# Patient Record
Sex: MALE | Race: WHITE | NOT HISPANIC OR LATINO | URBAN - METROPOLITAN AREA
[De-identification: names, ages, dates, MRNs, and addresses within clinical notes are randomized per-mention and may not be internally consistent; named-entity substitution may affect disease eponyms.]

---

## 2020-01-01 ENCOUNTER — INPATIENT (INPATIENT)
Facility: HOSPITAL | Age: 65
LOS: 26 days | End: 2020-08-12
Attending: INTERNAL MEDICINE | Admitting: INTERNAL MEDICINE
Payer: MEDICARE

## 2020-01-01 VITALS
HEART RATE: 108 BPM | OXYGEN SATURATION: 95 % | DIASTOLIC BLOOD PRESSURE: 71 MMHG | SYSTOLIC BLOOD PRESSURE: 138 MMHG | RESPIRATION RATE: 16 BRPM | TEMPERATURE: 97 F | WEIGHT: 186.29 LBS

## 2020-01-01 VITALS
RESPIRATION RATE: 26 BRPM | TEMPERATURE: 95 F | HEART RATE: 60 BPM | SYSTOLIC BLOOD PRESSURE: 69 MMHG | DIASTOLIC BLOOD PRESSURE: 36 MMHG

## 2020-01-01 DIAGNOSIS — I60.8 OTHER NONTRAUMATIC SUBARACHNOID HEMORRHAGE: ICD-10-CM

## 2020-01-01 DIAGNOSIS — R40.2122 COMA SCALE, EYES OPEN, TO PAIN, AT ARRIVAL TO EMERGENCY DEPARTMENT: ICD-10-CM

## 2020-01-01 DIAGNOSIS — J96.01 ACUTE RESPIRATORY FAILURE WITH HYPOXIA: ICD-10-CM

## 2020-01-01 DIAGNOSIS — G91.1 OBSTRUCTIVE HYDROCEPHALUS: ICD-10-CM

## 2020-01-01 DIAGNOSIS — E11.22 TYPE 2 DIABETES MELLITUS WITH DIABETIC CHRONIC KIDNEY DISEASE: ICD-10-CM

## 2020-01-01 DIAGNOSIS — R40.2342 COMA SCALE, BEST MOTOR RESPONSE, FLEXION WITHDRAWAL, AT ARRIVAL TO EMERGENCY DEPARTMENT: ICD-10-CM

## 2020-01-01 DIAGNOSIS — R53.2 FUNCTIONAL QUADRIPLEGIA: ICD-10-CM

## 2020-01-01 DIAGNOSIS — I10 ESSENTIAL (PRIMARY) HYPERTENSION: ICD-10-CM

## 2020-01-01 DIAGNOSIS — N17.0 ACUTE KIDNEY FAILURE WITH TUBULAR NECROSIS: ICD-10-CM

## 2020-01-01 DIAGNOSIS — E78.5 HYPERLIPIDEMIA, UNSPECIFIED: ICD-10-CM

## 2020-01-01 DIAGNOSIS — E11.65 TYPE 2 DIABETES MELLITUS WITH HYPERGLYCEMIA: ICD-10-CM

## 2020-01-01 DIAGNOSIS — A04.72 ENTEROCOLITIS DUE TO CLOSTRIDIUM DIFFICILE, NOT SPECIFIED AS RECURRENT: ICD-10-CM

## 2020-01-01 DIAGNOSIS — R65.21 SEVERE SEPSIS WITH SEPTIC SHOCK: ICD-10-CM

## 2020-01-01 DIAGNOSIS — A41.51 SEPSIS DUE TO ESCHERICHIA COLI [E. COLI]: ICD-10-CM

## 2020-01-01 DIAGNOSIS — G93.6 CEREBRAL EDEMA: ICD-10-CM

## 2020-01-01 DIAGNOSIS — G50.0 TRIGEMINAL NEURALGIA: ICD-10-CM

## 2020-01-01 DIAGNOSIS — J95.851 VENTILATOR ASSOCIATED PNEUMONIA: ICD-10-CM

## 2020-01-01 DIAGNOSIS — G93.5 COMPRESSION OF BRAIN: ICD-10-CM

## 2020-01-01 DIAGNOSIS — R27.8 OTHER LACK OF COORDINATION: ICD-10-CM

## 2020-01-01 DIAGNOSIS — R47.01 APHASIA: ICD-10-CM

## 2020-01-01 DIAGNOSIS — I46.9 CARDIAC ARREST, CAUSE UNSPECIFIED: ICD-10-CM

## 2020-01-01 DIAGNOSIS — I63.02 CEREBRAL INFARCTION DUE TO THROMBOSIS OF BASILAR ARTERY: ICD-10-CM

## 2020-01-01 DIAGNOSIS — E87.2 ACIDOSIS: ICD-10-CM

## 2020-01-01 DIAGNOSIS — I95.9 HYPOTENSION, UNSPECIFIED: ICD-10-CM

## 2020-01-01 DIAGNOSIS — Z79.84 LONG TERM (CURRENT) USE OF ORAL HYPOGLYCEMIC DRUGS: ICD-10-CM

## 2020-01-01 DIAGNOSIS — R29.810 FACIAL WEAKNESS: ICD-10-CM

## 2020-01-01 DIAGNOSIS — I67.1 CEREBRAL ANEURYSM, NONRUPTURED: ICD-10-CM

## 2020-01-01 DIAGNOSIS — R40.2222 COMA SCALE, BEST VERBAL RESPONSE, INCOMPREHENSIBLE WORDS, AT ARRIVAL TO EMERGENCY DEPARTMENT: ICD-10-CM

## 2020-01-01 DIAGNOSIS — E23.2 DIABETES INSIPIDUS: ICD-10-CM

## 2020-01-01 DIAGNOSIS — R29.709 NIHSS SCORE 9: ICD-10-CM

## 2020-01-01 LAB
-  AMIKACIN: SIGNIFICANT CHANGE UP
-  AMOXICILLIN/CLAVULANIC ACID: SIGNIFICANT CHANGE UP
-  AMPICILLIN/SULBACTAM: SIGNIFICANT CHANGE UP
-  AMPICILLIN/SULBACTAM: SIGNIFICANT CHANGE UP
-  AMPICILLIN: SIGNIFICANT CHANGE UP
-  AMPICILLIN: SIGNIFICANT CHANGE UP
-  AZTREONAM: SIGNIFICANT CHANGE UP
-  CANDIDA ALBICANS: SIGNIFICANT CHANGE UP
-  CANDIDA GLABRATA: SIGNIFICANT CHANGE UP
-  CANDIDA KRUSEI: SIGNIFICANT CHANGE UP
-  CANDIDA PARAPSILOSIS: SIGNIFICANT CHANGE UP
-  CANDIDA TROPICALIS: SIGNIFICANT CHANGE UP
-  CEFAZOLIN: SIGNIFICANT CHANGE UP
-  CEFAZOLIN: SIGNIFICANT CHANGE UP
-  CEFEPIME: SIGNIFICANT CHANGE UP
-  CEFOXITIN: SIGNIFICANT CHANGE UP
-  CEFOXITIN: SIGNIFICANT CHANGE UP
-  CEFTAZIDIME: SIGNIFICANT CHANGE UP
-  CEFTRIAXONE: SIGNIFICANT CHANGE UP
-  CEFTRIAXONE: SIGNIFICANT CHANGE UP
-  CIPROFLOXACIN: SIGNIFICANT CHANGE UP
-  COAGULASE NEGATIVE STAPHYLOCOCCUS: SIGNIFICANT CHANGE UP
-  ERTAPENEM: SIGNIFICANT CHANGE UP
-  ERTAPENEM: SIGNIFICANT CHANGE UP
-  GENTAMICIN: SIGNIFICANT CHANGE UP
-  IMIPENEM: SIGNIFICANT CHANGE UP
-  IMIPENEM: SIGNIFICANT CHANGE UP
-  K. PNEUMONIAE GROUP: SIGNIFICANT CHANGE UP
-  KPC RESISTANCE GENE: SIGNIFICANT CHANGE UP
-  LEVOFLOXACIN: SIGNIFICANT CHANGE UP
-  MEROPENEM: SIGNIFICANT CHANGE UP
-  NITROFURANTOIN: SIGNIFICANT CHANGE UP
-  PIPERACILLIN/TAZOBACTAM: SIGNIFICANT CHANGE UP
-  STREPTOCOCCUS SP. (NOT GRP A, B OR S PNEUMONIAE): SIGNIFICANT CHANGE UP
-  TIGECYCLINE: SIGNIFICANT CHANGE UP
-  TOBRAMYCIN: SIGNIFICANT CHANGE UP
-  TRIMETHOPRIM/SULFAMETHOXAZOLE: SIGNIFICANT CHANGE UP
-  TRIMETHOPRIM/SULFAMETHOXAZOLE: SIGNIFICANT CHANGE UP
A BAUMANNII DNA SPEC QL NAA+PROBE: SIGNIFICANT CHANGE UP
A1C WITH ESTIMATED AVERAGE GLUCOSE RESULT: 7.5 % — HIGH (ref 4–5.6)
ALBUMIN SERPL ELPH-MCNC: 1 G/DL — LOW (ref 3.5–5.2)
ALBUMIN SERPL ELPH-MCNC: 1.1 G/DL — LOW (ref 3.5–5.2)
ALBUMIN SERPL ELPH-MCNC: 1.4 G/DL — LOW (ref 3.5–5.2)
ALBUMIN SERPL ELPH-MCNC: 1.5 G/DL — LOW (ref 3.5–5.2)
ALBUMIN SERPL ELPH-MCNC: 1.6 G/DL — LOW (ref 3.5–5.2)
ALBUMIN SERPL ELPH-MCNC: 1.8 G/DL — LOW (ref 3.5–5.2)
ALBUMIN SERPL ELPH-MCNC: 2 G/DL — LOW (ref 3.5–5.2)
ALBUMIN SERPL ELPH-MCNC: 2.1 G/DL — LOW (ref 3.5–5.2)
ALBUMIN SERPL ELPH-MCNC: 2.3 G/DL — LOW (ref 3.5–5.2)
ALBUMIN SERPL ELPH-MCNC: 2.3 G/DL — LOW (ref 3.5–5.2)
ALBUMIN SERPL ELPH-MCNC: 2.5 G/DL — LOW (ref 3.5–5.2)
ALBUMIN SERPL ELPH-MCNC: 2.5 G/DL — LOW (ref 3.5–5.2)
ALBUMIN SERPL ELPH-MCNC: 3 G/DL — LOW (ref 3.5–5.2)
ALBUMIN SERPL ELPH-MCNC: 3.1 G/DL — LOW (ref 3.5–5.2)
ALBUMIN SERPL ELPH-MCNC: 3.1 G/DL — LOW (ref 3.5–5.2)
ALBUMIN SERPL ELPH-MCNC: 3.2 G/DL — LOW (ref 3.5–5.2)
ALBUMIN SERPL ELPH-MCNC: 3.2 G/DL — LOW (ref 3.5–5.2)
ALBUMIN SERPL ELPH-MCNC: 3.3 G/DL — LOW (ref 3.5–5.2)
ALBUMIN SERPL ELPH-MCNC: 3.4 G/DL — LOW (ref 3.5–5.2)
ALBUMIN SERPL ELPH-MCNC: 3.5 G/DL — SIGNIFICANT CHANGE UP (ref 3.5–5.2)
ALBUMIN SERPL ELPH-MCNC: 3.5 G/DL — SIGNIFICANT CHANGE UP (ref 3.5–5.2)
ALBUMIN SERPL ELPH-MCNC: 4 G/DL — SIGNIFICANT CHANGE UP (ref 3.5–5.2)
ALBUMIN SERPL ELPH-MCNC: 4.5 G/DL — SIGNIFICANT CHANGE UP (ref 3.5–5.2)
ALBUMIN SERPL ELPH-MCNC: 4.7 G/DL — SIGNIFICANT CHANGE UP (ref 3.5–5.2)
ALP SERPL-CCNC: 104 U/L — SIGNIFICANT CHANGE UP (ref 30–115)
ALP SERPL-CCNC: 137 U/L — HIGH (ref 30–115)
ALP SERPL-CCNC: 149 U/L — HIGH (ref 30–115)
ALP SERPL-CCNC: 155 U/L — HIGH (ref 30–115)
ALP SERPL-CCNC: 168 U/L — HIGH (ref 30–115)
ALP SERPL-CCNC: 35 U/L — SIGNIFICANT CHANGE UP (ref 30–115)
ALP SERPL-CCNC: 36 U/L — SIGNIFICANT CHANGE UP (ref 30–115)
ALP SERPL-CCNC: 36 U/L — SIGNIFICANT CHANGE UP (ref 30–115)
ALP SERPL-CCNC: 37 U/L — SIGNIFICANT CHANGE UP (ref 30–115)
ALP SERPL-CCNC: 38 U/L — SIGNIFICANT CHANGE UP (ref 30–115)
ALP SERPL-CCNC: 41 U/L — SIGNIFICANT CHANGE UP (ref 30–115)
ALP SERPL-CCNC: 43 U/L — SIGNIFICANT CHANGE UP (ref 30–115)
ALP SERPL-CCNC: 43 U/L — SIGNIFICANT CHANGE UP (ref 30–115)
ALP SERPL-CCNC: 45 U/L — SIGNIFICANT CHANGE UP (ref 30–115)
ALP SERPL-CCNC: 52 U/L — SIGNIFICANT CHANGE UP (ref 30–115)
ALP SERPL-CCNC: 54 U/L — SIGNIFICANT CHANGE UP (ref 30–115)
ALP SERPL-CCNC: 56 U/L — SIGNIFICANT CHANGE UP (ref 30–115)
ALP SERPL-CCNC: 68 U/L — SIGNIFICANT CHANGE UP (ref 30–115)
ALP SERPL-CCNC: 70 U/L — SIGNIFICANT CHANGE UP (ref 30–115)
ALP SERPL-CCNC: 75 U/L — SIGNIFICANT CHANGE UP (ref 30–115)
ALP SERPL-CCNC: 78 U/L — SIGNIFICANT CHANGE UP (ref 30–115)
ALP SERPL-CCNC: 79 U/L — SIGNIFICANT CHANGE UP (ref 30–115)
ALP SERPL-CCNC: 81 U/L — SIGNIFICANT CHANGE UP (ref 30–115)
ALP SERPL-CCNC: 86 U/L — SIGNIFICANT CHANGE UP (ref 30–115)
ALP SERPL-CCNC: 95 U/L — SIGNIFICANT CHANGE UP (ref 30–115)
ALP SERPL-CCNC: 95 U/L — SIGNIFICANT CHANGE UP (ref 30–115)
ALP SERPL-CCNC: 96 U/L — SIGNIFICANT CHANGE UP (ref 30–115)
ALP SERPL-CCNC: 96 U/L — SIGNIFICANT CHANGE UP (ref 30–115)
ALP SERPL-CCNC: 99 U/L — SIGNIFICANT CHANGE UP (ref 30–115)
ALT FLD-CCNC: 11 U/L — SIGNIFICANT CHANGE UP (ref 0–41)
ALT FLD-CCNC: 11 U/L — SIGNIFICANT CHANGE UP (ref 0–41)
ALT FLD-CCNC: 12 U/L — SIGNIFICANT CHANGE UP (ref 0–41)
ALT FLD-CCNC: 15 U/L — SIGNIFICANT CHANGE UP (ref 0–41)
ALT FLD-CCNC: 15 U/L — SIGNIFICANT CHANGE UP (ref 0–41)
ALT FLD-CCNC: 18 U/L — SIGNIFICANT CHANGE UP (ref 0–41)
ALT FLD-CCNC: 19 U/L — SIGNIFICANT CHANGE UP (ref 0–41)
ALT FLD-CCNC: 20 U/L — SIGNIFICANT CHANGE UP (ref 0–41)
ALT FLD-CCNC: 21 U/L — SIGNIFICANT CHANGE UP (ref 0–41)
ALT FLD-CCNC: 21 U/L — SIGNIFICANT CHANGE UP (ref 0–41)
ALT FLD-CCNC: 22 U/L — SIGNIFICANT CHANGE UP (ref 0–41)
ALT FLD-CCNC: 23 U/L — SIGNIFICANT CHANGE UP (ref 0–41)
ALT FLD-CCNC: 23 U/L — SIGNIFICANT CHANGE UP (ref 0–41)
ALT FLD-CCNC: 24 U/L — SIGNIFICANT CHANGE UP (ref 0–41)
ALT FLD-CCNC: 25 U/L — SIGNIFICANT CHANGE UP (ref 0–41)
ALT FLD-CCNC: 25 U/L — SIGNIFICANT CHANGE UP (ref 0–41)
ALT FLD-CCNC: 26 U/L — SIGNIFICANT CHANGE UP (ref 0–41)
ALT FLD-CCNC: 27 U/L — SIGNIFICANT CHANGE UP (ref 0–41)
ALT FLD-CCNC: 35 U/L — SIGNIFICANT CHANGE UP (ref 0–41)
ALT FLD-CCNC: 36 U/L — SIGNIFICANT CHANGE UP (ref 0–41)
ALT FLD-CCNC: 38 U/L — SIGNIFICANT CHANGE UP (ref 0–41)
ALT FLD-CCNC: 6 U/L — SIGNIFICANT CHANGE UP (ref 0–41)
ALT FLD-CCNC: 7 U/L — SIGNIFICANT CHANGE UP (ref 0–41)
AMMONIA BLD-MCNC: 22 UMOL/L — SIGNIFICANT CHANGE UP (ref 11–55)
ANION GAP SERPL CALC-SCNC: 10 MMOL/L — SIGNIFICANT CHANGE UP (ref 7–14)
ANION GAP SERPL CALC-SCNC: 11 MMOL/L — SIGNIFICANT CHANGE UP (ref 7–14)
ANION GAP SERPL CALC-SCNC: 12 MMOL/L — SIGNIFICANT CHANGE UP (ref 7–14)
ANION GAP SERPL CALC-SCNC: 13 MMOL/L — SIGNIFICANT CHANGE UP (ref 7–14)
ANION GAP SERPL CALC-SCNC: 14 MMOL/L — SIGNIFICANT CHANGE UP (ref 7–14)
ANION GAP SERPL CALC-SCNC: 15 MMOL/L — HIGH (ref 7–14)
ANION GAP SERPL CALC-SCNC: 16 MMOL/L — HIGH (ref 7–14)
ANION GAP SERPL CALC-SCNC: 17 MMOL/L — HIGH (ref 7–14)
ANION GAP SERPL CALC-SCNC: 18 MMOL/L — HIGH (ref 7–14)
ANION GAP SERPL CALC-SCNC: 19 MMOL/L — HIGH (ref 7–14)
ANION GAP SERPL CALC-SCNC: 7 MMOL/L — SIGNIFICANT CHANGE UP (ref 7–14)
ANION GAP SERPL CALC-SCNC: 8 MMOL/L — SIGNIFICANT CHANGE UP (ref 7–14)
ANION GAP SERPL CALC-SCNC: 9 MMOL/L — SIGNIFICANT CHANGE UP (ref 7–14)
ANISOCYTOSIS BLD QL: SLIGHT — SIGNIFICANT CHANGE UP
APPEARANCE UR: ABNORMAL
APPEARANCE UR: CLEAR — SIGNIFICANT CHANGE UP
APTT BLD: 23.8 SEC — CRITICAL LOW (ref 27–39.2)
APTT BLD: 26.1 SEC — LOW (ref 27–39.2)
APTT BLD: 35.8 SEC — SIGNIFICANT CHANGE UP (ref 27–39.2)
APTT BLD: 36.3 SEC — SIGNIFICANT CHANGE UP (ref 27–39.2)
APTT BLD: 40.7 SEC — HIGH (ref 27–39.2)
AST SERPL-CCNC: 129 U/L — HIGH (ref 0–41)
AST SERPL-CCNC: 138 U/L — HIGH (ref 0–41)
AST SERPL-CCNC: 23 U/L — SIGNIFICANT CHANGE UP (ref 0–41)
AST SERPL-CCNC: 24 U/L — SIGNIFICANT CHANGE UP (ref 0–41)
AST SERPL-CCNC: 26 U/L — SIGNIFICANT CHANGE UP (ref 0–41)
AST SERPL-CCNC: 26 U/L — SIGNIFICANT CHANGE UP (ref 0–41)
AST SERPL-CCNC: 28 U/L — SIGNIFICANT CHANGE UP (ref 0–41)
AST SERPL-CCNC: 29 U/L — SIGNIFICANT CHANGE UP (ref 0–41)
AST SERPL-CCNC: 31 U/L — SIGNIFICANT CHANGE UP (ref 0–41)
AST SERPL-CCNC: 34 U/L — SIGNIFICANT CHANGE UP (ref 0–41)
AST SERPL-CCNC: 34 U/L — SIGNIFICANT CHANGE UP (ref 0–41)
AST SERPL-CCNC: 35 U/L — SIGNIFICANT CHANGE UP (ref 0–41)
AST SERPL-CCNC: 36 U/L — SIGNIFICANT CHANGE UP (ref 0–41)
AST SERPL-CCNC: 36 U/L — SIGNIFICANT CHANGE UP (ref 0–41)
AST SERPL-CCNC: 40 U/L — SIGNIFICANT CHANGE UP (ref 0–41)
AST SERPL-CCNC: 44 U/L — HIGH (ref 0–41)
AST SERPL-CCNC: 46 U/L — HIGH (ref 0–41)
AST SERPL-CCNC: 48 U/L — HIGH (ref 0–41)
AST SERPL-CCNC: 55 U/L — HIGH (ref 0–41)
AST SERPL-CCNC: 59 U/L — HIGH (ref 0–41)
AST SERPL-CCNC: 68 U/L — HIGH (ref 0–41)
AST SERPL-CCNC: 71 U/L — HIGH (ref 0–41)
AST SERPL-CCNC: 73 U/L — HIGH (ref 0–41)
AST SERPL-CCNC: 75 U/L — HIGH (ref 0–41)
AST SERPL-CCNC: 75 U/L — HIGH (ref 0–41)
AST SERPL-CCNC: 78 U/L — HIGH (ref 0–41)
AST SERPL-CCNC: 84 U/L — HIGH (ref 0–41)
AST SERPL-CCNC: 86 U/L — HIGH (ref 0–41)
AST SERPL-CCNC: 92 U/L — HIGH (ref 0–41)
BACTERIA # UR AUTO: NEGATIVE — SIGNIFICANT CHANGE UP
BACTERIA # UR AUTO: NEGATIVE — SIGNIFICANT CHANGE UP
BASE EXCESS BLDA CALC-SCNC: -0.9 MMOL/L — SIGNIFICANT CHANGE UP (ref -2–2)
BASE EXCESS BLDA CALC-SCNC: -1.1 MMOL/L — SIGNIFICANT CHANGE UP (ref -2–2)
BASE EXCESS BLDA CALC-SCNC: -1.8 MMOL/L — SIGNIFICANT CHANGE UP (ref -2–2)
BASE EXCESS BLDA CALC-SCNC: -2.2 MMOL/L — LOW (ref -2–2)
BASE EXCESS BLDA CALC-SCNC: -4.3 MMOL/L — LOW (ref -2–2)
BASE EXCESS BLDV CALC-SCNC: -2.7 MMOL/L — LOW (ref -2–2)
BASE EXCESS BLDV CALC-SCNC: 1.7 MMOL/L — SIGNIFICANT CHANGE UP (ref -2–2)
BASOPHILS # BLD AUTO: 0 K/UL — SIGNIFICANT CHANGE UP (ref 0–0.2)
BASOPHILS # BLD AUTO: 0.01 K/UL — SIGNIFICANT CHANGE UP (ref 0–0.2)
BASOPHILS # BLD AUTO: 0.02 K/UL — SIGNIFICANT CHANGE UP (ref 0–0.2)
BASOPHILS # BLD AUTO: 0.03 K/UL — SIGNIFICANT CHANGE UP (ref 0–0.2)
BASOPHILS # BLD AUTO: 0.03 K/UL — SIGNIFICANT CHANGE UP (ref 0–0.2)
BASOPHILS # BLD AUTO: 0.06 K/UL — SIGNIFICANT CHANGE UP (ref 0–0.2)
BASOPHILS NFR BLD AUTO: 0 % — SIGNIFICANT CHANGE UP (ref 0–1)
BASOPHILS NFR BLD AUTO: 0.1 % — SIGNIFICANT CHANGE UP (ref 0–1)
BASOPHILS NFR BLD AUTO: 0.2 % — SIGNIFICANT CHANGE UP (ref 0–1)
BASOPHILS NFR BLD AUTO: 0.4 % — SIGNIFICANT CHANGE UP (ref 0–1)
BILIRUB SERPL-MCNC: 0.2 MG/DL — SIGNIFICANT CHANGE UP (ref 0.2–1.2)
BILIRUB SERPL-MCNC: 0.3 MG/DL — SIGNIFICANT CHANGE UP (ref 0.2–1.2)
BILIRUB SERPL-MCNC: 0.4 MG/DL — SIGNIFICANT CHANGE UP (ref 0.2–1.2)
BILIRUB SERPL-MCNC: 0.5 MG/DL — SIGNIFICANT CHANGE UP (ref 0.2–1.2)
BILIRUB SERPL-MCNC: 0.5 MG/DL — SIGNIFICANT CHANGE UP (ref 0.2–1.2)
BILIRUB SERPL-MCNC: 0.6 MG/DL — SIGNIFICANT CHANGE UP (ref 0.2–1.2)
BILIRUB SERPL-MCNC: 0.6 MG/DL — SIGNIFICANT CHANGE UP (ref 0.2–1.2)
BILIRUB SERPL-MCNC: 0.8 MG/DL — SIGNIFICANT CHANGE UP (ref 0.2–1.2)
BILIRUB SERPL-MCNC: 0.9 MG/DL — SIGNIFICANT CHANGE UP (ref 0.2–1.2)
BILIRUB SERPL-MCNC: 1 MG/DL — SIGNIFICANT CHANGE UP (ref 0.2–1.2)
BILIRUB SERPL-MCNC: 1.1 MG/DL — SIGNIFICANT CHANGE UP (ref 0.2–1.2)
BILIRUB SERPL-MCNC: 1.2 MG/DL — SIGNIFICANT CHANGE UP (ref 0.2–1.2)
BILIRUB SERPL-MCNC: 1.3 MG/DL — HIGH (ref 0.2–1.2)
BILIRUB SERPL-MCNC: 1.4 MG/DL — HIGH (ref 0.2–1.2)
BILIRUB SERPL-MCNC: 1.6 MG/DL — HIGH (ref 0.2–1.2)
BILIRUB UR-MCNC: NEGATIVE — SIGNIFICANT CHANGE UP
BILIRUB UR-MCNC: NEGATIVE — SIGNIFICANT CHANGE UP
BLD GP AB SCN SERPL QL: SIGNIFICANT CHANGE UP
BLD GP AB SCN SERPL QL: SIGNIFICANT CHANGE UP
BUN SERPL-MCNC: 100 MG/DL — CRITICAL HIGH (ref 10–20)
BUN SERPL-MCNC: 101 MG/DL — CRITICAL HIGH (ref 10–20)
BUN SERPL-MCNC: 103 MG/DL — CRITICAL HIGH (ref 10–20)
BUN SERPL-MCNC: 103 MG/DL — CRITICAL HIGH (ref 10–20)
BUN SERPL-MCNC: 104 MG/DL — CRITICAL HIGH (ref 10–20)
BUN SERPL-MCNC: 108 MG/DL — CRITICAL HIGH (ref 10–20)
BUN SERPL-MCNC: 108 MG/DL — CRITICAL HIGH (ref 10–20)
BUN SERPL-MCNC: 110 MG/DL — CRITICAL HIGH (ref 10–20)
BUN SERPL-MCNC: 16 MG/DL — SIGNIFICANT CHANGE UP (ref 10–20)
BUN SERPL-MCNC: 17 MG/DL — SIGNIFICANT CHANGE UP (ref 10–20)
BUN SERPL-MCNC: 17 MG/DL — SIGNIFICANT CHANGE UP (ref 10–20)
BUN SERPL-MCNC: 18 MG/DL — SIGNIFICANT CHANGE UP (ref 10–20)
BUN SERPL-MCNC: 19 MG/DL — SIGNIFICANT CHANGE UP (ref 10–20)
BUN SERPL-MCNC: 19 MG/DL — SIGNIFICANT CHANGE UP (ref 10–20)
BUN SERPL-MCNC: 20 MG/DL — SIGNIFICANT CHANGE UP (ref 10–20)
BUN SERPL-MCNC: 21 MG/DL — HIGH (ref 10–20)
BUN SERPL-MCNC: 21 MG/DL — HIGH (ref 10–20)
BUN SERPL-MCNC: 22 MG/DL — HIGH (ref 10–20)
BUN SERPL-MCNC: 23 MG/DL — HIGH (ref 10–20)
BUN SERPL-MCNC: 23 MG/DL — HIGH (ref 10–20)
BUN SERPL-MCNC: 24 MG/DL — HIGH (ref 10–20)
BUN SERPL-MCNC: 24 MG/DL — HIGH (ref 10–20)
BUN SERPL-MCNC: 25 MG/DL — HIGH (ref 10–20)
BUN SERPL-MCNC: 25 MG/DL — HIGH (ref 10–20)
BUN SERPL-MCNC: 26 MG/DL — HIGH (ref 10–20)
BUN SERPL-MCNC: 27 MG/DL — HIGH (ref 10–20)
BUN SERPL-MCNC: 28 MG/DL — HIGH (ref 10–20)
BUN SERPL-MCNC: 29 MG/DL — HIGH (ref 10–20)
BUN SERPL-MCNC: 30 MG/DL — HIGH (ref 10–20)
BUN SERPL-MCNC: 34 MG/DL — HIGH (ref 10–20)
BUN SERPL-MCNC: 35 MG/DL — HIGH (ref 10–20)
BUN SERPL-MCNC: 42 MG/DL — HIGH (ref 10–20)
BUN SERPL-MCNC: 44 MG/DL — HIGH (ref 10–20)
BUN SERPL-MCNC: 46 MG/DL — HIGH (ref 10–20)
BUN SERPL-MCNC: 46 MG/DL — HIGH (ref 10–20)
BUN SERPL-MCNC: 52 MG/DL — HIGH (ref 10–20)
BUN SERPL-MCNC: 54 MG/DL — HIGH (ref 10–20)
BUN SERPL-MCNC: 55 MG/DL — HIGH (ref 10–20)
BUN SERPL-MCNC: 62 MG/DL — CRITICAL HIGH (ref 10–20)
BUN SERPL-MCNC: 68 MG/DL — CRITICAL HIGH (ref 10–20)
BUN SERPL-MCNC: 69 MG/DL — CRITICAL HIGH (ref 10–20)
BUN SERPL-MCNC: 7 MG/DL — LOW (ref 10–20)
BUN SERPL-MCNC: 75 MG/DL — CRITICAL HIGH (ref 10–20)
BUN SERPL-MCNC: 76 MG/DL — CRITICAL HIGH (ref 10–20)
BUN SERPL-MCNC: 76 MG/DL — CRITICAL HIGH (ref 10–20)
BUN SERPL-MCNC: 77 MG/DL — CRITICAL HIGH (ref 10–20)
BUN SERPL-MCNC: 78 MG/DL — CRITICAL HIGH (ref 10–20)
BUN SERPL-MCNC: 78 MG/DL — CRITICAL HIGH (ref 10–20)
BUN SERPL-MCNC: 80 MG/DL — CRITICAL HIGH (ref 10–20)
BUN SERPL-MCNC: 81 MG/DL — CRITICAL HIGH (ref 10–20)
BUN SERPL-MCNC: 83 MG/DL — CRITICAL HIGH (ref 10–20)
BUN SERPL-MCNC: 84 MG/DL — CRITICAL HIGH (ref 10–20)
BUN SERPL-MCNC: 87 MG/DL — CRITICAL HIGH (ref 10–20)
BUN SERPL-MCNC: 95 MG/DL — CRITICAL HIGH (ref 10–20)
BUN SERPL-MCNC: 98 MG/DL — CRITICAL HIGH (ref 10–20)
BUN SERPL-MCNC: 99 MG/DL — CRITICAL HIGH (ref 10–20)
BURR CELLS BLD QL SMEAR: PRESENT — SIGNIFICANT CHANGE UP
C DIFF BY PCR RESULT: POSITIVE
C DIFF TOX GENS STL QL NAA+PROBE: SIGNIFICANT CHANGE UP
CA-I SERPL-SCNC: 1.06 MMOL/L — LOW (ref 1.12–1.3)
CA-I SERPL-SCNC: 1.11 MMOL/L — LOW (ref 1.12–1.3)
CALCIUM SERPL-MCNC: 10 MG/DL — SIGNIFICANT CHANGE UP (ref 8.5–10.1)
CALCIUM SERPL-MCNC: 7 MG/DL — LOW (ref 8.5–10.1)
CALCIUM SERPL-MCNC: 7.4 MG/DL — LOW (ref 8.5–10.1)
CALCIUM SERPL-MCNC: 7.4 MG/DL — LOW (ref 8.5–10.1)
CALCIUM SERPL-MCNC: 7.5 MG/DL — LOW (ref 8.5–10.1)
CALCIUM SERPL-MCNC: 7.6 MG/DL — LOW (ref 8.5–10.1)
CALCIUM SERPL-MCNC: 7.7 MG/DL — LOW (ref 8.5–10.1)
CALCIUM SERPL-MCNC: 7.8 MG/DL — LOW (ref 8.5–10.1)
CALCIUM SERPL-MCNC: 7.9 MG/DL — LOW (ref 8.5–10.1)
CALCIUM SERPL-MCNC: 8 MG/DL — LOW (ref 8.5–10.1)
CALCIUM SERPL-MCNC: 8.1 MG/DL — LOW (ref 8.5–10.1)
CALCIUM SERPL-MCNC: 8.2 MG/DL — LOW (ref 8.5–10.1)
CALCIUM SERPL-MCNC: 8.3 MG/DL — LOW (ref 8.5–10.1)
CALCIUM SERPL-MCNC: 8.4 MG/DL — LOW (ref 8.5–10.1)
CALCIUM SERPL-MCNC: 8.5 MG/DL — SIGNIFICANT CHANGE UP (ref 8.5–10.1)
CALCIUM SERPL-MCNC: 8.5 MG/DL — SIGNIFICANT CHANGE UP (ref 8.5–10.1)
CALCIUM SERPL-MCNC: 8.6 MG/DL — SIGNIFICANT CHANGE UP (ref 8.5–10.1)
CALCIUM SERPL-MCNC: 8.7 MG/DL — SIGNIFICANT CHANGE UP (ref 8.5–10.1)
CALCIUM SERPL-MCNC: 8.7 MG/DL — SIGNIFICANT CHANGE UP (ref 8.5–10.1)
CALCIUM SERPL-MCNC: 9.1 MG/DL — SIGNIFICANT CHANGE UP (ref 8.5–10.1)
CHLORIDE SERPL-SCNC: 101 MMOL/L — SIGNIFICANT CHANGE UP (ref 98–110)
CHLORIDE SERPL-SCNC: 103 MMOL/L — SIGNIFICANT CHANGE UP (ref 98–110)
CHLORIDE SERPL-SCNC: 104 MMOL/L — SIGNIFICANT CHANGE UP (ref 98–110)
CHLORIDE SERPL-SCNC: 105 MMOL/L — SIGNIFICANT CHANGE UP (ref 98–110)
CHLORIDE SERPL-SCNC: 106 MMOL/L — SIGNIFICANT CHANGE UP (ref 98–110)
CHLORIDE SERPL-SCNC: 108 MMOL/L — SIGNIFICANT CHANGE UP (ref 98–110)
CHLORIDE SERPL-SCNC: 109 MMOL/L — SIGNIFICANT CHANGE UP (ref 98–110)
CHLORIDE SERPL-SCNC: 109 MMOL/L — SIGNIFICANT CHANGE UP (ref 98–110)
CHLORIDE SERPL-SCNC: 110 MMOL/L — SIGNIFICANT CHANGE UP (ref 98–110)
CHLORIDE SERPL-SCNC: 111 MMOL/L — HIGH (ref 98–110)
CHLORIDE SERPL-SCNC: 113 MMOL/L — HIGH (ref 98–110)
CHLORIDE SERPL-SCNC: 114 MMOL/L — HIGH (ref 98–110)
CHLORIDE SERPL-SCNC: 115 MMOL/L — HIGH (ref 98–110)
CHLORIDE SERPL-SCNC: 115 MMOL/L — HIGH (ref 98–110)
CHLORIDE SERPL-SCNC: 116 MMOL/L — HIGH (ref 98–110)
CHLORIDE SERPL-SCNC: 117 MMOL/L — HIGH (ref 98–110)
CHLORIDE SERPL-SCNC: 117 MMOL/L — HIGH (ref 98–110)
CHLORIDE SERPL-SCNC: 118 MMOL/L — HIGH (ref 98–110)
CHLORIDE SERPL-SCNC: 118 MMOL/L — HIGH (ref 98–110)
CHLORIDE SERPL-SCNC: 119 MMOL/L — HIGH (ref 98–110)
CHLORIDE SERPL-SCNC: 120 MMOL/L — HIGH (ref 98–110)
CHLORIDE SERPL-SCNC: 121 MMOL/L — HIGH (ref 98–110)
CHLORIDE SERPL-SCNC: 122 MMOL/L — HIGH (ref 98–110)
CHLORIDE SERPL-SCNC: 122 MMOL/L — HIGH (ref 98–110)
CHLORIDE SERPL-SCNC: 126 MMOL/L — HIGH (ref 98–110)
CHLORIDE SERPL-SCNC: 127 MMOL/L — HIGH (ref 98–110)
CHLORIDE SERPL-SCNC: 128 MMOL/L — HIGH (ref 98–110)
CHLORIDE SERPL-SCNC: 129 MMOL/L — HIGH (ref 98–110)
CHLORIDE SERPL-SCNC: 131 MMOL/L — HIGH (ref 98–110)
CHLORIDE SERPL-SCNC: 131 MMOL/L — HIGH (ref 98–110)
CHLORIDE SERPL-SCNC: 132 MMOL/L — HIGH (ref 98–110)
CHLORIDE SERPL-SCNC: 134 MMOL/L — HIGH (ref 98–110)
CHLORIDE SERPL-SCNC: 135 MMOL/L — HIGH (ref 98–110)
CHLORIDE SERPL-SCNC: 135 MMOL/L — HIGH (ref 98–110)
CHLORIDE SERPL-SCNC: 136 MMOL/L — HIGH (ref 98–110)
CHLORIDE SERPL-SCNC: 137 MMOL/L — HIGH (ref 98–110)
CHLORIDE SERPL-SCNC: 138 MMOL/L — HIGH (ref 98–110)
CHLORIDE SERPL-SCNC: 139 MMOL/L — HIGH (ref 98–110)
CHLORIDE SERPL-SCNC: 139 MMOL/L — HIGH (ref 98–110)
CHLORIDE SERPL-SCNC: 141 MMOL/L — HIGH (ref 98–110)
CHLORIDE SERPL-SCNC: 143 MMOL/L — HIGH (ref 98–110)
CHLORIDE SERPL-SCNC: 93 MMOL/L — LOW (ref 98–110)
CHLORIDE SERPL-SCNC: 97 MMOL/L — LOW (ref 98–110)
CHOLEST SERPL-MCNC: 146 MG/DL — SIGNIFICANT CHANGE UP (ref 100–200)
CO2 SERPL-SCNC: 14 MMOL/L — LOW (ref 17–32)
CO2 SERPL-SCNC: 15 MMOL/L — LOW (ref 17–32)
CO2 SERPL-SCNC: 16 MMOL/L — LOW (ref 17–32)
CO2 SERPL-SCNC: 17 MMOL/L — SIGNIFICANT CHANGE UP (ref 17–32)
CO2 SERPL-SCNC: 18 MMOL/L — SIGNIFICANT CHANGE UP (ref 17–32)
CO2 SERPL-SCNC: 19 MMOL/L — SIGNIFICANT CHANGE UP (ref 17–32)
CO2 SERPL-SCNC: 20 MMOL/L — SIGNIFICANT CHANGE UP (ref 17–32)
CO2 SERPL-SCNC: 21 MMOL/L — SIGNIFICANT CHANGE UP (ref 17–32)
CO2 SERPL-SCNC: 22 MMOL/L — SIGNIFICANT CHANGE UP (ref 17–32)
CO2 SERPL-SCNC: 23 MMOL/L — SIGNIFICANT CHANGE UP (ref 17–32)
CO2 SERPL-SCNC: 23 MMOL/L — SIGNIFICANT CHANGE UP (ref 17–32)
CO2 SERPL-SCNC: 24 MMOL/L — SIGNIFICANT CHANGE UP (ref 17–32)
CO2 SERPL-SCNC: 28 MMOL/L — SIGNIFICANT CHANGE UP (ref 17–32)
COLOR SPEC: COLORLESS — SIGNIFICANT CHANGE UP
COLOR SPEC: YELLOW — SIGNIFICANT CHANGE UP
CREAT ?TM UR-MCNC: 13 MG/DL — SIGNIFICANT CHANGE UP
CREAT SERPL-MCNC: 0.6 MG/DL — LOW (ref 0.7–1.5)
CREAT SERPL-MCNC: 1 MG/DL — SIGNIFICANT CHANGE UP (ref 0.7–1.5)
CREAT SERPL-MCNC: 1.1 MG/DL — SIGNIFICANT CHANGE UP (ref 0.7–1.5)
CREAT SERPL-MCNC: 1.1 MG/DL — SIGNIFICANT CHANGE UP (ref 0.7–1.5)
CREAT SERPL-MCNC: 1.2 MG/DL — SIGNIFICANT CHANGE UP (ref 0.7–1.5)
CREAT SERPL-MCNC: 1.3 MG/DL — SIGNIFICANT CHANGE UP (ref 0.7–1.5)
CREAT SERPL-MCNC: 1.4 MG/DL — SIGNIFICANT CHANGE UP (ref 0.7–1.5)
CREAT SERPL-MCNC: 1.5 MG/DL — SIGNIFICANT CHANGE UP (ref 0.7–1.5)
CREAT SERPL-MCNC: 1.6 MG/DL — HIGH (ref 0.7–1.5)
CREAT SERPL-MCNC: 1.7 MG/DL — HIGH (ref 0.7–1.5)
CREAT SERPL-MCNC: 1.9 MG/DL — HIGH (ref 0.7–1.5)
CREAT SERPL-MCNC: 1.9 MG/DL — HIGH (ref 0.7–1.5)
CREAT SERPL-MCNC: 2.1 MG/DL — HIGH (ref 0.7–1.5)
CREAT SERPL-MCNC: 2.2 MG/DL — HIGH (ref 0.7–1.5)
CREAT SERPL-MCNC: 2.3 MG/DL — HIGH (ref 0.7–1.5)
CREAT SERPL-MCNC: 2.3 MG/DL — HIGH (ref 0.7–1.5)
CREAT SERPL-MCNC: 2.4 MG/DL — HIGH (ref 0.7–1.5)
CREAT SERPL-MCNC: 2.5 MG/DL — HIGH (ref 0.7–1.5)
CREAT SERPL-MCNC: 2.5 MG/DL — HIGH (ref 0.7–1.5)
CREAT SERPL-MCNC: 2.7 MG/DL — HIGH (ref 0.7–1.5)
CREAT SERPL-MCNC: 2.8 MG/DL — HIGH (ref 0.7–1.5)
CREAT SERPL-MCNC: 2.8 MG/DL — HIGH (ref 0.7–1.5)
CREAT SERPL-MCNC: 3 MG/DL — HIGH (ref 0.7–1.5)
CREAT SERPL-MCNC: 3.2 MG/DL — HIGH (ref 0.7–1.5)
CREAT SERPL-MCNC: 3.6 MG/DL — HIGH (ref 0.7–1.5)
CREAT SERPL-MCNC: 3.7 MG/DL — HIGH (ref 0.7–1.5)
CREAT SERPL-MCNC: 3.8 MG/DL — HIGH (ref 0.7–1.5)
CREAT SERPL-MCNC: 3.9 MG/DL — HIGH (ref 0.7–1.5)
CREAT SERPL-MCNC: 3.9 MG/DL — HIGH (ref 0.7–1.5)
CREAT SERPL-MCNC: 4 MG/DL — HIGH (ref 0.7–1.5)
CREAT SERPL-MCNC: 4.1 MG/DL — CRITICAL HIGH (ref 0.7–1.5)
CREAT SERPL-MCNC: 4.2 MG/DL — CRITICAL HIGH (ref 0.7–1.5)
CREAT SERPL-MCNC: 4.3 MG/DL — CRITICAL HIGH (ref 0.7–1.5)
CREAT SERPL-MCNC: 4.4 MG/DL — CRITICAL HIGH (ref 0.7–1.5)
CREAT SERPL-MCNC: 4.4 MG/DL — CRITICAL HIGH (ref 0.7–1.5)
CREAT SERPL-MCNC: 4.5 MG/DL — CRITICAL HIGH (ref 0.7–1.5)
CREAT SERPL-MCNC: 4.6 MG/DL — CRITICAL HIGH (ref 0.7–1.5)
CREAT SERPL-MCNC: 4.7 MG/DL — CRITICAL HIGH (ref 0.7–1.5)
CREAT SERPL-MCNC: 5.2 MG/DL — CRITICAL HIGH (ref 0.7–1.5)
CREAT SERPL-MCNC: 5.5 MG/DL — CRITICAL HIGH (ref 0.7–1.5)
CREAT SERPL-MCNC: 6 MG/DL — CRITICAL HIGH (ref 0.7–1.5)
CREAT SERPL-MCNC: 6.5 MG/DL — CRITICAL HIGH (ref 0.7–1.5)
CREAT SERPL-MCNC: 7 MG/DL — CRITICAL HIGH (ref 0.7–1.5)
CREAT SERPL-MCNC: 7.3 MG/DL — CRITICAL HIGH (ref 0.7–1.5)
CREAT SERPL-MCNC: 7.4 MG/DL — CRITICAL HIGH (ref 0.7–1.5)
CREAT SERPL-MCNC: 7.4 MG/DL — CRITICAL HIGH (ref 0.7–1.5)
CREAT SERPL-MCNC: 7.8 MG/DL — CRITICAL HIGH (ref 0.7–1.5)
CREAT SERPL-MCNC: 7.9 MG/DL — CRITICAL HIGH (ref 0.7–1.5)
CREAT SERPL-MCNC: 7.9 MG/DL — CRITICAL HIGH (ref 0.7–1.5)
CREAT SERPL-MCNC: 8 MG/DL — CRITICAL HIGH (ref 0.7–1.5)
CREAT SERPL-MCNC: 8 MG/DL — CRITICAL HIGH (ref 0.7–1.5)
CREAT SERPL-MCNC: 8.1 MG/DL — CRITICAL HIGH (ref 0.7–1.5)
CREAT SERPL-MCNC: 8.3 MG/DL — CRITICAL HIGH (ref 0.7–1.5)
CREAT SERPL-MCNC: 8.3 MG/DL — CRITICAL HIGH (ref 0.7–1.5)
CREAT SERPL-MCNC: 8.6 MG/DL — CRITICAL HIGH (ref 0.7–1.5)
CRP SERPL-MCNC: 16.3 MG/DL — HIGH (ref 0–0.4)
CULTURE RESULTS: SIGNIFICANT CHANGE UP
DIFF PNL FLD: ABNORMAL
DIFF PNL FLD: ABNORMAL
E CLOAC COMP DNA BLD POS QL NAA+PROBE: SIGNIFICANT CHANGE UP
E COLI DNA BLD POS QL NAA+NON-PROBE: SIGNIFICANT CHANGE UP
E COLI DNA BLD POS QL NAA+NON-PROBE: SIGNIFICANT CHANGE UP
ENTEROCOC DNA BLD POS QL NAA+NON-PROBE: SIGNIFICANT CHANGE UP
ENTEROCOC DNA BLD POS QL NAA+NON-PROBE: SIGNIFICANT CHANGE UP
EOSINOPHIL # BLD AUTO: 0 K/UL — SIGNIFICANT CHANGE UP (ref 0–0.7)
EOSINOPHIL # BLD AUTO: 0.01 K/UL — SIGNIFICANT CHANGE UP (ref 0–0.7)
EOSINOPHIL # BLD AUTO: 0.01 K/UL — SIGNIFICANT CHANGE UP (ref 0–0.7)
EOSINOPHIL # BLD AUTO: 0.14 K/UL — SIGNIFICANT CHANGE UP (ref 0–0.7)
EOSINOPHIL # BLD AUTO: 0.19 K/UL — SIGNIFICANT CHANGE UP (ref 0–0.7)
EOSINOPHIL # BLD AUTO: 0.48 K/UL — SIGNIFICANT CHANGE UP (ref 0–0.7)
EOSINOPHIL NFR BLD AUTO: 0 % — SIGNIFICANT CHANGE UP (ref 0–8)
EOSINOPHIL NFR BLD AUTO: 0.1 % — SIGNIFICANT CHANGE UP (ref 0–8)
EOSINOPHIL NFR BLD AUTO: 0.1 % — SIGNIFICANT CHANGE UP (ref 0–8)
EOSINOPHIL NFR BLD AUTO: 0.9 % — SIGNIFICANT CHANGE UP (ref 0–8)
EOSINOPHIL NFR BLD AUTO: 1.3 % — SIGNIFICANT CHANGE UP (ref 0–8)
EOSINOPHIL NFR BLD AUTO: 3.3 % — SIGNIFICANT CHANGE UP (ref 0–8)
EPI CELLS # UR: 2 /HPF — SIGNIFICANT CHANGE UP (ref 0–5)
EPI CELLS # UR: 4 /HPF — SIGNIFICANT CHANGE UP (ref 0–5)
ESTIMATED AVERAGE GLUCOSE: 169 MG/DL — HIGH (ref 68–114)
ETHANOL SERPL-MCNC: <10 MG/DL — SIGNIFICANT CHANGE UP
GAS PNL BLDA: SIGNIFICANT CHANGE UP
GAS PNL BLDA: SIGNIFICANT CHANGE UP
GAS PNL BLDV: 140 MMOL/L — SIGNIFICANT CHANGE UP (ref 136–145)
GAS PNL BLDV: 150 MMOL/L — HIGH (ref 136–145)
GAS PNL BLDV: SIGNIFICANT CHANGE UP
GAS PNL BLDV: SIGNIFICANT CHANGE UP
GIANT PLATELETS BLD QL SMEAR: PRESENT — SIGNIFICANT CHANGE UP
GIANT PLATELETS BLD QL SMEAR: PRESENT — SIGNIFICANT CHANGE UP
GLUCOSE BLDC GLUCOMTR-MCNC: 106 MG/DL — HIGH (ref 70–99)
GLUCOSE BLDC GLUCOMTR-MCNC: 107 MG/DL — HIGH (ref 70–99)
GLUCOSE BLDC GLUCOMTR-MCNC: 107 MG/DL — HIGH (ref 70–99)
GLUCOSE BLDC GLUCOMTR-MCNC: 109 MG/DL — HIGH (ref 70–99)
GLUCOSE BLDC GLUCOMTR-MCNC: 114 MG/DL — HIGH (ref 70–99)
GLUCOSE BLDC GLUCOMTR-MCNC: 114 MG/DL — HIGH (ref 70–99)
GLUCOSE BLDC GLUCOMTR-MCNC: 116 MG/DL — HIGH (ref 70–99)
GLUCOSE BLDC GLUCOMTR-MCNC: 117 MG/DL — HIGH (ref 70–99)
GLUCOSE BLDC GLUCOMTR-MCNC: 118 MG/DL — HIGH (ref 70–99)
GLUCOSE BLDC GLUCOMTR-MCNC: 119 MG/DL — HIGH (ref 70–99)
GLUCOSE BLDC GLUCOMTR-MCNC: 120 MG/DL — HIGH (ref 70–99)
GLUCOSE BLDC GLUCOMTR-MCNC: 122 MG/DL — HIGH (ref 70–99)
GLUCOSE BLDC GLUCOMTR-MCNC: 123 MG/DL — HIGH (ref 70–99)
GLUCOSE BLDC GLUCOMTR-MCNC: 123 MG/DL — HIGH (ref 70–99)
GLUCOSE BLDC GLUCOMTR-MCNC: 124 MG/DL — HIGH (ref 70–99)
GLUCOSE BLDC GLUCOMTR-MCNC: 125 MG/DL — HIGH (ref 70–99)
GLUCOSE BLDC GLUCOMTR-MCNC: 126 MG/DL — HIGH (ref 70–99)
GLUCOSE BLDC GLUCOMTR-MCNC: 127 MG/DL — HIGH (ref 70–99)
GLUCOSE BLDC GLUCOMTR-MCNC: 128 MG/DL — HIGH (ref 70–99)
GLUCOSE BLDC GLUCOMTR-MCNC: 129 MG/DL — HIGH (ref 70–99)
GLUCOSE BLDC GLUCOMTR-MCNC: 130 MG/DL — HIGH (ref 70–99)
GLUCOSE BLDC GLUCOMTR-MCNC: 131 MG/DL — HIGH (ref 70–99)
GLUCOSE BLDC GLUCOMTR-MCNC: 131 MG/DL — HIGH (ref 70–99)
GLUCOSE BLDC GLUCOMTR-MCNC: 132 MG/DL — HIGH (ref 70–99)
GLUCOSE BLDC GLUCOMTR-MCNC: 132 MG/DL — HIGH (ref 70–99)
GLUCOSE BLDC GLUCOMTR-MCNC: 133 MG/DL — HIGH (ref 70–99)
GLUCOSE BLDC GLUCOMTR-MCNC: 134 MG/DL — HIGH (ref 70–99)
GLUCOSE BLDC GLUCOMTR-MCNC: 134 MG/DL — HIGH (ref 70–99)
GLUCOSE BLDC GLUCOMTR-MCNC: 135 MG/DL — HIGH (ref 70–99)
GLUCOSE BLDC GLUCOMTR-MCNC: 135 MG/DL — HIGH (ref 70–99)
GLUCOSE BLDC GLUCOMTR-MCNC: 136 MG/DL — HIGH (ref 70–99)
GLUCOSE BLDC GLUCOMTR-MCNC: 137 MG/DL — HIGH (ref 70–99)
GLUCOSE BLDC GLUCOMTR-MCNC: 138 MG/DL — HIGH (ref 70–99)
GLUCOSE BLDC GLUCOMTR-MCNC: 139 MG/DL — HIGH (ref 70–99)
GLUCOSE BLDC GLUCOMTR-MCNC: 140 MG/DL — HIGH (ref 70–99)
GLUCOSE BLDC GLUCOMTR-MCNC: 141 MG/DL — HIGH (ref 70–99)
GLUCOSE BLDC GLUCOMTR-MCNC: 142 MG/DL — HIGH (ref 70–99)
GLUCOSE BLDC GLUCOMTR-MCNC: 143 MG/DL — HIGH (ref 70–99)
GLUCOSE BLDC GLUCOMTR-MCNC: 144 MG/DL — HIGH (ref 70–99)
GLUCOSE BLDC GLUCOMTR-MCNC: 145 MG/DL — HIGH (ref 70–99)
GLUCOSE BLDC GLUCOMTR-MCNC: 145 MG/DL — HIGH (ref 70–99)
GLUCOSE BLDC GLUCOMTR-MCNC: 146 MG/DL — HIGH (ref 70–99)
GLUCOSE BLDC GLUCOMTR-MCNC: 147 MG/DL — HIGH (ref 70–99)
GLUCOSE BLDC GLUCOMTR-MCNC: 148 MG/DL — HIGH (ref 70–99)
GLUCOSE BLDC GLUCOMTR-MCNC: 149 MG/DL — HIGH (ref 70–99)
GLUCOSE BLDC GLUCOMTR-MCNC: 149 MG/DL — HIGH (ref 70–99)
GLUCOSE BLDC GLUCOMTR-MCNC: 150 MG/DL — HIGH (ref 70–99)
GLUCOSE BLDC GLUCOMTR-MCNC: 150 MG/DL — HIGH (ref 70–99)
GLUCOSE BLDC GLUCOMTR-MCNC: 151 MG/DL — HIGH (ref 70–99)
GLUCOSE BLDC GLUCOMTR-MCNC: 152 MG/DL — HIGH (ref 70–99)
GLUCOSE BLDC GLUCOMTR-MCNC: 153 MG/DL — HIGH (ref 70–99)
GLUCOSE BLDC GLUCOMTR-MCNC: 154 MG/DL — HIGH (ref 70–99)
GLUCOSE BLDC GLUCOMTR-MCNC: 155 MG/DL — HIGH (ref 70–99)
GLUCOSE BLDC GLUCOMTR-MCNC: 156 MG/DL — HIGH (ref 70–99)
GLUCOSE BLDC GLUCOMTR-MCNC: 156 MG/DL — HIGH (ref 70–99)
GLUCOSE BLDC GLUCOMTR-MCNC: 157 MG/DL — HIGH (ref 70–99)
GLUCOSE BLDC GLUCOMTR-MCNC: 158 MG/DL — HIGH (ref 70–99)
GLUCOSE BLDC GLUCOMTR-MCNC: 159 MG/DL — HIGH (ref 70–99)
GLUCOSE BLDC GLUCOMTR-MCNC: 160 MG/DL — HIGH (ref 70–99)
GLUCOSE BLDC GLUCOMTR-MCNC: 161 MG/DL — HIGH (ref 70–99)
GLUCOSE BLDC GLUCOMTR-MCNC: 162 MG/DL — HIGH (ref 70–99)
GLUCOSE BLDC GLUCOMTR-MCNC: 163 MG/DL — HIGH (ref 70–99)
GLUCOSE BLDC GLUCOMTR-MCNC: 164 MG/DL — HIGH (ref 70–99)
GLUCOSE BLDC GLUCOMTR-MCNC: 165 MG/DL — HIGH (ref 70–99)
GLUCOSE BLDC GLUCOMTR-MCNC: 166 MG/DL — HIGH (ref 70–99)
GLUCOSE BLDC GLUCOMTR-MCNC: 167 MG/DL — HIGH (ref 70–99)
GLUCOSE BLDC GLUCOMTR-MCNC: 168 MG/DL — HIGH (ref 70–99)
GLUCOSE BLDC GLUCOMTR-MCNC: 168 MG/DL — HIGH (ref 70–99)
GLUCOSE BLDC GLUCOMTR-MCNC: 169 MG/DL — HIGH (ref 70–99)
GLUCOSE BLDC GLUCOMTR-MCNC: 170 MG/DL — HIGH (ref 70–99)
GLUCOSE BLDC GLUCOMTR-MCNC: 171 MG/DL — HIGH (ref 70–99)
GLUCOSE BLDC GLUCOMTR-MCNC: 171 MG/DL — HIGH (ref 70–99)
GLUCOSE BLDC GLUCOMTR-MCNC: 172 MG/DL — HIGH (ref 70–99)
GLUCOSE BLDC GLUCOMTR-MCNC: 173 MG/DL — HIGH (ref 70–99)
GLUCOSE BLDC GLUCOMTR-MCNC: 175 MG/DL — HIGH (ref 70–99)
GLUCOSE BLDC GLUCOMTR-MCNC: 175 MG/DL — HIGH (ref 70–99)
GLUCOSE BLDC GLUCOMTR-MCNC: 176 MG/DL — HIGH (ref 70–99)
GLUCOSE BLDC GLUCOMTR-MCNC: 176 MG/DL — HIGH (ref 70–99)
GLUCOSE BLDC GLUCOMTR-MCNC: 177 MG/DL — HIGH (ref 70–99)
GLUCOSE BLDC GLUCOMTR-MCNC: 177 MG/DL — HIGH (ref 70–99)
GLUCOSE BLDC GLUCOMTR-MCNC: 178 MG/DL — HIGH (ref 70–99)
GLUCOSE BLDC GLUCOMTR-MCNC: 178 MG/DL — HIGH (ref 70–99)
GLUCOSE BLDC GLUCOMTR-MCNC: 179 MG/DL — HIGH (ref 70–99)
GLUCOSE BLDC GLUCOMTR-MCNC: 181 MG/DL — HIGH (ref 70–99)
GLUCOSE BLDC GLUCOMTR-MCNC: 181 MG/DL — HIGH (ref 70–99)
GLUCOSE BLDC GLUCOMTR-MCNC: 182 MG/DL — HIGH (ref 70–99)
GLUCOSE BLDC GLUCOMTR-MCNC: 184 MG/DL — HIGH (ref 70–99)
GLUCOSE BLDC GLUCOMTR-MCNC: 185 MG/DL — HIGH (ref 70–99)
GLUCOSE BLDC GLUCOMTR-MCNC: 185 MG/DL — HIGH (ref 70–99)
GLUCOSE BLDC GLUCOMTR-MCNC: 186 MG/DL — HIGH (ref 70–99)
GLUCOSE BLDC GLUCOMTR-MCNC: 186 MG/DL — HIGH (ref 70–99)
GLUCOSE BLDC GLUCOMTR-MCNC: 187 MG/DL — HIGH (ref 70–99)
GLUCOSE BLDC GLUCOMTR-MCNC: 188 MG/DL — HIGH (ref 70–99)
GLUCOSE BLDC GLUCOMTR-MCNC: 189 MG/DL — HIGH (ref 70–99)
GLUCOSE BLDC GLUCOMTR-MCNC: 190 MG/DL — HIGH (ref 70–99)
GLUCOSE BLDC GLUCOMTR-MCNC: 192 MG/DL — HIGH (ref 70–99)
GLUCOSE BLDC GLUCOMTR-MCNC: 194 MG/DL — HIGH (ref 70–99)
GLUCOSE BLDC GLUCOMTR-MCNC: 195 MG/DL — HIGH (ref 70–99)
GLUCOSE BLDC GLUCOMTR-MCNC: 196 MG/DL — HIGH (ref 70–99)
GLUCOSE BLDC GLUCOMTR-MCNC: 198 MG/DL — HIGH (ref 70–99)
GLUCOSE BLDC GLUCOMTR-MCNC: 199 MG/DL — HIGH (ref 70–99)
GLUCOSE BLDC GLUCOMTR-MCNC: 200 MG/DL — HIGH (ref 70–99)
GLUCOSE BLDC GLUCOMTR-MCNC: 200 MG/DL — HIGH (ref 70–99)
GLUCOSE BLDC GLUCOMTR-MCNC: 201 MG/DL — HIGH (ref 70–99)
GLUCOSE BLDC GLUCOMTR-MCNC: 204 MG/DL — HIGH (ref 70–99)
GLUCOSE BLDC GLUCOMTR-MCNC: 205 MG/DL — HIGH (ref 70–99)
GLUCOSE BLDC GLUCOMTR-MCNC: 207 MG/DL — HIGH (ref 70–99)
GLUCOSE BLDC GLUCOMTR-MCNC: 207 MG/DL — HIGH (ref 70–99)
GLUCOSE BLDC GLUCOMTR-MCNC: 212 MG/DL — HIGH (ref 70–99)
GLUCOSE BLDC GLUCOMTR-MCNC: 213 MG/DL — HIGH (ref 70–99)
GLUCOSE BLDC GLUCOMTR-MCNC: 214 MG/DL — HIGH (ref 70–99)
GLUCOSE BLDC GLUCOMTR-MCNC: 214 MG/DL — HIGH (ref 70–99)
GLUCOSE BLDC GLUCOMTR-MCNC: 218 MG/DL — HIGH (ref 70–99)
GLUCOSE BLDC GLUCOMTR-MCNC: 219 MG/DL — HIGH (ref 70–99)
GLUCOSE BLDC GLUCOMTR-MCNC: 219 MG/DL — HIGH (ref 70–99)
GLUCOSE BLDC GLUCOMTR-MCNC: 223 MG/DL — HIGH (ref 70–99)
GLUCOSE BLDC GLUCOMTR-MCNC: 223 MG/DL — HIGH (ref 70–99)
GLUCOSE BLDC GLUCOMTR-MCNC: 225 MG/DL — HIGH (ref 70–99)
GLUCOSE BLDC GLUCOMTR-MCNC: 229 MG/DL — HIGH (ref 70–99)
GLUCOSE BLDC GLUCOMTR-MCNC: 230 MG/DL — HIGH (ref 70–99)
GLUCOSE BLDC GLUCOMTR-MCNC: 233 MG/DL — HIGH (ref 70–99)
GLUCOSE BLDC GLUCOMTR-MCNC: 233 MG/DL — HIGH (ref 70–99)
GLUCOSE BLDC GLUCOMTR-MCNC: 236 MG/DL — HIGH (ref 70–99)
GLUCOSE BLDC GLUCOMTR-MCNC: 238 MG/DL — HIGH (ref 70–99)
GLUCOSE BLDC GLUCOMTR-MCNC: 238 MG/DL — HIGH (ref 70–99)
GLUCOSE BLDC GLUCOMTR-MCNC: 242 MG/DL — HIGH (ref 70–99)
GLUCOSE BLDC GLUCOMTR-MCNC: 246 MG/DL — HIGH (ref 70–99)
GLUCOSE BLDC GLUCOMTR-MCNC: 250 MG/DL — HIGH (ref 70–99)
GLUCOSE BLDC GLUCOMTR-MCNC: 250 MG/DL — HIGH (ref 70–99)
GLUCOSE BLDC GLUCOMTR-MCNC: 252 MG/DL — HIGH (ref 70–99)
GLUCOSE BLDC GLUCOMTR-MCNC: 252 MG/DL — HIGH (ref 70–99)
GLUCOSE BLDC GLUCOMTR-MCNC: 254 MG/DL — HIGH (ref 70–99)
GLUCOSE BLDC GLUCOMTR-MCNC: 257 MG/DL — HIGH (ref 70–99)
GLUCOSE BLDC GLUCOMTR-MCNC: 267 MG/DL — HIGH (ref 70–99)
GLUCOSE BLDC GLUCOMTR-MCNC: 271 MG/DL — HIGH (ref 70–99)
GLUCOSE BLDC GLUCOMTR-MCNC: 281 MG/DL — HIGH (ref 70–99)
GLUCOSE BLDC GLUCOMTR-MCNC: 288 MG/DL — HIGH (ref 70–99)
GLUCOSE BLDC GLUCOMTR-MCNC: 302 MG/DL — HIGH (ref 70–99)
GLUCOSE BLDC GLUCOMTR-MCNC: 306 MG/DL — HIGH (ref 70–99)
GLUCOSE BLDC GLUCOMTR-MCNC: 311 MG/DL — HIGH (ref 70–99)
GLUCOSE BLDC GLUCOMTR-MCNC: 317 MG/DL — HIGH (ref 70–99)
GLUCOSE BLDC GLUCOMTR-MCNC: 320 MG/DL — HIGH (ref 70–99)
GLUCOSE BLDC GLUCOMTR-MCNC: 323 MG/DL — HIGH (ref 70–99)
GLUCOSE BLDC GLUCOMTR-MCNC: 329 MG/DL — HIGH (ref 70–99)
GLUCOSE BLDC GLUCOMTR-MCNC: 334 MG/DL — HIGH (ref 70–99)
GLUCOSE BLDC GLUCOMTR-MCNC: 339 MG/DL — HIGH (ref 70–99)
GLUCOSE BLDC GLUCOMTR-MCNC: 342 MG/DL — HIGH (ref 70–99)
GLUCOSE BLDC GLUCOMTR-MCNC: 348 MG/DL — HIGH (ref 70–99)
GLUCOSE BLDC GLUCOMTR-MCNC: 349 MG/DL — HIGH (ref 70–99)
GLUCOSE BLDC GLUCOMTR-MCNC: 386 MG/DL — HIGH (ref 70–99)
GLUCOSE SERPL-MCNC: 115 MG/DL — HIGH (ref 70–99)
GLUCOSE SERPL-MCNC: 121 MG/DL — HIGH (ref 70–99)
GLUCOSE SERPL-MCNC: 125 MG/DL — HIGH (ref 70–99)
GLUCOSE SERPL-MCNC: 129 MG/DL — HIGH (ref 70–99)
GLUCOSE SERPL-MCNC: 129 MG/DL — HIGH (ref 70–99)
GLUCOSE SERPL-MCNC: 131 MG/DL — HIGH (ref 70–99)
GLUCOSE SERPL-MCNC: 132 MG/DL — HIGH (ref 70–99)
GLUCOSE SERPL-MCNC: 137 MG/DL — HIGH (ref 70–99)
GLUCOSE SERPL-MCNC: 138 MG/DL — HIGH (ref 70–99)
GLUCOSE SERPL-MCNC: 140 MG/DL — HIGH (ref 70–99)
GLUCOSE SERPL-MCNC: 141 MG/DL — HIGH (ref 70–99)
GLUCOSE SERPL-MCNC: 142 MG/DL — HIGH (ref 70–99)
GLUCOSE SERPL-MCNC: 143 MG/DL — HIGH (ref 70–99)
GLUCOSE SERPL-MCNC: 143 MG/DL — HIGH (ref 70–99)
GLUCOSE SERPL-MCNC: 151 MG/DL — HIGH (ref 70–99)
GLUCOSE SERPL-MCNC: 152 MG/DL — HIGH (ref 70–99)
GLUCOSE SERPL-MCNC: 154 MG/DL — HIGH (ref 70–99)
GLUCOSE SERPL-MCNC: 155 MG/DL — HIGH (ref 70–99)
GLUCOSE SERPL-MCNC: 157 MG/DL — HIGH (ref 70–99)
GLUCOSE SERPL-MCNC: 157 MG/DL — HIGH (ref 70–99)
GLUCOSE SERPL-MCNC: 159 MG/DL — HIGH (ref 70–99)
GLUCOSE SERPL-MCNC: 159 MG/DL — HIGH (ref 70–99)
GLUCOSE SERPL-MCNC: 160 MG/DL — HIGH (ref 70–99)
GLUCOSE SERPL-MCNC: 162 MG/DL — HIGH (ref 70–99)
GLUCOSE SERPL-MCNC: 163 MG/DL — HIGH (ref 70–99)
GLUCOSE SERPL-MCNC: 164 MG/DL — HIGH (ref 70–99)
GLUCOSE SERPL-MCNC: 165 MG/DL — HIGH (ref 70–99)
GLUCOSE SERPL-MCNC: 167 MG/DL — HIGH (ref 70–99)
GLUCOSE SERPL-MCNC: 169 MG/DL — HIGH (ref 70–99)
GLUCOSE SERPL-MCNC: 170 MG/DL — HIGH (ref 70–99)
GLUCOSE SERPL-MCNC: 170 MG/DL — HIGH (ref 70–99)
GLUCOSE SERPL-MCNC: 171 MG/DL — HIGH (ref 70–99)
GLUCOSE SERPL-MCNC: 172 MG/DL — HIGH (ref 70–99)
GLUCOSE SERPL-MCNC: 173 MG/DL — HIGH (ref 70–99)
GLUCOSE SERPL-MCNC: 173 MG/DL — HIGH (ref 70–99)
GLUCOSE SERPL-MCNC: 174 MG/DL — HIGH (ref 70–99)
GLUCOSE SERPL-MCNC: 174 MG/DL — HIGH (ref 70–99)
GLUCOSE SERPL-MCNC: 175 MG/DL — HIGH (ref 70–99)
GLUCOSE SERPL-MCNC: 176 MG/DL — HIGH (ref 70–99)
GLUCOSE SERPL-MCNC: 180 MG/DL — HIGH (ref 70–99)
GLUCOSE SERPL-MCNC: 181 MG/DL — HIGH (ref 70–99)
GLUCOSE SERPL-MCNC: 184 MG/DL — HIGH (ref 70–99)
GLUCOSE SERPL-MCNC: 186 MG/DL — HIGH (ref 70–99)
GLUCOSE SERPL-MCNC: 187 MG/DL — HIGH (ref 70–99)
GLUCOSE SERPL-MCNC: 189 MG/DL — HIGH (ref 70–99)
GLUCOSE SERPL-MCNC: 194 MG/DL — HIGH (ref 70–99)
GLUCOSE SERPL-MCNC: 195 MG/DL — HIGH (ref 70–99)
GLUCOSE SERPL-MCNC: 198 MG/DL — HIGH (ref 70–99)
GLUCOSE SERPL-MCNC: 203 MG/DL — HIGH (ref 70–99)
GLUCOSE SERPL-MCNC: 206 MG/DL — HIGH (ref 70–99)
GLUCOSE SERPL-MCNC: 206 MG/DL — HIGH (ref 70–99)
GLUCOSE SERPL-MCNC: 211 MG/DL — HIGH (ref 70–99)
GLUCOSE SERPL-MCNC: 211 MG/DL — HIGH (ref 70–99)
GLUCOSE SERPL-MCNC: 215 MG/DL — HIGH (ref 70–99)
GLUCOSE SERPL-MCNC: 220 MG/DL — HIGH (ref 70–99)
GLUCOSE SERPL-MCNC: 226 MG/DL — HIGH (ref 70–99)
GLUCOSE SERPL-MCNC: 228 MG/DL — HIGH (ref 70–99)
GLUCOSE SERPL-MCNC: 238 MG/DL — HIGH (ref 70–99)
GLUCOSE SERPL-MCNC: 252 MG/DL — HIGH (ref 70–99)
GLUCOSE SERPL-MCNC: 268 MG/DL — HIGH (ref 70–99)
GLUCOSE SERPL-MCNC: 273 MG/DL — HIGH (ref 70–99)
GLUCOSE SERPL-MCNC: 279 MG/DL — HIGH (ref 70–99)
GLUCOSE SERPL-MCNC: 284 MG/DL — HIGH (ref 70–99)
GLUCOSE SERPL-MCNC: 297 MG/DL — HIGH (ref 70–99)
GLUCOSE SERPL-MCNC: 303 MG/DL — HIGH (ref 70–99)
GLUCOSE SERPL-MCNC: 311 MG/DL — HIGH (ref 70–99)
GLUCOSE SERPL-MCNC: 332 MG/DL — HIGH (ref 70–99)
GLUCOSE SERPL-MCNC: 336 MG/DL — HIGH (ref 70–99)
GLUCOSE SERPL-MCNC: 336 MG/DL — HIGH (ref 70–99)
GLUCOSE SERPL-MCNC: 356 MG/DL — HIGH (ref 70–99)
GLUCOSE SERPL-MCNC: 360 MG/DL — HIGH (ref 70–99)
GLUCOSE SERPL-MCNC: 397 MG/DL — HIGH (ref 70–99)
GLUCOSE SERPL-MCNC: 402 MG/DL — HIGH (ref 70–99)
GLUCOSE SERPL-MCNC: 81 MG/DL — SIGNIFICANT CHANGE UP (ref 70–99)
GLUCOSE UR QL: NEGATIVE — SIGNIFICANT CHANGE UP
GLUCOSE UR QL: NEGATIVE — SIGNIFICANT CHANGE UP
GP B STREP DNA BLD POS QL NAA+NON-PROBE: SIGNIFICANT CHANGE UP
GRAM STN FLD: SIGNIFICANT CHANGE UP
HAEM INFLU DNA BLD POS QL NAA+NON-PROBE: SIGNIFICANT CHANGE UP
HCO3 BLDA-SCNC: 20 MMOL/L — LOW (ref 21–29)
HCO3 BLDA-SCNC: 21 MMOL/L — SIGNIFICANT CHANGE UP (ref 21–29)
HCO3 BLDA-SCNC: 23 MMOL/L — SIGNIFICANT CHANGE UP (ref 21–29)
HCO3 BLDA-SCNC: 23 MMOL/L — SIGNIFICANT CHANGE UP (ref 21–29)
HCO3 BLDA-SCNC: 24 MMOL/L — SIGNIFICANT CHANGE UP (ref 21–29)
HCO3 BLDV-SCNC: 22 MMOL/L — SIGNIFICANT CHANGE UP (ref 22–29)
HCO3 BLDV-SCNC: 25 MMOL/L — SIGNIFICANT CHANGE UP (ref 22–29)
HCT VFR BLD CALC: 21.9 % — LOW (ref 42–52)
HCT VFR BLD CALC: 22.4 % — LOW (ref 42–52)
HCT VFR BLD CALC: 22.7 % — LOW (ref 42–52)
HCT VFR BLD CALC: 22.7 % — LOW (ref 42–52)
HCT VFR BLD CALC: 23.6 % — LOW (ref 42–52)
HCT VFR BLD CALC: 24.1 % — LOW (ref 42–52)
HCT VFR BLD CALC: 24.1 % — LOW (ref 42–52)
HCT VFR BLD CALC: 24.4 % — LOW (ref 42–52)
HCT VFR BLD CALC: 24.5 % — LOW (ref 42–52)
HCT VFR BLD CALC: 25 % — LOW (ref 42–52)
HCT VFR BLD CALC: 25.2 % — LOW (ref 42–52)
HCT VFR BLD CALC: 25.8 % — LOW (ref 42–52)
HCT VFR BLD CALC: 25.8 % — LOW (ref 42–52)
HCT VFR BLD CALC: 26 % — LOW (ref 42–52)
HCT VFR BLD CALC: 26.4 % — LOW (ref 42–52)
HCT VFR BLD CALC: 26.5 % — LOW (ref 42–52)
HCT VFR BLD CALC: 26.7 % — LOW (ref 42–52)
HCT VFR BLD CALC: 27.7 % — LOW (ref 42–52)
HCT VFR BLD CALC: 28.1 % — LOW (ref 42–52)
HCT VFR BLD CALC: 28.2 % — LOW (ref 42–52)
HCT VFR BLD CALC: 28.3 % — LOW (ref 42–52)
HCT VFR BLD CALC: 28.5 % — LOW (ref 42–52)
HCT VFR BLD CALC: 28.6 % — LOW (ref 42–52)
HCT VFR BLD CALC: 29.4 % — LOW (ref 42–52)
HCT VFR BLD CALC: 30.9 % — LOW (ref 42–52)
HCT VFR BLD CALC: 30.9 % — LOW (ref 42–52)
HCT VFR BLD CALC: 31.7 % — LOW (ref 42–52)
HCT VFR BLD CALC: 33.1 % — LOW (ref 42–52)
HCT VFR BLD CALC: 33.2 % — LOW (ref 42–52)
HCT VFR BLD CALC: 33.4 % — LOW (ref 42–52)
HCT VFR BLD CALC: 33.6 % — LOW (ref 42–52)
HCT VFR BLD CALC: 33.7 % — LOW (ref 42–52)
HCT VFR BLD CALC: 34 % — LOW (ref 42–52)
HCT VFR BLD CALC: 34.1 % — LOW (ref 42–52)
HCT VFR BLD CALC: 34.3 % — LOW (ref 42–52)
HCT VFR BLD CALC: 34.7 % — LOW (ref 42–52)
HCT VFR BLD CALC: 35.1 % — LOW (ref 42–52)
HCT VFR BLD CALC: 35.8 % — LOW (ref 42–52)
HCT VFR BLD CALC: 44.7 % — SIGNIFICANT CHANGE UP (ref 42–52)
HCT VFR BLD CALC: 46.3 % — SIGNIFICANT CHANGE UP (ref 42–52)
HCT VFR BLDA CALC: 24 % — LOW (ref 34–44)
HCT VFR BLDA CALC: 37.1 % — SIGNIFICANT CHANGE UP (ref 34–44)
HCV AB S/CO SERPL IA: 0.04 COI — SIGNIFICANT CHANGE UP
HCV AB SERPL-IMP: SIGNIFICANT CHANGE UP
HDLC SERPL-MCNC: 50 MG/DL — SIGNIFICANT CHANGE UP
HGB BLD CALC-MCNC: 12.1 G/DL — LOW (ref 14–18)
HGB BLD CALC-MCNC: 7.8 G/DL — LOW (ref 14–18)
HGB BLD-MCNC: 10.1 G/DL — LOW (ref 14–18)
HGB BLD-MCNC: 10.5 G/DL — LOW (ref 14–18)
HGB BLD-MCNC: 10.6 G/DL — LOW (ref 14–18)
HGB BLD-MCNC: 10.7 G/DL — LOW (ref 14–18)
HGB BLD-MCNC: 10.7 G/DL — LOW (ref 14–18)
HGB BLD-MCNC: 10.9 G/DL — LOW (ref 14–18)
HGB BLD-MCNC: 11 G/DL — LOW (ref 14–18)
HGB BLD-MCNC: 11.2 G/DL — LOW (ref 14–18)
HGB BLD-MCNC: 12.1 G/DL — LOW (ref 14–18)
HGB BLD-MCNC: 15.2 G/DL — SIGNIFICANT CHANGE UP (ref 14–18)
HGB BLD-MCNC: 15.4 G/DL — SIGNIFICANT CHANGE UP (ref 14–18)
HGB BLD-MCNC: 7.1 G/DL — LOW (ref 14–18)
HGB BLD-MCNC: 7.1 G/DL — LOW (ref 14–18)
HGB BLD-MCNC: 7.2 G/DL — LOW (ref 14–18)
HGB BLD-MCNC: 7.3 G/DL — LOW (ref 14–18)
HGB BLD-MCNC: 7.5 G/DL — LOW (ref 14–18)
HGB BLD-MCNC: 7.5 G/DL — LOW (ref 14–18)
HGB BLD-MCNC: 7.7 G/DL — LOW (ref 14–18)
HGB BLD-MCNC: 7.7 G/DL — LOW (ref 14–18)
HGB BLD-MCNC: 7.9 G/DL — LOW (ref 14–18)
HGB BLD-MCNC: 7.9 G/DL — LOW (ref 14–18)
HGB BLD-MCNC: 8.1 G/DL — LOW (ref 14–18)
HGB BLD-MCNC: 8.1 G/DL — LOW (ref 14–18)
HGB BLD-MCNC: 8.2 G/DL — LOW (ref 14–18)
HGB BLD-MCNC: 8.2 G/DL — LOW (ref 14–18)
HGB BLD-MCNC: 8.3 G/DL — LOW (ref 14–18)
HGB BLD-MCNC: 8.8 G/DL — LOW (ref 14–18)
HGB BLD-MCNC: 9 G/DL — LOW (ref 14–18)
HGB BLD-MCNC: 9.2 G/DL — LOW (ref 14–18)
HGB BLD-MCNC: 9.4 G/DL — LOW (ref 14–18)
HGB BLD-MCNC: 9.7 G/DL — LOW (ref 14–18)
HGB BLD-MCNC: 9.8 G/DL — LOW (ref 14–18)
HGB BLD-MCNC: 9.9 G/DL — LOW (ref 14–18)
HOROWITZ INDEX BLDA+IHG-RTO: 40 — SIGNIFICANT CHANGE UP
HYALINE CASTS # UR AUTO: 1 /LPF — SIGNIFICANT CHANGE UP (ref 0–7)
HYALINE CASTS # UR AUTO: 5 /LPF — SIGNIFICANT CHANGE UP (ref 0–7)
IMM GRANULOCYTES NFR BLD AUTO: 0.5 % — HIGH (ref 0.1–0.3)
IMM GRANULOCYTES NFR BLD AUTO: 0.6 % — HIGH (ref 0.1–0.3)
IMM GRANULOCYTES NFR BLD AUTO: 0.6 % — HIGH (ref 0.1–0.3)
IMM GRANULOCYTES NFR BLD AUTO: 0.7 % — HIGH (ref 0.1–0.3)
IMM GRANULOCYTES NFR BLD AUTO: 9.3 % — HIGH (ref 0.1–0.3)
INR BLD: 1.15 RATIO — SIGNIFICANT CHANGE UP (ref 0.65–1.3)
INR BLD: 1.24 RATIO — SIGNIFICANT CHANGE UP (ref 0.65–1.3)
INR BLD: 1.28 RATIO — SIGNIFICANT CHANGE UP (ref 0.65–1.3)
INR BLD: 1.31 RATIO — HIGH (ref 0.65–1.3)
INR BLD: 1.37 RATIO — HIGH (ref 0.65–1.3)
K OXYTOCA DNA BLD POS QL NAA+NON-PROBE: SIGNIFICANT CHANGE UP
KETONES UR-MCNC: NEGATIVE — SIGNIFICANT CHANGE UP
KETONES UR-MCNC: NEGATIVE — SIGNIFICANT CHANGE UP
L MONOCYTOG DNA BLD POS QL NAA+NON-PROBE: SIGNIFICANT CHANGE UP
LACTATE BLDV-MCNC: 1 MMOL/L — SIGNIFICANT CHANGE UP (ref 0.5–1.6)
LACTATE BLDV-MCNC: 2 MMOL/L — HIGH (ref 0.5–1.6)
LACTATE SERPL-SCNC: 0.7 MMOL/L — SIGNIFICANT CHANGE UP (ref 0.7–2)
LACTATE SERPL-SCNC: 0.9 MMOL/L — SIGNIFICANT CHANGE UP (ref 0.7–2)
LACTATE SERPL-SCNC: 0.9 MMOL/L — SIGNIFICANT CHANGE UP (ref 0.7–2)
LACTATE SERPL-SCNC: 2 MMOL/L — SIGNIFICANT CHANGE UP (ref 0.7–2)
LACTATE SERPL-SCNC: 2.8 MMOL/L — HIGH (ref 0.7–2)
LACTATE SERPL-SCNC: 3.2 MMOL/L — HIGH (ref 0.7–2)
LEUKOCYTE ESTERASE UR-ACNC: NEGATIVE — SIGNIFICANT CHANGE UP
LEUKOCYTE ESTERASE UR-ACNC: NEGATIVE — SIGNIFICANT CHANGE UP
LIPID PNL WITH DIRECT LDL SERPL: 85 MG/DL — SIGNIFICANT CHANGE UP (ref 4–129)
LYMPHOCYTES # BLD AUTO: 1.1 K/UL — LOW (ref 1.2–3.4)
LYMPHOCYTES # BLD AUTO: 1.49 K/UL — SIGNIFICANT CHANGE UP (ref 1.2–3.4)
LYMPHOCYTES # BLD AUTO: 1.7 K/UL — SIGNIFICANT CHANGE UP (ref 1.2–3.4)
LYMPHOCYTES # BLD AUTO: 1.82 K/UL — SIGNIFICANT CHANGE UP (ref 1.2–3.4)
LYMPHOCYTES # BLD AUTO: 10.9 % — LOW (ref 20.5–51.1)
LYMPHOCYTES # BLD AUTO: 11.6 % — LOW (ref 20.5–51.1)
LYMPHOCYTES # BLD AUTO: 11.9 % — LOW (ref 20.5–51.1)
LYMPHOCYTES # BLD AUTO: 18.4 % — LOW (ref 20.5–51.1)
LYMPHOCYTES # BLD AUTO: 2.69 K/UL — SIGNIFICANT CHANGE UP (ref 1.2–3.4)
LYMPHOCYTES # BLD AUTO: 25.5 % — SIGNIFICANT CHANGE UP (ref 20.5–51.1)
LYMPHOCYTES # BLD AUTO: 3.66 K/UL — HIGH (ref 1.2–3.4)
LYMPHOCYTES # BLD AUTO: 8.9 % — LOW (ref 20.5–51.1)
MAGNESIUM SERPL-MCNC: 1.7 MG/DL — LOW (ref 1.8–2.4)
MAGNESIUM SERPL-MCNC: 1.8 MG/DL — SIGNIFICANT CHANGE UP (ref 1.8–2.4)
MAGNESIUM SERPL-MCNC: 1.9 MG/DL — SIGNIFICANT CHANGE UP (ref 1.8–2.4)
MAGNESIUM SERPL-MCNC: 2.1 MG/DL — SIGNIFICANT CHANGE UP (ref 1.8–2.4)
MAGNESIUM SERPL-MCNC: 2.1 MG/DL — SIGNIFICANT CHANGE UP (ref 1.8–2.4)
MAGNESIUM SERPL-MCNC: 2.2 MG/DL — SIGNIFICANT CHANGE UP (ref 1.8–2.4)
MAGNESIUM SERPL-MCNC: 2.3 MG/DL — SIGNIFICANT CHANGE UP (ref 1.8–2.4)
MAGNESIUM SERPL-MCNC: 2.4 MG/DL — SIGNIFICANT CHANGE UP (ref 1.8–2.4)
MAGNESIUM SERPL-MCNC: 2.5 MG/DL — HIGH (ref 1.8–2.4)
MAGNESIUM SERPL-MCNC: 2.6 MG/DL — HIGH (ref 1.8–2.4)
MAGNESIUM SERPL-MCNC: 2.7 MG/DL — HIGH (ref 1.8–2.4)
MAGNESIUM SERPL-MCNC: 2.7 MG/DL — HIGH (ref 1.8–2.4)
MANUAL SMEAR VERIFICATION: SIGNIFICANT CHANGE UP
MANUAL SMEAR VERIFICATION: SIGNIFICANT CHANGE UP
MCHC RBC-ENTMCNC: 28.1 PG — SIGNIFICANT CHANGE UP (ref 27–31)
MCHC RBC-ENTMCNC: 28.3 PG — SIGNIFICANT CHANGE UP (ref 27–31)
MCHC RBC-ENTMCNC: 28.5 PG — SIGNIFICANT CHANGE UP (ref 27–31)
MCHC RBC-ENTMCNC: 28.6 PG — SIGNIFICANT CHANGE UP (ref 27–31)
MCHC RBC-ENTMCNC: 28.8 PG — SIGNIFICANT CHANGE UP (ref 27–31)
MCHC RBC-ENTMCNC: 28.9 PG — SIGNIFICANT CHANGE UP (ref 27–31)
MCHC RBC-ENTMCNC: 29 PG — SIGNIFICANT CHANGE UP (ref 27–31)
MCHC RBC-ENTMCNC: 29.1 PG — SIGNIFICANT CHANGE UP (ref 27–31)
MCHC RBC-ENTMCNC: 29.2 PG — SIGNIFICANT CHANGE UP (ref 27–31)
MCHC RBC-ENTMCNC: 29.2 PG — SIGNIFICANT CHANGE UP (ref 27–31)
MCHC RBC-ENTMCNC: 29.3 PG — SIGNIFICANT CHANGE UP (ref 27–31)
MCHC RBC-ENTMCNC: 29.4 PG — SIGNIFICANT CHANGE UP (ref 27–31)
MCHC RBC-ENTMCNC: 29.4 PG — SIGNIFICANT CHANGE UP (ref 27–31)
MCHC RBC-ENTMCNC: 29.5 PG — SIGNIFICANT CHANGE UP (ref 27–31)
MCHC RBC-ENTMCNC: 29.6 PG — SIGNIFICANT CHANGE UP (ref 27–31)
MCHC RBC-ENTMCNC: 29.7 PG — SIGNIFICANT CHANGE UP (ref 27–31)
MCHC RBC-ENTMCNC: 29.8 PG — SIGNIFICANT CHANGE UP (ref 27–31)
MCHC RBC-ENTMCNC: 29.8 PG — SIGNIFICANT CHANGE UP (ref 27–31)
MCHC RBC-ENTMCNC: 30 PG — SIGNIFICANT CHANGE UP (ref 27–31)
MCHC RBC-ENTMCNC: 30.1 PG — SIGNIFICANT CHANGE UP (ref 27–31)
MCHC RBC-ENTMCNC: 30.2 PG — SIGNIFICANT CHANGE UP (ref 27–31)
MCHC RBC-ENTMCNC: 30.2 PG — SIGNIFICANT CHANGE UP (ref 27–31)
MCHC RBC-ENTMCNC: 30.4 G/DL — LOW (ref 32–37)
MCHC RBC-ENTMCNC: 30.7 G/DL — LOW (ref 32–37)
MCHC RBC-ENTMCNC: 30.8 G/DL — LOW (ref 32–37)
MCHC RBC-ENTMCNC: 30.9 G/DL — LOW (ref 32–37)
MCHC RBC-ENTMCNC: 31.3 G/DL — LOW (ref 32–37)
MCHC RBC-ENTMCNC: 31.3 G/DL — LOW (ref 32–37)
MCHC RBC-ENTMCNC: 31.6 G/DL — LOW (ref 32–37)
MCHC RBC-ENTMCNC: 31.7 G/DL — LOW (ref 32–37)
MCHC RBC-ENTMCNC: 31.8 G/DL — LOW (ref 32–37)
MCHC RBC-ENTMCNC: 31.8 G/DL — LOW (ref 32–37)
MCHC RBC-ENTMCNC: 31.9 G/DL — LOW (ref 32–37)
MCHC RBC-ENTMCNC: 31.9 G/DL — LOW (ref 32–37)
MCHC RBC-ENTMCNC: 32 G/DL — SIGNIFICANT CHANGE UP (ref 32–37)
MCHC RBC-ENTMCNC: 32.1 G/DL — SIGNIFICANT CHANGE UP (ref 32–37)
MCHC RBC-ENTMCNC: 32.2 G/DL — SIGNIFICANT CHANGE UP (ref 32–37)
MCHC RBC-ENTMCNC: 32.3 G/DL — SIGNIFICANT CHANGE UP (ref 32–37)
MCHC RBC-ENTMCNC: 32.3 PG — HIGH (ref 27–31)
MCHC RBC-ENTMCNC: 32.4 G/DL — SIGNIFICANT CHANGE UP (ref 32–37)
MCHC RBC-ENTMCNC: 32.5 G/DL — SIGNIFICANT CHANGE UP (ref 32–37)
MCHC RBC-ENTMCNC: 32.5 G/DL — SIGNIFICANT CHANGE UP (ref 32–37)
MCHC RBC-ENTMCNC: 33 G/DL — SIGNIFICANT CHANGE UP (ref 32–37)
MCHC RBC-ENTMCNC: 33.3 G/DL — SIGNIFICANT CHANGE UP (ref 32–37)
MCHC RBC-ENTMCNC: 33.6 G/DL — SIGNIFICANT CHANGE UP (ref 32–37)
MCHC RBC-ENTMCNC: 33.8 G/DL — SIGNIFICANT CHANGE UP (ref 32–37)
MCHC RBC-ENTMCNC: 34 G/DL — SIGNIFICANT CHANGE UP (ref 32–37)
MCHC RBC-ENTMCNC: 34.5 G/DL — SIGNIFICANT CHANGE UP (ref 32–37)
MCV RBC AUTO: 86.1 FL — SIGNIFICANT CHANGE UP (ref 80–94)
MCV RBC AUTO: 86.9 FL — SIGNIFICANT CHANGE UP (ref 80–94)
MCV RBC AUTO: 87 FL — SIGNIFICANT CHANGE UP (ref 80–94)
MCV RBC AUTO: 88 FL — SIGNIFICANT CHANGE UP (ref 80–94)
MCV RBC AUTO: 88 FL — SIGNIFICANT CHANGE UP (ref 80–94)
MCV RBC AUTO: 88.4 FL — SIGNIFICANT CHANGE UP (ref 80–94)
MCV RBC AUTO: 88.6 FL — SIGNIFICANT CHANGE UP (ref 80–94)
MCV RBC AUTO: 88.7 FL — SIGNIFICANT CHANGE UP (ref 80–94)
MCV RBC AUTO: 89.4 FL — SIGNIFICANT CHANGE UP (ref 80–94)
MCV RBC AUTO: 89.6 FL — SIGNIFICANT CHANGE UP (ref 80–94)
MCV RBC AUTO: 89.7 FL — SIGNIFICANT CHANGE UP (ref 80–94)
MCV RBC AUTO: 89.9 FL — SIGNIFICANT CHANGE UP (ref 80–94)
MCV RBC AUTO: 90.2 FL — SIGNIFICANT CHANGE UP (ref 80–94)
MCV RBC AUTO: 90.3 FL — SIGNIFICANT CHANGE UP (ref 80–94)
MCV RBC AUTO: 90.4 FL — SIGNIFICANT CHANGE UP (ref 80–94)
MCV RBC AUTO: 90.5 FL — SIGNIFICANT CHANGE UP (ref 80–94)
MCV RBC AUTO: 91.3 FL — SIGNIFICANT CHANGE UP (ref 80–94)
MCV RBC AUTO: 91.6 FL — SIGNIFICANT CHANGE UP (ref 80–94)
MCV RBC AUTO: 91.6 FL — SIGNIFICANT CHANGE UP (ref 80–94)
MCV RBC AUTO: 92.2 FL — SIGNIFICANT CHANGE UP (ref 80–94)
MCV RBC AUTO: 92.3 FL — SIGNIFICANT CHANGE UP (ref 80–94)
MCV RBC AUTO: 92.5 FL — SIGNIFICANT CHANGE UP (ref 80–94)
MCV RBC AUTO: 92.7 FL — SIGNIFICANT CHANGE UP (ref 80–94)
MCV RBC AUTO: 92.8 FL — SIGNIFICANT CHANGE UP (ref 80–94)
MCV RBC AUTO: 92.9 FL — SIGNIFICANT CHANGE UP (ref 80–94)
MCV RBC AUTO: 93.1 FL — SIGNIFICANT CHANGE UP (ref 80–94)
MCV RBC AUTO: 93.4 FL — SIGNIFICANT CHANGE UP (ref 80–94)
MCV RBC AUTO: 93.4 FL — SIGNIFICANT CHANGE UP (ref 80–94)
MCV RBC AUTO: 93.6 FL — SIGNIFICANT CHANGE UP (ref 80–94)
MCV RBC AUTO: 93.7 FL — SIGNIFICANT CHANGE UP (ref 80–94)
MCV RBC AUTO: 93.7 FL — SIGNIFICANT CHANGE UP (ref 80–94)
MCV RBC AUTO: 94 FL — SIGNIFICANT CHANGE UP (ref 80–94)
MCV RBC AUTO: 94.3 FL — HIGH (ref 80–94)
MCV RBC AUTO: 94.3 FL — HIGH (ref 80–94)
MCV RBC AUTO: 95.1 FL — HIGH (ref 80–94)
MCV RBC AUTO: 95.9 FL — HIGH (ref 80–94)
METHOD TYPE: SIGNIFICANT CHANGE UP
MONOCYTES # BLD AUTO: 0.8 K/UL — HIGH (ref 0.1–0.6)
MONOCYTES # BLD AUTO: 1.01 K/UL — HIGH (ref 0.1–0.6)
MONOCYTES # BLD AUTO: 1.14 K/UL — HIGH (ref 0.1–0.6)
MONOCYTES # BLD AUTO: 1.25 K/UL — HIGH (ref 0.1–0.6)
MONOCYTES # BLD AUTO: 1.32 K/UL — HIGH (ref 0.1–0.6)
MONOCYTES # BLD AUTO: 1.54 K/UL — HIGH (ref 0.1–0.6)
MONOCYTES NFR BLD AUTO: 10.5 % — HIGH (ref 1.7–9.3)
MONOCYTES NFR BLD AUTO: 6.5 % — SIGNIFICANT CHANGE UP (ref 1.7–9.3)
MONOCYTES NFR BLD AUTO: 7 % — SIGNIFICANT CHANGE UP (ref 1.7–9.3)
MONOCYTES NFR BLD AUTO: 8 % — SIGNIFICANT CHANGE UP (ref 1.7–9.3)
MONOCYTES NFR BLD AUTO: 8 % — SIGNIFICANT CHANGE UP (ref 1.7–9.3)
MONOCYTES NFR BLD AUTO: 9.7 % — HIGH (ref 1.7–9.3)
MRSA PCR RESULT.: NEGATIVE — SIGNIFICANT CHANGE UP
MRSA SPEC QL CULT: SIGNIFICANT CHANGE UP
MSSA DNA SPEC QL NAA+PROBE: SIGNIFICANT CHANGE UP
MYELOCYTES NFR BLD: 0.9 % — HIGH (ref 0–0)
N MEN ISLT CULT: SIGNIFICANT CHANGE UP
NEUTROPHILS # BLD AUTO: 10.35 K/UL — HIGH (ref 1.4–6.5)
NEUTROPHILS # BLD AUTO: 10.72 K/UL — HIGH (ref 1.4–6.5)
NEUTROPHILS # BLD AUTO: 11.25 K/UL — HIGH (ref 1.4–6.5)
NEUTROPHILS # BLD AUTO: 12.32 K/UL — HIGH (ref 1.4–6.5)
NEUTROPHILS # BLD AUTO: 8.51 K/UL — HIGH (ref 1.4–6.5)
NEUTROPHILS # BLD AUTO: 9.36 K/UL — HIGH (ref 1.4–6.5)
NEUTROPHILS NFR BLD AUTO: 58.1 % — SIGNIFICANT CHANGE UP (ref 42.2–75.2)
NEUTROPHILS NFR BLD AUTO: 65.4 % — SIGNIFICANT CHANGE UP (ref 42.2–75.2)
NEUTROPHILS NFR BLD AUTO: 78.6 % — HIGH (ref 42.2–75.2)
NEUTROPHILS NFR BLD AUTO: 78.7 % — HIGH (ref 42.2–75.2)
NEUTROPHILS NFR BLD AUTO: 79.1 % — HIGH (ref 42.2–75.2)
NEUTROPHILS NFR BLD AUTO: 83.8 % — HIGH (ref 42.2–75.2)
NEUTS BAND # BLD: 2.7 % — SIGNIFICANT CHANGE UP (ref 0–6)
NITRITE UR-MCNC: NEGATIVE — SIGNIFICANT CHANGE UP
NITRITE UR-MCNC: POSITIVE
NRBC # BLD: 0 /100 WBCS — SIGNIFICANT CHANGE UP (ref 0–0)
NRBC # BLD: 1 /100 WBCS — HIGH (ref 0–0)
NRBC # BLD: 2 /100 WBCS — HIGH (ref 0–0)
NRBC # BLD: 3 /100 WBCS — HIGH (ref 0–0)
NRBC # BLD: 3 /100 WBCS — HIGH (ref 0–0)
NRBC # BLD: 4 /100 WBCS — HIGH (ref 0–0)
NRBC # BLD: 4 /100 WBCS — HIGH (ref 0–0)
NRBC # BLD: 5 /100 — HIGH (ref 0–0)
NRBC # BLD: 6 /100 WBCS — HIGH (ref 0–0)
NRBC # BLD: SIGNIFICANT CHANGE UP /100 WBCS (ref 0–0)
ORGANISM # SPEC MICROSCOPIC CNT: SIGNIFICANT CHANGE UP
OSMOLALITY UR: 96 MOS/KG — SIGNIFICANT CHANGE UP (ref 50–1400)
P AERUGINOSA DNA BLD POS NAA+NON-PROBE: SIGNIFICANT CHANGE UP
PCO2 BLDA: 31 MMHG — LOW (ref 38–42)
PCO2 BLDA: 34 MMHG — LOW (ref 38–42)
PCO2 BLDA: 37 MMHG — LOW (ref 38–42)
PCO2 BLDA: 37 MMHG — LOW (ref 38–42)
PCO2 BLDA: 38 MMHG — SIGNIFICANT CHANGE UP (ref 38–42)
PCO2 BLDV: 33 MMHG — LOW (ref 41–51)
PCO2 BLDV: 35 MMHG — LOW (ref 41–51)
PH BLDA: 7.38 — SIGNIFICANT CHANGE UP (ref 7.38–7.42)
PH BLDA: 7.4 — SIGNIFICANT CHANGE UP (ref 7.38–7.42)
PH BLDA: 7.4 — SIGNIFICANT CHANGE UP (ref 7.38–7.42)
PH BLDA: 7.41 — SIGNIFICANT CHANGE UP (ref 7.38–7.42)
PH BLDA: 7.44 — HIGH (ref 7.38–7.42)
PH BLDV: 7.4 — SIGNIFICANT CHANGE UP (ref 7.26–7.43)
PH BLDV: 7.49 — HIGH (ref 7.26–7.43)
PH UR: 6 — SIGNIFICANT CHANGE UP (ref 5–8)
PH UR: 6.5 — SIGNIFICANT CHANGE UP (ref 5–8)
PHOSPHATE SERPL-MCNC: 1.4 MG/DL — LOW (ref 2.1–4.9)
PHOSPHATE SERPL-MCNC: 10.7 MG/DL — HIGH (ref 2.1–4.9)
PHOSPHATE SERPL-MCNC: 10.8 MG/DL — HIGH (ref 2.1–4.9)
PHOSPHATE SERPL-MCNC: 2.1 MG/DL — SIGNIFICANT CHANGE UP (ref 2.1–4.9)
PHOSPHATE SERPL-MCNC: 2.2 MG/DL — SIGNIFICANT CHANGE UP (ref 2.1–4.9)
PHOSPHATE SERPL-MCNC: 3.1 MG/DL — SIGNIFICANT CHANGE UP (ref 2.1–4.9)
PHOSPHATE SERPL-MCNC: 3.4 MG/DL — SIGNIFICANT CHANGE UP (ref 2.1–4.9)
PHOSPHATE SERPL-MCNC: 3.9 MG/DL — SIGNIFICANT CHANGE UP (ref 2.1–4.9)
PHOSPHATE SERPL-MCNC: 4.5 MG/DL — SIGNIFICANT CHANGE UP (ref 2.1–4.9)
PHOSPHATE SERPL-MCNC: 8.7 MG/DL — HIGH (ref 2.1–4.9)
PHOSPHATE SERPL-MCNC: 9.4 MG/DL — HIGH (ref 2.1–4.9)
PHOSPHATE SERPL-MCNC: 9.9 MG/DL — HIGH (ref 2.1–4.9)
PLAT MORPH BLD: NORMAL — SIGNIFICANT CHANGE UP
PLAT MORPH BLD: NORMAL — SIGNIFICANT CHANGE UP
PLATELET # BLD AUTO: 111 K/UL — LOW (ref 130–400)
PLATELET # BLD AUTO: 118 K/UL — LOW (ref 130–400)
PLATELET # BLD AUTO: 121 K/UL — LOW (ref 130–400)
PLATELET # BLD AUTO: 132 K/UL — SIGNIFICANT CHANGE UP (ref 130–400)
PLATELET # BLD AUTO: 138 K/UL — SIGNIFICANT CHANGE UP (ref 130–400)
PLATELET # BLD AUTO: 138 K/UL — SIGNIFICANT CHANGE UP (ref 130–400)
PLATELET # BLD AUTO: 139 K/UL — SIGNIFICANT CHANGE UP (ref 130–400)
PLATELET # BLD AUTO: 142 K/UL — SIGNIFICANT CHANGE UP (ref 130–400)
PLATELET # BLD AUTO: 142 K/UL — SIGNIFICANT CHANGE UP (ref 130–400)
PLATELET # BLD AUTO: 145 K/UL — SIGNIFICANT CHANGE UP (ref 130–400)
PLATELET # BLD AUTO: 152 K/UL — SIGNIFICANT CHANGE UP (ref 130–400)
PLATELET # BLD AUTO: 153 K/UL — SIGNIFICANT CHANGE UP (ref 130–400)
PLATELET # BLD AUTO: 153 K/UL — SIGNIFICANT CHANGE UP (ref 130–400)
PLATELET # BLD AUTO: 159 K/UL — SIGNIFICANT CHANGE UP (ref 130–400)
PLATELET # BLD AUTO: 161 K/UL — SIGNIFICANT CHANGE UP (ref 130–400)
PLATELET # BLD AUTO: 178 K/UL — SIGNIFICANT CHANGE UP (ref 130–400)
PLATELET # BLD AUTO: 204 K/UL — SIGNIFICANT CHANGE UP (ref 130–400)
PLATELET # BLD AUTO: 205 K/UL — SIGNIFICANT CHANGE UP (ref 130–400)
PLATELET # BLD AUTO: 218 K/UL — SIGNIFICANT CHANGE UP (ref 130–400)
PLATELET # BLD AUTO: 230 K/UL — SIGNIFICANT CHANGE UP (ref 130–400)
PLATELET # BLD AUTO: 259 K/UL — SIGNIFICANT CHANGE UP (ref 130–400)
PLATELET # BLD AUTO: 268 K/UL — SIGNIFICANT CHANGE UP (ref 130–400)
PLATELET # BLD AUTO: 279 K/UL — SIGNIFICANT CHANGE UP (ref 130–400)
PLATELET # BLD AUTO: 286 K/UL — SIGNIFICANT CHANGE UP (ref 130–400)
PLATELET # BLD AUTO: 302 K/UL — SIGNIFICANT CHANGE UP (ref 130–400)
PLATELET # BLD AUTO: 333 K/UL — SIGNIFICANT CHANGE UP (ref 130–400)
PLATELET # BLD AUTO: 349 K/UL — SIGNIFICANT CHANGE UP (ref 130–400)
PLATELET # BLD AUTO: 373 K/UL — SIGNIFICANT CHANGE UP (ref 130–400)
PLATELET # BLD AUTO: 380 K/UL — SIGNIFICANT CHANGE UP (ref 130–400)
PLATELET # BLD AUTO: 419 K/UL — HIGH (ref 130–400)
PLATELET # BLD AUTO: 445 K/UL — HIGH (ref 130–400)
PLATELET # BLD AUTO: 487 K/UL — HIGH (ref 130–400)
PLATELET # BLD AUTO: 493 K/UL — HIGH (ref 130–400)
PLATELET # BLD AUTO: 494 K/UL — HIGH (ref 130–400)
PLATELET # BLD AUTO: 500 K/UL — HIGH (ref 130–400)
PLATELET # BLD AUTO: 545 K/UL — HIGH (ref 130–400)
PLATELET # BLD AUTO: 569 K/UL — HIGH (ref 130–400)
PLATELET # BLD AUTO: 575 K/UL — HIGH (ref 130–400)
PLATELET # BLD AUTO: 627 K/UL — HIGH (ref 130–400)
PLATELET # BLD AUTO: 634 K/UL — HIGH (ref 130–400)
PO2 BLDA: 146 MMHG — HIGH (ref 78–95)
PO2 BLDA: 64 MMHG — LOW (ref 78–95)
PO2 BLDA: 70 MMHG — LOW (ref 78–95)
PO2 BLDA: 74 MMHG — LOW (ref 78–95)
PO2 BLDA: 95 MMHG — SIGNIFICANT CHANGE UP (ref 78–95)
PO2 BLDV: 43 MMHG — HIGH (ref 20–40)
PO2 BLDV: 93 MMHG — HIGH (ref 20–40)
POIKILOCYTOSIS BLD QL AUTO: SIGNIFICANT CHANGE UP
POLYCHROMASIA BLD QL SMEAR: SIGNIFICANT CHANGE UP
POTASSIUM BLDV-SCNC: 3.1 MMOL/L — LOW (ref 3.3–5.6)
POTASSIUM BLDV-SCNC: 4 MMOL/L — SIGNIFICANT CHANGE UP (ref 3.3–5.6)
POTASSIUM SERPL-MCNC: 3.1 MMOL/L — LOW (ref 3.5–5)
POTASSIUM SERPL-MCNC: 3.2 MMOL/L — LOW (ref 3.5–5)
POTASSIUM SERPL-MCNC: 3.2 MMOL/L — LOW (ref 3.5–5)
POTASSIUM SERPL-MCNC: 3.3 MMOL/L — LOW (ref 3.5–5)
POTASSIUM SERPL-MCNC: 3.4 MMOL/L — LOW (ref 3.5–5)
POTASSIUM SERPL-MCNC: 3.5 MMOL/L — SIGNIFICANT CHANGE UP (ref 3.5–5)
POTASSIUM SERPL-MCNC: 3.5 MMOL/L — SIGNIFICANT CHANGE UP (ref 3.5–5)
POTASSIUM SERPL-MCNC: 3.6 MMOL/L — SIGNIFICANT CHANGE UP (ref 3.5–5)
POTASSIUM SERPL-MCNC: 3.7 MMOL/L — SIGNIFICANT CHANGE UP (ref 3.5–5)
POTASSIUM SERPL-MCNC: 3.8 MMOL/L — SIGNIFICANT CHANGE UP (ref 3.5–5)
POTASSIUM SERPL-MCNC: 3.9 MMOL/L — SIGNIFICANT CHANGE UP (ref 3.5–5)
POTASSIUM SERPL-MCNC: 4 MMOL/L — SIGNIFICANT CHANGE UP (ref 3.5–5)
POTASSIUM SERPL-MCNC: 4.1 MMOL/L — SIGNIFICANT CHANGE UP (ref 3.5–5)
POTASSIUM SERPL-MCNC: 4.2 MMOL/L — SIGNIFICANT CHANGE UP (ref 3.5–5)
POTASSIUM SERPL-MCNC: 4.3 MMOL/L — SIGNIFICANT CHANGE UP (ref 3.5–5)
POTASSIUM SERPL-MCNC: 4.4 MMOL/L — SIGNIFICANT CHANGE UP (ref 3.5–5)
POTASSIUM SERPL-MCNC: 4.4 MMOL/L — SIGNIFICANT CHANGE UP (ref 3.5–5)
POTASSIUM SERPL-MCNC: 4.5 MMOL/L — SIGNIFICANT CHANGE UP (ref 3.5–5)
POTASSIUM SERPL-MCNC: 4.7 MMOL/L — SIGNIFICANT CHANGE UP (ref 3.5–5)
POTASSIUM SERPL-MCNC: 4.8 MMOL/L — SIGNIFICANT CHANGE UP (ref 3.5–5)
POTASSIUM SERPL-MCNC: 4.8 MMOL/L — SIGNIFICANT CHANGE UP (ref 3.5–5)
POTASSIUM SERPL-MCNC: 4.9 MMOL/L — SIGNIFICANT CHANGE UP (ref 3.5–5)
POTASSIUM SERPL-MCNC: 4.9 MMOL/L — SIGNIFICANT CHANGE UP (ref 3.5–5)
POTASSIUM SERPL-MCNC: 5 MMOL/L — SIGNIFICANT CHANGE UP (ref 3.5–5)
POTASSIUM SERPL-MCNC: 5.2 MMOL/L — HIGH (ref 3.5–5)
POTASSIUM SERPL-MCNC: 5.5 MMOL/L — HIGH (ref 3.5–5)
POTASSIUM SERPL-SCNC: 3.1 MMOL/L — LOW (ref 3.5–5)
POTASSIUM SERPL-SCNC: 3.2 MMOL/L — LOW (ref 3.5–5)
POTASSIUM SERPL-SCNC: 3.2 MMOL/L — LOW (ref 3.5–5)
POTASSIUM SERPL-SCNC: 3.3 MMOL/L — LOW (ref 3.5–5)
POTASSIUM SERPL-SCNC: 3.4 MMOL/L — LOW (ref 3.5–5)
POTASSIUM SERPL-SCNC: 3.5 MMOL/L — SIGNIFICANT CHANGE UP (ref 3.5–5)
POTASSIUM SERPL-SCNC: 3.5 MMOL/L — SIGNIFICANT CHANGE UP (ref 3.5–5)
POTASSIUM SERPL-SCNC: 3.6 MMOL/L — SIGNIFICANT CHANGE UP (ref 3.5–5)
POTASSIUM SERPL-SCNC: 3.7 MMOL/L — SIGNIFICANT CHANGE UP (ref 3.5–5)
POTASSIUM SERPL-SCNC: 3.8 MMOL/L — SIGNIFICANT CHANGE UP (ref 3.5–5)
POTASSIUM SERPL-SCNC: 3.9 MMOL/L — SIGNIFICANT CHANGE UP (ref 3.5–5)
POTASSIUM SERPL-SCNC: 4 MMOL/L — SIGNIFICANT CHANGE UP (ref 3.5–5)
POTASSIUM SERPL-SCNC: 4.1 MMOL/L — SIGNIFICANT CHANGE UP (ref 3.5–5)
POTASSIUM SERPL-SCNC: 4.2 MMOL/L — SIGNIFICANT CHANGE UP (ref 3.5–5)
POTASSIUM SERPL-SCNC: 4.3 MMOL/L — SIGNIFICANT CHANGE UP (ref 3.5–5)
POTASSIUM SERPL-SCNC: 4.4 MMOL/L — SIGNIFICANT CHANGE UP (ref 3.5–5)
POTASSIUM SERPL-SCNC: 4.4 MMOL/L — SIGNIFICANT CHANGE UP (ref 3.5–5)
POTASSIUM SERPL-SCNC: 4.5 MMOL/L — SIGNIFICANT CHANGE UP (ref 3.5–5)
POTASSIUM SERPL-SCNC: 4.7 MMOL/L — SIGNIFICANT CHANGE UP (ref 3.5–5)
POTASSIUM SERPL-SCNC: 4.8 MMOL/L — SIGNIFICANT CHANGE UP (ref 3.5–5)
POTASSIUM SERPL-SCNC: 4.8 MMOL/L — SIGNIFICANT CHANGE UP (ref 3.5–5)
POTASSIUM SERPL-SCNC: 4.9 MMOL/L — SIGNIFICANT CHANGE UP (ref 3.5–5)
POTASSIUM SERPL-SCNC: 4.9 MMOL/L — SIGNIFICANT CHANGE UP (ref 3.5–5)
POTASSIUM SERPL-SCNC: 5 MMOL/L — SIGNIFICANT CHANGE UP (ref 3.5–5)
POTASSIUM SERPL-SCNC: 5.2 MMOL/L — HIGH (ref 3.5–5)
POTASSIUM SERPL-SCNC: 5.5 MMOL/L — HIGH (ref 3.5–5)
PREALB SERPL-MCNC: 3 MG/DL — LOW (ref 20–40)
PROT SERPL-MCNC: 4.8 G/DL — LOW (ref 6–8)
PROT SERPL-MCNC: 4.8 G/DL — LOW (ref 6–8)
PROT SERPL-MCNC: 5 G/DL — LOW (ref 6–8)
PROT SERPL-MCNC: 5 G/DL — LOW (ref 6–8)
PROT SERPL-MCNC: 5.1 G/DL — LOW (ref 6–8)
PROT SERPL-MCNC: 5.2 G/DL — LOW (ref 6–8)
PROT SERPL-MCNC: 5.3 G/DL — LOW (ref 6–8)
PROT SERPL-MCNC: 5.4 G/DL — LOW (ref 6–8)
PROT SERPL-MCNC: 5.5 G/DL — LOW (ref 6–8)
PROT SERPL-MCNC: 5.9 G/DL — LOW (ref 6–8)
PROT SERPL-MCNC: 6 G/DL — SIGNIFICANT CHANGE UP (ref 6–8)
PROT SERPL-MCNC: 6.1 G/DL — SIGNIFICANT CHANGE UP (ref 6–8)
PROT SERPL-MCNC: 6.2 G/DL — SIGNIFICANT CHANGE UP (ref 6–8)
PROT SERPL-MCNC: 6.2 G/DL — SIGNIFICANT CHANGE UP (ref 6–8)
PROT SERPL-MCNC: 6.5 G/DL — SIGNIFICANT CHANGE UP (ref 6–8)
PROT SERPL-MCNC: 6.6 G/DL — SIGNIFICANT CHANGE UP (ref 6–8)
PROT SERPL-MCNC: 8.4 G/DL — HIGH (ref 6–8)
PROT SERPL-MCNC: 8.7 G/DL — HIGH (ref 6–8)
PROT UR-MCNC: ABNORMAL
PROT UR-MCNC: SIGNIFICANT CHANGE UP
PROTHROM AB SERPL-ACNC: 13.2 SEC — HIGH (ref 9.95–12.87)
PROTHROM AB SERPL-ACNC: 14.3 SEC — HIGH (ref 9.95–12.87)
PROTHROM AB SERPL-ACNC: 14.7 SEC — HIGH (ref 9.95–12.87)
PROTHROM AB SERPL-ACNC: 15.1 SEC — HIGH (ref 9.95–12.87)
PROTHROM AB SERPL-ACNC: 15.8 SEC — HIGH (ref 9.95–12.87)
RBC # BLD: 2.48 M/UL — LOW (ref 4.7–6.1)
RBC # BLD: 2.49 M/UL — LOW (ref 4.7–6.1)
RBC # BLD: 2.51 M/UL — LOW (ref 4.7–6.1)
RBC # BLD: 2.53 M/UL — LOW (ref 4.7–6.1)
RBC # BLD: 2.56 M/UL — LOW (ref 4.7–6.1)
RBC # BLD: 2.63 M/UL — LOW (ref 4.7–6.1)
RBC # BLD: 2.69 M/UL — LOW (ref 4.7–6.1)
RBC # BLD: 2.72 M/UL — LOW (ref 4.7–6.1)
RBC # BLD: 2.74 M/UL — LOW (ref 4.7–6.1)
RBC # BLD: 2.74 M/UL — LOW (ref 4.7–6.1)
RBC # BLD: 2.8 M/UL — LOW (ref 4.7–6.1)
RBC # BLD: 2.81 M/UL — LOW (ref 4.7–6.1)
RBC # BLD: 2.82 M/UL — LOW (ref 4.7–6.1)
RBC # BLD: 2.84 M/UL — LOW (ref 4.7–6.1)
RBC # BLD: 2.85 M/UL — LOW (ref 4.7–6.1)
RBC # BLD: 2.85 M/UL — LOW (ref 4.7–6.1)
RBC # BLD: 2.88 M/UL — LOW (ref 4.7–6.1)
RBC # BLD: 3 M/UL — LOW (ref 4.7–6.1)
RBC # BLD: 3.08 M/UL — LOW (ref 4.7–6.1)
RBC # BLD: 3.09 M/UL — LOW (ref 4.7–6.1)
RBC # BLD: 3.09 M/UL — LOW (ref 4.7–6.1)
RBC # BLD: 3.15 M/UL — LOW (ref 4.7–6.1)
RBC # BLD: 3.17 M/UL — LOW (ref 4.7–6.1)
RBC # BLD: 3.25 M/UL — LOW (ref 4.7–6.1)
RBC # BLD: 3.25 M/UL — LOW (ref 4.7–6.1)
RBC # BLD: 3.32 M/UL — LOW (ref 4.7–6.1)
RBC # BLD: 3.36 M/UL — LOW (ref 4.7–6.1)
RBC # BLD: 3.57 M/UL — LOW (ref 4.7–6.1)
RBC # BLD: 3.58 M/UL — LOW (ref 4.7–6.1)
RBC # BLD: 3.59 M/UL — LOW (ref 4.7–6.1)
RBC # BLD: 3.64 M/UL — LOW (ref 4.7–6.1)
RBC # BLD: 3.65 M/UL — LOW (ref 4.7–6.1)
RBC # BLD: 3.65 M/UL — LOW (ref 4.7–6.1)
RBC # BLD: 3.66 M/UL — LOW (ref 4.7–6.1)
RBC # BLD: 3.66 M/UL — LOW (ref 4.7–6.1)
RBC # BLD: 3.67 M/UL — LOW (ref 4.7–6.1)
RBC # BLD: 3.84 M/UL — LOW (ref 4.7–6.1)
RBC # BLD: 4.12 M/UL — LOW (ref 4.7–6.1)
RBC # BLD: 5.19 M/UL — SIGNIFICANT CHANGE UP (ref 4.7–6.1)
RBC # BLD: 5.32 M/UL — SIGNIFICANT CHANGE UP (ref 4.7–6.1)
RBC # FLD: 13.4 % — SIGNIFICANT CHANGE UP (ref 11.5–14.5)
RBC # FLD: 13.8 % — SIGNIFICANT CHANGE UP (ref 11.5–14.5)
RBC # FLD: 13.9 % — SIGNIFICANT CHANGE UP (ref 11.5–14.5)
RBC # FLD: 14.6 % — HIGH (ref 11.5–14.5)
RBC # FLD: 15 % — HIGH (ref 11.5–14.5)
RBC # FLD: 15.1 % — HIGH (ref 11.5–14.5)
RBC # FLD: 15.3 % — HIGH (ref 11.5–14.5)
RBC # FLD: 15.6 % — HIGH (ref 11.5–14.5)
RBC # FLD: 15.7 % — HIGH (ref 11.5–14.5)
RBC # FLD: 15.7 % — HIGH (ref 11.5–14.5)
RBC # FLD: 15.9 % — HIGH (ref 11.5–14.5)
RBC # FLD: 16 % — HIGH (ref 11.5–14.5)
RBC # FLD: 16 % — HIGH (ref 11.5–14.5)
RBC # FLD: 16.1 % — HIGH (ref 11.5–14.5)
RBC # FLD: 16.2 % — HIGH (ref 11.5–14.5)
RBC # FLD: 16.3 % — HIGH (ref 11.5–14.5)
RBC # FLD: 16.4 % — HIGH (ref 11.5–14.5)
RBC # FLD: 16.4 % — HIGH (ref 11.5–14.5)
RBC # FLD: 16.5 % — HIGH (ref 11.5–14.5)
RBC # FLD: 16.6 % — HIGH (ref 11.5–14.5)
RBC # FLD: 16.7 % — HIGH (ref 11.5–14.5)
RBC # FLD: 16.8 % — HIGH (ref 11.5–14.5)
RBC # FLD: 16.8 % — HIGH (ref 11.5–14.5)
RBC # FLD: 16.9 % — HIGH (ref 11.5–14.5)
RBC # FLD: 17 % — HIGH (ref 11.5–14.5)
RBC # FLD: 17.1 % — HIGH (ref 11.5–14.5)
RBC BLD AUTO: ABNORMAL
RBC BLD AUTO: NORMAL — SIGNIFICANT CHANGE UP
RBC CASTS # UR COMP ASSIST: 3 /HPF — SIGNIFICANT CHANGE UP (ref 0–4)
RBC CASTS # UR COMP ASSIST: 6 /HPF — HIGH (ref 0–4)
S MARCESCENS DNA BLD POS NAA+NON-PROBE: SIGNIFICANT CHANGE UP
S PNEUM DNA BLD POS QL NAA+NON-PROBE: SIGNIFICANT CHANGE UP
S PYO DNA BLD POS QL NAA+NON-PROBE: SIGNIFICANT CHANGE UP
SAO2 % BLDA: 92 % — SIGNIFICANT CHANGE UP (ref 92–96)
SAO2 % BLDA: 93 % — SIGNIFICANT CHANGE UP (ref 92–96)
SAO2 % BLDA: 95 % — SIGNIFICANT CHANGE UP (ref 92–96)
SAO2 % BLDA: 96 % — SIGNIFICANT CHANGE UP (ref 92–96)
SAO2 % BLDA: 97 % — HIGH (ref 92–96)
SAO2 % BLDV: 78 % — SIGNIFICANT CHANGE UP
SAO2 % BLDV: 96 % — SIGNIFICANT CHANGE UP
SARS-COV-2 RNA SPEC QL NAA+PROBE: SIGNIFICANT CHANGE UP
SMUDGE CELLS # BLD: PRESENT — SIGNIFICANT CHANGE UP
SODIUM SERPL-SCNC: 135 MMOL/L — SIGNIFICANT CHANGE UP (ref 135–146)
SODIUM SERPL-SCNC: 136 MMOL/L — SIGNIFICANT CHANGE UP (ref 135–146)
SODIUM SERPL-SCNC: 137 MMOL/L — SIGNIFICANT CHANGE UP (ref 135–146)
SODIUM SERPL-SCNC: 140 MMOL/L — SIGNIFICANT CHANGE UP (ref 135–146)
SODIUM SERPL-SCNC: 141 MMOL/L — SIGNIFICANT CHANGE UP (ref 135–146)
SODIUM SERPL-SCNC: 142 MMOL/L — SIGNIFICANT CHANGE UP (ref 135–146)
SODIUM SERPL-SCNC: 143 MMOL/L — SIGNIFICANT CHANGE UP (ref 135–146)
SODIUM SERPL-SCNC: 144 MMOL/L — SIGNIFICANT CHANGE UP (ref 135–146)
SODIUM SERPL-SCNC: 145 MMOL/L — SIGNIFICANT CHANGE UP (ref 135–146)
SODIUM SERPL-SCNC: 146 MMOL/L — SIGNIFICANT CHANGE UP (ref 135–146)
SODIUM SERPL-SCNC: 147 MMOL/L — HIGH (ref 135–146)
SODIUM SERPL-SCNC: 148 MMOL/L — HIGH (ref 135–146)
SODIUM SERPL-SCNC: 149 MMOL/L — HIGH (ref 135–146)
SODIUM SERPL-SCNC: 150 MMOL/L — HIGH (ref 135–146)
SODIUM SERPL-SCNC: 151 MMOL/L — HIGH (ref 135–146)
SODIUM SERPL-SCNC: 152 MMOL/L — HIGH (ref 135–146)
SODIUM SERPL-SCNC: 153 MMOL/L — HIGH (ref 135–146)
SODIUM SERPL-SCNC: 154 MMOL/L — HIGH (ref 135–146)
SODIUM SERPL-SCNC: 156 MMOL/L — HIGH (ref 135–146)
SODIUM SERPL-SCNC: 157 MMOL/L — HIGH (ref 135–146)
SODIUM SERPL-SCNC: 158 MMOL/L — HIGH (ref 135–146)
SODIUM SERPL-SCNC: 158 MMOL/L — HIGH (ref 135–146)
SODIUM SERPL-SCNC: 161 MMOL/L — CRITICAL HIGH (ref 135–146)
SODIUM SERPL-SCNC: 161 MMOL/L — CRITICAL HIGH (ref 135–146)
SODIUM SERPL-SCNC: 162 MMOL/L — CRITICAL HIGH (ref 135–146)
SODIUM SERPL-SCNC: 162 MMOL/L — CRITICAL HIGH (ref 135–146)
SODIUM SERPL-SCNC: 163 MMOL/L — CRITICAL HIGH (ref 135–146)
SODIUM SERPL-SCNC: 164 MMOL/L — CRITICAL HIGH (ref 135–146)
SODIUM SERPL-SCNC: 165 MMOL/L — CRITICAL HIGH (ref 135–146)
SODIUM SERPL-SCNC: 166 MMOL/L — CRITICAL HIGH (ref 135–146)
SODIUM SERPL-SCNC: 167 MMOL/L — CRITICAL HIGH (ref 135–146)
SODIUM SERPL-SCNC: 169 MMOL/L — CRITICAL HIGH (ref 135–146)
SODIUM SERPL-SCNC: 170 MMOL/L — CRITICAL HIGH (ref 135–146)
SP GR SPEC: 1 — LOW (ref 1.01–1.02)
SP GR SPEC: 1.01 — SIGNIFICANT CHANGE UP (ref 1.01–1.02)
SPECIMEN SOURCE: SIGNIFICANT CHANGE UP
TOTAL CHOLESTEROL/HDL RATIO MEASUREMENT: 2.9 RATIO — LOW (ref 4–5.5)
TOXIC GRANULES BLD QL SMEAR: PRESENT — SIGNIFICANT CHANGE UP
TRIGL SERPL-MCNC: 132 MG/DL — SIGNIFICANT CHANGE UP (ref 10–149)
TROPONIN T SERPL-MCNC: 0.1 NG/ML — CRITICAL HIGH
TROPONIN T SERPL-MCNC: <0.01 NG/ML — SIGNIFICANT CHANGE UP
TROPONIN T SERPL-MCNC: <0.01 NG/ML — SIGNIFICANT CHANGE UP
UROBILINOGEN FLD QL: SIGNIFICANT CHANGE UP
UROBILINOGEN FLD QL: SIGNIFICANT CHANGE UP
VANCOMYCIN TROUGH SERPL-MCNC: 10 UG/ML — SIGNIFICANT CHANGE UP (ref 5–10)
VANCOMYCIN TROUGH SERPL-MCNC: 9 UG/ML — SIGNIFICANT CHANGE UP (ref 5–10)
VARIANT LYMPHS # BLD: 0.9 % — SIGNIFICANT CHANGE UP (ref 0–5)
WBC # BLD: 10.97 K/UL — HIGH (ref 4.8–10.8)
WBC # BLD: 12.11 K/UL — HIGH (ref 4.8–10.8)
WBC # BLD: 12.2 K/UL — HIGH (ref 4.8–10.8)
WBC # BLD: 12.27 K/UL — HIGH (ref 4.8–10.8)
WBC # BLD: 12.34 K/UL — HIGH (ref 4.8–10.8)
WBC # BLD: 12.37 K/UL — HIGH (ref 4.8–10.8)
WBC # BLD: 12.47 K/UL — HIGH (ref 4.8–10.8)
WBC # BLD: 13.14 K/UL — HIGH (ref 4.8–10.8)
WBC # BLD: 13.4 K/UL — HIGH (ref 4.8–10.8)
WBC # BLD: 13.58 K/UL — HIGH (ref 4.8–10.8)
WBC # BLD: 13.62 K/UL — HIGH (ref 4.8–10.8)
WBC # BLD: 13.65 K/UL — HIGH (ref 4.8–10.8)
WBC # BLD: 13.88 K/UL — HIGH (ref 4.8–10.8)
WBC # BLD: 14.08 K/UL — HIGH (ref 4.8–10.8)
WBC # BLD: 14.23 K/UL — HIGH (ref 4.8–10.8)
WBC # BLD: 14.33 K/UL — HIGH (ref 4.8–10.8)
WBC # BLD: 14.42 K/UL — HIGH (ref 4.8–10.8)
WBC # BLD: 14.42 K/UL — HIGH (ref 4.8–10.8)
WBC # BLD: 14.5 K/UL — HIGH (ref 4.8–10.8)
WBC # BLD: 14.64 K/UL — HIGH (ref 4.8–10.8)
WBC # BLD: 15.29 K/UL — HIGH (ref 4.8–10.8)
WBC # BLD: 15.56 K/UL — HIGH (ref 4.8–10.8)
WBC # BLD: 15.68 K/UL — HIGH (ref 4.8–10.8)
WBC # BLD: 15.88 K/UL — HIGH (ref 4.8–10.8)
WBC # BLD: 15.9 K/UL — HIGH (ref 4.8–10.8)
WBC # BLD: 16.16 K/UL — HIGH (ref 4.8–10.8)
WBC # BLD: 16.21 K/UL — HIGH (ref 4.8–10.8)
WBC # BLD: 16.44 K/UL — HIGH (ref 4.8–10.8)
WBC # BLD: 16.51 K/UL — HIGH (ref 4.8–10.8)
WBC # BLD: 16.65 K/UL — HIGH (ref 4.8–10.8)
WBC # BLD: 16.68 K/UL — HIGH (ref 4.8–10.8)
WBC # BLD: 16.7 K/UL — HIGH (ref 4.8–10.8)
WBC # BLD: 16.73 K/UL — HIGH (ref 4.8–10.8)
WBC # BLD: 16.78 K/UL — HIGH (ref 4.8–10.8)
WBC # BLD: 17.57 K/UL — HIGH (ref 4.8–10.8)
WBC # BLD: 18.89 K/UL — HIGH (ref 4.8–10.8)
WBC # BLD: 21.86 K/UL — HIGH (ref 4.8–10.8)
WBC # BLD: 22.03 K/UL — HIGH (ref 4.8–10.8)
WBC # BLD: 23.32 K/UL — HIGH (ref 4.8–10.8)
WBC # BLD: 9.01 K/UL — SIGNIFICANT CHANGE UP (ref 4.8–10.8)
WBC # FLD AUTO: 10.97 K/UL — HIGH (ref 4.8–10.8)
WBC # FLD AUTO: 12.11 K/UL — HIGH (ref 4.8–10.8)
WBC # FLD AUTO: 12.2 K/UL — HIGH (ref 4.8–10.8)
WBC # FLD AUTO: 12.27 K/UL — HIGH (ref 4.8–10.8)
WBC # FLD AUTO: 12.34 K/UL — HIGH (ref 4.8–10.8)
WBC # FLD AUTO: 12.37 K/UL — HIGH (ref 4.8–10.8)
WBC # FLD AUTO: 12.47 K/UL — HIGH (ref 4.8–10.8)
WBC # FLD AUTO: 13.14 K/UL — HIGH (ref 4.8–10.8)
WBC # FLD AUTO: 13.4 K/UL — HIGH (ref 4.8–10.8)
WBC # FLD AUTO: 13.58 K/UL — HIGH (ref 4.8–10.8)
WBC # FLD AUTO: 13.62 K/UL — HIGH (ref 4.8–10.8)
WBC # FLD AUTO: 13.65 K/UL — HIGH (ref 4.8–10.8)
WBC # FLD AUTO: 13.88 K/UL — HIGH (ref 4.8–10.8)
WBC # FLD AUTO: 14.08 K/UL — HIGH (ref 4.8–10.8)
WBC # FLD AUTO: 14.23 K/UL — HIGH (ref 4.8–10.8)
WBC # FLD AUTO: 14.33 K/UL — HIGH (ref 4.8–10.8)
WBC # FLD AUTO: 14.42 K/UL — HIGH (ref 4.8–10.8)
WBC # FLD AUTO: 14.42 K/UL — HIGH (ref 4.8–10.8)
WBC # FLD AUTO: 14.5 K/UL — HIGH (ref 4.8–10.8)
WBC # FLD AUTO: 14.64 K/UL — HIGH (ref 4.8–10.8)
WBC # FLD AUTO: 15.29 K/UL — HIGH (ref 4.8–10.8)
WBC # FLD AUTO: 15.56 K/UL — HIGH (ref 4.8–10.8)
WBC # FLD AUTO: 15.68 K/UL — HIGH (ref 4.8–10.8)
WBC # FLD AUTO: 15.88 K/UL — HIGH (ref 4.8–10.8)
WBC # FLD AUTO: 15.9 K/UL — HIGH (ref 4.8–10.8)
WBC # FLD AUTO: 16.16 K/UL — HIGH (ref 4.8–10.8)
WBC # FLD AUTO: 16.21 K/UL — HIGH (ref 4.8–10.8)
WBC # FLD AUTO: 16.44 K/UL — HIGH (ref 4.8–10.8)
WBC # FLD AUTO: 16.51 K/UL — HIGH (ref 4.8–10.8)
WBC # FLD AUTO: 16.65 K/UL — HIGH (ref 4.8–10.8)
WBC # FLD AUTO: 16.68 K/UL — HIGH (ref 4.8–10.8)
WBC # FLD AUTO: 16.7 K/UL — HIGH (ref 4.8–10.8)
WBC # FLD AUTO: 16.73 K/UL — HIGH (ref 4.8–10.8)
WBC # FLD AUTO: 16.78 K/UL — HIGH (ref 4.8–10.8)
WBC # FLD AUTO: 17.57 K/UL — HIGH (ref 4.8–10.8)
WBC # FLD AUTO: 18.89 K/UL — HIGH (ref 4.8–10.8)
WBC # FLD AUTO: 21.86 K/UL — HIGH (ref 4.8–10.8)
WBC # FLD AUTO: 22.03 K/UL — HIGH (ref 4.8–10.8)
WBC # FLD AUTO: 23.32 K/UL — HIGH (ref 4.8–10.8)
WBC # FLD AUTO: 9.01 K/UL — SIGNIFICANT CHANGE UP (ref 4.8–10.8)
WBC UR QL: 14 /HPF — HIGH (ref 0–5)
WBC UR QL: 2 /HPF — SIGNIFICANT CHANGE UP (ref 0–5)

## 2020-01-01 PROCEDURE — 95819 EEG AWAKE AND ASLEEP: CPT | Mod: 26

## 2020-01-01 PROCEDURE — 71045 X-RAY EXAM CHEST 1 VIEW: CPT | Mod: 26

## 2020-01-01 PROCEDURE — 74176 CT ABD & PELVIS W/O CONTRAST: CPT | Mod: 26

## 2020-01-01 PROCEDURE — 99233 SBSQ HOSP IP/OBS HIGH 50: CPT

## 2020-01-01 PROCEDURE — 93010 ELECTROCARDIOGRAM REPORT: CPT

## 2020-01-01 PROCEDURE — 70450 CT HEAD/BRAIN W/O DYE: CPT | Mod: 26,59

## 2020-01-01 PROCEDURE — 99232 SBSQ HOSP IP/OBS MODERATE 35: CPT

## 2020-01-01 PROCEDURE — 43246 EGD PLACE GASTROSTOMY TUBE: CPT

## 2020-01-01 PROCEDURE — 99223 1ST HOSP IP/OBS HIGH 75: CPT

## 2020-01-01 PROCEDURE — 99221 1ST HOSP IP/OBS SF/LOW 40: CPT

## 2020-01-01 PROCEDURE — 99497 ADVNCD CARE PLAN 30 MIN: CPT | Mod: 25

## 2020-01-01 PROCEDURE — 71045 X-RAY EXAM CHEST 1 VIEW: CPT | Mod: 26,77

## 2020-01-01 PROCEDURE — 0042T: CPT

## 2020-01-01 PROCEDURE — 99222 1ST HOSP IP/OBS MODERATE 55: CPT

## 2020-01-01 PROCEDURE — 99223 1ST HOSP IP/OBS HIGH 75: CPT | Mod: 24

## 2020-01-01 PROCEDURE — 93970 EXTREMITY STUDY: CPT | Mod: 26

## 2020-01-01 PROCEDURE — 70498 CT ANGIOGRAPHY NECK: CPT | Mod: 26

## 2020-01-01 PROCEDURE — 99291 CRITICAL CARE FIRST HOUR: CPT | Mod: 25

## 2020-01-01 PROCEDURE — 74018 RADEX ABDOMEN 1 VIEW: CPT | Mod: 26

## 2020-01-01 PROCEDURE — 99231 SBSQ HOSP IP/OBS SF/LOW 25: CPT | Mod: GC

## 2020-01-01 PROCEDURE — 99291 CRITICAL CARE FIRST HOUR: CPT

## 2020-01-01 PROCEDURE — 93010 ELECTROCARDIOGRAM REPORT: CPT | Mod: 76

## 2020-01-01 PROCEDURE — 71045 X-RAY EXAM CHEST 1 VIEW: CPT | Mod: 26,76

## 2020-01-01 PROCEDURE — 99233 SBSQ HOSP IP/OBS HIGH 50: CPT | Mod: GC

## 2020-01-01 PROCEDURE — 70450 CT HEAD/BRAIN W/O DYE: CPT | Mod: 26

## 2020-01-01 PROCEDURE — 93306 TTE W/DOPPLER COMPLETE: CPT | Mod: 26

## 2020-01-01 PROCEDURE — 71045 X-RAY EXAM CHEST 1 VIEW: CPT | Mod: 26,59,76

## 2020-01-01 PROCEDURE — 99232 SBSQ HOSP IP/OBS MODERATE 35: CPT | Mod: GC

## 2020-01-01 PROCEDURE — 31500 INSERT EMERGENCY AIRWAY: CPT

## 2020-01-01 PROCEDURE — 70496 CT ANGIOGRAPHY HEAD: CPT | Mod: 26

## 2020-01-01 PROCEDURE — 31730 INTRO WINDPIPE WIRE/TUBE: CPT

## 2020-01-01 PROCEDURE — 76770 US EXAM ABDO BACK WALL COMP: CPT | Mod: 26

## 2020-01-01 RX ORDER — INSULIN GLARGINE 100 [IU]/ML
12 INJECTION, SOLUTION SUBCUTANEOUS AT BEDTIME
Refills: 0 | Status: DISCONTINUED | OUTPATIENT
Start: 2020-01-01 | End: 2020-01-01

## 2020-01-01 RX ORDER — CLOPIDOGREL BISULFATE 75 MG/1
1 TABLET, FILM COATED ORAL
Qty: 0 | Refills: 0 | DISCHARGE

## 2020-01-01 RX ORDER — DIGOXIN 250 MCG
0.5 TABLET ORAL ONCE
Refills: 0 | Status: COMPLETED | OUTPATIENT
Start: 2020-01-01 | End: 2020-01-01

## 2020-01-01 RX ORDER — LABETALOL HCL 100 MG
10 TABLET ORAL ONCE
Refills: 0 | Status: COMPLETED | OUTPATIENT
Start: 2020-01-01 | End: 2020-01-01

## 2020-01-01 RX ORDER — AMIODARONE HYDROCHLORIDE 400 MG/1
0.5 TABLET ORAL
Qty: 900 | Refills: 0 | Status: DISCONTINUED | OUTPATIENT
Start: 2020-01-01 | End: 2020-01-01

## 2020-01-01 RX ORDER — SODIUM CHLORIDE 9 MG/ML
1000 INJECTION, SOLUTION INTRAVENOUS
Refills: 0 | Status: DISCONTINUED | OUTPATIENT
Start: 2020-01-01 | End: 2020-01-01

## 2020-01-01 RX ORDER — DESMOPRESSIN ACETATE 0.1 MG/1
0.4 TABLET ORAL EVERY 12 HOURS
Refills: 0 | Status: DISCONTINUED | OUTPATIENT
Start: 2020-01-01 | End: 2020-01-01

## 2020-01-01 RX ORDER — HEPARIN SODIUM 5000 [USP'U]/ML
5000 INJECTION INTRAVENOUS; SUBCUTANEOUS EVERY 8 HOURS
Refills: 0 | Status: DISCONTINUED | OUTPATIENT
Start: 2020-01-01 | End: 2020-01-01

## 2020-01-01 RX ORDER — VANCOMYCIN HCL 1 G
1250 VIAL (EA) INTRAVENOUS
Refills: 0 | Status: DISCONTINUED | OUTPATIENT
Start: 2020-01-01 | End: 2020-01-01

## 2020-01-01 RX ORDER — SODIUM CHLORIDE 5 G/100ML
500 INJECTION, SOLUTION INTRAVENOUS
Refills: 0 | Status: DISCONTINUED | OUTPATIENT
Start: 2020-01-01 | End: 2020-01-01

## 2020-01-01 RX ORDER — ALTEPLASE 100 MG
7.6 KIT INTRAVENOUS ONCE
Refills: 0 | Status: COMPLETED | OUTPATIENT
Start: 2020-01-01 | End: 2020-01-01

## 2020-01-01 RX ORDER — VANCOMYCIN HCL 1 G
125 VIAL (EA) INTRAVENOUS EVERY 6 HOURS
Refills: 0 | Status: DISCONTINUED | OUTPATIENT
Start: 2020-01-01 | End: 2020-01-01

## 2020-01-01 RX ORDER — ETOMIDATE 2 MG/ML
20 INJECTION INTRAVENOUS ONCE
Refills: 0 | Status: COMPLETED | OUTPATIENT
Start: 2020-01-01 | End: 2020-01-01

## 2020-01-01 RX ORDER — VANCOMYCIN HCL 1 G
1250 VIAL (EA) INTRAVENOUS ONCE
Refills: 0 | Status: COMPLETED | OUTPATIENT
Start: 2020-01-01 | End: 2020-01-01

## 2020-01-01 RX ORDER — NOREPINEPHRINE BITARTRATE/D5W 8 MG/250ML
0.01 PLASTIC BAG, INJECTION (ML) INTRAVENOUS
Qty: 8 | Refills: 0 | Status: DISCONTINUED | OUTPATIENT
Start: 2020-01-01 | End: 2020-01-01

## 2020-01-01 RX ORDER — NOREPINEPHRINE BITARTRATE/D5W 8 MG/250ML
0.05 PLASTIC BAG, INJECTION (ML) INTRAVENOUS
Qty: 16 | Refills: 0 | Status: DISCONTINUED | OUTPATIENT
Start: 2020-01-01 | End: 2020-01-01

## 2020-01-01 RX ORDER — VANCOMYCIN HCL 1 G
VIAL (EA) INTRAVENOUS
Refills: 0 | Status: DISCONTINUED | OUTPATIENT
Start: 2020-01-01 | End: 2020-01-01

## 2020-01-01 RX ORDER — DESMOPRESSIN ACETATE 0.1 MG/1
0.2 TABLET ORAL EVERY 12 HOURS
Refills: 0 | Status: DISCONTINUED | OUTPATIENT
Start: 2020-01-01 | End: 2020-01-01

## 2020-01-01 RX ORDER — IOHEXOL 300 MG/ML
30 INJECTION, SOLUTION INTRAVENOUS ONCE
Refills: 0 | Status: COMPLETED | OUTPATIENT
Start: 2020-01-01 | End: 2020-01-01

## 2020-01-01 RX ORDER — NICARDIPINE HYDROCHLORIDE 30 MG/1
5 CAPSULE, EXTENDED RELEASE ORAL
Qty: 40 | Refills: 0 | Status: DISCONTINUED | OUTPATIENT
Start: 2020-01-01 | End: 2020-01-01

## 2020-01-01 RX ORDER — POTASSIUM CHLORIDE 20 MEQ
20 PACKET (EA) ORAL
Refills: 0 | Status: COMPLETED | OUTPATIENT
Start: 2020-01-01 | End: 2020-01-01

## 2020-01-01 RX ORDER — LOSARTAN POTASSIUM 100 MG/1
1 TABLET, FILM COATED ORAL
Qty: 0 | Refills: 0 | DISCHARGE

## 2020-01-01 RX ORDER — MEROPENEM 1 G/30ML
INJECTION INTRAVENOUS
Refills: 0 | Status: DISCONTINUED | OUTPATIENT
Start: 2020-01-01 | End: 2020-01-01

## 2020-01-01 RX ORDER — METRONIDAZOLE 500 MG
500 TABLET ORAL EVERY 8 HOURS
Refills: 0 | Status: DISCONTINUED | OUTPATIENT
Start: 2020-01-01 | End: 2020-01-01

## 2020-01-01 RX ORDER — ACETAMINOPHEN 500 MG
650 TABLET ORAL EVERY 6 HOURS
Refills: 0 | Status: DISCONTINUED | OUTPATIENT
Start: 2020-01-01 | End: 2020-01-01

## 2020-01-01 RX ORDER — ROCURONIUM BROMIDE 10 MG/ML
150 VIAL (ML) INTRAVENOUS ONCE
Refills: 0 | Status: COMPLETED | OUTPATIENT
Start: 2020-01-01 | End: 2020-01-01

## 2020-01-01 RX ORDER — CISATRACURIUM BESYLATE 2 MG/ML
20 INJECTION INTRAVENOUS ONCE
Refills: 0 | Status: COMPLETED | OUTPATIENT
Start: 2020-01-01 | End: 2020-01-01

## 2020-01-01 RX ORDER — SODIUM CHLORIDE 9 MG/ML
500 INJECTION INTRAMUSCULAR; INTRAVENOUS; SUBCUTANEOUS ONCE
Refills: 0 | Status: COMPLETED | OUTPATIENT
Start: 2020-01-01 | End: 2020-01-01

## 2020-01-01 RX ORDER — INSULIN LISPRO 100/ML
6 VIAL (ML) SUBCUTANEOUS EVERY 8 HOURS
Refills: 0 | Status: DISCONTINUED | OUTPATIENT
Start: 2020-01-01 | End: 2020-01-01

## 2020-01-01 RX ORDER — EPTIFIBATIDE 2 MG/ML
0.5 INJECTION, SOLUTION INTRAVENOUS
Qty: 75 | Refills: 0 | Status: DISCONTINUED | OUTPATIENT
Start: 2020-01-01 | End: 2020-01-01

## 2020-01-01 RX ORDER — AMIODARONE HYDROCHLORIDE 400 MG/1
1 TABLET ORAL
Qty: 900 | Refills: 0 | Status: DISCONTINUED | OUTPATIENT
Start: 2020-01-01 | End: 2020-01-01

## 2020-01-01 RX ORDER — DESMOPRESSIN ACETATE 0.1 MG/1
2 TABLET ORAL ONCE
Refills: 0 | Status: COMPLETED | OUTPATIENT
Start: 2020-01-01 | End: 2020-01-01

## 2020-01-01 RX ORDER — CHLORHEXIDINE GLUCONATE 213 G/1000ML
1 SOLUTION TOPICAL
Refills: 0 | Status: DISCONTINUED | OUTPATIENT
Start: 2020-01-01 | End: 2020-01-01

## 2020-01-01 RX ORDER — DESMOPRESSIN ACETATE 0.1 MG/1
2 TABLET ORAL EVERY 12 HOURS
Refills: 0 | Status: DISCONTINUED | OUTPATIENT
Start: 2020-01-01 | End: 2020-01-01

## 2020-01-01 RX ORDER — INSULIN HUMAN 100 [IU]/ML
1 INJECTION, SOLUTION SUBCUTANEOUS
Qty: 100 | Refills: 0 | Status: DISCONTINUED | OUTPATIENT
Start: 2020-01-01 | End: 2020-01-01

## 2020-01-01 RX ORDER — GLUCAGON INJECTION, SOLUTION 0.5 MG/.1ML
1 INJECTION, SOLUTION SUBCUTANEOUS ONCE
Refills: 0 | Status: DISCONTINUED | OUTPATIENT
Start: 2020-01-01 | End: 2020-01-01

## 2020-01-01 RX ORDER — MEROPENEM 1 G/30ML
500 INJECTION INTRAVENOUS ONCE
Refills: 0 | Status: COMPLETED | OUTPATIENT
Start: 2020-01-01 | End: 2020-01-01

## 2020-01-01 RX ORDER — OXCARBAZEPINE 300 MG/1
1 TABLET, FILM COATED ORAL
Qty: 0 | Refills: 0 | DISCHARGE

## 2020-01-01 RX ORDER — DOPAMINE HYDROCHLORIDE 40 MG/ML
1 INJECTION, SOLUTION, CONCENTRATE INTRAVENOUS
Qty: 400 | Refills: 0 | Status: DISCONTINUED | OUTPATIENT
Start: 2020-01-01 | End: 2020-01-01

## 2020-01-01 RX ORDER — PANTOPRAZOLE SODIUM 20 MG/1
40 TABLET, DELAYED RELEASE ORAL DAILY
Refills: 0 | Status: DISCONTINUED | OUTPATIENT
Start: 2020-01-01 | End: 2020-01-01

## 2020-01-01 RX ORDER — AMLODIPINE BESYLATE 2.5 MG/1
1 TABLET ORAL
Qty: 0 | Refills: 0 | DISCHARGE

## 2020-01-01 RX ORDER — SODIUM ZIRCONIUM CYCLOSILICATE 10 G/10G
10 POWDER, FOR SUSPENSION ORAL
Refills: 0 | Status: DISCONTINUED | OUTPATIENT
Start: 2020-01-01 | End: 2020-01-01

## 2020-01-01 RX ORDER — SUCCINYLCHOLINE CHLORIDE 100 MG/5ML
150 SYRINGE (ML) INTRAVENOUS ONCE
Refills: 0 | Status: DISCONTINUED | OUTPATIENT
Start: 2020-01-01 | End: 2020-01-01

## 2020-01-01 RX ORDER — DESMOPRESSIN ACETATE 0.1 MG/1
2 TABLET ORAL
Refills: 0 | Status: DISCONTINUED | OUTPATIENT
Start: 2020-01-01 | End: 2020-01-01

## 2020-01-01 RX ORDER — MIDAZOLAM HYDROCHLORIDE 1 MG/ML
2 INJECTION, SOLUTION INTRAMUSCULAR; INTRAVENOUS ONCE
Refills: 0 | Status: DISCONTINUED | OUTPATIENT
Start: 2020-01-01 | End: 2020-01-01

## 2020-01-01 RX ORDER — SEVELAMER CARBONATE 2400 MG/1
1600 POWDER, FOR SUSPENSION ORAL
Refills: 0 | Status: DISCONTINUED | OUTPATIENT
Start: 2020-01-01 | End: 2020-01-01

## 2020-01-01 RX ORDER — ALPRAZOLAM 0.25 MG
1 TABLET ORAL
Qty: 0 | Refills: 0 | DISCHARGE

## 2020-01-01 RX ORDER — INSULIN LISPRO 100/ML
3 VIAL (ML) SUBCUTANEOUS EVERY 8 HOURS
Refills: 0 | Status: DISCONTINUED | OUTPATIENT
Start: 2020-01-01 | End: 2020-01-01

## 2020-01-01 RX ORDER — DEXTROSE 50 % IN WATER 50 %
25 SYRINGE (ML) INTRAVENOUS ONCE
Refills: 0 | Status: DISCONTINUED | OUTPATIENT
Start: 2020-01-01 | End: 2020-01-01

## 2020-01-01 RX ORDER — VANCOMYCIN HCL 1 G
125 VIAL (EA) INTRAVENOUS EVERY 24 HOURS
Refills: 0 | Status: DISCONTINUED | OUTPATIENT
Start: 2020-01-01 | End: 2020-01-01

## 2020-01-01 RX ORDER — CEFEPIME 1 G/1
1000 INJECTION, POWDER, FOR SOLUTION INTRAMUSCULAR; INTRAVENOUS EVERY 24 HOURS
Refills: 0 | Status: DISCONTINUED | OUTPATIENT
Start: 2020-01-01 | End: 2020-01-01

## 2020-01-01 RX ORDER — OMEPRAZOLE 10 MG/1
1 CAPSULE, DELAYED RELEASE ORAL
Qty: 0 | Refills: 0 | DISCHARGE

## 2020-01-01 RX ORDER — VANCOMYCIN HCL 1 G
1250 VIAL (EA) INTRAVENOUS EVERY 24 HOURS
Refills: 0 | Status: DISCONTINUED | OUTPATIENT
Start: 2020-01-01 | End: 2020-01-01

## 2020-01-01 RX ORDER — LABETALOL HCL 100 MG
20 TABLET ORAL ONCE
Refills: 0 | Status: COMPLETED | OUTPATIENT
Start: 2020-01-01 | End: 2020-01-01

## 2020-01-01 RX ORDER — DIGOXIN 250 MCG
0.5 TABLET ORAL ONCE
Refills: 0 | Status: DISCONTINUED | OUTPATIENT
Start: 2020-01-01 | End: 2020-01-01

## 2020-01-01 RX ORDER — MIDODRINE HYDROCHLORIDE 2.5 MG/1
10 TABLET ORAL EVERY 8 HOURS
Refills: 0 | Status: DISCONTINUED | OUTPATIENT
Start: 2020-01-01 | End: 2020-01-01

## 2020-01-01 RX ORDER — SODIUM CHLORIDE 9 MG/ML
1000 INJECTION, SOLUTION INTRAVENOUS ONCE
Refills: 0 | Status: COMPLETED | OUTPATIENT
Start: 2020-01-01 | End: 2020-01-01

## 2020-01-01 RX ORDER — METFORMIN HYDROCHLORIDE 850 MG/1
1 TABLET ORAL
Qty: 0 | Refills: 0 | DISCHARGE

## 2020-01-01 RX ORDER — ATORVASTATIN CALCIUM 80 MG/1
80 TABLET, FILM COATED ORAL AT BEDTIME
Refills: 0 | Status: DISCONTINUED | OUTPATIENT
Start: 2020-01-01 | End: 2020-01-01

## 2020-01-01 RX ORDER — CHLORHEXIDINE GLUCONATE 213 G/1000ML
15 SOLUTION TOPICAL EVERY 12 HOURS
Refills: 0 | Status: DISCONTINUED | OUTPATIENT
Start: 2020-01-01 | End: 2020-01-01

## 2020-01-01 RX ORDER — INSULIN LISPRO 100/ML
5 VIAL (ML) SUBCUTANEOUS
Refills: 0 | Status: DISCONTINUED | OUTPATIENT
Start: 2020-01-01 | End: 2020-01-01

## 2020-01-01 RX ORDER — VANCOMYCIN HCL 1 G
250 VIAL (EA) INTRAVENOUS EVERY 6 HOURS
Refills: 0 | Status: DISCONTINUED | OUTPATIENT
Start: 2020-01-01 | End: 2020-01-01

## 2020-01-01 RX ORDER — INSULIN GLARGINE 100 [IU]/ML
24 INJECTION, SOLUTION SUBCUTANEOUS AT BEDTIME
Refills: 0 | Status: DISCONTINUED | OUTPATIENT
Start: 2020-01-01 | End: 2020-01-01

## 2020-01-01 RX ORDER — SODIUM BICARBONATE 1 MEQ/ML
650 SYRINGE (ML) INTRAVENOUS EVERY 8 HOURS
Refills: 0 | Status: DISCONTINUED | OUTPATIENT
Start: 2020-01-01 | End: 2020-01-01

## 2020-01-01 RX ORDER — AMIODARONE HYDROCHLORIDE 400 MG/1
TABLET ORAL
Refills: 0 | Status: DISCONTINUED | OUTPATIENT
Start: 2020-01-01 | End: 2020-01-01

## 2020-01-01 RX ORDER — DESMOPRESSIN ACETATE 0.1 MG/1
4 TABLET ORAL
Refills: 0 | Status: DISCONTINUED | OUTPATIENT
Start: 2020-01-01 | End: 2020-01-01

## 2020-01-01 RX ORDER — DEXTROSE 50 % IN WATER 50 %
15 SYRINGE (ML) INTRAVENOUS ONCE
Refills: 0 | Status: DISCONTINUED | OUTPATIENT
Start: 2020-01-01 | End: 2020-01-01

## 2020-01-01 RX ORDER — VASOPRESSIN 20 [USP'U]/ML
0.04 INJECTION INTRAVENOUS
Qty: 50 | Refills: 0 | Status: DISCONTINUED | OUTPATIENT
Start: 2020-01-01 | End: 2020-01-01

## 2020-01-01 RX ORDER — NOREPINEPHRINE BITARTRATE/D5W 8 MG/250ML
0.05 PLASTIC BAG, INJECTION (ML) INTRAVENOUS
Qty: 8 | Refills: 0 | Status: DISCONTINUED | OUTPATIENT
Start: 2020-01-01 | End: 2020-01-01

## 2020-01-01 RX ORDER — MAGNESIUM SULFATE 500 MG/ML
2 VIAL (ML) INJECTION ONCE
Refills: 0 | Status: COMPLETED | OUTPATIENT
Start: 2020-01-01 | End: 2020-01-01

## 2020-01-01 RX ORDER — MEROPENEM 1 G/30ML
500 INJECTION INTRAVENOUS EVERY 12 HOURS
Refills: 0 | Status: DISCONTINUED | OUTPATIENT
Start: 2020-01-01 | End: 2020-01-01

## 2020-01-01 RX ORDER — SODIUM BICARBONATE 1 MEQ/ML
1300 SYRINGE (ML) INTRAVENOUS THREE TIMES A DAY
Refills: 0 | Status: DISCONTINUED | OUTPATIENT
Start: 2020-01-01 | End: 2020-01-01

## 2020-01-01 RX ORDER — AMIODARONE HYDROCHLORIDE 400 MG/1
100 TABLET ORAL
Refills: 0 | Status: DISCONTINUED | OUTPATIENT
Start: 2020-01-01 | End: 2020-01-01

## 2020-01-01 RX ORDER — BACITRACIN ZINC 500 UNIT/G
1 OINTMENT IN PACKET (EA) TOPICAL ONCE
Refills: 0 | Status: COMPLETED | OUTPATIENT
Start: 2020-01-01 | End: 2020-01-01

## 2020-01-01 RX ORDER — SODIUM CHLORIDE 9 MG/ML
30 INJECTION INTRAMUSCULAR; INTRAVENOUS; SUBCUTANEOUS ONCE
Refills: 0 | Status: COMPLETED | OUTPATIENT
Start: 2020-01-01 | End: 2020-01-01

## 2020-01-01 RX ORDER — AMIODARONE HYDROCHLORIDE 400 MG/1
150 TABLET ORAL ONCE
Refills: 0 | Status: COMPLETED | OUTPATIENT
Start: 2020-01-01 | End: 2020-01-01

## 2020-01-01 RX ORDER — INSULIN GLARGINE 100 [IU]/ML
15 INJECTION, SOLUTION SUBCUTANEOUS AT BEDTIME
Refills: 0 | Status: DISCONTINUED | OUTPATIENT
Start: 2020-01-01 | End: 2020-01-01

## 2020-01-01 RX ORDER — FENTANYL CITRATE 50 UG/ML
25 INJECTION INTRAVENOUS ONCE
Refills: 0 | Status: DISCONTINUED | OUTPATIENT
Start: 2020-01-01 | End: 2020-01-01

## 2020-01-01 RX ORDER — TRIHEXYPHENIDYL HCL 2 MG
1 TABLET ORAL
Qty: 0 | Refills: 0 | DISCHARGE

## 2020-01-01 RX ORDER — INSULIN LISPRO 100/ML
VIAL (ML) SUBCUTANEOUS
Refills: 0 | Status: DISCONTINUED | OUTPATIENT
Start: 2020-01-01 | End: 2020-01-01

## 2020-01-01 RX ORDER — AMLODIPINE BESYLATE 2.5 MG/1
5 TABLET ORAL DAILY
Refills: 0 | Status: DISCONTINUED | OUTPATIENT
Start: 2020-01-01 | End: 2020-01-01

## 2020-01-01 RX ORDER — SODIUM BICARBONATE 1 MEQ/ML
650 SYRINGE (ML) INTRAVENOUS EVERY 6 HOURS
Refills: 0 | Status: DISCONTINUED | OUTPATIENT
Start: 2020-01-01 | End: 2020-01-01

## 2020-01-01 RX ORDER — VASOPRESSIN 20 [USP'U]/ML
0.05 INJECTION INTRAVENOUS
Qty: 50 | Refills: 0 | Status: DISCONTINUED | OUTPATIENT
Start: 2020-01-01 | End: 2020-01-01

## 2020-01-01 RX ORDER — AMIODARONE HYDROCHLORIDE 400 MG/1
400 TABLET ORAL EVERY 12 HOURS
Refills: 0 | Status: DISCONTINUED | OUTPATIENT
Start: 2020-01-01 | End: 2020-01-01

## 2020-01-01 RX ORDER — CEFTRIAXONE 500 MG/1
2000 INJECTION, POWDER, FOR SOLUTION INTRAMUSCULAR; INTRAVENOUS EVERY 24 HOURS
Refills: 0 | Status: DISCONTINUED | OUTPATIENT
Start: 2020-01-01 | End: 2020-01-01

## 2020-01-01 RX ORDER — ALTEPLASE 100 MG
68.4 KIT INTRAVENOUS ONCE
Refills: 0 | Status: COMPLETED | OUTPATIENT
Start: 2020-01-01 | End: 2020-01-01

## 2020-01-01 RX ORDER — DESMOPRESSIN ACETATE 0.1 MG/1
0.4 TABLET ORAL
Refills: 0 | Status: DISCONTINUED | OUTPATIENT
Start: 2020-01-01 | End: 2020-01-01

## 2020-01-01 RX ORDER — SODIUM CHLORIDE 9 MG/ML
1000 INJECTION INTRAMUSCULAR; INTRAVENOUS; SUBCUTANEOUS
Refills: 0 | Status: DISCONTINUED | OUTPATIENT
Start: 2020-01-01 | End: 2020-01-01

## 2020-01-01 RX ORDER — DEXMEDETOMIDINE HYDROCHLORIDE IN 0.9% SODIUM CHLORIDE 4 UG/ML
1 INJECTION INTRAVENOUS
Qty: 400 | Refills: 0 | Status: DISCONTINUED | OUTPATIENT
Start: 2020-01-01 | End: 2020-01-01

## 2020-01-01 RX ORDER — DEXTROSE 50 % IN WATER 50 %
12.5 SYRINGE (ML) INTRAVENOUS ONCE
Refills: 0 | Status: DISCONTINUED | OUTPATIENT
Start: 2020-01-01 | End: 2020-01-01

## 2020-01-01 RX ORDER — MANNITOL
50 POWDER (GRAM) MISCELLANEOUS ONCE
Refills: 0 | Status: COMPLETED | OUTPATIENT
Start: 2020-01-01 | End: 2020-01-01

## 2020-01-01 RX ORDER — SODIUM POLYSTYRENE SULFONATE 4.1 MEQ/G
15 POWDER, FOR SUSPENSION ORAL ONCE
Refills: 0 | Status: DISCONTINUED | OUTPATIENT
Start: 2020-01-01 | End: 2020-01-01

## 2020-01-01 RX ORDER — PHENYLEPHRINE HYDROCHLORIDE 10 MG/ML
0.1 INJECTION INTRAVENOUS
Qty: 160 | Refills: 0 | Status: DISCONTINUED | OUTPATIENT
Start: 2020-01-01 | End: 2020-01-01

## 2020-01-01 RX ORDER — LOSARTAN POTASSIUM 100 MG/1
50 TABLET, FILM COATED ORAL DAILY
Refills: 0 | Status: DISCONTINUED | OUTPATIENT
Start: 2020-01-01 | End: 2020-01-01

## 2020-01-01 RX ORDER — DEXTROSE MONOHYDRATE, SODIUM CHLORIDE, AND POTASSIUM CHLORIDE 50; .745; 4.5 G/1000ML; G/1000ML; G/1000ML
1000 INJECTION, SOLUTION INTRAVENOUS
Refills: 0 | Status: DISCONTINUED | OUTPATIENT
Start: 2020-01-01 | End: 2020-01-01

## 2020-01-01 RX ADMIN — Medication 650 MILLIGRAM(S): at 12:26

## 2020-01-01 RX ADMIN — PANTOPRAZOLE SODIUM 40 MILLIGRAM(S): 20 TABLET, DELAYED RELEASE ORAL at 12:01

## 2020-01-01 RX ADMIN — MEROPENEM 100 MILLIGRAM(S): 1 INJECTION INTRAVENOUS at 05:07

## 2020-01-01 RX ADMIN — HEPARIN SODIUM 5000 UNIT(S): 5000 INJECTION INTRAVENOUS; SUBCUTANEOUS at 21:03

## 2020-01-01 RX ADMIN — CEFEPIME 100 MILLIGRAM(S): 1 INJECTION, POWDER, FOR SOLUTION INTRAMUSCULAR; INTRAVENOUS at 17:14

## 2020-01-01 RX ADMIN — Medication 125 MILLIGRAM(S): at 11:23

## 2020-01-01 RX ADMIN — SODIUM CHLORIDE 400 MILLILITER(S): 9 INJECTION, SOLUTION INTRAVENOUS at 12:57

## 2020-01-01 RX ADMIN — CHLORHEXIDINE GLUCONATE 1 APPLICATION(S): 213 SOLUTION TOPICAL at 05:06

## 2020-01-01 RX ADMIN — HEPARIN SODIUM 5000 UNIT(S): 5000 INJECTION INTRAVENOUS; SUBCUTANEOUS at 22:03

## 2020-01-01 RX ADMIN — Medication 50 MILLIEQUIVALENT(S): at 10:38

## 2020-01-01 RX ADMIN — PANTOPRAZOLE SODIUM 40 MILLIGRAM(S): 20 TABLET, DELAYED RELEASE ORAL at 13:22

## 2020-01-01 RX ADMIN — Medication 100 MILLIGRAM(S): at 05:33

## 2020-01-01 RX ADMIN — AMIODARONE HYDROCHLORIDE 400 MILLIGRAM(S): 400 TABLET ORAL at 13:01

## 2020-01-01 RX ADMIN — Medication 166.67 MILLIGRAM(S): at 15:12

## 2020-01-01 RX ADMIN — AMIODARONE HYDROCHLORIDE 100 MILLIGRAM(S): 400 TABLET ORAL at 17:13

## 2020-01-01 RX ADMIN — Medication 125 MILLIGRAM(S): at 12:01

## 2020-01-01 RX ADMIN — SEVELAMER CARBONATE 1600 MILLIGRAM(S): 2400 POWDER, FOR SUSPENSION ORAL at 17:07

## 2020-01-01 RX ADMIN — CHLORHEXIDINE GLUCONATE 15 MILLILITER(S): 213 SOLUTION TOPICAL at 17:31

## 2020-01-01 RX ADMIN — AMIODARONE HYDROCHLORIDE 400 MILLIGRAM(S): 400 TABLET ORAL at 23:00

## 2020-01-01 RX ADMIN — HEPARIN SODIUM 5000 UNIT(S): 5000 INJECTION INTRAVENOUS; SUBCUTANEOUS at 21:17

## 2020-01-01 RX ADMIN — CEFTRIAXONE 100 MILLIGRAM(S): 500 INJECTION, POWDER, FOR SOLUTION INTRAMUSCULAR; INTRAVENOUS at 18:48

## 2020-01-01 RX ADMIN — PANTOPRAZOLE SODIUM 40 MILLIGRAM(S): 20 TABLET, DELAYED RELEASE ORAL at 11:24

## 2020-01-01 RX ADMIN — PANTOPRAZOLE SODIUM 40 MILLIGRAM(S): 20 TABLET, DELAYED RELEASE ORAL at 12:31

## 2020-01-01 RX ADMIN — Medication 100 MILLIGRAM(S): at 13:11

## 2020-01-01 RX ADMIN — Medication 650 MILLIGRAM(S): at 11:49

## 2020-01-01 RX ADMIN — ATORVASTATIN CALCIUM 80 MILLIGRAM(S): 80 TABLET, FILM COATED ORAL at 21:50

## 2020-01-01 RX ADMIN — Medication 1300 MILLIGRAM(S): at 14:21

## 2020-01-01 RX ADMIN — Medication 650 MILLIGRAM(S): at 00:09

## 2020-01-01 RX ADMIN — INSULIN HUMAN 1 UNIT(S)/HR: 100 INJECTION, SOLUTION SUBCUTANEOUS at 21:07

## 2020-01-01 RX ADMIN — Medication 125 MILLIGRAM(S): at 05:07

## 2020-01-01 RX ADMIN — INSULIN GLARGINE 12 UNIT(S): 100 INJECTION, SOLUTION SUBCUTANEOUS at 21:49

## 2020-01-01 RX ADMIN — AMIODARONE HYDROCHLORIDE 100 MILLIGRAM(S): 400 TABLET ORAL at 05:03

## 2020-01-01 RX ADMIN — Medication 125 MILLIGRAM(S): at 13:16

## 2020-01-01 RX ADMIN — Medication 3 UNIT(S): at 21:46

## 2020-01-01 RX ADMIN — Medication 1.58 MICROGRAM(S)/KG/MIN: at 05:27

## 2020-01-01 RX ADMIN — AMIODARONE HYDROCHLORIDE 16.7 MG/MIN: 400 TABLET ORAL at 17:48

## 2020-01-01 RX ADMIN — CHLORHEXIDINE GLUCONATE 1 APPLICATION(S): 213 SOLUTION TOPICAL at 05:47

## 2020-01-01 RX ADMIN — CHLORHEXIDINE GLUCONATE 15 MILLILITER(S): 213 SOLUTION TOPICAL at 17:16

## 2020-01-01 RX ADMIN — Medication 125 MILLIGRAM(S): at 17:25

## 2020-01-01 RX ADMIN — PANTOPRAZOLE SODIUM 40 MILLIGRAM(S): 20 TABLET, DELAYED RELEASE ORAL at 11:47

## 2020-01-01 RX ADMIN — Medication 5 UNIT(S): at 21:30

## 2020-01-01 RX ADMIN — AMIODARONE HYDROCHLORIDE 100 MILLIGRAM(S): 400 TABLET ORAL at 05:27

## 2020-01-01 RX ADMIN — SEVELAMER CARBONATE 1600 MILLIGRAM(S): 2400 POWDER, FOR SUSPENSION ORAL at 17:14

## 2020-01-01 RX ADMIN — Medication 250 MILLIGRAM(S): at 11:39

## 2020-01-01 RX ADMIN — Medication 650 MILLIGRAM(S): at 06:38

## 2020-01-01 RX ADMIN — Medication 650 MILLIGRAM(S): at 13:43

## 2020-01-01 RX ADMIN — HEPARIN SODIUM 5000 UNIT(S): 5000 INJECTION INTRAVENOUS; SUBCUTANEOUS at 21:35

## 2020-01-01 RX ADMIN — PANTOPRAZOLE SODIUM 40 MILLIGRAM(S): 20 TABLET, DELAYED RELEASE ORAL at 11:52

## 2020-01-01 RX ADMIN — INSULIN GLARGINE 12 UNIT(S): 100 INJECTION, SOLUTION SUBCUTANEOUS at 23:57

## 2020-01-01 RX ADMIN — SEVELAMER CARBONATE 1600 MILLIGRAM(S): 2400 POWDER, FOR SUSPENSION ORAL at 13:02

## 2020-01-01 RX ADMIN — HEPARIN SODIUM 5000 UNIT(S): 5000 INJECTION INTRAVENOUS; SUBCUTANEOUS at 21:16

## 2020-01-01 RX ADMIN — CHLORHEXIDINE GLUCONATE 1 APPLICATION(S): 213 SOLUTION TOPICAL at 05:03

## 2020-01-01 RX ADMIN — Medication 650 MILLIGRAM(S): at 09:45

## 2020-01-01 RX ADMIN — AMIODARONE HYDROCHLORIDE 100 MILLIGRAM(S): 400 TABLET ORAL at 05:58

## 2020-01-01 RX ADMIN — HEPARIN SODIUM 5000 UNIT(S): 5000 INJECTION INTRAVENOUS; SUBCUTANEOUS at 23:55

## 2020-01-01 RX ADMIN — SODIUM CHLORIDE 500 MILLILITER(S): 9 INJECTION INTRAMUSCULAR; INTRAVENOUS; SUBCUTANEOUS at 10:06

## 2020-01-01 RX ADMIN — Medication 3 UNIT(S): at 22:37

## 2020-01-01 RX ADMIN — Medication 1 APPLICATION(S): at 12:01

## 2020-01-01 RX ADMIN — HEPARIN SODIUM 5000 UNIT(S): 5000 INJECTION INTRAVENOUS; SUBCUTANEOUS at 15:02

## 2020-01-01 RX ADMIN — Medication 125 MILLIGRAM(S): at 00:02

## 2020-01-01 RX ADMIN — Medication 1300 MILLIGRAM(S): at 05:53

## 2020-01-01 RX ADMIN — DOPAMINE HYDROCHLORIDE 3.17 MICROGRAM(S)/KG/MIN: 40 INJECTION, SOLUTION, CONCENTRATE INTRAVENOUS at 11:42

## 2020-01-01 RX ADMIN — Medication 1300 MILLIGRAM(S): at 05:02

## 2020-01-01 RX ADMIN — CHLORHEXIDINE GLUCONATE 15 MILLILITER(S): 213 SOLUTION TOPICAL at 06:38

## 2020-01-01 RX ADMIN — CHLORHEXIDINE GLUCONATE 1 APPLICATION(S): 213 SOLUTION TOPICAL at 05:01

## 2020-01-01 RX ADMIN — Medication 2: at 16:55

## 2020-01-01 RX ADMIN — HEPARIN SODIUM 5000 UNIT(S): 5000 INJECTION INTRAVENOUS; SUBCUTANEOUS at 13:18

## 2020-01-01 RX ADMIN — ATORVASTATIN CALCIUM 80 MILLIGRAM(S): 80 TABLET, FILM COATED ORAL at 21:16

## 2020-01-01 RX ADMIN — Medication 650 MILLIGRAM(S): at 14:02

## 2020-01-01 RX ADMIN — Medication 1300 MILLIGRAM(S): at 13:03

## 2020-01-01 RX ADMIN — PANTOPRAZOLE SODIUM 40 MILLIGRAM(S): 20 TABLET, DELAYED RELEASE ORAL at 11:59

## 2020-01-01 RX ADMIN — Medication 3 UNIT(S): at 05:26

## 2020-01-01 RX ADMIN — MIDODRINE HYDROCHLORIDE 10 MILLIGRAM(S): 2.5 TABLET ORAL at 05:53

## 2020-01-01 RX ADMIN — Medication 1300 MILLIGRAM(S): at 22:09

## 2020-01-01 RX ADMIN — VASOPRESSIN 2.4 UNIT(S)/MIN: 20 INJECTION INTRAVENOUS at 12:30

## 2020-01-01 RX ADMIN — HEPARIN SODIUM 5000 UNIT(S): 5000 INJECTION INTRAVENOUS; SUBCUTANEOUS at 05:31

## 2020-01-01 RX ADMIN — PANTOPRAZOLE SODIUM 40 MILLIGRAM(S): 20 TABLET, DELAYED RELEASE ORAL at 11:58

## 2020-01-01 RX ADMIN — Medication 250 MILLIGRAM(S): at 11:34

## 2020-01-01 RX ADMIN — Medication 1300 MILLIGRAM(S): at 05:31

## 2020-01-01 RX ADMIN — Medication 1.58 MICROGRAM(S)/KG/MIN: at 03:04

## 2020-01-01 RX ADMIN — AMIODARONE HYDROCHLORIDE 100 MILLIGRAM(S): 400 TABLET ORAL at 06:03

## 2020-01-01 RX ADMIN — CHLORHEXIDINE GLUCONATE 15 MILLILITER(S): 213 SOLUTION TOPICAL at 05:01

## 2020-01-01 RX ADMIN — Medication 650 MILLIGRAM(S): at 17:14

## 2020-01-01 RX ADMIN — Medication 125 MILLIGRAM(S): at 05:57

## 2020-01-01 RX ADMIN — Medication 166.67 MILLIGRAM(S): at 23:05

## 2020-01-01 RX ADMIN — VASOPRESSIN 2.4 UNIT(S)/MIN: 20 INJECTION INTRAVENOUS at 12:58

## 2020-01-01 RX ADMIN — CHLORHEXIDINE GLUCONATE 15 MILLILITER(S): 213 SOLUTION TOPICAL at 18:11

## 2020-01-01 RX ADMIN — DESMOPRESSIN ACETATE 4 MICROGRAM(S): 0.1 TABLET ORAL at 17:49

## 2020-01-01 RX ADMIN — Medication 1300 MILLIGRAM(S): at 08:23

## 2020-01-01 RX ADMIN — AMIODARONE HYDROCHLORIDE 400 MILLIGRAM(S): 400 TABLET ORAL at 13:17

## 2020-01-01 RX ADMIN — Medication 125 MILLIGRAM(S): at 05:01

## 2020-01-01 RX ADMIN — CHLORHEXIDINE GLUCONATE 15 MILLILITER(S): 213 SOLUTION TOPICAL at 05:09

## 2020-01-01 RX ADMIN — CHLORHEXIDINE GLUCONATE 15 MILLILITER(S): 213 SOLUTION TOPICAL at 17:27

## 2020-01-01 RX ADMIN — PHENYLEPHRINE HYDROCHLORIDE 1.58 MICROGRAM(S)/KG/MIN: 10 INJECTION INTRAVENOUS at 12:57

## 2020-01-01 RX ADMIN — DESMOPRESSIN ACETATE 2 MICROGRAM(S): 0.1 TABLET ORAL at 05:03

## 2020-01-01 RX ADMIN — HEPARIN SODIUM 5000 UNIT(S): 5000 INJECTION INTRAVENOUS; SUBCUTANEOUS at 14:27

## 2020-01-01 RX ADMIN — DESMOPRESSIN ACETATE 0.2 MILLIGRAM(S): 0.1 TABLET ORAL at 21:26

## 2020-01-01 RX ADMIN — Medication 125 MILLIGRAM(S): at 13:03

## 2020-01-01 RX ADMIN — Medication 250 MILLIGRAM(S): at 05:29

## 2020-01-01 RX ADMIN — SEVELAMER CARBONATE 1600 MILLIGRAM(S): 2400 POWDER, FOR SUSPENSION ORAL at 12:01

## 2020-01-01 RX ADMIN — DESMOPRESSIN ACETATE 2 MICROGRAM(S): 0.1 TABLET ORAL at 19:21

## 2020-01-01 RX ADMIN — HEPARIN SODIUM 5000 UNIT(S): 5000 INJECTION INTRAVENOUS; SUBCUTANEOUS at 05:04

## 2020-01-01 RX ADMIN — Medication 650 MILLIGRAM(S): at 00:00

## 2020-01-01 RX ADMIN — CHLORHEXIDINE GLUCONATE 1 APPLICATION(S): 213 SOLUTION TOPICAL at 05:31

## 2020-01-01 RX ADMIN — HEPARIN SODIUM 5000 UNIT(S): 5000 INJECTION INTRAVENOUS; SUBCUTANEOUS at 15:27

## 2020-01-01 RX ADMIN — CHLORHEXIDINE GLUCONATE 15 MILLILITER(S): 213 SOLUTION TOPICAL at 05:58

## 2020-01-01 RX ADMIN — CHLORHEXIDINE GLUCONATE 1 APPLICATION(S): 213 SOLUTION TOPICAL at 05:29

## 2020-01-01 RX ADMIN — Medication 650 MILLIGRAM(S): at 20:45

## 2020-01-01 RX ADMIN — PANTOPRAZOLE SODIUM 40 MILLIGRAM(S): 20 TABLET, DELAYED RELEASE ORAL at 11:39

## 2020-01-01 RX ADMIN — HEPARIN SODIUM 5000 UNIT(S): 5000 INJECTION INTRAVENOUS; SUBCUTANEOUS at 21:07

## 2020-01-01 RX ADMIN — HEPARIN SODIUM 5000 UNIT(S): 5000 INJECTION INTRAVENOUS; SUBCUTANEOUS at 21:13

## 2020-01-01 RX ADMIN — AMIODARONE HYDROCHLORIDE 600 MILLIGRAM(S): 400 TABLET ORAL at 07:00

## 2020-01-01 RX ADMIN — SODIUM CHLORIDE 30 MILLILITER(S): 9 INJECTION INTRAMUSCULAR; INTRAVENOUS; SUBCUTANEOUS at 10:40

## 2020-01-01 RX ADMIN — Medication 250 MILLIGRAM(S): at 11:29

## 2020-01-01 RX ADMIN — MIDODRINE HYDROCHLORIDE 10 MILLIGRAM(S): 2.5 TABLET ORAL at 05:57

## 2020-01-01 RX ADMIN — CHLORHEXIDINE GLUCONATE 1 APPLICATION(S): 213 SOLUTION TOPICAL at 05:33

## 2020-01-01 RX ADMIN — HEPARIN SODIUM 5000 UNIT(S): 5000 INJECTION INTRAVENOUS; SUBCUTANEOUS at 05:53

## 2020-01-01 RX ADMIN — Medication 650 MILLIGRAM(S): at 21:24

## 2020-01-01 RX ADMIN — CHLORHEXIDINE GLUCONATE 1 APPLICATION(S): 213 SOLUTION TOPICAL at 05:27

## 2020-01-01 RX ADMIN — PANTOPRAZOLE SODIUM 40 MILLIGRAM(S): 20 TABLET, DELAYED RELEASE ORAL at 11:51

## 2020-01-01 RX ADMIN — Medication 250 MILLIGRAM(S): at 17:01

## 2020-01-01 RX ADMIN — Medication 250 MILLIGRAM(S): at 17:15

## 2020-01-01 RX ADMIN — AMIODARONE HYDROCHLORIDE 400 MILLIGRAM(S): 400 TABLET ORAL at 23:01

## 2020-01-01 RX ADMIN — Medication 1300 MILLIGRAM(S): at 05:29

## 2020-01-01 RX ADMIN — HEPARIN SODIUM 5000 UNIT(S): 5000 INJECTION INTRAVENOUS; SUBCUTANEOUS at 13:14

## 2020-01-01 RX ADMIN — SEVELAMER CARBONATE 1600 MILLIGRAM(S): 2400 POWDER, FOR SUSPENSION ORAL at 11:43

## 2020-01-01 RX ADMIN — DESMOPRESSIN ACETATE 4 MICROGRAM(S): 0.1 TABLET ORAL at 06:15

## 2020-01-01 RX ADMIN — CHLORHEXIDINE GLUCONATE 15 MILLILITER(S): 213 SOLUTION TOPICAL at 17:13

## 2020-01-01 RX ADMIN — INSULIN GLARGINE 12 UNIT(S): 100 INJECTION, SOLUTION SUBCUTANEOUS at 21:46

## 2020-01-01 RX ADMIN — Medication 50 GRAM(S): at 11:04

## 2020-01-01 RX ADMIN — CHLORHEXIDINE GLUCONATE 15 MILLILITER(S): 213 SOLUTION TOPICAL at 18:16

## 2020-01-01 RX ADMIN — MIDAZOLAM HYDROCHLORIDE 2 MILLIGRAM(S): 1 INJECTION, SOLUTION INTRAMUSCULAR; INTRAVENOUS at 11:36

## 2020-01-01 RX ADMIN — Medication 250 MILLIGRAM(S): at 00:36

## 2020-01-01 RX ADMIN — Medication 650 MILLIGRAM(S): at 07:15

## 2020-01-01 RX ADMIN — AMIODARONE HYDROCHLORIDE 400 MILLIGRAM(S): 400 TABLET ORAL at 03:41

## 2020-01-01 RX ADMIN — Medication 100 MILLIGRAM(S): at 05:10

## 2020-01-01 RX ADMIN — HEPARIN SODIUM 5000 UNIT(S): 5000 INJECTION INTRAVENOUS; SUBCUTANEOUS at 05:03

## 2020-01-01 RX ADMIN — HEPARIN SODIUM 5000 UNIT(S): 5000 INJECTION INTRAVENOUS; SUBCUTANEOUS at 13:05

## 2020-01-01 RX ADMIN — Medication 1300 MILLIGRAM(S): at 21:22

## 2020-01-01 RX ADMIN — Medication 3 UNIT(S): at 22:07

## 2020-01-01 RX ADMIN — INSULIN HUMAN 1 UNIT(S)/HR: 100 INJECTION, SOLUTION SUBCUTANEOUS at 03:05

## 2020-01-01 RX ADMIN — CHLORHEXIDINE GLUCONATE 15 MILLILITER(S): 213 SOLUTION TOPICAL at 17:28

## 2020-01-01 RX ADMIN — MIDODRINE HYDROCHLORIDE 10 MILLIGRAM(S): 2.5 TABLET ORAL at 21:46

## 2020-01-01 RX ADMIN — MIDODRINE HYDROCHLORIDE 10 MILLIGRAM(S): 2.5 TABLET ORAL at 22:09

## 2020-01-01 RX ADMIN — MIDODRINE HYDROCHLORIDE 10 MILLIGRAM(S): 2.5 TABLET ORAL at 13:03

## 2020-01-01 RX ADMIN — Medication 1300 MILLIGRAM(S): at 13:05

## 2020-01-01 RX ADMIN — CHLORHEXIDINE GLUCONATE 15 MILLILITER(S): 213 SOLUTION TOPICAL at 05:26

## 2020-01-01 RX ADMIN — HEPARIN SODIUM 5000 UNIT(S): 5000 INJECTION INTRAVENOUS; SUBCUTANEOUS at 21:05

## 2020-01-01 RX ADMIN — AMIODARONE HYDROCHLORIDE 400 MILLIGRAM(S): 400 TABLET ORAL at 12:12

## 2020-01-01 RX ADMIN — ATORVASTATIN CALCIUM 80 MILLIGRAM(S): 80 TABLET, FILM COATED ORAL at 21:06

## 2020-01-01 RX ADMIN — PHENYLEPHRINE HYDROCHLORIDE 1.58 MICROGRAM(S)/KG/MIN: 10 INJECTION INTRAVENOUS at 12:30

## 2020-01-01 RX ADMIN — PHENYLEPHRINE HYDROCHLORIDE 1.58 MICROGRAM(S)/KG/MIN: 10 INJECTION INTRAVENOUS at 05:09

## 2020-01-01 RX ADMIN — Medication 100 MILLIGRAM(S): at 21:25

## 2020-01-01 RX ADMIN — CHLORHEXIDINE GLUCONATE 15 MILLILITER(S): 213 SOLUTION TOPICAL at 05:06

## 2020-01-01 RX ADMIN — Medication 650 MILLIGRAM(S): at 12:04

## 2020-01-01 RX ADMIN — HEPARIN SODIUM 5000 UNIT(S): 5000 INJECTION INTRAVENOUS; SUBCUTANEOUS at 23:05

## 2020-01-01 RX ADMIN — Medication 250 MILLIGRAM(S): at 05:25

## 2020-01-01 RX ADMIN — DOPAMINE HYDROCHLORIDE 3.17 MICROGRAM(S)/KG/MIN: 40 INJECTION, SOLUTION, CONCENTRATE INTRAVENOUS at 11:40

## 2020-01-01 RX ADMIN — Medication 1300 MILLIGRAM(S): at 13:02

## 2020-01-01 RX ADMIN — MIDODRINE HYDROCHLORIDE 10 MILLIGRAM(S): 2.5 TABLET ORAL at 14:27

## 2020-01-01 RX ADMIN — CHLORHEXIDINE GLUCONATE 1 APPLICATION(S): 213 SOLUTION TOPICAL at 05:09

## 2020-01-01 RX ADMIN — CHLORHEXIDINE GLUCONATE 15 MILLILITER(S): 213 SOLUTION TOPICAL at 19:03

## 2020-01-01 RX ADMIN — INSULIN GLARGINE 12 UNIT(S): 100 INJECTION, SOLUTION SUBCUTANEOUS at 21:20

## 2020-01-01 RX ADMIN — Medication 250 MILLIGRAM(S): at 05:56

## 2020-01-01 RX ADMIN — CHLORHEXIDINE GLUCONATE 15 MILLILITER(S): 213 SOLUTION TOPICAL at 05:53

## 2020-01-01 RX ADMIN — SODIUM ZIRCONIUM CYCLOSILICATE 10 GRAM(S): 10 POWDER, FOR SUSPENSION ORAL at 18:42

## 2020-01-01 RX ADMIN — HEPARIN SODIUM 5000 UNIT(S): 5000 INJECTION INTRAVENOUS; SUBCUTANEOUS at 05:02

## 2020-01-01 RX ADMIN — Medication 100 MILLIGRAM(S): at 17:05

## 2020-01-01 RX ADMIN — Medication 50 MILLIEQUIVALENT(S): at 12:40

## 2020-01-01 RX ADMIN — Medication 3 UNIT(S): at 15:02

## 2020-01-01 RX ADMIN — Medication 650 MILLIGRAM(S): at 00:39

## 2020-01-01 RX ADMIN — HEPARIN SODIUM 5000 UNIT(S): 5000 INJECTION INTRAVENOUS; SUBCUTANEOUS at 21:22

## 2020-01-01 RX ADMIN — Medication 1.58 MICROGRAM(S)/KG/MIN: at 22:20

## 2020-01-01 RX ADMIN — Medication 100 MILLIGRAM(S): at 13:06

## 2020-01-01 RX ADMIN — Medication 25 GRAM(S): at 06:29

## 2020-01-01 RX ADMIN — AMIODARONE HYDROCHLORIDE 100 MILLIGRAM(S): 400 TABLET ORAL at 17:11

## 2020-01-01 RX ADMIN — DOPAMINE HYDROCHLORIDE 3.17 MICROGRAM(S)/KG/MIN: 40 INJECTION, SOLUTION, CONCENTRATE INTRAVENOUS at 10:17

## 2020-01-01 RX ADMIN — Medication 100 MILLIGRAM(S): at 05:28

## 2020-01-01 RX ADMIN — Medication 250 MILLIGRAM(S): at 11:38

## 2020-01-01 RX ADMIN — Medication 650 MILLIGRAM(S): at 05:31

## 2020-01-01 RX ADMIN — Medication 1 APPLICATION(S): at 12:25

## 2020-01-01 RX ADMIN — AMIODARONE HYDROCHLORIDE 100 MILLIGRAM(S): 400 TABLET ORAL at 18:42

## 2020-01-01 RX ADMIN — HEPARIN SODIUM 5000 UNIT(S): 5000 INJECTION INTRAVENOUS; SUBCUTANEOUS at 05:07

## 2020-01-01 RX ADMIN — Medication 650 MILLIGRAM(S): at 05:00

## 2020-01-01 RX ADMIN — CISATRACURIUM BESYLATE 20 MILLIGRAM(S): 2 INJECTION INTRAVENOUS at 11:37

## 2020-01-01 RX ADMIN — Medication 650 MILLIGRAM(S): at 05:55

## 2020-01-01 RX ADMIN — HEPARIN SODIUM 5000 UNIT(S): 5000 INJECTION INTRAVENOUS; SUBCUTANEOUS at 22:44

## 2020-01-01 RX ADMIN — Medication 3 UNIT(S): at 05:22

## 2020-01-01 RX ADMIN — Medication 650 MILLIGRAM(S): at 05:07

## 2020-01-01 RX ADMIN — Medication 250 MILLIGRAM(S): at 01:26

## 2020-01-01 RX ADMIN — SODIUM ZIRCONIUM CYCLOSILICATE 10 GRAM(S): 10 POWDER, FOR SUSPENSION ORAL at 05:30

## 2020-01-01 RX ADMIN — SEVELAMER CARBONATE 1600 MILLIGRAM(S): 2400 POWDER, FOR SUSPENSION ORAL at 11:47

## 2020-01-01 RX ADMIN — Medication 1300 MILLIGRAM(S): at 13:34

## 2020-01-01 RX ADMIN — Medication 125 MILLIGRAM(S): at 11:46

## 2020-01-01 RX ADMIN — Medication 1300 MILLIGRAM(S): at 05:57

## 2020-01-01 RX ADMIN — AMIODARONE HYDROCHLORIDE 400 MILLIGRAM(S): 400 TABLET ORAL at 23:52

## 2020-01-01 RX ADMIN — Medication 1300 MILLIGRAM(S): at 21:14

## 2020-01-01 RX ADMIN — SODIUM ZIRCONIUM CYCLOSILICATE 10 GRAM(S): 10 POWDER, FOR SUSPENSION ORAL at 05:02

## 2020-01-01 RX ADMIN — Medication 650 MILLIGRAM(S): at 17:25

## 2020-01-01 RX ADMIN — SODIUM ZIRCONIUM CYCLOSILICATE 10 GRAM(S): 10 POWDER, FOR SUSPENSION ORAL at 05:05

## 2020-01-01 RX ADMIN — SEVELAMER CARBONATE 1600 MILLIGRAM(S): 2400 POWDER, FOR SUSPENSION ORAL at 17:10

## 2020-01-01 RX ADMIN — Medication 1 APPLICATION(S): at 05:29

## 2020-01-01 RX ADMIN — INSULIN HUMAN 1 UNIT(S)/HR: 100 INJECTION, SOLUTION SUBCUTANEOUS at 00:15

## 2020-01-01 RX ADMIN — Medication 250 MILLIGRAM(S): at 11:51

## 2020-01-01 RX ADMIN — ATORVASTATIN CALCIUM 80 MILLIGRAM(S): 80 TABLET, FILM COATED ORAL at 21:51

## 2020-01-01 RX ADMIN — AMIODARONE HYDROCHLORIDE 100 MILLIGRAM(S): 400 TABLET ORAL at 17:24

## 2020-01-01 RX ADMIN — CHLORHEXIDINE GLUCONATE 1 APPLICATION(S): 213 SOLUTION TOPICAL at 06:00

## 2020-01-01 RX ADMIN — CHLORHEXIDINE GLUCONATE 1 APPLICATION(S): 213 SOLUTION TOPICAL at 05:58

## 2020-01-01 RX ADMIN — AMIODARONE HYDROCHLORIDE 33.3 MG/MIN: 400 TABLET ORAL at 08:51

## 2020-01-01 RX ADMIN — Medication 250 MILLIGRAM(S): at 01:25

## 2020-01-01 RX ADMIN — PANTOPRAZOLE SODIUM 40 MILLIGRAM(S): 20 TABLET, DELAYED RELEASE ORAL at 11:46

## 2020-01-01 RX ADMIN — AMIODARONE HYDROCHLORIDE 100 MILLIGRAM(S): 400 TABLET ORAL at 17:02

## 2020-01-01 RX ADMIN — PANTOPRAZOLE SODIUM 40 MILLIGRAM(S): 20 TABLET, DELAYED RELEASE ORAL at 11:34

## 2020-01-01 RX ADMIN — MIDODRINE HYDROCHLORIDE 10 MILLIGRAM(S): 2.5 TABLET ORAL at 13:11

## 2020-01-01 RX ADMIN — Medication 250 MILLIGRAM(S): at 05:32

## 2020-01-01 RX ADMIN — Medication 650 MILLIGRAM(S): at 07:58

## 2020-01-01 RX ADMIN — SEVELAMER CARBONATE 1600 MILLIGRAM(S): 2400 POWDER, FOR SUSPENSION ORAL at 08:35

## 2020-01-01 RX ADMIN — Medication 125 MILLIGRAM(S): at 23:10

## 2020-01-01 RX ADMIN — Medication 125 MILLIGRAM(S): at 19:02

## 2020-01-01 RX ADMIN — SODIUM CHLORIDE 25 MILLILITER(S): 9 INJECTION, SOLUTION INTRAVENOUS at 12:29

## 2020-01-01 RX ADMIN — Medication 650 MILLIGRAM(S): at 10:38

## 2020-01-01 RX ADMIN — MEROPENEM 100 MILLIGRAM(S): 1 INJECTION INTRAVENOUS at 17:16

## 2020-01-01 RX ADMIN — CHLORHEXIDINE GLUCONATE 15 MILLILITER(S): 213 SOLUTION TOPICAL at 17:57

## 2020-01-01 RX ADMIN — AMIODARONE HYDROCHLORIDE 400 MILLIGRAM(S): 400 TABLET ORAL at 00:02

## 2020-01-01 RX ADMIN — INSULIN GLARGINE 15 UNIT(S): 100 INJECTION, SOLUTION SUBCUTANEOUS at 21:29

## 2020-01-01 RX ADMIN — CHLORHEXIDINE GLUCONATE 1 APPLICATION(S): 213 SOLUTION TOPICAL at 05:23

## 2020-01-01 RX ADMIN — AMIODARONE HYDROCHLORIDE 100 MILLIGRAM(S): 400 TABLET ORAL at 05:25

## 2020-01-01 RX ADMIN — Medication 250 MILLIGRAM(S): at 18:33

## 2020-01-01 RX ADMIN — Medication 3.96 MICROGRAM(S)/KG/MIN: at 12:44

## 2020-01-01 RX ADMIN — ATORVASTATIN CALCIUM 80 MILLIGRAM(S): 80 TABLET, FILM COATED ORAL at 21:29

## 2020-01-01 RX ADMIN — Medication 250 MILLIGRAM(S): at 12:29

## 2020-01-01 RX ADMIN — HEPARIN SODIUM 5000 UNIT(S): 5000 INJECTION INTRAVENOUS; SUBCUTANEOUS at 13:03

## 2020-01-01 RX ADMIN — Medication 650 MILLIGRAM(S): at 17:29

## 2020-01-01 RX ADMIN — HEPARIN SODIUM 5000 UNIT(S): 5000 INJECTION INTRAVENOUS; SUBCUTANEOUS at 13:01

## 2020-01-01 RX ADMIN — Medication 650 MILLIGRAM(S): at 04:12

## 2020-01-01 RX ADMIN — HEPARIN SODIUM 5000 UNIT(S): 5000 INJECTION INTRAVENOUS; SUBCUTANEOUS at 16:00

## 2020-01-01 RX ADMIN — PANTOPRAZOLE SODIUM 40 MILLIGRAM(S): 20 TABLET, DELAYED RELEASE ORAL at 13:18

## 2020-01-01 RX ADMIN — MIDODRINE HYDROCHLORIDE 10 MILLIGRAM(S): 2.5 TABLET ORAL at 05:31

## 2020-01-01 RX ADMIN — HEPARIN SODIUM 5000 UNIT(S): 5000 INJECTION INTRAVENOUS; SUBCUTANEOUS at 13:20

## 2020-01-01 RX ADMIN — CHLORHEXIDINE GLUCONATE 15 MILLILITER(S): 213 SOLUTION TOPICAL at 05:19

## 2020-01-01 RX ADMIN — Medication 250 MILLIGRAM(S): at 18:49

## 2020-01-01 RX ADMIN — Medication 1300 MILLIGRAM(S): at 21:47

## 2020-01-01 RX ADMIN — Medication 1300 MILLIGRAM(S): at 23:55

## 2020-01-01 RX ADMIN — INSULIN HUMAN 1 UNIT(S)/HR: 100 INJECTION, SOLUTION SUBCUTANEOUS at 12:30

## 2020-01-01 RX ADMIN — HEPARIN SODIUM 5000 UNIT(S): 5000 INJECTION INTRAVENOUS; SUBCUTANEOUS at 05:37

## 2020-01-01 RX ADMIN — CHLORHEXIDINE GLUCONATE 15 MILLILITER(S): 213 SOLUTION TOPICAL at 17:47

## 2020-01-01 RX ADMIN — SODIUM ZIRCONIUM CYCLOSILICATE 10 GRAM(S): 10 POWDER, FOR SUSPENSION ORAL at 17:00

## 2020-01-01 RX ADMIN — INSULIN GLARGINE 12 UNIT(S): 100 INJECTION, SOLUTION SUBCUTANEOUS at 22:07

## 2020-01-01 RX ADMIN — HEPARIN SODIUM 5000 UNIT(S): 5000 INJECTION INTRAVENOUS; SUBCUTANEOUS at 14:04

## 2020-01-01 RX ADMIN — MIDODRINE HYDROCHLORIDE 10 MILLIGRAM(S): 2.5 TABLET ORAL at 05:05

## 2020-01-01 RX ADMIN — Medication 650 MILLIGRAM(S): at 00:30

## 2020-01-01 RX ADMIN — SEVELAMER CARBONATE 1600 MILLIGRAM(S): 2400 POWDER, FOR SUSPENSION ORAL at 11:59

## 2020-01-01 RX ADMIN — PANTOPRAZOLE SODIUM 40 MILLIGRAM(S): 20 TABLET, DELAYED RELEASE ORAL at 13:02

## 2020-01-01 RX ADMIN — DESMOPRESSIN ACETATE 0.2 MILLIGRAM(S): 0.1 TABLET ORAL at 05:03

## 2020-01-01 RX ADMIN — CHLORHEXIDINE GLUCONATE 15 MILLILITER(S): 213 SOLUTION TOPICAL at 16:56

## 2020-01-01 RX ADMIN — Medication 125 MILLIGRAM(S): at 18:42

## 2020-01-01 RX ADMIN — MEROPENEM 100 MILLIGRAM(S): 1 INJECTION INTRAVENOUS at 06:03

## 2020-01-01 RX ADMIN — CHLORHEXIDINE GLUCONATE 1 APPLICATION(S): 213 SOLUTION TOPICAL at 05:02

## 2020-01-01 RX ADMIN — Medication 4: at 06:22

## 2020-01-01 RX ADMIN — Medication 10: at 06:04

## 2020-01-01 RX ADMIN — INSULIN HUMAN 1 UNIT(S)/HR: 100 INJECTION, SOLUTION SUBCUTANEOUS at 11:45

## 2020-01-01 RX ADMIN — CHLORHEXIDINE GLUCONATE 1 APPLICATION(S): 213 SOLUTION TOPICAL at 05:54

## 2020-01-01 RX ADMIN — CHLORHEXIDINE GLUCONATE 15 MILLILITER(S): 213 SOLUTION TOPICAL at 17:14

## 2020-01-01 RX ADMIN — DOPAMINE HYDROCHLORIDE 3.17 MICROGRAM(S)/KG/MIN: 40 INJECTION, SOLUTION, CONCENTRATE INTRAVENOUS at 19:46

## 2020-01-01 RX ADMIN — Medication 250 MILLIGRAM(S): at 23:05

## 2020-01-01 RX ADMIN — Medication 3 UNIT(S): at 14:20

## 2020-01-01 RX ADMIN — Medication 125 MILLIGRAM(S): at 05:47

## 2020-01-01 RX ADMIN — Medication 4: at 06:25

## 2020-01-01 RX ADMIN — MIDODRINE HYDROCHLORIDE 10 MILLIGRAM(S): 2.5 TABLET ORAL at 05:20

## 2020-01-01 RX ADMIN — DESMOPRESSIN ACETATE 2 MICROGRAM(S): 0.1 TABLET ORAL at 13:39

## 2020-01-01 RX ADMIN — Medication 650 MILLIGRAM(S): at 13:19

## 2020-01-01 RX ADMIN — SODIUM CHLORIDE 1000 MILLILITER(S): 9 INJECTION INTRAMUSCULAR; INTRAVENOUS; SUBCUTANEOUS at 16:55

## 2020-01-01 RX ADMIN — Medication 3 UNIT(S): at 05:02

## 2020-01-01 RX ADMIN — AMIODARONE HYDROCHLORIDE 100 MILLIGRAM(S): 400 TABLET ORAL at 18:11

## 2020-01-01 RX ADMIN — SEVELAMER CARBONATE 1600 MILLIGRAM(S): 2400 POWDER, FOR SUSPENSION ORAL at 08:18

## 2020-01-01 RX ADMIN — MIDODRINE HYDROCHLORIDE 10 MILLIGRAM(S): 2.5 TABLET ORAL at 13:34

## 2020-01-01 RX ADMIN — SODIUM CHLORIDE 400 MILLILITER(S): 9 INJECTION, SOLUTION INTRAVENOUS at 08:54

## 2020-01-01 RX ADMIN — DOPAMINE HYDROCHLORIDE 3.17 MICROGRAM(S)/KG/MIN: 40 INJECTION, SOLUTION, CONCENTRATE INTRAVENOUS at 14:43

## 2020-01-01 RX ADMIN — AMIODARONE HYDROCHLORIDE 100 MILLIGRAM(S): 400 TABLET ORAL at 17:15

## 2020-01-01 RX ADMIN — MIDODRINE HYDROCHLORIDE 10 MILLIGRAM(S): 2.5 TABLET ORAL at 21:05

## 2020-01-01 RX ADMIN — Medication 125 MILLIGRAM(S): at 00:11

## 2020-01-01 RX ADMIN — SEVELAMER CARBONATE 1600 MILLIGRAM(S): 2400 POWDER, FOR SUSPENSION ORAL at 16:58

## 2020-01-01 RX ADMIN — AMIODARONE HYDROCHLORIDE 100 MILLIGRAM(S): 400 TABLET ORAL at 05:46

## 2020-01-01 RX ADMIN — Medication 650 MILLIGRAM(S): at 11:19

## 2020-01-01 RX ADMIN — Medication 650 MILLIGRAM(S): at 12:30

## 2020-01-01 RX ADMIN — AMIODARONE HYDROCHLORIDE 100 MILLIGRAM(S): 400 TABLET ORAL at 05:22

## 2020-01-01 RX ADMIN — CEFTRIAXONE 100 MILLIGRAM(S): 500 INJECTION, POWDER, FOR SOLUTION INTRAMUSCULAR; INTRAVENOUS at 18:27

## 2020-01-01 RX ADMIN — Medication 250 MILLIGRAM(S): at 17:27

## 2020-01-01 RX ADMIN — Medication 2: at 15:38

## 2020-01-01 RX ADMIN — HEPARIN SODIUM 5000 UNIT(S): 5000 INJECTION INTRAVENOUS; SUBCUTANEOUS at 14:20

## 2020-01-01 RX ADMIN — DESMOPRESSIN ACETATE 0.2 MILLIGRAM(S): 0.1 TABLET ORAL at 05:23

## 2020-01-01 RX ADMIN — HEPARIN SODIUM 5000 UNIT(S): 5000 INJECTION INTRAVENOUS; SUBCUTANEOUS at 05:09

## 2020-01-01 RX ADMIN — AMIODARONE HYDROCHLORIDE 400 MILLIGRAM(S): 400 TABLET ORAL at 12:11

## 2020-01-01 RX ADMIN — Medication 3 UNIT(S): at 06:10

## 2020-01-01 RX ADMIN — SODIUM CHLORIDE 1000 MILLILITER(S): 9 INJECTION, SOLUTION INTRAVENOUS at 07:00

## 2020-01-01 RX ADMIN — Medication 6: at 11:01

## 2020-01-01 RX ADMIN — CHLORHEXIDINE GLUCONATE 15 MILLILITER(S): 213 SOLUTION TOPICAL at 06:04

## 2020-01-01 RX ADMIN — Medication 50 MILLIEQUIVALENT(S): at 11:38

## 2020-01-01 RX ADMIN — Medication 650 MILLIGRAM(S): at 18:11

## 2020-01-01 RX ADMIN — Medication 250 MILLIGRAM(S): at 05:02

## 2020-01-01 RX ADMIN — Medication 650 MILLIGRAM(S): at 11:29

## 2020-01-01 RX ADMIN — HEPARIN SODIUM 5000 UNIT(S): 5000 INJECTION INTRAVENOUS; SUBCUTANEOUS at 21:20

## 2020-01-01 RX ADMIN — CHLORHEXIDINE GLUCONATE 15 MILLILITER(S): 213 SOLUTION TOPICAL at 05:56

## 2020-01-01 RX ADMIN — Medication 1.58 MICROGRAM(S)/KG/MIN: at 11:42

## 2020-01-01 RX ADMIN — Medication 650 MILLIGRAM(S): at 23:06

## 2020-01-01 RX ADMIN — MEROPENEM 100 MILLIGRAM(S): 1 INJECTION INTRAVENOUS at 19:02

## 2020-01-01 RX ADMIN — Medication 3 UNIT(S): at 13:30

## 2020-01-01 RX ADMIN — MEROPENEM 100 MILLIGRAM(S): 1 INJECTION INTRAVENOUS at 05:26

## 2020-01-01 RX ADMIN — Medication 125 MILLIGRAM(S): at 05:05

## 2020-01-01 RX ADMIN — SEVELAMER CARBONATE 1600 MILLIGRAM(S): 2400 POWDER, FOR SUSPENSION ORAL at 05:24

## 2020-01-01 RX ADMIN — CHLORHEXIDINE GLUCONATE 15 MILLILITER(S): 213 SOLUTION TOPICAL at 06:00

## 2020-01-01 RX ADMIN — PANTOPRAZOLE SODIUM 40 MILLIGRAM(S): 20 TABLET, DELAYED RELEASE ORAL at 11:43

## 2020-01-01 RX ADMIN — CHLORHEXIDINE GLUCONATE 15 MILLILITER(S): 213 SOLUTION TOPICAL at 17:00

## 2020-01-01 RX ADMIN — Medication 100 MILLIGRAM(S): at 21:06

## 2020-01-01 RX ADMIN — Medication 650 MILLIGRAM(S): at 12:09

## 2020-01-01 RX ADMIN — AMIODARONE HYDROCHLORIDE 100 MILLIGRAM(S): 400 TABLET ORAL at 17:07

## 2020-01-01 RX ADMIN — MEROPENEM 100 MILLIGRAM(S): 1 INJECTION INTRAVENOUS at 05:02

## 2020-01-01 RX ADMIN — DOPAMINE HYDROCHLORIDE 3.17 MICROGRAM(S)/KG/MIN: 40 INJECTION, SOLUTION, CONCENTRATE INTRAVENOUS at 05:33

## 2020-01-01 RX ADMIN — AMIODARONE HYDROCHLORIDE 100 MILLIGRAM(S): 400 TABLET ORAL at 05:08

## 2020-01-01 RX ADMIN — INSULIN GLARGINE 12 UNIT(S): 100 INJECTION, SOLUTION SUBCUTANEOUS at 21:54

## 2020-01-01 RX ADMIN — MIDODRINE HYDROCHLORIDE 10 MILLIGRAM(S): 2.5 TABLET ORAL at 13:05

## 2020-01-01 RX ADMIN — CHLORHEXIDINE GLUCONATE 15 MILLILITER(S): 213 SOLUTION TOPICAL at 05:29

## 2020-01-01 RX ADMIN — MEROPENEM 100 MILLIGRAM(S): 1 INJECTION INTRAVENOUS at 14:29

## 2020-01-01 RX ADMIN — Medication 3 UNIT(S): at 05:03

## 2020-01-01 RX ADMIN — Medication 650 MILLIGRAM(S): at 23:09

## 2020-01-01 RX ADMIN — CHLORHEXIDINE GLUCONATE 15 MILLILITER(S): 213 SOLUTION TOPICAL at 17:55

## 2020-01-01 RX ADMIN — SEVELAMER CARBONATE 1600 MILLIGRAM(S): 2400 POWDER, FOR SUSPENSION ORAL at 08:24

## 2020-01-01 RX ADMIN — INSULIN GLARGINE 12 UNIT(S): 100 INJECTION, SOLUTION SUBCUTANEOUS at 23:11

## 2020-01-01 RX ADMIN — Medication 125 MILLIGRAM(S): at 17:13

## 2020-01-01 RX ADMIN — Medication 125 MILLIGRAM(S): at 12:51

## 2020-01-01 RX ADMIN — Medication 3 UNIT(S): at 14:07

## 2020-01-01 RX ADMIN — SODIUM CHLORIDE 25 MILLILITER(S): 5 INJECTION, SOLUTION INTRAVENOUS at 08:37

## 2020-01-01 RX ADMIN — CHLORHEXIDINE GLUCONATE 15 MILLILITER(S): 213 SOLUTION TOPICAL at 18:27

## 2020-01-01 RX ADMIN — Medication 150 MILLIGRAM(S): at 18:28

## 2020-01-01 RX ADMIN — HEPARIN SODIUM 5000 UNIT(S): 5000 INJECTION INTRAVENOUS; SUBCUTANEOUS at 13:57

## 2020-01-01 RX ADMIN — Medication 250 MILLIGRAM(S): at 23:52

## 2020-01-01 RX ADMIN — Medication 3 UNIT(S): at 14:40

## 2020-01-01 RX ADMIN — Medication 650 MILLIGRAM(S): at 04:10

## 2020-01-01 RX ADMIN — HEPARIN SODIUM 5000 UNIT(S): 5000 INJECTION INTRAVENOUS; SUBCUTANEOUS at 21:51

## 2020-01-01 RX ADMIN — SODIUM CHLORIDE 15 MILLILITER(S): 5 INJECTION, SOLUTION INTRAVENOUS at 19:10

## 2020-01-01 RX ADMIN — Medication 650 MILLIGRAM(S): at 04:42

## 2020-01-01 RX ADMIN — PANTOPRAZOLE SODIUM 40 MILLIGRAM(S): 20 TABLET, DELAYED RELEASE ORAL at 11:32

## 2020-01-01 RX ADMIN — SODIUM CHLORIDE 75 MILLILITER(S): 9 INJECTION, SOLUTION INTRAVENOUS at 09:13

## 2020-01-01 RX ADMIN — Medication 8: at 17:55

## 2020-01-01 RX ADMIN — MEROPENEM 100 MILLIGRAM(S): 1 INJECTION INTRAVENOUS at 05:03

## 2020-01-01 RX ADMIN — CHLORHEXIDINE GLUCONATE 1 APPLICATION(S): 213 SOLUTION TOPICAL at 05:59

## 2020-01-01 RX ADMIN — Medication 100 MILLIGRAM(S): at 05:01

## 2020-01-01 RX ADMIN — MIDODRINE HYDROCHLORIDE 10 MILLIGRAM(S): 2.5 TABLET ORAL at 14:06

## 2020-01-01 RX ADMIN — Medication 250 MILLIGRAM(S): at 19:21

## 2020-01-01 RX ADMIN — PANTOPRAZOLE SODIUM 40 MILLIGRAM(S): 20 TABLET, DELAYED RELEASE ORAL at 11:38

## 2020-01-01 RX ADMIN — Medication 10 MILLIGRAM(S): at 00:37

## 2020-01-01 RX ADMIN — Medication 3 UNIT(S): at 05:54

## 2020-01-01 RX ADMIN — HEPARIN SODIUM 5000 UNIT(S): 5000 INJECTION INTRAVENOUS; SUBCUTANEOUS at 13:33

## 2020-01-01 RX ADMIN — SODIUM ZIRCONIUM CYCLOSILICATE 10 GRAM(S): 10 POWDER, FOR SUSPENSION ORAL at 17:13

## 2020-01-01 RX ADMIN — PANTOPRAZOLE SODIUM 40 MILLIGRAM(S): 20 TABLET, DELAYED RELEASE ORAL at 12:26

## 2020-01-01 RX ADMIN — Medication 8: at 06:39

## 2020-01-01 RX ADMIN — SODIUM CHLORIDE 60 MILLILITER(S): 9 INJECTION, SOLUTION INTRAVENOUS at 09:40

## 2020-01-01 RX ADMIN — CEFTRIAXONE 100 MILLIGRAM(S): 500 INJECTION, POWDER, FOR SOLUTION INTRAMUSCULAR; INTRAVENOUS at 17:27

## 2020-01-01 RX ADMIN — HEPARIN SODIUM 5000 UNIT(S): 5000 INJECTION INTRAVENOUS; SUBCUTANEOUS at 05:32

## 2020-01-01 RX ADMIN — HEPARIN SODIUM 5000 UNIT(S): 5000 INJECTION INTRAVENOUS; SUBCUTANEOUS at 05:20

## 2020-01-01 RX ADMIN — Medication 20 MILLIGRAM(S): at 16:34

## 2020-01-01 RX ADMIN — Medication 1300 MILLIGRAM(S): at 21:03

## 2020-01-01 RX ADMIN — HEPARIN SODIUM 5000 UNIT(S): 5000 INJECTION INTRAVENOUS; SUBCUTANEOUS at 06:04

## 2020-01-01 RX ADMIN — MEROPENEM 100 MILLIGRAM(S): 1 INJECTION INTRAVENOUS at 05:09

## 2020-01-01 RX ADMIN — Medication 650 MILLIGRAM(S): at 17:41

## 2020-01-01 RX ADMIN — SEVELAMER CARBONATE 1600 MILLIGRAM(S): 2400 POWDER, FOR SUSPENSION ORAL at 12:08

## 2020-01-01 RX ADMIN — ALTEPLASE 68.4 MILLIGRAM(S): KIT at 16:36

## 2020-01-01 RX ADMIN — MIDODRINE HYDROCHLORIDE 10 MILLIGRAM(S): 2.5 TABLET ORAL at 08:23

## 2020-01-01 RX ADMIN — Medication 3 UNIT(S): at 05:46

## 2020-01-01 RX ADMIN — Medication 100 MILLIEQUIVALENT(S): at 03:55

## 2020-01-01 RX ADMIN — Medication 650 MILLIGRAM(S): at 20:06

## 2020-01-01 RX ADMIN — Medication 3 UNIT(S): at 14:27

## 2020-01-01 RX ADMIN — Medication 125 MILLIGRAM(S): at 11:59

## 2020-01-01 RX ADMIN — Medication 650 MILLIGRAM(S): at 05:03

## 2020-01-01 RX ADMIN — MIDODRINE HYDROCHLORIDE 10 MILLIGRAM(S): 2.5 TABLET ORAL at 05:02

## 2020-01-01 RX ADMIN — ATORVASTATIN CALCIUM 80 MILLIGRAM(S): 80 TABLET, FILM COATED ORAL at 21:20

## 2020-01-01 RX ADMIN — Medication 1300 MILLIGRAM(S): at 21:05

## 2020-01-01 RX ADMIN — MIDODRINE HYDROCHLORIDE 10 MILLIGRAM(S): 2.5 TABLET ORAL at 05:29

## 2020-01-01 RX ADMIN — Medication 650 MILLIGRAM(S): at 11:34

## 2020-01-01 RX ADMIN — Medication 3 UNIT(S): at 21:49

## 2020-01-01 RX ADMIN — Medication 3 UNIT(S): at 05:59

## 2020-01-01 RX ADMIN — PANTOPRAZOLE SODIUM 40 MILLIGRAM(S): 20 TABLET, DELAYED RELEASE ORAL at 13:38

## 2020-01-01 RX ADMIN — AMIODARONE HYDROCHLORIDE 400 MILLIGRAM(S): 400 TABLET ORAL at 11:39

## 2020-01-01 RX ADMIN — Medication 650 MILLIGRAM(S): at 14:32

## 2020-01-01 RX ADMIN — Medication 250 MILLIGRAM(S): at 18:20

## 2020-01-01 RX ADMIN — Medication 650 MILLIGRAM(S): at 03:53

## 2020-01-01 RX ADMIN — HEPARIN SODIUM 5000 UNIT(S): 5000 INJECTION INTRAVENOUS; SUBCUTANEOUS at 14:26

## 2020-01-01 RX ADMIN — CEFEPIME 100 MILLIGRAM(S): 1 INJECTION, POWDER, FOR SOLUTION INTRAMUSCULAR; INTRAVENOUS at 17:12

## 2020-01-01 RX ADMIN — HEPARIN SODIUM 5000 UNIT(S): 5000 INJECTION INTRAVENOUS; SUBCUTANEOUS at 14:24

## 2020-01-01 RX ADMIN — HEPARIN SODIUM 5000 UNIT(S): 5000 INJECTION INTRAVENOUS; SUBCUTANEOUS at 13:02

## 2020-01-01 RX ADMIN — MEROPENEM 100 MILLIGRAM(S): 1 INJECTION INTRAVENOUS at 05:58

## 2020-01-01 RX ADMIN — Medication 125 MILLIGRAM(S): at 17:05

## 2020-01-01 RX ADMIN — SODIUM ZIRCONIUM CYCLOSILICATE 10 GRAM(S): 10 POWDER, FOR SUSPENSION ORAL at 18:11

## 2020-01-01 RX ADMIN — Medication 100 MILLIGRAM(S): at 21:15

## 2020-01-01 RX ADMIN — HEPARIN SODIUM 5000 UNIT(S): 5000 INJECTION INTRAVENOUS; SUBCUTANEOUS at 21:19

## 2020-01-01 RX ADMIN — Medication 650 MILLIGRAM(S): at 21:16

## 2020-01-01 RX ADMIN — HEPARIN SODIUM 5000 UNIT(S): 5000 INJECTION INTRAVENOUS; SUBCUTANEOUS at 13:42

## 2020-01-01 RX ADMIN — Medication 650 MILLIGRAM(S): at 19:55

## 2020-01-01 RX ADMIN — CHLORHEXIDINE GLUCONATE 15 MILLILITER(S): 213 SOLUTION TOPICAL at 06:27

## 2020-01-01 RX ADMIN — DOPAMINE HYDROCHLORIDE 3.17 MICROGRAM(S)/KG/MIN: 40 INJECTION, SOLUTION, CONCENTRATE INTRAVENOUS at 05:27

## 2020-01-01 RX ADMIN — Medication 1.58 MICROGRAM(S)/KG/MIN: at 19:01

## 2020-01-01 RX ADMIN — Medication 650 MILLIGRAM(S): at 21:23

## 2020-01-01 RX ADMIN — SODIUM CHLORIDE 100 MILLILITER(S): 9 INJECTION INTRAMUSCULAR; INTRAVENOUS; SUBCUTANEOUS at 11:47

## 2020-01-01 RX ADMIN — CEFTRIAXONE 100 MILLIGRAM(S): 500 INJECTION, POWDER, FOR SOLUTION INTRAMUSCULAR; INTRAVENOUS at 18:35

## 2020-01-01 RX ADMIN — HEPARIN SODIUM 5000 UNIT(S): 5000 INJECTION INTRAVENOUS; SUBCUTANEOUS at 05:46

## 2020-01-01 RX ADMIN — Medication 650 MILLIGRAM(S): at 21:19

## 2020-01-01 RX ADMIN — Medication 650 MILLIGRAM(S): at 08:28

## 2020-01-01 RX ADMIN — INSULIN HUMAN 1 UNIT(S)/HR: 100 INJECTION, SOLUTION SUBCUTANEOUS at 06:53

## 2020-01-01 RX ADMIN — Medication 125 MILLIGRAM(S): at 00:15

## 2020-01-01 RX ADMIN — CHLORHEXIDINE GLUCONATE 15 MILLILITER(S): 213 SOLUTION TOPICAL at 05:23

## 2020-01-01 RX ADMIN — Medication 1300 MILLIGRAM(S): at 05:05

## 2020-01-01 RX ADMIN — Medication 1.58 MICROGRAM(S)/KG/MIN: at 23:05

## 2020-01-01 RX ADMIN — Medication 650 MILLIGRAM(S): at 06:00

## 2020-01-01 RX ADMIN — DOPAMINE HYDROCHLORIDE 3.17 MICROGRAM(S)/KG/MIN: 40 INJECTION, SOLUTION, CONCENTRATE INTRAVENOUS at 18:20

## 2020-01-01 RX ADMIN — CHLORHEXIDINE GLUCONATE 15 MILLILITER(S): 213 SOLUTION TOPICAL at 05:59

## 2020-01-01 RX ADMIN — Medication 1.58 MICROGRAM(S)/KG/MIN: at 18:43

## 2020-01-01 RX ADMIN — SEVELAMER CARBONATE 1600 MILLIGRAM(S): 2400 POWDER, FOR SUSPENSION ORAL at 18:11

## 2020-01-01 RX ADMIN — PANTOPRAZOLE SODIUM 40 MILLIGRAM(S): 20 TABLET, DELAYED RELEASE ORAL at 12:51

## 2020-01-01 RX ADMIN — DESMOPRESSIN ACETATE 0.2 MILLIGRAM(S): 0.1 TABLET ORAL at 18:13

## 2020-01-01 RX ADMIN — HEPARIN SODIUM 5000 UNIT(S): 5000 INJECTION INTRAVENOUS; SUBCUTANEOUS at 13:34

## 2020-01-01 RX ADMIN — SODIUM CHLORIDE 1000 MILLILITER(S): 9 INJECTION INTRAMUSCULAR; INTRAVENOUS; SUBCUTANEOUS at 08:46

## 2020-01-01 RX ADMIN — MIDODRINE HYDROCHLORIDE 10 MILLIGRAM(S): 2.5 TABLET ORAL at 21:15

## 2020-01-01 RX ADMIN — MIDODRINE HYDROCHLORIDE 10 MILLIGRAM(S): 2.5 TABLET ORAL at 23:55

## 2020-01-01 RX ADMIN — MEROPENEM 100 MILLIGRAM(S): 1 INJECTION INTRAVENOUS at 17:27

## 2020-01-01 RX ADMIN — ETOMIDATE 20 MILLIGRAM(S): 2 INJECTION INTRAVENOUS at 18:28

## 2020-01-01 RX ADMIN — FENTANYL CITRATE 25 MICROGRAM(S): 50 INJECTION INTRAVENOUS at 12:21

## 2020-01-01 RX ADMIN — Medication 50 MILLIEQUIVALENT(S): at 10:25

## 2020-01-01 RX ADMIN — AMIODARONE HYDROCHLORIDE 100 MILLIGRAM(S): 400 TABLET ORAL at 05:30

## 2020-01-01 RX ADMIN — CHLORHEXIDINE GLUCONATE 1 APPLICATION(S): 213 SOLUTION TOPICAL at 05:37

## 2020-01-01 RX ADMIN — CHLORHEXIDINE GLUCONATE 15 MILLILITER(S): 213 SOLUTION TOPICAL at 05:47

## 2020-01-01 RX ADMIN — AMIODARONE HYDROCHLORIDE 100 MILLIGRAM(S): 400 TABLET ORAL at 17:31

## 2020-01-01 RX ADMIN — Medication 3 UNIT(S): at 23:57

## 2020-01-01 RX ADMIN — CHLORHEXIDINE GLUCONATE 15 MILLILITER(S): 213 SOLUTION TOPICAL at 17:23

## 2020-01-01 RX ADMIN — HEPARIN SODIUM 5000 UNIT(S): 5000 INJECTION INTRAVENOUS; SUBCUTANEOUS at 22:08

## 2020-01-01 RX ADMIN — MEROPENEM 100 MILLIGRAM(S): 1 INJECTION INTRAVENOUS at 18:45

## 2020-01-01 RX ADMIN — CHLORHEXIDINE GLUCONATE 15 MILLILITER(S): 213 SOLUTION TOPICAL at 05:31

## 2020-01-01 RX ADMIN — ATORVASTATIN CALCIUM 80 MILLIGRAM(S): 80 TABLET, FILM COATED ORAL at 21:26

## 2020-01-01 RX ADMIN — ATORVASTATIN CALCIUM 80 MILLIGRAM(S): 80 TABLET, FILM COATED ORAL at 22:07

## 2020-01-01 RX ADMIN — Medication 650 MILLIGRAM(S): at 20:50

## 2020-01-01 RX ADMIN — HEPARIN SODIUM 5000 UNIT(S): 5000 INJECTION INTRAVENOUS; SUBCUTANEOUS at 21:45

## 2020-01-01 RX ADMIN — AMIODARONE HYDROCHLORIDE 100 MILLIGRAM(S): 400 TABLET ORAL at 05:53

## 2020-01-01 RX ADMIN — CHLORHEXIDINE GLUCONATE 15 MILLILITER(S): 213 SOLUTION TOPICAL at 05:37

## 2020-01-01 RX ADMIN — MEROPENEM 100 MILLIGRAM(S): 1 INJECTION INTRAVENOUS at 17:01

## 2020-01-01 RX ADMIN — Medication 125 MILLIGRAM(S): at 11:44

## 2020-01-01 RX ADMIN — Medication 125 MILLIGRAM(S): at 23:55

## 2020-01-01 RX ADMIN — HEPARIN SODIUM 5000 UNIT(S): 5000 INJECTION INTRAVENOUS; SUBCUTANEOUS at 22:07

## 2020-01-01 RX ADMIN — SEVELAMER CARBONATE 1600 MILLIGRAM(S): 2400 POWDER, FOR SUSPENSION ORAL at 17:13

## 2020-01-01 RX ADMIN — Medication 250 MILLIGRAM(S): at 23:00

## 2020-01-01 RX ADMIN — Medication 166.67 MILLIGRAM(S): at 10:32

## 2020-01-01 RX ADMIN — CEFEPIME 100 MILLIGRAM(S): 1 INJECTION, POWDER, FOR SOLUTION INTRAMUSCULAR; INTRAVENOUS at 17:00

## 2020-01-01 RX ADMIN — DESMOPRESSIN ACETATE 2 MICROGRAM(S): 0.1 TABLET ORAL at 18:30

## 2020-01-01 RX ADMIN — Medication 250 MILLIGRAM(S): at 06:04

## 2020-01-01 RX ADMIN — Medication 250 MILLIGRAM(S): at 23:17

## 2020-01-01 RX ADMIN — Medication 1 APPLICATION(S): at 17:27

## 2020-01-01 RX ADMIN — Medication 1300 MILLIGRAM(S): at 14:27

## 2020-01-01 RX ADMIN — MEROPENEM 100 MILLIGRAM(S): 1 INJECTION INTRAVENOUS at 11:41

## 2020-01-01 RX ADMIN — Medication 1300 MILLIGRAM(S): at 05:19

## 2020-01-01 RX ADMIN — Medication 3 UNIT(S): at 13:34

## 2020-01-01 RX ADMIN — CHLORHEXIDINE GLUCONATE 15 MILLILITER(S): 213 SOLUTION TOPICAL at 17:05

## 2020-01-01 RX ADMIN — INSULIN HUMAN 1 UNIT(S)/HR: 100 INJECTION, SOLUTION SUBCUTANEOUS at 12:57

## 2020-01-01 RX ADMIN — Medication 650 MILLIGRAM(S): at 17:24

## 2020-01-01 RX ADMIN — SODIUM CHLORIDE 25 MILLILITER(S): 9 INJECTION, SOLUTION INTRAVENOUS at 04:40

## 2020-01-01 RX ADMIN — AMIODARONE HYDROCHLORIDE 100 MILLIGRAM(S): 400 TABLET ORAL at 18:20

## 2020-01-01 RX ADMIN — AMIODARONE HYDROCHLORIDE 100 MILLIGRAM(S): 400 TABLET ORAL at 05:31

## 2020-01-01 RX ADMIN — DOPAMINE HYDROCHLORIDE 3.17 MICROGRAM(S)/KG/MIN: 40 INJECTION, SOLUTION, CONCENTRATE INTRAVENOUS at 03:36

## 2020-01-01 RX ADMIN — Medication 650 MILLIGRAM(S): at 21:20

## 2020-01-01 RX ADMIN — INSULIN GLARGINE 12 UNIT(S): 100 INJECTION, SOLUTION SUBCUTANEOUS at 22:36

## 2020-01-01 RX ADMIN — CHLORHEXIDINE GLUCONATE 15 MILLILITER(S): 213 SOLUTION TOPICAL at 18:32

## 2020-01-01 RX ADMIN — HEPARIN SODIUM 5000 UNIT(S): 5000 INJECTION INTRAVENOUS; SUBCUTANEOUS at 14:06

## 2020-01-01 RX ADMIN — Medication 100 MILLIGRAM(S): at 14:07

## 2020-01-01 RX ADMIN — CHLORHEXIDINE GLUCONATE 15 MILLILITER(S): 213 SOLUTION TOPICAL at 18:19

## 2020-01-01 RX ADMIN — CHLORHEXIDINE GLUCONATE 1 APPLICATION(S): 213 SOLUTION TOPICAL at 05:56

## 2020-01-01 RX ADMIN — HEPARIN SODIUM 5000 UNIT(S): 5000 INJECTION INTRAVENOUS; SUBCUTANEOUS at 23:17

## 2020-01-01 RX ADMIN — CHLORHEXIDINE GLUCONATE 15 MILLILITER(S): 213 SOLUTION TOPICAL at 05:03

## 2020-01-01 RX ADMIN — MEROPENEM 100 MILLIGRAM(S): 1 INJECTION INTRAVENOUS at 17:35

## 2020-01-01 RX ADMIN — Medication 650 MILLIGRAM(S): at 05:25

## 2020-01-01 RX ADMIN — Medication 650 MILLIGRAM(S): at 03:23

## 2020-01-01 RX ADMIN — SODIUM CHLORIDE 80 MILLILITER(S): 5 INJECTION, SOLUTION INTRAVENOUS at 11:00

## 2020-01-01 RX ADMIN — Medication 2: at 10:26

## 2020-01-01 RX ADMIN — HEPARIN SODIUM 5000 UNIT(S): 5000 INJECTION INTRAVENOUS; SUBCUTANEOUS at 05:25

## 2020-01-01 RX ADMIN — NICARDIPINE HYDROCHLORIDE 25 MG/HR: 30 CAPSULE, EXTENDED RELEASE ORAL at 18:03

## 2020-01-01 RX ADMIN — Medication 3 UNIT(S): at 21:21

## 2020-01-01 RX ADMIN — CHLORHEXIDINE GLUCONATE 15 MILLILITER(S): 213 SOLUTION TOPICAL at 18:14

## 2020-01-01 RX ADMIN — Medication 650 MILLIGRAM(S): at 03:31

## 2020-01-01 RX ADMIN — DESMOPRESSIN ACETATE 2 MICROGRAM(S): 0.1 TABLET ORAL at 17:16

## 2020-01-01 RX ADMIN — Medication 1300 MILLIGRAM(S): at 14:06

## 2020-01-01 RX ADMIN — VASOPRESSIN 2.4 UNIT(S)/MIN: 20 INJECTION INTRAVENOUS at 04:40

## 2020-01-01 RX ADMIN — HEPARIN SODIUM 5000 UNIT(S): 5000 INJECTION INTRAVENOUS; SUBCUTANEOUS at 23:09

## 2020-01-01 RX ADMIN — SODIUM CHLORIDE 75 MILLILITER(S): 9 INJECTION, SOLUTION INTRAVENOUS at 20:50

## 2020-01-01 RX ADMIN — SEVELAMER CARBONATE 1600 MILLIGRAM(S): 2400 POWDER, FOR SUSPENSION ORAL at 12:52

## 2020-01-01 RX ADMIN — Medication 250 MILLIGRAM(S): at 05:37

## 2020-01-01 RX ADMIN — CHLORHEXIDINE GLUCONATE 15 MILLILITER(S): 213 SOLUTION TOPICAL at 05:04

## 2020-01-01 RX ADMIN — Medication 250 MILLIGRAM(S): at 17:16

## 2020-01-01 RX ADMIN — CHLORHEXIDINE GLUCONATE 15 MILLILITER(S): 213 SOLUTION TOPICAL at 17:10

## 2020-01-01 RX ADMIN — FENTANYL CITRATE 25 MICROGRAM(S): 50 INJECTION INTRAVENOUS at 11:36

## 2020-01-01 RX ADMIN — DESMOPRESSIN ACETATE 2 MICROGRAM(S): 0.1 TABLET ORAL at 22:08

## 2020-01-01 RX ADMIN — CHLORHEXIDINE GLUCONATE 1 APPLICATION(S): 213 SOLUTION TOPICAL at 06:26

## 2020-01-01 RX ADMIN — Medication 100 MILLIEQUIVALENT(S): at 05:02

## 2020-01-01 RX ADMIN — DESMOPRESSIN ACETATE 2 MICROGRAM(S): 0.1 TABLET ORAL at 05:07

## 2020-01-01 RX ADMIN — Medication 100 MILLIGRAM(S): at 21:08

## 2020-01-01 RX ADMIN — Medication 125 MILLIGRAM(S): at 12:26

## 2020-01-01 RX ADMIN — Medication 650 MILLIGRAM(S): at 04:30

## 2020-01-01 RX ADMIN — HEPARIN SODIUM 5000 UNIT(S): 5000 INJECTION INTRAVENOUS; SUBCUTANEOUS at 05:57

## 2020-01-01 RX ADMIN — PANTOPRAZOLE SODIUM 40 MILLIGRAM(S): 20 TABLET, DELAYED RELEASE ORAL at 12:29

## 2020-01-01 RX ADMIN — HEPARIN SODIUM 5000 UNIT(S): 5000 INJECTION INTRAVENOUS; SUBCUTANEOUS at 13:43

## 2020-01-01 RX ADMIN — CHLORHEXIDINE GLUCONATE 15 MILLILITER(S): 213 SOLUTION TOPICAL at 05:02

## 2020-01-01 RX ADMIN — SEVELAMER CARBONATE 1600 MILLIGRAM(S): 2400 POWDER, FOR SUSPENSION ORAL at 08:15

## 2020-01-01 RX ADMIN — Medication 50 MILLIEQUIVALENT(S): at 08:00

## 2020-01-01 RX ADMIN — CHLORHEXIDINE GLUCONATE 15 MILLILITER(S): 213 SOLUTION TOPICAL at 18:43

## 2020-01-01 RX ADMIN — HEPARIN SODIUM 5000 UNIT(S): 5000 INJECTION INTRAVENOUS; SUBCUTANEOUS at 22:29

## 2020-01-01 RX ADMIN — ATORVASTATIN CALCIUM 80 MILLIGRAM(S): 80 TABLET, FILM COATED ORAL at 22:29

## 2020-01-01 RX ADMIN — CHLORHEXIDINE GLUCONATE 15 MILLILITER(S): 213 SOLUTION TOPICAL at 18:48

## 2020-01-01 RX ADMIN — HEPARIN SODIUM 5000 UNIT(S): 5000 INJECTION INTRAVENOUS; SUBCUTANEOUS at 05:01

## 2020-01-01 RX ADMIN — Medication 2: at 06:21

## 2020-01-01 RX ADMIN — PANTOPRAZOLE SODIUM 40 MILLIGRAM(S): 20 TABLET, DELAYED RELEASE ORAL at 12:11

## 2020-01-01 RX ADMIN — Medication 650 MILLIGRAM(S): at 12:00

## 2020-01-01 RX ADMIN — CHLORHEXIDINE GLUCONATE 1 APPLICATION(S): 213 SOLUTION TOPICAL at 05:26

## 2020-01-01 RX ADMIN — INSULIN HUMAN 1 UNIT(S)/HR: 100 INJECTION, SOLUTION SUBCUTANEOUS at 19:46

## 2020-01-01 RX ADMIN — HEPARIN SODIUM 5000 UNIT(S): 5000 INJECTION INTRAVENOUS; SUBCUTANEOUS at 05:29

## 2020-01-01 RX ADMIN — CHLORHEXIDINE GLUCONATE 1 APPLICATION(S): 213 SOLUTION TOPICAL at 06:38

## 2020-01-01 RX ADMIN — MEROPENEM 100 MILLIGRAM(S): 1 INJECTION INTRAVENOUS at 18:19

## 2020-01-01 RX ADMIN — Medication 3 UNIT(S): at 16:53

## 2020-01-01 RX ADMIN — Medication 650 MILLIGRAM(S): at 16:30

## 2020-01-01 RX ADMIN — SODIUM ZIRCONIUM CYCLOSILICATE 10 GRAM(S): 10 POWDER, FOR SUSPENSION ORAL at 05:31

## 2020-01-01 RX ADMIN — CEFEPIME 100 MILLIGRAM(S): 1 INJECTION, POWDER, FOR SOLUTION INTRAMUSCULAR; INTRAVENOUS at 17:06

## 2020-01-01 RX ADMIN — Medication 1 APPLICATION(S): at 12:51

## 2020-01-01 RX ADMIN — Medication 5 UNIT(S): at 06:05

## 2020-01-01 RX ADMIN — DESMOPRESSIN ACETATE 2 MICROGRAM(S): 0.1 TABLET ORAL at 05:32

## 2020-01-01 RX ADMIN — MIDODRINE HYDROCHLORIDE 10 MILLIGRAM(S): 2.5 TABLET ORAL at 21:22

## 2020-01-01 RX ADMIN — HEPARIN SODIUM 5000 UNIT(S): 5000 INJECTION INTRAVENOUS; SUBCUTANEOUS at 13:24

## 2020-01-01 RX ADMIN — IOHEXOL 30 MILLILITER(S): 300 INJECTION, SOLUTION INTRAVENOUS at 20:20

## 2020-01-01 RX ADMIN — PANTOPRAZOLE SODIUM 40 MILLIGRAM(S): 20 TABLET, DELAYED RELEASE ORAL at 12:12

## 2020-01-01 RX ADMIN — MEROPENEM 100 MILLIGRAM(S): 1 INJECTION INTRAVENOUS at 05:46

## 2020-01-01 RX ADMIN — Medication 3 UNIT(S): at 14:58

## 2020-01-01 RX ADMIN — CHLORHEXIDINE GLUCONATE 15 MILLILITER(S): 213 SOLUTION TOPICAL at 17:01

## 2020-01-01 RX ADMIN — PANTOPRAZOLE SODIUM 40 MILLIGRAM(S): 20 TABLET, DELAYED RELEASE ORAL at 11:36

## 2020-01-01 RX ADMIN — SODIUM CHLORIDE 80 MILLILITER(S): 5 INJECTION, SOLUTION INTRAVENOUS at 12:45

## 2020-01-01 RX ADMIN — Medication 0.5 MILLIGRAM(S): at 07:01

## 2020-01-01 RX ADMIN — SODIUM ZIRCONIUM CYCLOSILICATE 10 GRAM(S): 10 POWDER, FOR SUSPENSION ORAL at 09:40

## 2020-01-01 RX ADMIN — Medication 650 MILLIGRAM(S): at 19:36

## 2020-01-01 RX ADMIN — Medication 650 MILLIGRAM(S): at 21:06

## 2020-01-01 RX ADMIN — Medication 650 MILLIGRAM(S): at 10:08

## 2020-01-01 RX ADMIN — Medication 100 MILLIGRAM(S): at 14:26

## 2020-01-01 RX ADMIN — MEROPENEM 100 MILLIGRAM(S): 1 INJECTION INTRAVENOUS at 17:13

## 2020-01-01 RX ADMIN — Medication 166.67 MILLIGRAM(S): at 10:09

## 2020-01-01 RX ADMIN — Medication 650 MILLIGRAM(S): at 15:27

## 2020-01-01 RX ADMIN — CEFEPIME 100 MILLIGRAM(S): 1 INJECTION, POWDER, FOR SOLUTION INTRAMUSCULAR; INTRAVENOUS at 18:11

## 2020-01-01 RX ADMIN — PANTOPRAZOLE SODIUM 40 MILLIGRAM(S): 20 TABLET, DELAYED RELEASE ORAL at 12:58

## 2020-01-01 RX ADMIN — CHLORHEXIDINE GLUCONATE 1 APPLICATION(S): 213 SOLUTION TOPICAL at 06:04

## 2020-01-01 RX ADMIN — SEVELAMER CARBONATE 1600 MILLIGRAM(S): 2400 POWDER, FOR SUSPENSION ORAL at 08:07

## 2020-01-01 RX ADMIN — HEPARIN SODIUM 5000 UNIT(S): 5000 INJECTION INTRAVENOUS; SUBCUTANEOUS at 05:28

## 2020-01-01 RX ADMIN — ALTEPLASE 456 MILLIGRAM(S): KIT at 16:35

## 2020-01-01 RX ADMIN — Medication 1 APPLICATION(S): at 11:47

## 2020-01-01 RX ADMIN — PANTOPRAZOLE SODIUM 40 MILLIGRAM(S): 20 TABLET, DELAYED RELEASE ORAL at 11:29

## 2020-01-01 RX ADMIN — Medication 3 UNIT(S): at 23:11

## 2020-01-01 RX ADMIN — MIDODRINE HYDROCHLORIDE 10 MILLIGRAM(S): 2.5 TABLET ORAL at 21:03

## 2020-01-01 RX ADMIN — CHLORHEXIDINE GLUCONATE 15 MILLILITER(S): 213 SOLUTION TOPICAL at 05:27

## 2020-01-01 RX ADMIN — Medication 250 MILLIGRAM(S): at 12:12

## 2020-01-01 RX ADMIN — Medication 1.58 MICROGRAM(S)/KG/MIN: at 13:02

## 2020-01-01 RX ADMIN — CHLORHEXIDINE GLUCONATE 1 APPLICATION(S): 213 SOLUTION TOPICAL at 05:07

## 2020-01-01 RX ADMIN — SODIUM CHLORIDE 50 MILLILITER(S): 9 INJECTION, SOLUTION INTRAVENOUS at 18:56

## 2020-01-01 RX ADMIN — CHLORHEXIDINE GLUCONATE 15 MILLILITER(S): 213 SOLUTION TOPICAL at 05:33

## 2020-01-01 RX ADMIN — CHLORHEXIDINE GLUCONATE 15 MILLILITER(S): 213 SOLUTION TOPICAL at 05:07

## 2020-01-01 RX ADMIN — Medication 3 UNIT(S): at 21:55

## 2020-01-01 RX ADMIN — CHLORHEXIDINE GLUCONATE 1 APPLICATION(S): 213 SOLUTION TOPICAL at 05:24

## 2020-07-16 NOTE — H&P ADULT - NSHPPHYSICALEXAM_GEN_ALL_CORE
GENERAL: Aphasic able to nod head to answer simple questions, no signs of respiratory distress  HEAD:  Atraumatic, Normocephalic  EYES: mild proptosis  NECK: Supple, No JVD  CHEST/LUNG: Clear to auscultation bilaterally; No wheeze; No crackles; No accessory muscles used  HEART: Regular rate and rhythm; No murmurs;   ABDOMEN: Soft, Nontender, Nondistended; Bowel sounds present; No guarding  PSYCH: unable to assess however Pt. nods he is aware is in hospital  NEUROLOGY: B/l LE weakness 1/5 Exam prior to RRT  GENERAL: Aphasic able to nod head to answer simple questions, no signs of respiratory distress  HEAD:  Atraumatic, Normocephalic  EYES: mild proptosis  NECK: Supple, No JVD  CHEST/LUNG: Clear to auscultation bilaterally; No wheeze; No crackles; No accessory muscles used  HEART: Regular rate and rhythm; No murmurs;   ABDOMEN: Soft, Nontender, Nondistended; Bowel sounds present; No guarding  PSYCH: unable to assess however Pt. nods he is aware is in hospital  NEUROLOGY: B/l LE weakness 1/5

## 2020-07-16 NOTE — ED PROVIDER NOTE - OBJECTIVE STATEMENT
64 year old male hx dm, trigeminal neuralgia, htn, hld p/w stroke. PAtient's lnk 1.5 hours pta. Patient was playing with baby then felt nauseous and vomited x 1. he complained of dizziness and nausea but was mentating well. he took his bp and dm medications then vomited again and was unable to follow commands. no trauma. Patient has baseline droop on right side of face according to family members for 2 months. Recent travel from [place]. No recent fevers, cough, abd pain, diarrhea.

## 2020-07-16 NOTE — ED PROVIDER NOTE - ATTENDING CONTRIBUTION TO CARE
acute onset decreased responsiveness with vomiting x 1.  occurred 40 minutes PTA.  see PN and stroke note.

## 2020-07-16 NOTE — CONSULT NOTE ADULT - ASSESSMENT
64 year old male pmh of DM, CAD with PCI x3 in 2017, HTN, DLD, trigeminal neuralgia who presents to ED from [facility] s/p TPA for stroke. At [facility] stroke code called initial NIHSS 9, CTA showed right vertebral artery occlusion and distal basilar artery thrombus. Other extensive atheromatous changes including moderate/severe subclavian and vertebral stenosis.   Pt. was given TPA at 16:46 and sent to [facility] for neurointerventional eval and post TPA care. After 5 minutes of arrival patient rapidly deteriorated with GCS of 4 and got intubated for airway protection. Stat CTH was negative for bleed, repeat CTP/CTA findings as well as patient rapid deterioration prompted urgent neurointervention [facility]      Case was discussed with neuro 64 year old male pmh of DM, CAD with PCI x3 in 2017, HTN, DLD, trigeminal neuralgia who presents to ED from [facility] s/p TPA for stroke. At [facility] stroke code called initial NIHSS 9, CTA showed right vertebral artery occlusion and distal basilar artery thrombus. Other extensive atheromatous changes including moderate/severe subclavian and vertebral stenosis.   Pt. was given TPA at 16:46 and sent to [facility] for neurointerventional eval and post TPA care. After 5 minutes of arrival patient rapidly deteriorated with GCS of 4 and got intubated for airway protection. Stat CTH was negative for bleed, repeat CTP/CTA findings as well as patient rapid deterioration prompted urgent neurointervention and patient was transported to [facility].      Case was discussed with neuroattending on call Dr. Davonte Garay and neurointerventionalist Dr. Abebe  Risks and benefits of the procedure were discussed with the family and they consented for emergent thrombectomy      Post procedure note will follow    Geri Peacock PA-C  [phone]

## 2020-07-16 NOTE — CHART NOTE - NSCHARTNOTEFT_GEN_A_CORE
Pt transported to [facility] with neuro PA, respiratory and RNs.  Hemodynamically stable. /75 P62 R15 [id number]%

## 2020-07-16 NOTE — ED PROVIDER NOTE - PROGRESS NOTE DETAILS
seen soon after arrival.  stroke code called d/w Dr Draper.  I accompanied pt to CT and viewed images in real time. d/w rads CTA results d/w Dr. Draper.  TPA ordered.  d/w family.  daughter is an internist and agrees. TPA given /105. EMS in ED.  case discussed. airway intact Pt evaluated on arrival. No events in route. BP noted. Cardene drip started. Will admit pt to ICU. Pt started posturing, not protecting airway. Pt intubated and taken to CT emergently. ICU team at bedside.

## 2020-07-16 NOTE — H&P ADULT - HISTORY OF PRESENT ILLNESS
64 year old male pmh of DM, CAD with PCI x3 in 2017, HTN, DLD, trigeminal neuralgia who presents to ED from [facility] s/p [facility] for stroke.  History obtained from charts and daughter Vianney [phone] as Pt. is currently aphasic. Per daughter Pt. who is AOx3 64 year old male pmh of DM, CAD with PCI x3 in 2017, HTN, DLD, trigeminal neuralgia who presents to ED from [facility] s/p TPA for stroke.  History obtained from charts and daughter Vianney [phone] as Pt. is currently aphasic. Per daughter Pt. who is AOx3 at baseline was at home playing with grandchildren and became nausea, had 3 episodes of NBNB vomiting. After laying down for 30 min family found Pt. confused with aphasia and brought Pt. to [facility] ED.  At [facility] stroke code called CTA showed right vertebral artery occlusion and distal basilar artery thrombus. Other extensive atheromatousd changes including moderate/severe subclavian and vertebral stenosis.   Pt. was given TPA and sent to [facility] for neurointerventional eval and post TPA care.  Pt. seen in ED with expressive aphasia although able to move all extremities with weakness of bilateral LE 1/5. Pt. was on cardine drip to maintain BP below 185.   Interval Hx -While in ED RRT called as Pt. posturing and shaking of b/l upper extremities, Pt. was intubated for airway protection. Stat CT done, NI alert initiated. Pending Neurointerventional recs. 64 year old male pmh of DM, CAD with PCI x3 in 2017, HTN, DLD, trigeminal neuralgia who presents to ED from [facility] s/p TPA for stroke.  History obtained from charts and daughter Vianney [phone] as Pt. is currently aphasic. Per daughter Pt. who is AOx3 at baseline was at home playing with grandchildren and became nausea, had 3 episodes of NBNB vomiting. After laying down for 30 min family found Pt. confused with aphasia and brought Pt. to [facility] ED.  At [facility] stroke code called CTA showed right vertebral artery occlusion and distal basilar artery thrombus. Other extensive atheromatous changes including moderate/severe subclavian and vertebral stenosis.   Pt. was given TPA at 16:46 and sent to [facility] for neurointerventional eval and post TPA care.  Pt. seen in ED with expressive aphasia although able to move all extremities with weakness of bilateral LE 1/5. Pt. was on cardine drip to maintain BP below 185.   Interval Hx -While in ED RRT called as Pt. posturing and shaking of b/l upper extremities, Pt. was intubated for airway protection. Stat CT done no new hemmorage, CT perfusion Stat, Neuro sx and neurointerventional contacted. NI actual initiated. Pending Neurointerventional recs. 64 year old male pmh of DM, CAD with PCI x3 in 2017, HTN, DLD, trigeminal neuralgia who presents to ED from [facility] s/p TPA for stroke.  History obtained from charts and daughter Vianney [phone] as Pt. is currently aphasic. Per daughter Pt. who is AOx3 at baseline was at home playing with grandchildren and became nausea, had 3 episodes of NBNB vomiting. After laying down for 30 min family found Pt. confused with aphasia and brought Pt. to [facility] ED.  At [facility] stroke code called initial NIHSS 9, CTA showed right vertebral artery occlusion and distal basilar artery thrombus. Other extensive atheromatous changes including moderate/severe subclavian and vertebral stenosis.   Pt. was given TPA at 16:46 and sent to [facility] for neurointerventional eval and post TPA care.  Pt. seen in ED with expressive aphasia although able to move all extremities with weakness of bilateral LE 1/5. Pt. was on cardine drip to maintain BP below 185.   Interval Hx -While in ED RRT called as Pt. posturing and shaking of b/l upper extremities, not following any commands, Pt. was intubated for airway protection. Stat CT done no new hemmorage, CT perfusion Stat, Neuro sx and neurointerventional contacted. NI actual initiated. Pending Neurointerventional recs.

## 2020-07-16 NOTE — H&P ADULT - NSHPLABSRESULTS_GEN_ALL_CORE
15.2   14.33 )-----------( 268      ( 16 Jul 2020 15:44 )             44.7   07-16    137  |  101  |  19  ----------------------------<  165<H>  3.6   |  21  |  1.0    Ca    10.0      16 Jul 2020 15:44    TPro  8.4<H>  /  Alb  4.5  /  TBili  0.8  /  DBili  x   /  AST  26  /  ALT  22  /  AlkPhos  54  07-16    < from: CT Angio Neck w/ IV Cont (07.16.20 @ 16:08) >    IMPRESSION:    1.  *Occlusion of the right vertebral artery from about the level of the C2 vertebral body to the vertebrobasilar junction (V3 and V4 segments).  2.  *Thrombus within the distal basilar artery measuring about 6 mm in length and producing about 80% luminal stenosis.    Other extensive atheromatous changes including:  3.  Moderate/severe stenosis of the right subclavian artery origin.   4.  Moderate/severe stenosis of the right vertebral artery origin.   5.  Moderate stenosis of the right ICA petrous segment.  6.  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  7.  Fusiform dilatation of the left vertebral artery up to 7 mm, and the basilar artery up to 1.1 cm, with compression of the indira.    < end of copied text >

## 2020-07-16 NOTE — ED ADULT NURSE REASSESSMENT NOTE - NS ED NURSE REASSESS COMMENT FT1
pt transferred to [facility] via [facility] ambulance at 1705

## 2020-07-16 NOTE — CONSULT NOTE ADULT - SUBJECTIVE AND OBJECTIVE BOX
HPI:  64 year old male pmh of DM, CAD with PCI x3 in 2017, HTN, DLD, trigeminal neuralgia who presents to ED from [facility] s/p TPA for stroke. CTA showed < from: CT Angio Head w/ IV Cont (07.16.20 @ 16:08) >  1.  *Occlusion of the right vertebral artery from about the level of the C2 vertebral body to the vertebrobasilar junction (V3 and V4 segments).  2.  *Thrombus within the distal basilar artery measuring about 6 mm in length and producing about 80% luminal stenosis.    Other extensive atheromatous changes including:  3.  Moderate/severe stenosis of the right subclavian artery origin.   4.  Moderate/severe stenosis of the right vertebral artery origin.   5.  Moderate stenosis of the right ICA petrous segment.  6.  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  7.  Fusiform dilatation of the left vertebral artery up to 7 mm, and the basilar artery up to 1.1 cm, with compression of the indira.    < end of copied text >    History obtained from charts and daughter Vianney [phone] as Pt. is currently aphasic. Per daughter Pt. who is AOx3 (16 Jul 2020 18:12)   Pt deteriorated. Rapid response was called and pt was intubated. Pt went for stat Head CT which showed ICH      PAST MEDICAL & SURGICAL HISTORY:  HTN (hypertension)  Diabetes  No significant past surgical history      Home Medications:      Allergies    No Known Allergies    Intolerances        ROS:  [X] A ten-point review of systems is negative except as noted   [  ] Due to altered mental status/intubation, subjective information were not able to be obtained from the patient. History was obtained, to the extent possible, from review of the chart and collateral sources of information    MEDICATIONS  (STANDING):  chlorhexidine 0.12% Liquid 15 milliLiter(s) Oral Mucosa every 12 hours  niCARdipine Infusion 5 mG/Hr (25 mL/Hr) IV Continuous <Continuous>    MEDICATIONS  (PRN):      ICU Vital Signs Last 24 Hrs  T(C): 35.9 (16 Jul 2020 15:45), Max: 35.9 (16 Jul 2020 15:45)  T(F): 96.6 (16 Jul 2020 15:45), Max: 96.6 (16 Jul 2020 15:45)  HR: 96 (16 Jul 2020 18:10) (64 - 108)  BP: 185/87 (16 Jul 2020 18:10) (138/71 - 200/110)  BP(mean): --  ABP: --  ABP(mean): --  RR: 18 (16 Jul 2020 18:10) (15 - 18)  SpO2: 98% (16 Jul 2020 18:10) (95% - 99%)      I&O's Detail      CBC Full  -  ( 16 Jul 2020 15:44 )  WBC Count : 14.33 K/uL  RBC Count : 5.19 M/uL  Hemoglobin : 15.2 g/dL  Hematocrit : 44.7 %  Platelet Count - Automated : 268 K/uL  Mean Cell Volume : 86.1 fL  Mean Cell Hemoglobin : 29.3 pg  Mean Cell Hemoglobin Concentration : 34.0 g/dL  Auto Neutrophil # : 9.36 K/uL  Auto Lymphocyte # : 3.66 K/uL  Auto Monocyte # : 1.01 K/uL  Auto Eosinophil # : 0.19 K/uL  Auto Basophil # : 0.03 K/uL  Auto Neutrophil % : 65.4 %  Auto Lymphocyte % : 25.5 %  Auto Monocyte % : 7.0 %  Auto Eosinophil % : 1.3 %  Auto Basophil % : 0.2 %    07-16    137  |  101  |  19  ----------------------------<  165<H>  3.6   |  21  |  1.0    Ca    10.0      16 Jul 2020 15:44    TPro  8.4<H>  /  Alb  4.5  /  TBili  0.8  /  DBili  x   /  AST  26  /  ALT  22  /  AlkPhos  54  07-16    CARDIAC MARKERS ( 16 Jul 2020 15:44 )  x     / <0.01 ng/mL / x     / x     / x          Assessment:      Plan: HPI:  64 year old male pmh of DM, CAD with PCI x3 in 2017, HTN, DLD, trigeminal neuralgia who presents to ED from [facility] s/p TPA for stroke. HCT showed < from: CT Brain Stroke Protocol (07.16.20 @ 16:01) >  1.  No evidence of acute intracranial hemorrhage or large territory infarct.    2.  Lacunar infarcts within the right basal ganglia, age indeterminate.    3.  Markedly enlarged and calcified vertebral and basilar arteries consistent with dolichoectasia/fusiform aneurysms, with mass effect upon the indira.    < end of copied text >     CTA showed < from: CT Angio Head w/ IV Cont (07.16.20 @ 16:08) >  1.  *Occlusion of the right vertebral artery from about the level of the C2 vertebral body to the vertebrobasilar junction (V3 and V4 segments).  2.  *Thrombus within the distal basilar artery measuring about 6 mm in length and producing about 80% luminal stenosis.    Other extensive atheromatous changes including:  3.  Moderate/severe stenosis of the right subclavian artery origin.   4.  Moderate/severe stenosis of the right vertebral artery origin.   5.  Moderate stenosis of the right ICA petrous segment.  6.  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  7.  Fusiform dilatation of the left vertebral artery up to 7 mm, and the basilar artery up to 1.1 cm, with compression of the indira.    < end of copied text >    History obtained from charts and daughter Vianney [phone] as Pt. is currently aphasic. Per daughter Pt. who is AOx3 (16 Jul 2020 18:12)   Pt deteriorated. Rapid response was called and pt was intubated. Pt went for stat Head CT which did not show any Intracranial bleeding    PAST MEDICAL & SURGICAL HISTORY:  HTN (hypertension)  Diabetes  No significant past surgical history    Home Medications:    Allergies    No Known Allergies    ROS:  [X] A ten-point review of systems is negative except as noted   [  ] Due to altered mental status/intubation, subjective information were not able to be obtained from the patient. History was obtained, to the extent possible, from review of the chart and collateral sources of information    MEDICATIONS  (STANDING):  chlorhexidine 0.12% Liquid 15 milliLiter(s) Oral Mucosa every 12 hours  niCARdipine Infusion 5 mG/Hr (25 mL/Hr) IV Continuous <Continuous>    MEDICATIONS  (PRN):    ICU Vital Signs Last 24 Hrs  T(C): 35.9 (16 Jul 2020 15:45), Max: 35.9 (16 Jul 2020 15:45)  T(F): 96.6 (16 Jul 2020 15:45), Max: 96.6 (16 Jul 2020 15:45)  HR: 96 (16 Jul 2020 18:10) (64 - 108)  BP: 185/87 (16 Jul 2020 18:10) (138/71 - 200/110)  BP(mean): --  ABP: --  ABP(mean): --  RR: 18 (16 Jul 2020 18:10) (15 - 18)  SpO2: 98% (16 Jul 2020 18:10) (95% - 99%)    Physical Exam:  Recently Intubated, sedated along with Paralytics  Pupils 2mm NR  no tracking  Does not follow commands  No w/d of UE to pain  No w/d of LE to pain    I&O's Detail    CBC Full  -  ( 16 Jul 2020 15:44 )  WBC Count : 14.33 K/uL  RBC Count : 5.19 M/uL  Hemoglobin : 15.2 g/dL  Hematocrit : 44.7 %  Platelet Count - Automated : 268 K/uL  Mean Cell Volume : 86.1 fL  Mean Cell Hemoglobin : 29.3 pg  Mean Cell Hemoglobin Concentration : 34.0 g/dL  Auto Neutrophil # : 9.36 K/uL  Auto Lymphocyte # : 3.66 K/uL  Auto Monocyte # : 1.01 K/uL  Auto Eosinophil # : 0.19 K/uL  Auto Basophil # : 0.03 K/uL  Auto Neutrophil % : 65.4 %  Auto Lymphocyte % : 25.5 %  Auto Monocyte % : 7.0 %  Auto Eosinophil % : 1.3 %  Auto Basophil % : 0.2 %    07-16    137  |  101  |  19  ----------------------------<  165<H>  3.6   |  21  |  1.0    Ca    10.0      16 Jul 2020 15:44    TPro  8.4<H>  /  Alb  4.5  /  TBili  0.8  /  DBili  x   /  AST  26  /  ALT  22  /  AlkPhos  54  07-16    CARDIAC MARKERS ( 16 Jul 2020 15:44 )  x     / <0.01 ng/mL / x     / x     / x          Assessment:  As above    Plan:  No Neurosurgical intervention  Pt with no obvious changes from previous CT  Neurology and NeuroInterventional Managing  CT Perfusion done  Dr. Morocho aware and will discuss with Neuro Attendings

## 2020-07-16 NOTE — CHART NOTE - NSCHARTNOTEFT_GEN_A_CORE
stroke code    Patient is a 65 yo man with hx of DM, HTN who presents with the following symptoms 40 min PTA. Patient was playing with grandchild in yard when he became altered.  on exam in ER patient with expressive aphasia vs anarthria with preserved comprehension. no gaze or other lateralizing features. NIHSS as calculated by description of exam 9  no contraindications to tpa    CTH: no acute changes, no bleed  CTA:  1.  *Occlusion of the right vertebral artery from about the level of the C2 vertebral body to the vertebrobasilar junction (V3 and V4 segments).  2.  *Thrombus within the distal basilar artery measuring about 6 mm in length and producing about 80% luminal stenosis.    Other extensive atheromatous changes including:  3.  Moderate/severe stenosis of the right subclavian artery origin.   4.  Moderate/severe stenosis of the right vertebral artery origin.   5.  Moderate stenosis of the right ICA petrous segment.  6.  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  7.  Fusiform dilatation of the left vertebral artery up to 7 mm, and the basilar artery up to 1.1 cm, with compression of the indira.    Patient was given  tpa at [facility] ER based on his presentation and absence of contraindications and transferred to [facility] for NI   discussed case with Dr Abebe.  The films were reviewed and the findings as noted above are not amenable to intervention    Plan:  admit to ICU. follow post tpa protocol  Neuro checks and vitals q 15 min x 2 hrs, q 30 min x 6 hrs and q 1 hr x 24 hrs  maintain SBP <185 and DBP <105  no blood draws  no antiplatelets or a/c for 24 hrs  24 hr post tpa cth tomorrow but if any change in NIHSS, STAT CTH     *PATIENT WAS NOT EXAMINED VIA TELESTROKE PRIOR TO TRANSFER [place]  NEURO TEAM [place] IS AWARE AND FULL CONSULT WILL FOLLOW. stroke code  NIHSS 9  MRS 0    Patient is a 65 yo man with hx of DM, HTN who presents with the following symptoms 40 min PTA. Patient was playing with grandchild in yard when he became altered.  on exam in ER patient with expressive aphasia vs anarthria with preserved comprehension. no gaze or other lateralizing features. NIHSS as calculated by description of exam 9  no contraindications to tpa    CTH: no acute changes, no bleed  CTA:  1.  *Occlusion of the right vertebral artery from about the level of the C2 vertebral body to the vertebrobasilar junction (V3 and V4 segments).  2.  *Thrombus within the distal basilar artery measuring about 6 mm in length and producing about 80% luminal stenosis.    Other extensive atheromatous changes including:  3.  Moderate/severe stenosis of the right subclavian artery origin.   4.  Moderate/severe stenosis of the right vertebral artery origin.   5.  Moderate stenosis of the right ICA petrous segment.  6.  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  7.  Fusiform dilatation of the left vertebral artery up to 7 mm, and the basilar artery up to 1.1 cm, with compression of the indira.    Patient was given  tpa at [facility] ER based on his presentation and absence of contraindications and transferred to [facility] for  eval.  I discussed case with Dr Abebe.  The films were reviewed and the findings as noted above are not amenable to intervention.    Plan:  admit to ICU. follow post tpa protocol  Neuro checks and vitals q 15 min x 2 hrs, q 30 min x 6 hrs and q 1 hr x 24 hrs  maintain SBP <185 and DBP <105  no blood draws  no antiplatelets or a/c for 24 hrs  24 hr post tpa cth tomorrow but if any change in NIHSS, STAT CTH     *PATIENT WAS NOT EXAMINED VIA TELESTROKE PRIOR TO TRANSFER [place]  NEURO TEAM [place] IS AWARE AND FULL CONSULT WILL FOLLOW.

## 2020-07-16 NOTE — ED ADULT NURSE NOTE - NSIMPLEMENTINTERV_GEN_ALL_ED
Implemented All Fall with Harm Risk Interventions:  [place] to call system. Call bell, personal items and telephone within reach. Instruct patient to call for assistance. Room bathroom lighting operational. Non-slip footwear when patient is off stretcher. Physically safe environment: no spills, clutter or unnecessary equipment. Stretcher in lowest position, wheels locked, appropriate side rails in place. Provide visual cue, wrist band, yellow gown, etc. Monitor gait and stability. Monitor for mental status changes and reorient to person, place, and time. Review medications for side effects contributing to fall risk. Reinforce activity limits and safety measures with patient and family. Provide visual clues: red socks.

## 2020-07-16 NOTE — ED PROVIDER NOTE - PHYSICAL EXAMINATION
Physical Exam    Vital Signs: I have reviewed the initial vital signs  Constitutional: well-nourished, appears stated age, no acute distress  EENT: Conjunctiva pink, Sclera clear, PERRLA, EOMI. Mucous membranes moist, no exudates or lesions noted, uvula midline.  Cardiovascular: S1 and S2 present, regular rate, regular rhythm. Well perfused extremities, no peripheral edema  Respiratory: unlabored respiratory effort, clear to auscultation bilaterally no wheezing, rales or rhonchi  Gastrointestinal: soft, non-tender abdomen. No guarding or rebound tenderness  Musculoskeletal: supple nontender neck, no midline tenderness, no joint pain  Integumentary: warm, dry, no rash  Neurologic: A & O x 0, right sided facial droop, left hemineglect, global weakness  Psychiatric: appropriate mood, appropriate affect Physical Exam    Vital Signs: I have reviewed the initial vital signs  Constitutional: ill-appearing, unable to speak, sitting on stretched, eyes open sponaneously  EENT: Conjunctiva pink, Sclera clear, PERRLA, EOMI absent on left side. Mucous membranes moist, no exudates or lesions noted, uvula midline.  Cardiovascular: S1 and S2 present, regular rate, regular rhythm.  Respiratory: unlabored respiratory effort, clear to auscultation bilaterally no wheezing, rales or rhonchi  Gastrointestinal: soft, non-tender abdomen. No guarding or rebound tenderness  Musculoskeletal: supple nontender neck, no midline tenderness, no joint pain  Integumentary: warm, dry, no rash  Neurologic: GCS 12. A & O x 0, right sided facial droop, left hemineglect, global weakness, able to follow some commands.  Psychiatric: appropriate mood, appropriate affect

## 2020-07-16 NOTE — ED ADULT NURSE REASSESSMENT NOTE - NS ED NURSE REASSESS COMMENT FT1
Patient received from [facility] transfer, TPA finished upon arrival, BP noted to be elevated, MD is aware, patient noted to be non-verbal, is semi-cooperative, will continue to watch and assess patient, safety and comfort, measures maintained.

## 2020-07-16 NOTE — ED ADULT NURSE NOTE - CHIEF COMPLAINT QUOTE
BIBA from home . Pt had a change of mental status 45 min ago as per EMS. Pt has facial asymmetry. EMS inserted right hand # 20.Addendum pt also has vomiting as per EMS.

## 2020-07-16 NOTE — ED ADULT TRIAGE NOTE - CHIEF COMPLAINT QUOTE
BIBA from home . Pt had a change of mental status 45 min ago as per EMS. Pt has facial asymmetry. EMS inserted right hand # 20.

## 2020-07-16 NOTE — H&P ADULT - ASSESSMENT
IMPRESSION:      PLAN:    CNS: Avoid Sedatives, EEG, Neurology consult, repeat CT head, sedation by XXXX, neurochecks     HEENT: Oral care, HOB at 45, speech and swallow evaluation    PULMONARY: Steroids, inhalers, Duoneb, Repeat CXR, Repeat ABG, Wean off NIV, legionella and streprococcal urine antigen    CARDIOVASCULAR:Continue pressors, ECHO, Cardiology consult, repeat EKG, repeat troponin, BNP     GI: GI prophylaxis,  Feeding, nutrition consult, GI consult    RENAL: I=0, monitor electrolytes and replete as needed, renal ultrasound,UA,  Nephrology consult, IV hydration by XXXX     INFECTIOUS DISEASE: Antibiotics, Blood cultures, Urine cultures, Sputum culutre and gram stain, ID consult, Check MRSA nares, RVP, FLU, Procalcitonin, HIV screening     HEMATOLOGICAL:  DVT prophylaxis, repeat coagulation profile, Keep Hb above 7, Active type and screen, Anticoagulation, VA duplex, check peripheral smear    ENDOCRINE:  Follow up FS.  Insulin protocol if needed. Endocrine consult    MUSCULOSKELETAL: Pt/rehab    TOXICOLOGY: Urine toxicology, Alcohol level, Acetaminophen level      Abbasi=  TLC=   Arterial line=   Peripheral=   OG/NG/PEG=  Code status= IMPRESSION:  CVA s/p TPA with AMS   possible ICH     PLAN:    CNS: , Neurology consult, repeat CT head, sedation by XXXX, neurochecks     HEENT: Oral care, HOB at 45, speech and swallow evaluation    PULMONARY: Steroids, inhalers, Duoneb, Repeat CXR, Repeat ABG, Wean off NIV, legionella and streprococcal urine antigen    CARDIOVASCULAR:Continue pressors, ECHO, Cardiology consult, repeat EKG, repeat troponin, BNP     GI: GI prophylaxis,  Feeding, nutrition consult, GI consult    RENAL: I=0, monitor electrolytes and replete as needed, renal ultrasound,UA,  Nephrology consult, IV hydration by XXXX     INFECTIOUS DISEASE: Antibiotics, Blood cultures, Urine cultures, Sputum culutre and gram stain, ID consult, Check MRSA nares, RVP, FLU, Procalcitonin, HIV screening     HEMATOLOGICAL:  DVT prophylaxis, repeat coagulation profile, Keep Hb above 7, Active type and screen, Anticoagulation, VA duplex, check peripheral smear    ENDOCRINE:  Follow up FS.  Insulin protocol if needed. Endocrine consult    MUSCULOSKELETAL: Pt/rehab    TOXICOLOGY: Urine toxicology, Alcohol level, Acetaminophen level      Abbasi=  TLC=   Arterial line=   Peripheral=   OG/NG/PEG=  Code status= IMPRESSION:  CVA s/p TPA with AMS    PLAN: no blood work for 24hours post TPA    CNS: , Pending F/U Neurology/ Neurointevention/ Neuro Surg, sedation with fentanyl and versed if needed, neurochecks q15 min maintain -180 on cardine drip, statin  EEG     HEENT: Trach Care    PULMONARY: maintain sats above 92%    CARDIOVASCULAR: ECHO with bubble     GI:  NPO for now      RENAL: I=0, no labs for 24 hours post tpa    INFECTIOUS DISEASE: no abx for now, culture once 24 hours past tpa    HEMATOLOGICAL:  DVT prophylaxis with SCD for now s/p TPA for 24 hours then pending neuro recs    ENDOCRINE:  Follow up FS.  Insulin protocol if needed.     Code status=full code  Prognosis guarded

## 2020-07-16 NOTE — CONSULT NOTE ADULT - SUBJECTIVE AND OBJECTIVE BOX
Neurocritical Care Progress Note:    1. Brief Presentation: unresponsiveness, posturing    2. Today's Acute Problems: patient is intubated, comatose,     3. Relevant brief History: 64 year old male pmh of DM, CAD with PCI x3 in 2017, HTN, DLD, trigeminal neuralgia who presents to ED from [facility] s/p TPA for stroke.  History obtained from charts and daughter Vianney [phone] as Pt. is currently aphasic. Per daughter Pt. who is AOx3 at baseline was at home playing with grandchildren and became nausea, had 3 episodes of NBNB vomiting. After laying down for 30 min family found Pt. confused with aphasia and brought Pt. to [facility] ED.  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 Other extensive atheromatous changes including moderate/severe subclavian and vertebral stenosis.   Pt. was given TPA at 16:46 and sent to [facility] for neurointerventional eval and post TPA care.  Pt. seen in ED with expressive aphasia although able to move all extremities with weakness of bilateral LE 1/5. Pt. was on cardine drip to maintain BP below 185.   Interval Hx -While in ED RRT called as Pt. with full body posturing and not following any commands, Pt. was intubated for airway protection. Stat CT done no new hemmorage, CT perfusion Stat, with basilar artery near occlusion. Code NI actual was activated and patient was transferred to [facility] for neurointervention.      4-Yesterday's Plan:    5. Last 24 hour updates:    6. Medications:   atorvastatin 80 milliGRAM(s) Oral at bedtime  chlorhexidine 0.12% Liquid 15 milliLiter(s) Oral Mucosa every 12 hours  chlorhexidine 4% Liquid 1 Application(s) Topical <User Schedule>  niCARdipine Infusion 5 mG/Hr IV Continuous <Continuous>      7. Ancillary Management:   Chest PT[ ]   Head of bed >35 [ ]   Out of bed to chair [ ]   PT/OT/SP Eval [ ]   Spirometry[ ]   DVT prophalaxis[ ]    8.Neuro:  GCS preintubation 4  Awake: Spontaneously[ ] Occasionally[ ] To Voice [ ] To painful stimuli [x ]   AIert [ ]. Following commands: 3 steps[ ], 2 steps[ ], 1 step [ ], None [x ]   Orientation: 0[x ], 1[ ], 2[ ], 3[ ]. Tracking objects with eyes: [ ]   Language: globally aphasic  Time off sedation for exam: no sedation  Pupils: non reactive to light Right   >1   Left    > 1     Corneal: +     Gag reflex: +    EOMI: +    NIH STROKE SCALE  Item	                                                        Score  1 a.	Level of Consciousness	               	2  1 b. LOC Questions	                                2  1 c.	LOC Commands	                               	2  2.	Best Gaze	                                        0  3.	Visual	                                                0  4.	Facial Palsy	                                        0  5 a.	Motor Arm - Left	                                3  5 b.	Motor Arm - Right	                        3  6 a.	Motor Leg - Left	                                3  6 b.	Motor Leg - Right	                                3  7.	Limb Ataxia	                                        0  8.	Sensory	                                                2  9.	Language	                                        3  10.	Dysarthria	                                        2  11.	Extinction and Inattention  	        0  ______________________________________  TOTAL	                                                        25      mRS:  0 No symptoms at all  1 No significant disability despite symptoms; able to carry out all usual duties and activities without assistance  2 Slight disability; unable to carry out all previous activities, but able to look after own affairs  3 Moderate disability; requiring some help, but able to walk without assistance  4 Moderately severe disability; unable to walk without assistance and unable to attend to own bodily needs without assistance  5 Severe disability; bedridden, incontinent and requiring constant nursing care and attention  6 Dead    ICHs:  Age >=80  GCS Score: 3-4 (2 pts)   GCS Score: 5-12 (1 pt)   ICH Volume: >30  (+) IVH  (+) Infratentorial    Mendez&Arroyo:  Grade I = Asymptomatic, mild headache, slight nuchal rigidity  Grade II = Moderate to severe headache, nuchal rigidity, no neurological deficit other than cranial nerve palsy  Grade III = Drowiness, confusion, mild focal neurological deficit  Grade IV = Stupor, moderate to severe hemiparesis  Grade V = Coma, decerebrate posturing    m-Blackmon:  Grade 0   (No Subarachnoid Hemorrhage (SAH)), No intraventricular hemorrhage (IVH), Incidence of symptomatic vasopasm 0%  Grade 1   (Focal or diffuse, thin SAH), No IVH, incidence of symptomatic vasospasm 24%  Grade 2   (Thin focal or diffuse SAH), IVH present, incidence of symptomatic vasospasm 33%  Grade 3   (Thick focal or diffuse SAH), No IVH, incidence of symptomatic vasospasm 33%  Grade 4   (Thick focal or diffuse SAH), IVH present, incidence of symptomatic vasosasm 40%    Last CTH: < from: CT Head No Cont (07.16.20 @ 18:49) >    EXAM:  CT BRAIN            PROCEDURE DATE:  07/16/2020            INTERPRETATION:  Clinical History / Reason for exam: Stroke. Evaluate for hemorrhage.    Technique: Multiple contiguous axial CT images of the head were obtained from the base of theskull to the vertex without administration of intravenous contrast. Sagittal and coronal reformations were also obtained.    Comparison: CT head performed same day, earlier    FINDINGS:    The ventricles, basal cisterns and sulcal pattern are within normal limits for patient's stated age.    Bilateral subcortical and periventricular white matter hypodensities, consistent with chronic microvascular ischemic changes.    Stable right basal ganglia lacunar infarcts.    There is no acute mass effect, midline shift or intracranial hemorrhage.      The grey white differentiation is maintained throughout.    Stable enlargement of bilateral vertebral arteries and basilar artery with peripheral calcifications. The basilar artery measures up to 1.4 cm in caliber with mass effect upon the indira.    Additional diffuse vascular calcifications throughout the anterior circulation. 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      No evidence of depressed skull fracture.    The visualized paranasal sinuses are well aerated.    The bilateral mastoid complexes, globes and orbits are grossly within normal limits.    IMPRESSION:   No evidence of acute mass effect, midline shift or intracranial hemorrhage.    Stable enlargement of bilateral vertebral arteries and basilar artery measuring up to 1.4 cm.     Diffuse calcification throughout the anterior and posterior circulation.          < end of copied text >      Last CTA/MRA:    Last CTP:     Last MRI:    Last TCD:    Last EEG:    EVD: [ ] Vinton: [ ]     ICP:     CPP:     Level(cm):     24hr(ml):     CSF:     W:     R:     C:     P:     G:     LA:    9. Cardiovascular:   Vital Signs Last 24 Hrs  T(C): 35.9 (16 Jul 2020 15:45), Max: 35.9 (16 Jul 2020 15:45)  T(F): 96.6 (16 Jul 2020 15:45), Max: 96.6 (16 Jul 2020 15:45)  HR: 90 (16 Jul 2020 19:56) (64 - 135)  BP: 184/89 (16 Jul 2020 19:44) (138/71 - 210/100)  BP(mean): --  RR: 14 (16 Jul 2020 19:44) (14 - 18)  SpO2: 100% (16 Jul 2020 19:56) (95% - 100%)     Last Echo:    Last EKG:    CVP   MAP/CPP/SBP target:   CO:      CI:       Enzymes/Trop:    10. Respiratory:   ABG:    VBG:    Chest Xray:    Mode: AC/ CMV (Assist Control/ Continuous Mandatory Ventilation)  RR (machine): 140  TV (machine): 450  FiO2: 40  PEEP: 5  ITime: 1  MAP: 10  PIP: 18      Peak Pressure/[place] Pressure:    11.GI:    Prophalaxis:     Bowel mvt:     Abd distension:   LIVER FUNCTIONS - ( 16 Jul 2020 15:44 )  Alb: 4.5 g/dL / Pro: 8.4 g/dL / ALK PHOS: 54 U/L / ALT: 22 U/L / AST: 26 U/L / GGT: x             12.Renal/Fluids/Electrolytes:    07-16    137  |  101  |  19  ----------------------------<  165<H>  3.6   |  21  |  1.0    Ca    10.0      16 Jul 2020 15:44    TPro  8.4<H>  /  Alb  4.5  /  TBili  0.8  /  DBili  x   /  AST  26  /  ALT  22  /  AlkPhos  54  07-16      I&O's Detail      13.ID:   TMax:   Vital Signs Last 24 Hrs  T(C): 35.9 (16 Jul 2020 15:45), Max: 35.9 (16 Jul 2020 15:45)  T(F): 96.6 (16 Jul 2020 15:45), Max: 96.6 (16 Jul 2020 15:45)  HR: 90 (16 Jul 2020 19:56) (64 - 135)  BP: 184/89 (16 Jul 2020 19:44) (138/71 - 210/100)  BP(mean): --  RR: 14 (16 Jul 2020 19:44) (14 - 18)  SpO2: 100% (16 Jul 2020 19:56) (95% - 100%)   Lactate, Blood: 3.2 mmol/L (07-16 @ 15:44)          Lines: Central[] Date inserted: Peripheral[]    14. Hematology:                         15.2   14.33 )-----------( 268      ( 16 Jul 2020 15:44 )             44.7      07-16    137  |  101  |  19  ----------------------------<  165<H>  3.6   |  21  |  1.0    Ca    10.0      16 Jul 2020 15:44    TPro  8.4<H>  /  Alb  4.5  /  TBili  0.8  /  DBili  x   /  AST  26  /  ALT  22  /  AlkPhos  54  07-16     PT/INR - ( 16 Jul 2020 15:44 )   PT: 13.20 sec;   INR: 1.15 ratio         PTT - ( 16 Jul 2020 15:44 )  PTT:26.1 sec    DVT Prophylaxis Lovenox[ ] Heparin[ ] Venodynes[ ] SCD's[ ]    15. Impression:        16. Suggestions:        17. Disposition:

## 2020-07-16 NOTE — H&P ADULT - NSHPREVIEWOFSYSTEMS_GEN_ALL_CORE
Unable to assess secondary to expressive aphasia Unable to assess secondary to expressive aphasia and subsequent intubation

## 2020-07-17 NOTE — PROGRESS NOTE ADULT - SUBJECTIVE AND OBJECTIVE BOX
Patient is 65 y/o M with PMH of DM, CAD s/p PCI x3 (2017), HTN, DLD, trigeminal neuralgia presented to ED [facility] on 07/16/20 for AMS and aphasia. 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 Patient was given TPA at 16:46 and transferred to [facility] for post-TPA care and neurointerventional evaluation.     Initial exam in ED, patient was reported to have expressive aphasia, able to move all extremities with weakness of bilateral LE.       INTERVAL HPI/OVERNIGHT EVENTS:   No overnight events   Afebrile, hemodynamically stable     ICU Vital Signs Last 24 Hrs  T(C): 38.2 (17 Jul 2020 12:00), Max: 38.2 (17 Jul 2020 12:00)  T(F): 100.7 (17 Jul 2020 12:00), Max: 100.7 (17 Jul 2020 12:00)  HR: 62 (17 Jul 2020 12:00) (54 - 135)  BP: 117/64 (17 Jul 2020 11:00) (52/39 - 226/113)  BP(mean): 83 (17 Jul 2020 11:00) (44 - 156)  ABP: 126/58 (17 Jul 2020 12:00) (126/58 - 182/64)  ABP(mean): 72 (17 Jul 2020 12:00) (72 - 92)  RR: 27 (17 Jul 2020 12:00) (12 - 36)  SpO2: 100% (17 Jul 2020 12:00) (94% - 100%)    I&O's Summary    16 Jul 2020 07:01  -  17 Jul 2020 07:00  --------------------------------------------------------  IN: 789.7 mL / OUT: 2450 mL / NET: -1660.3 mL    17 Jul 2020 07:01  -  17 Jul 2020 12:28  --------------------------------------------------------  IN: 1365 mL / OUT: 550 mL / NET: 815 mL      Mode: AC/ CMV (Assist Control/ Continuous Mandatory Ventilation)  RR (machine): 14  TV (machine): 450  FiO2: 40  PEEP: 5  ITime: 1  MAP: 3  PIP: 13      LABS:                        15.4   15.90 )-----------( 259      ( 17 Jul 2020 04:04 )             46.3     07-17    136  |  97<L>  |  18  ----------------------------<  252<H>  4.3   |  20  |  1.0    Ca    9.1      17 Jul 2020 04:04  Phos  3.9     07-17  Mg     1.7     07-17    TPro  8.7<H>  /  Alb  4.7  /  TBili  1.1  /  DBili  x   /  AST  29  /  ALT  21  /  AlkPhos  56  07-17    PT/INR - ( 16 Jul 2020 15:44 )   PT: 13.20 sec;   INR: 1.15 ratio         PTT - ( 16 Jul 2020 15:44 )  PTT:26.1 sec    CAPILLARY BLOOD GLUCOSE      POCT Blood Glucose.: 143 mg/dL (17 Jul 2020 11:54)  POCT Blood Glucose.: 230 mg/dL (17 Jul 2020 06:21)  POCT Blood Glucose.: 181 mg/dL (17 Jul 2020 00:35)  POCT Blood Glucose.: 151 mg/dL (16 Jul 2020 15:46)    ABG - ( 17 Jul 2020 12:01 )  pH, Arterial: 7.47  pH, Blood: x     /  pCO2: 30    /  pO2: 146   / HCO3: 22    / Base Excess: -1.3  /  SaO2: 97                  RADIOLOGY & ADDITIONAL TESTS:    Consultant(s) Notes Reviewed:  [x ] YES  [ ] NO    MEDICATIONS  (STANDING):  atorvastatin 80 milliGRAM(s) Oral at bedtime  chlorhexidine 0.12% Liquid 15 milliLiter(s) Oral Mucosa every 12 hours  chlorhexidine 4% Liquid 1 Application(s) Topical <User Schedule>  dexMEDEtomidine Infusion 1 MICROgram(s)/kG/Hr (21.1 mL/Hr) IV Continuous <Continuous>  dextrose 5%. 1000 milliLiter(s) (50 mL/Hr) IV Continuous <Continuous>  dextrose 50% Injectable 12.5 Gram(s) IV Push once  dextrose 50% Injectable 25 Gram(s) IV Push once  dextrose 50% Injectable 25 Gram(s) IV Push once  insulin lispro (HumaLOG) corrective regimen sliding scale   SubCutaneous three times a day before meals  niCARdipine Infusion 5 mG/Hr (25 mL/Hr) IV Continuous <Continuous>  norepinephrine Infusion 0.05 MICROgram(s)/kG/Min (3.96 mL/Hr) IV Continuous <Continuous>  pantoprazole  Injectable 40 milliGRAM(s) IV Push daily  sodium chloride 3%. 500 milliLiter(s) (80 mL/Hr) IV Continuous <Continuous>    MEDICATIONS  (PRN):  dextrose 40% Gel 15 Gram(s) Oral once PRN Blood Glucose LESS THAN 70 milliGRAM(s)/deciliter  glucagon  Injectable 1 milliGRAM(s) IntraMuscular once PRN Glucose LESS THAN 70 milligrams/deciliter      PHYSICAL EXAM:  GENERAL:   HEAD:  Atraumatic, Normocephalic  EYES: EOMI, PERRLA, conjunctiva and sclera clear  NECK: Supple, No JVD, Normal thyroid, no enlarged nodes  NERVOUS SYSTEM:  Alert & Awake.   CHEST/LUNG: B/L good air entry; No rales, rhonchi, or wheezing  HEART: S1S2 normal, no S3, Regular rate and rhythm; No murmurs  ABDOMEN: Soft, Nontender, Nondistended; Bowel sounds present  EXTREMITIES:  2+ Peripheral Pulses, No clubbing, cyanosis, or edema  LYMPH: No lymphadenopathy noted  SKIN: No rashes or lesions    Care Discussed with Consultants/Other Providers [ x] YES  [ ] NO Patient is 65 y/o M with PMH of DM, CAD s/p PCI x3 (2017), HTN, DLD, trigeminal neuralgia presented to ED [facility] on 07/16/20 for AMS and aphasia. 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 Patient was given TPA at 16:46 and transferred to [facility] for post-TPA care and neurointerventional evaluation.     Initial exam in ED, patient was reported to have expressive aphasia, able to move all extremities with weakness of bilateral LE.       INTERVAL HPI/OVERNIGHT EVENTS:       ICU Vital Signs Last 24 Hrs  T(C): 38.2 (17 Jul 2020 12:00), Max: 38.2 (17 Jul 2020 12:00)  T(F): 100.7 (17 Jul 2020 12:00), Max: 100.7 (17 Jul 2020 12:00)  HR: 62 (17 Jul 2020 12:00) (54 - 135)  BP: 117/64 (17 Jul 2020 11:00) (52/39 - 226/113)  BP(mean): 83 (17 Jul 2020 11:00) (44 - 156)  ABP: 126/58 (17 Jul 2020 12:00) (126/58 - 182/64)  ABP(mean): 72 (17 Jul 2020 12:00) (72 - 92)  RR: 27 (17 Jul 2020 12:00) (12 - 36)  SpO2: 100% (17 Jul 2020 12:00) (94% - 100%)    I&O's Summary    16 Jul 2020 07:01  -  17 Jul 2020 07:00  --------------------------------------------------------  IN: 789.7 mL / OUT: 2450 mL / NET: -1660.3 mL    17 Jul 2020 07:01  -  17 Jul 2020 12:28  --------------------------------------------------------  IN: 1365 mL / OUT: 550 mL / NET: 815 mL      Mode: AC/ CMV (Assist Control/ Continuous Mandatory Ventilation)  RR (machine): 14  TV (machine): 450  FiO2: 40  PEEP: 5  ITime: 1  MAP: 3  PIP: 13      LABS:                        15.4   15.90 )-----------( 259      ( 17 Jul 2020 04:04 )             46.3     07-17    136  |  97<L>  |  18  ----------------------------<  252<H>  4.3   |  20  |  1.0    Ca    9.1      17 Jul 2020 04:04  Phos  3.9     07-17  Mg     1.7     07-17    TPro  8.7<H>  /  Alb  4.7  /  TBili  1.1  /  DBili  x   /  AST  29  /  ALT  21  /  AlkPhos  56  07-17    PT/INR - ( 16 Jul 2020 15:44 )   PT: 13.20 sec;   INR: 1.15 ratio         PTT - ( 16 Jul 2020 15:44 )  PTT:26.1 sec    CAPILLARY BLOOD GLUCOSE      POCT Blood Glucose.: 143 mg/dL (17 Jul 2020 11:54)  POCT Blood Glucose.: 230 mg/dL (17 Jul 2020 06:21)  POCT Blood Glucose.: 181 mg/dL (17 Jul 2020 00:35)  POCT Blood Glucose.: 151 mg/dL (16 Jul 2020 15:46)    ABG - ( 17 Jul 2020 12:01 )  pH, Arterial: 7.47  pH, Blood: x     /  pCO2: 30    /  pO2: 146   / HCO3: 22    / Base Excess: -1.3  /  SaO2: 97                  RADIOLOGY & ADDITIONAL TESTS:    Consultant(s) Notes Reviewed:  [x ] YES  [ ] NO    MEDICATIONS  (STANDING):  atorvastatin 80 milliGRAM(s) Oral at bedtime  chlorhexidine 0.12% Liquid 15 milliLiter(s) Oral Mucosa every 12 hours  chlorhexidine 4% Liquid 1 Application(s) Topical <User Schedule>  dexMEDEtomidine Infusion 1 MICROgram(s)/kG/Hr (21.1 mL/Hr) IV Continuous <Continuous>  dextrose 5%. 1000 milliLiter(s) (50 mL/Hr) IV Continuous <Continuous>  dextrose 50% Injectable 12.5 Gram(s) IV Push once  dextrose 50% Injectable 25 Gram(s) IV Push once  dextrose 50% Injectable 25 Gram(s) IV Push once  insulin lispro (HumaLOG) corrective regimen sliding scale   SubCutaneous three times a day before meals  niCARdipine Infusion 5 mG/Hr (25 mL/Hr) IV Continuous <Continuous>  norepinephrine Infusion 0.05 MICROgram(s)/kG/Min (3.96 mL/Hr) IV Continuous <Continuous>  pantoprazole  Injectable 40 milliGRAM(s) IV Push daily  sodium chloride 3%. 500 milliLiter(s) (80 mL/Hr) IV Continuous <Continuous>    MEDICATIONS  (PRN):  dextrose 40% Gel 15 Gram(s) Oral once PRN Blood Glucose LESS THAN 70 milliGRAM(s)/deciliter  glucagon  Injectable 1 milliGRAM(s) IntraMuscular once PRN Glucose LESS THAN 70 milligrams/deciliter      PHYSICAL EXAM:  GENERAL:   HEAD:  Atraumatic, Normocephalic  EYES:     CHEST/LUNG:  HEART: S1S2 normal, no S3, Regular rate and rhythm; No murmurs  ABDOMEN: Soft, Nontender, Nondistended; Bowel sounds present  EXTREMITIES:        Care Discussed with Consultants/Other Providers [ x] YES  [ ] NO Patient is 63 y/o M with PMH of DM, CAD s/p PCI x3 (2017), HTN, DLD, trigeminal neuralgia presented to ED [facility] on 07/16/20 for AMS and aphasia. 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 Patient was given TPA at 16:46 and transferred to [facility] for post-TPA care and neurointerventional evaluation.     Initial exam in ED, patient was reported to have expressive aphasia, able to move all extremities with weakness of bilateral LE. Also on Nicardipine drip to maintain BP below 185.  RRT called while in the ED, patient posturing with shaking of B/L upper extremities, not following commands. Patient was intubated for airway protection. Neurointerventional and neurological evaluation, recommended emergent thrombectomy. Mechanical thrombectomy of aneurysmal basiliar occlusion completed. Upgraded to ICU for further monitoring      INTERVAL HPI/OVERNIGHT EVENTS:       ICU Vital Signs Last 24 Hrs  T(C): 38.2 (17 Jul 2020 12:00), Max: 38.2 (17 Jul 2020 12:00)  T(F): 100.7 (17 Jul 2020 12:00), Max: 100.7 (17 Jul 2020 12:00)  HR: 62 (17 Jul 2020 12:00) (54 - 135)  BP: 117/64 (17 Jul 2020 11:00) (52/39 - 226/113)  BP(mean): 83 (17 Jul 2020 11:00) (44 - 156)  ABP: 126/58 (17 Jul 2020 12:00) (126/58 - 182/64)  ABP(mean): 72 (17 Jul 2020 12:00) (72 - 92)  RR: 27 (17 Jul 2020 12:00) (12 - 36)  SpO2: 100% (17 Jul 2020 12:00) (94% - 100%)    I&O's Summary    16 Jul 2020 07:01  -  17 Jul 2020 07:00  --------------------------------------------------------  IN: 789.7 mL / OUT: 2450 mL / NET: -1660.3 mL    17 Jul 2020 07:01  -  17 Jul 2020 12:28  --------------------------------------------------------  IN: 1365 mL / OUT: 550 mL / NET: 815 mL      Mode: AC/ CMV (Assist Control/ Continuous Mandatory Ventilation)  RR (machine): 14  TV (machine): 450  FiO2: 40  PEEP: 5  ITime: 1  MAP: 3  PIP: 13      LABS:                        15.4   15.90 )-----------( 259      ( 17 Jul 2020 04:04 )             46.3     07-17    136  |  97<L>  |  18  ----------------------------<  252<H>  4.3   |  20  |  1.0    Ca    9.1      17 Jul 2020 04:04  Phos  3.9     07-17  Mg     1.7     07-17    TPro  8.7<H>  /  Alb  4.7  /  TBili  1.1  /  DBili  x   /  AST  29  /  ALT  21  /  AlkPhos  56  07-17    PT/INR - ( 16 Jul 2020 15:44 )   PT: 13.20 sec;   INR: 1.15 ratio         PTT - ( 16 Jul 2020 15:44 )  PTT:26.1 sec    CAPILLARY BLOOD GLUCOSE      POCT Blood Glucose.: 143 mg/dL (17 Jul 2020 11:54)  POCT Blood Glucose.: 230 mg/dL (17 Jul 2020 06:21)  POCT Blood Glucose.: 181 mg/dL (17 Jul 2020 00:35)  POCT Blood Glucose.: 151 mg/dL (16 Jul 2020 15:46)    ABG - ( 17 Jul 2020 12:01 )  pH, Arterial: 7.47  pH, Blood: x     /  pCO2: 30    /  pO2: 146   / HCO3: 22    / Base Excess: -1.3  /  SaO2: 97                  RADIOLOGY & ADDITIONAL TESTS:    Consultant(s) Notes Reviewed:  [x ] YES  [ ] NO    MEDICATIONS  (STANDING):  atorvastatin 80 milliGRAM(s) Oral at bedtime  chlorhexidine 0.12% Liquid 15 milliLiter(s) Oral Mucosa every 12 hours  chlorhexidine 4% Liquid 1 Application(s) Topical <User Schedule>  dexMEDEtomidine Infusion 1 MICROgram(s)/kG/Hr (21.1 mL/Hr) IV Continuous <Continuous>  dextrose 5%. 1000 milliLiter(s) (50 mL/Hr) IV Continuous <Continuous>  dextrose 50% Injectable 12.5 Gram(s) IV Push once  dextrose 50% Injectable 25 Gram(s) IV Push once  dextrose 50% Injectable 25 Gram(s) IV Push once  insulin lispro (HumaLOG) corrective regimen sliding scale   SubCutaneous three times a day before meals  niCARdipine Infusion 5 mG/Hr (25 mL/Hr) IV Continuous <Continuous>  norepinephrine Infusion 0.05 MICROgram(s)/kG/Min (3.96 mL/Hr) IV Continuous <Continuous>  pantoprazole  Injectable 40 milliGRAM(s) IV Push daily  sodium chloride 3%. 500 milliLiter(s) (80 mL/Hr) IV Continuous <Continuous>    MEDICATIONS  (PRN):  dextrose 40% Gel 15 Gram(s) Oral once PRN Blood Glucose LESS THAN 70 milliGRAM(s)/deciliter  glucagon  Injectable 1 milliGRAM(s) IntraMuscular once PRN Glucose LESS THAN 70 milligrams/deciliter      PHYSICAL EXAM:  GENERAL:   HEAD:  Atraumatic, Normocephalic  EYES:     CHEST/LUNG:  HEART: S1S2 normal, no S3, Regular rate and rhythm; No murmurs  ABDOMEN: Soft, Nontender, Nondistended; Bowel sounds present  EXTREMITIES:        Care Discussed with Consultants/Other Providers [ x] YES  [ ] NO Patient is 65 y/o M with PMH of DM, CAD s/p PCI x3 (2017), HTN, DLD, trigeminal neuralgia presented to ED [facility] on 07/16/20 for AMS and aphasia. 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 Patient was given TPA at 16:46 and transferred to [facility] for post-TPA care and neurointerventional evaluation.     Initial exam in ED, patient was reported to have expressive aphasia, able to move all extremities with weakness of bilateral LE. Also on Nicardipine drip to maintain BP below 185.  RRT called while in the ED, patient posturing with shaking of B/L upper extremities, not following commands. Patient was intubated for airway protection. Neurointerventional and neurological evaluation, recommended emergent thrombectomy. Mechanical thrombectomy of aneurysmal basiliar occlusion completed. Upgraded to ICU for further monitoring      INTERVAL HPI/OVERNIGHT EVENTS:    Overnight patient had /111, given 10mg labetalol and the nicardipine drip was restarted in order to maintain -160. Replaced 2g of Mg. Patient continued with posturing, with absence of corneal reflexes.   Urgent CT was ordered for the morning. Patient was taken down to radiology. Once back in his room, his BP dropped to 50/40s with HR 43.     ICU Vital Signs Last 24 Hrs  T(C): 38.2 (17 Jul 2020 12:00), Max: 38.2 (17 Jul 2020 12:00)  T(F): 100.7 (17 Jul 2020 12:00), Max: 100.7 (17 Jul 2020 12:00)  HR: 62 (17 Jul 2020 12:00) (54 - 135)  BP: 117/64 (17 Jul 2020 11:00) (52/39 - 226/113)  BP(mean): 83 (17 Jul 2020 11:00) (44 - 156)  ABP: 126/58 (17 Jul 2020 12:00) (126/58 - 182/64)  ABP(mean): 72 (17 Jul 2020 12:00) (72 - 92)  RR: 27 (17 Jul 2020 12:00) (12 - 36)  SpO2: 100% (17 Jul 2020 12:00) (94% - 100%)    I&O's Summary    16 Jul 2020 07:01  -  17 Jul 2020 07:00  --------------------------------------------------------  IN: 789.7 mL / OUT: 2450 mL / NET: -1660.3 mL    17 Jul 2020 07:01  -  17 Jul 2020 12:28  --------------------------------------------------------  IN: 1365 mL / OUT: 550 mL / NET: 815 mL      Mode: AC/ CMV (Assist Control/ Continuous Mandatory Ventilation)  RR (machine): 14  TV (machine): 450  FiO2: 40  PEEP: 5  ITime: 1  MAP: 3  PIP: 13      LABS:                        15.4   15.90 )-----------( 259      ( 17 Jul 2020 04:04 )             46.3     07-17    136  |  97<L>  |  18  ----------------------------<  252<H>  4.3   |  20  |  1.0    Ca    9.1      17 Jul 2020 04:04  Phos  3.9     07-17  Mg     1.7     07-17    TPro  8.7<H>  /  Alb  4.7  /  TBili  1.1  /  DBili  x   /  AST  29  /  ALT  21  /  AlkPhos  56  07-17    PT/INR - ( 16 Jul 2020 15:44 )   PT: 13.20 sec;   INR: 1.15 ratio         PTT - ( 16 Jul 2020 15:44 )  PTT:26.1 sec    CAPILLARY BLOOD GLUCOSE      POCT Blood Glucose.: 143 mg/dL (17 Jul 2020 11:54)  POCT Blood Glucose.: 230 mg/dL (17 Jul 2020 06:21)  POCT Blood Glucose.: 181 mg/dL (17 Jul 2020 00:35)  POCT Blood Glucose.: 151 mg/dL (16 Jul 2020 15:46)    ABG - ( 17 Jul 2020 12:01 )  pH, Arterial: 7.47  pH, Blood: x     /  pCO2: 30    /  pO2: 146   / HCO3: 22    / Base Excess: -1.3  /  SaO2: 97                  RADIOLOGY & ADDITIONAL TESTS:    Consultant(s) Notes Reviewed:  [x ] YES  [ ] NO    MEDICATIONS  (STANDING):  atorvastatin 80 milliGRAM(s) Oral at bedtime  chlorhexidine 0.12% Liquid 15 milliLiter(s) Oral Mucosa every 12 hours  chlorhexidine 4% Liquid 1 Application(s) Topical <User Schedule>  dexMEDEtomidine Infusion 1 MICROgram(s)/kG/Hr (21.1 mL/Hr) IV Continuous <Continuous>  dextrose 5%. 1000 milliLiter(s) (50 mL/Hr) IV Continuous <Continuous>  dextrose 50% Injectable 12.5 Gram(s) IV Push once  dextrose 50% Injectable 25 Gram(s) IV Push once  dextrose 50% Injectable 25 Gram(s) IV Push once  insulin lispro (HumaLOG) corrective regimen sliding scale   SubCutaneous three times a day before meals  niCARdipine Infusion 5 mG/Hr (25 mL/Hr) IV Continuous <Continuous>  norepinephrine Infusion 0.05 MICROgram(s)/kG/Min (3.96 mL/Hr) IV Continuous <Continuous>  pantoprazole  Injectable 40 milliGRAM(s) IV Push daily  sodium chloride 3%. 500 milliLiter(s) (80 mL/Hr) IV Continuous <Continuous>    MEDICATIONS  (PRN):  dextrose 40% Gel 15 Gram(s) Oral once PRN Blood Glucose LESS THAN 70 milliGRAM(s)/deciliter  glucagon  Injectable 1 milliGRAM(s) IntraMuscular once PRN Glucose LESS THAN 70 milligrams/deciliter      PHYSICAL EXAM:  GENERAL: Nonresponsive to stimuli or pain   HEAD:  Atraumatic, Normocephalic  EYES: Pupils are pinpoint  CHEST/LUNG: Ventilated, harsh breath sounds   HEART: S1, S2 present, no S3. no murmurs, gallops or rubs  ABDOMEN: Soft, Nontender, Nondistended  EXTREMITIES: decerebrate posturing with painful stimuli         Care Discussed with Consultants/Other Providers [ x] YES  [ ] NO

## 2020-07-17 NOTE — PROGRESS NOTE ADULT - ASSESSMENT
A 64 years old man who initially presented from [facility] with sudden confusion, aphasia, and n/v had initial NIHSS 9. CTA head/neck showed right VA occlusion and distal BA thrombus, moderate to severe subclavian/vertebral stenosis, and fusiform aneurysmal dilatation of BA. On CTP he had approximately 301 ml ischemic penumbra involving the posterior circulation without core infarction. He received IV-tPA on 7/16th at 4:36PM and was transferred to [facility] for emergent neuroendovascular intervention. In ED, he had RRT with posturing and shaking of b/l UE and was emergently intubated to protect his airway. He is s/p emergent IA mechanical thrombectomy of BA with TICI 3. Post procedure CTH reports of residual contrast staining, no significant improvement, and was placed on Integrilin drip. This morning, patient had sudden neurological worsening, hypotensive, bradycardia and required 23% and 50 g of 25% mannitol and started on hypertonic saline. Repeat CT head this morning shows worsening brainstem mass effect and hemorrhage.     SUGGESTIONS:  - Frequent neuro checks  - Continue neuro management per neurocritical care team   - Medical management per primary team     Updated plan with primary team and attending physician updated family member Vianney, [phone]  Case discussed and patient seen with attending physician   Naomie Sharp NP  [id number] A 64 years old man who initially presented from [facility] with sudden confusion, aphasia, and n/v had initial NIHSS 9. CTA head/neck showed right VA occlusion and distal BA thrombus, moderate to severe subclavian/vertebral stenosis, and fusiform aneurysmal dilatation of BA. On CTP he had approximately 301 ml ischemic penumbra involving the posterior circulation without core infarction. He received IV-tPA on 7/16th at 4:36PM and was transferred to [facility] for emergent neuroendovascular intervention. In ED, he had RRT with posturing and shaking of b/l UE and was emergently intubated to protect his airway. He is s/p emergent IA mechanical thrombectomy of BA with TICI 3. Post procedure CTH reports of residual contrast staining, no significant improvement, and was placed on Integrilin drip. This morning, patient had sudden neurological worsening, hypotensive, bradycardia and required pressors and given 23% and 50 g of 25% mannitol by ICU team. Repeat CT head this morning shows worsening brainstem mass effect and SAH.     SUGGESTIONS:  - Mnagement per MICU and NCC Team.

## 2020-07-17 NOTE — CHART NOTE - NSCHARTNOTEFT_GEN_A_CORE
Patient is s/p thrombectomy by neurology. pt was seen and examined bedside. Patient had no corneal or gag reflex on physical exam, pt came to the MICU on no sedation. abdomen is soft with suprapubic fullness. right groin access site is soft with non palpable hematoma. bladder scan revealed 999cc of urine, vergara was inserted and 1.5L of urine came out. His BP is 220/110, he was given 10 of labetalol and started on a nicardipine drip for BP control.    Spoke with Neurology at 2405 regarding patient's neuro exam. Neurology would like to start the pt on precedex titrate to RASS of -1 to -2. Patient is s/p thrombectomy by neurology. pt was seen and examined bedside. Patient had no corneal or gag reflex on physical exam, pt came to the MICU on no sedation. abdomen is soft with suprapubic fullness. right groin access site is soft with non palpable hematoma. bladder scan revealed 999cc of urine, vergara was inserted and 1.5L of urine came out. His BP is 220/110, he was given 10 of labetalol and started on a nicardipine drip for BP control.    Spoke with Neurology at 2405 regarding patient's neuro exam. Neurology would like to start the pt on precedex titrate to RASS of -1 to -2. Keep systolic BP between 120-160s Patient is s/p thrombectomy by neurology. pt was seen and examined bedside. Patient had no corneal or gag reflex on physical exam, pt came to the MICU on no sedation. abdomen is soft with suprapubic fullness. right groin access site is soft with non palpable hematoma. bladder scan revealed 999cc of urine, vergara was inserted and 1.5L of urine came out.     Spoke with Neurology at 2405 regarding patient's neuro exam. Neurology would like to start the pt on precedex titrate to RASS of -1 to -2. Keep systolic BP between 120-160s

## 2020-07-17 NOTE — CONSULT NOTE ADULT - SUBJECTIVE AND OBJECTIVE BOX
Patient is a 64y old  Male who presents with a chief complaint of Stroke s/p tpa (17 Jul 2020 04:10)      HPI:  64 year old male pmh of DM, CAD with PCI x3 in 2017, HTN, DLD, trigeminal neuralgia who presents to ED from [facility] s/p TPA for stroke.  History obtained from charts and daughter Vianney [phone] as Pt. is currently aphasic. Per daughter Pt. who is AOx3 at baseline was at home playing with grandchildren and became nausea, had 3 episodes of NBNB vomiting. After laying down for 30 min family found Pt. confused with aphasia and brought Pt. to [facility] ED.  At [facility] stroke code called initial NIHSS 9, CTA showed right vertebral artery occlusion and distal basilar artery thrombus. Other extensive atheromatous changes including moderate/severe subclavian and vertebral stenosis.   Pt. was given TPA at 16:46 and sent to [facility] for neurointerventional eval and post TPA care.  Pt. seen in ED with expressive aphasia although able to move all extremities with weakness of bilateral LE 1/5. Pt. was on cardine drip to maintain BP below 185.   Interval Hx -While in ED RRT called as Pt. posturing and shaking of b/l upper extremities, not following any commands, Pt. was intubated for airway protection. Stat CT done no new hemorhage, CT perfusion Stat, Neuro sx and neurointerventional contacted. NI actual initiated. went to procedure s/p thrombectomy, off cardene, on precedex, and integrelin      PAST MEDICAL & SURGICAL HISTORY:  HTN (hypertension)  Diabetes  No significant past surgical history      SOCIAL HX:   Smoking  -    FAMILY HISTORY: UTO      REVIEW OF SYSTEMS see hpi    	      Allergies    No Known Allergies    Intolerances        atorvastatin 80 milliGRAM(s) Oral at bedtime  chlorhexidine 0.12% Liquid 15 milliLiter(s) Oral Mucosa every 12 hours  chlorhexidine 4% Liquid 1 Application(s) Topical <User Schedule>  dexMEDEtomidine Infusion 1 MICROgram(s)/kG/Hr IV Continuous <Continuous>  dextrose 40% Gel 15 Gram(s) Oral once PRN  dextrose 5%. 1000 milliLiter(s) IV Continuous <Continuous>  dextrose 50% Injectable 12.5 Gram(s) IV Push once  dextrose 50% Injectable 25 Gram(s) IV Push once  dextrose 50% Injectable 25 Gram(s) IV Push once  eptifibatide Infusion 75 mg/100 mL 0.5 MICROgram(s)/kG/Min IV Continuous <Continuous>  glucagon  Injectable 1 milliGRAM(s) IntraMuscular once PRN  insulin lispro (HumaLOG) corrective regimen sliding scale   SubCutaneous three times a day before meals  niCARdipine Infusion 5 mG/Hr IV Continuous <Continuous>  pantoprazole  Injectable 40 milliGRAM(s) IV Push daily  sodium chloride 0.9%. 1000 milliLiter(s) IV Continuous <Continuous>  : Home Meds:      PHYSICAL EXAM    ICU Vital Signs Last 24 Hrs  T(C): 36.5 (17 Jul 2020 06:00), Max: 36.5 (17 Jul 2020 06:00)  T(F): 97.7 (17 Jul 2020 06:00), Max: 97.7 (17 Jul 2020 06:00)  HR: 84 (17 Jul 2020 07:00) (62 - 135)  BP: 109/61 (17 Jul 2020 07:00) (109/61 - 226/113)  BP(mean): 88 (17 Jul 2020 07:00) (79 - 156)  RR: 18 (17 Jul 2020 07:00) (12 - 36)  SpO2: 100% (17 Jul 2020 07:00) (94% - 100%)      General: intubated  HEENT:  MARGE              Lymph Nodes: No cervical LN   Lungs: Bilateral BS  Cardiovascular: Regular  Abdomen: Soft, Positive BS  Extremities: No clubbing  Skin: Warm  Neurological: on precedex. breathing over vent, not following commands, withdraw LE to painful stimuli, decorticate UE      07-16-20 @ 07:01  -  07-17-20 @ 07:00  --------------------------------------------------------  IN:    eptifibatide Infusion 75 mg/100 mL: 27.2 mL    IV PiggyBack: 50 mL    niCARdipine Infusion: 112.5 mL    sodium chloride 0.9%.: 600 mL  Total IN: 789.7 mL    OUT:    Intermittent Catheterization - Urethral: 2450 mL  Total OUT: 2450 mL    Total NET: -1660.3 mL          LABS:                          15.4   15.90 )-----------( 259      ( 17 Jul 2020 04:04 )             46.3                                               07-17    136  |  97<L>  |  18  ----------------------------<  252<H>  4.3   |  20  |  1.0    Ca    9.1      17 Jul 2020 04:04  Phos  3.9     07-17  Mg     1.7     07-17    TPro  8.7<H>  /  Alb  4.7  /  TBili  1.1  /  DBili  x   /  AST  29  /  ALT  21  /  AlkPhos  56  07-17      PT/INR - ( 16 Jul 2020 15:44 )   PT: 13.20 sec;   INR: 1.15 ratio         PTT - ( 16 Jul 2020 15:44 )  PTT:26.1 sec                                           CARDIAC MARKERS ( 16 Jul 2020 15:44 )  x     / <0.01 ng/mL / x     / x     / x                                                LIVER FUNCTIONS - ( 17 Jul 2020 04:04 )  Alb: 4.7 g/dL / Pro: 8.7 g/dL / ALK PHOS: 56 U/L / ALT: 21 U/L / AST: 29 U/L / GGT: x                                                                                               Mode: AC/ CMV (Assist Control/ Continuous Mandatory Ventilation)  RR (machine): 14  TV (machine): 450  FiO2: 40  PEEP: 5  ITime: 1  MAP: 10  PIP: 18                                      ABG - ( 17 Jul 2020 04:30 )  pH, Arterial: 7.40  pH, Blood: x     /  pCO2: 37    /  pO2: 146   / HCO3: 23    / Base Excess: -1.8  /  SaO2: 97                  X-Rays    REVIEWED CT reviewed    MEDICATIONS  (STANDING):  atorvastatin 80 milliGRAM(s) Oral at bedtime  chlorhexidine 0.12% Liquid 15 milliLiter(s) Oral Mucosa every 12 hours  chlorhexidine 4% Liquid 1 Application(s) Topical <User Schedule>  dexMEDEtomidine Infusion 1 MICROgram(s)/kG/Hr (21.1 mL/Hr) IV Continuous <Continuous>  dextrose 5%. 1000 milliLiter(s) (50 mL/Hr) IV Continuous <Continuous>  dextrose 50% Injectable 12.5 Gram(s) IV Push once  dextrose 50% Injectable 25 Gram(s) IV Push once  dextrose 50% Injectable 25 Gram(s) IV Push once  eptifibatide Infusion 75 mg/100 mL 0.5 MICROgram(s)/kG/Min (3.38 mL/Hr) IV Continuous <Continuous>  insulin lispro (HumaLOG) corrective regimen sliding scale   SubCutaneous three times a day before meals  niCARdipine Infusion 5 mG/Hr (25 mL/Hr) IV Continuous <Continuous>  pantoprazole  Injectable 40 milliGRAM(s) IV Push daily  sodium chloride 0.9%. 1000 milliLiter(s) (75 mL/Hr) IV Continuous <Continuous>    MEDICATIONS  (PRN):  dextrose 40% Gel 15 Gram(s) Oral once PRN Blood Glucose LESS THAN 70 milliGRAM(s)/deciliter  glucagon  Injectable 1 milliGRAM(s) IntraMuscular once PRN Glucose LESS THAN 70 milligrams/deciliter

## 2020-07-17 NOTE — PROGRESS NOTE ADULT - ASSESSMENT
Impression:  64 year old male with pmh of DM, CAD with PCI x3 in 2017, HTN, DLD, presented to  [facility] initially for expressive aphasia and confusion with NIHSS of 9 was s/p tpa at 16:46 hrs . CTA H/N revealed a right vertebral artery occlusion and distal basilar artery thrombus. Noted worsening neuro exam at [place] ED with  intubated for airway protection and his NIHSS worsened to 39. Stat CTH was negative for bleed, CT perfusion Stat, with basilar artery near occlusion.  Patient is s/p mechanical thrombectomy with full recanalization of aneurysmal basilar artery; procedure was completed under general anesthesia.  Post procedurally Repeat HCT showed contrast staining and no bleed. Patient Currently in ICU and  remains mechanically vented and has an NIHSS of 34.       Suggestions:  Neuro checks q 1  Continue Integrilin drip   MRI brain N/C  Sbp goal 120-160  ECHO  Lipid profile, hbaic  Continue Lipitor  Neuroendovascular follow up          Disposition: Continue ICU monitoring Impression:  64 year old male with pmh of DM, CAD with PCI x3 in 2017, HTN, DLD, presented to  [facility] initially for expressive aphasia and confusion with NIHSS of 9 was s/p tpa at 16:46 hrs . CTA H/N revealed a right vertebral artery occlusion and distal basilar artery thrombus. Noted worsening neuro exam at [place] ED with  intubated for airway protection and his NIHSS worsened to 39. Stat CTH was negative for bleed, CT perfusion Stat, with basilar artery near occlusion.  Patient is s/p mechanical thrombectomy with full recanalization of aneurysmal basilar artery; procedure was completed under general anesthesia.  Post procedurally Repeat HCT showed contrast staining and no bleed. Patient Currently in ICU and  remains mechanically vented and has an NIHSS of 34.       In 8am hours patient  still had GCS of 4 and was hemodynamically stable. Around 10.20am  however patient started to deteriorate, BP dropped, became sadaf, and stopped withdrawing to painful stimuli. He was taken for stat CTH that showed worsening cytotoxic edema with ischemia in posterior circulation.   Patient was administered 23%saline 30ml bolus X1 followed by 50 g of 25% Mannitol bolus as well. BP normalized with Levo. Patient has no brainstem reflexes. High likelihood for brain dead.  Extensive discussion was held with the family regarding extremely poor prognosis.      Suggestions:  Keep syst -160  PCO2 25-35 for now  Continue 3% at 80 cc an hour  Keep sodium level 150-160 range  will need official brainstem exam          Disposition: Continue ICU monitoring

## 2020-07-17 NOTE — CHART NOTE - NSCHARTNOTEFT_GEN_A_CORE
10:40am patient given 23% NS (25mL)  11:00am patient given mannitol 50mg IV push Patient returned from CT scan, found to be hypotensive at BP 50/40s; MAP<50.   10:40am patient given 23% NS (25mL) by Dr. Downey  11:00am patient given mannitol 50mg IV     Levo titrated to -160   BP continued to climb to , levo is being held Patient had gone for urgent CT scan this morning.  Patient returned from CT scan, found to be hypotensive at BP 50/40s; MAP<50. HR 43  He was unresponsive, no withdrawal to pain  Seniors, ICU fellow, Neurology and Neuroendovascular were called.   Placed an urgent R femoral central line and L radial A-line  Levophed was started, Levo titrated to -160   10:40am patient given 23% NS (25mL) by Dr. Downey  11:00am patient given mannitol 50mg IV push  Patient is on 3% hypertonic saline  BP continued to climb to SBP 250s, levo is being held. Patient had gone for urgent CT scan this morning.  Patient returned from CT scan, found to be hypotensive at BP 50/40s; MAP<50. HR 43  He was unresponsive, no withdrawal to pain  Neurology and Neuroendovascular at beside.   Placed an urgent R femoral central line and L radial A-line  Levophed was started, Levo titrated to -160   10:40am patient given 23% NS (25mL) IV push by Dr. Downey  11:00am patient given mannitol 50mg IV push  Patient is started on 3% hypertonic saline  Na goal 150-160, follow up BMP  Goal CO2 30-35, patient hyperventilated Patient had gone for urgent CT scan this morning.  Patient returned from CT scan, found to be hypotensive at BP 50/40s; MAP<50. HR 43  He was unresponsive, no withdrawal to pain  Neurology and Neuroendovascular at beside.   Placed an urgent R femoral central line and L radial A-line  Levophed was started, Levo titrated to -160   10:40am patient given 23% NS (25mL) IV push   11:00am patient given 25% mannitol 50mg IV push  Patient is started on 3% hypertonic saline 80 mL/hr  Na goal 150-160, follow up BMP  Goal CO2 30-35, patient hyperventilated

## 2020-07-17 NOTE — CONSULT NOTE ADULT - ASSESSMENT
IMPRESSION:    Stroke s/p tpa, s/p Neuro inter ( thrombectomy basilar artery ) on integrelin drip s/p repeat head ct      PLAN:    CNS: MRI brain. Neuro f/up, integrelin. precedex    HEENT:  Oral care    PULMONARY:  HOB @ 45 degrees, no changes in vnet settings    CARDIOVASCULAR: echo, monitor BP, atorvastatin    GI: GI prophylaxis                                          Feeding NPO    RENAL:  F/u  lytes.  Correct as needed. accurate I/O    INFECTIOUS DISEASE: NON ABX    HEMATOLOGICAL:  DVT prophylaxis.    ENDOCRINE:  Follow up FS.  Insulin protocol if needed.      CODE STATUS: FULL CODE    DISPOSITION: Pt requires continued monitoring in the MICU    Poor prognosis

## 2020-07-17 NOTE — PROGRESS NOTE ADULT - SUBJECTIVE AND OBJECTIVE BOX
NEUROENDOVASCULAR PROGRESS NOTE    Patient is a 64y old  Male who presents with a chief complaint of Stroke s/p tpa (17 Jul 2020 12:28)    Interval history: A 64 years old man who initially presented from [facility] with sudden confusion, aphasia, and n/v had initial NIHSS 9. CTA head/neck showed right VA occlusion and distal BA thrombus, moderate to severe subclavian/vertebral stenosis, and fusiform aneurysmal dilatation of BA. On CTP he had approximately 301 ml ischemic penumbra involving the posterior circulation without core infarction. He received IV-tPA on 7/16th at 4:36PM and was transferred to [facility] for emergent neuroendovascular intervention. In ED, he had RRT with posturing and shaking of b/l UE and was emergently intubated to protect his airway. He is s/p emergent IA mechanical thrombectomy of BA with TICI 3. Post procedure CTH reports of residual contrast staining.     7/17 0800 AM - Pt seen in ICU with primary RN and covering NCC ACP. Pt started on Precedex at 7 am for bucking on vent, and Cardene gtt turned off at 6 am. He continues to be on Integrilin drip. Patient with + gag reflex, posturing, and no pupillary, corneals, and oculocephalic reflex.  7/17 10:25 AM - Pt returned to ICU unit, just completed repeat CT head and had sudden worsening neurological changes, not posturing, no brainstem reflexes and received report from primary RN pt returned back to unit and SBP in 40s. Dr. Abebe and Dr. Downey evaluated pt and emergently received   7/17 10:40 AM - Pt received 30 ml of 23% saline IVP and emergent central line was placed  7/17 11:05 AM - Pt received 50g of 25% Mannitol and then was started on 3% at 80 cc/hr. Repeat CT head from this morning reports of worsening edema.     REVIEW OF SYSTEMS  Unable to obtain review of systems due to: intubated and poor neurological functioning    Vital Signs Last 24 Hrs  T(C): 38.2 (17 Jul 2020 12:00), Max: 38.2 (17 Jul 2020 12:00)  T(F): 100.7 (17 Jul 2020 12:00), Max: 100.7 (17 Jul 2020 12:00)  HR: 62 (17 Jul 2020 12:00) (54 - 135)  BP: 117/64 (17 Jul 2020 11:00) (52/39 - 226/113)  BP(mean): 83 (17 Jul 2020 11:00) (44 - 156)  RR: 27 (17 Jul 2020 12:00) (12 - 36)  SpO2: 100% (17 Jul 2020 12:00) (94% - 100%)    Neurologic Exam:  Mental status: Comatose  Language: Intubated, not following any commands  Cranial nerves: B/l negative pupillary and corneal reflex, no oculocephalic reflex., + gag reflex  Motor: Flaccid and posturing noted   Sensation: Extends in all extremities to noxious stimuli    Ext: no c/c/e, 2+ pulses throughout, no right groin hematoma    NIH STROKE SCALE  Item	                                                        Score  1 a.	Level of Consciousness	               	3  1 b. LOC Questions	                                    2  1 c.	LOC Commands	                               	2  2.	Best Gaze	                                    2  3.	Visual	                                                3  4.	Facial Palsy	                                    0  5 a.	Motor Arm - Left	                        3  5 b.	Motor Arm - Right	                        3  6 a.	Motor Leg - Left	                        3  6 b.	Motor Leg - Right	                        3  7.	Limb Ataxia	                                    0  8.	Sensory	                                                0  9.	Language	                                    3  10.	Dysarthria	                                    2  11.	Extinction and Inattention  	            0  __________________________________________________________________  TOTAL	                                                            29    mRS: 5    GCS:    4    MEDICATIONS  (STANDING):  atorvastatin 80 milliGRAM(s) Oral at bedtime  chlorhexidine 0.12% Liquid 15 milliLiter(s) Oral Mucosa every 12 hours  chlorhexidine 4% Liquid 1 Application(s) Topical <User Schedule>  dextrose 5%. 1000 milliLiter(s) IV Continuous <Continuous>  dextrose 50% Injectable 12.5 Gram(s) IV Push once  dextrose 50% Injectable 25 Gram(s) IV Push once  dextrose 50% Injectable 25 Gram(s) IV Push once  insulin lispro (HumaLOG) corrective regimen sliding scale   SubCutaneous three times a day before meals  niCARdipine Infusion 5 mG/Hr IV Continuous <Continuous>  norepinephrine Infusion 0.05 MICROgram(s)/kG/Min IV Continuous <Continuous>  pantoprazole  Injectable 40 milliGRAM(s) IV Push daily  sodium chloride 3%. 500 milliLiter(s) IV Continuous <Continuous>    LABS:                      12.1   12.34 )-----------( 230      ( 17 Jul 2020 13:25 )             35.8     07-17    142  |  109  |  18  ----------------------------<  176<H>  4.2   |  19  |  1.2    Ca    7.9<L>      17 Jul 2020 13:25  Phos  3.9     07-17  Mg     2.1     07-17    TPro  6.5  /  Alb  3.5  /  TBili  1.4<H>  /  DBili  x   /  AST  24  /  ALT  15  /  AlkPhos  43  07-17    PT/INR - ( 17 Jul 2020 13:25 )   PT: 14.30 sec;   INR: 1.24 ratio    PTT - ( 17 Jul 2020 13:25 )  PTT:23.8 sec    LIVER FUNCTIONS - ( 17 Jul 2020 13:25 )  Alb: 3.5 g/dL / Pro: 6.5 g/dL / ALK PHOS: 43 U/L / ALT: 15 U/L / AST: 24 U/L / GGT: x           RADIOLOGY:  < from: CT Head No Cont (07.17.20 @ 10:27) >  FINDINGS/IMPRESSION:  1.  Interval resorption of the IV contrast within the intracranial vasculature.  2.  Interval increase in posterior fossa edema and mass effect consistent with evolving infarct (involving both cerebellar hemispheres and possibly the brainstem) with resultant new effacement of the fourth ventricle and new mild obstructive hydrocephalus.  3.  Evolving infarct also noted within the right temporal lobe.  4.  Increased hyperdensity within the left ambient and quadrigeminal plate cisterns, and new hyperdensity within the right ambient and quadrigeminal plate cisterns, the suprasellar cistern, the right sylvian fissure and scattered sulci suspicious worsening/new subarachnoid hemorrhage.  5.  Redemonstrated enlarged dolichoectatic vertebrobasilar vessels with mass effect upon the brainstem.    < from: CT Perfusion w/ Maps w/ IV Cont (07.16.20 @ 20:08) >  FINDINGS:    HEAD CTA:  There is moderate to severe stenosis of the right petrous segment of the internal carotid artery. There is atherosclerotic plaque the bilateral carotid siphons resulting in moderate stenosis bilaterally in the cavernous segments.    The anterior and middle cerebral arteries are patent bilaterally though demonstrate multifocal areas of stenosis as seen on the prior examination. Small broad-based aneurysm is noted projecting anteriorly at the right M1 segment of the MCA which measures approximately 2 mm, series 4 image 244. There is an additional broad-based aneurysm noted projecting posteriorly at the left M1 segment which also measures approximately 2 mm, series 4 image 239. There is aneurysmal dilation of the inferior segmentof the left M2 which is unchanged.    Redemonstration of marked fusiform aneurysmal dilation of the left V4 segment of the vertebral arteries as well as the basilar artery which measures 1.1 cm in greatest diameter. Previously noted thrombus in marybeth less apparent in the distal thrombus and is likely partially dissolved. There is limited contrast opacification noted within the distal basilar artery in the previous area of thrombus with diminished contrast flow within the right PCA. There is novisualized right posterior communicating artery. The left PCA is unremarkable. There is a left posterior communicating artery which is patent.    Visualized dural venous sinuses are patent. There are small arachnoid granulations noted projecting intothe superior sagittal sinus at the vertex.    NECK CTA:  Visualized aortic arch and great vessel origins are patent. There is mild atherosclerotic plaque noted involving the aortic arch.    There is moderate to severe stenosis of the proximal rightsubclavian.    Right: There is mild stenosis of the right ICA origin. The right common carotid artery is normal in course and caliber to the carotid bifurcation. Origins of the internal and external carotid arteries are normal by NASCET criteria. Theright internal carotid artery has normal course and caliber to the intracranial circulation.    There is moderate stenosis of the right vertebral artery origin. The previously noted thrombus in the right vertebral artery now appears to have extended and is thrombosed to the the level of the C7 vertebral body.    Left: The origin of the left common carotid artery is normal. The left common carotid artery is normal in course and caliber to the carotid bifurcation. There is moderate to severe stenosis of the proximal external carotid artery.. The left internal carotid artery has normal course and caliber to the intracranial circulation.    The origin of the left vertebral artery is normal. The left vertebral artery is normal in course and caliber to the intracranial circulation.    Partially visualized endotracheal tube with tip terminating in the inferior trachea.    RAPID PERFUSION:  A large area of ischemic penumbra is noted. Volume of 301 mL of Tmax greater than 6.0 seconds is noted inthe posterior circulation. There is no evidence of cortical infarction.    IMPRESSION:  Since most recent examination the previously noted distal basilar clot is less well-defined on this examination though now with diminished contrast enhancement of the distal basilar which likely represents residual thrombus. Contrast enhancement within the right PCA is diminished as compared to prior.  There has been extension of the right vertebral artery thrombus inferiorly now extending to the level of the C7 vertebral body.  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  Multifocal areas of moderate to severe stenosis of the bilateral ICA and MCA branches with unchangedbroad-based 2 mm aneurysms noted of the bilateral M1 segments.  Approximately 301 mL ischemic penumbra noted involving the posterior circulation without core infarction

## 2020-07-17 NOTE — PROGRESS NOTE ADULT - SUBJECTIVE AND OBJECTIVE BOX
1.Presentation: Expressive Aphasia and Confusion    2. Today's Acute Problems:  -Basilar artery near occlusion is S/P Mechanical thrombectomy of aneurysmal basilar artery thrombus   -Basilar artery aneurysm  -Intubated and comatose  -Decerebrate posturing         3.History:  64 year old male with pmh of DM, CAD with PCI x3 in 2017, HTN, DLD, presented to  [facility] initially for expressive aphasia and confusion with NIHSS of 9 was s/p tpa at 16:46 hrs . CTA H/N revealed a right vertebral artery occlusion and distal basilar artery thrombus. Patient was sent to [facility] for neurointerventional eval and post TPA care. On initial neuro eval at [place] , patients expressive aphasia persisted . Pt. was on cardine drip to maintain BP below 185. While in ED Rapid response team was activated for sudden unresponsiveness, witnessed posturing. Pt. was intubated for airway protection and his NIHSS worsened to 39 at this time. Stat CTH was negative for bleed, CT perfusion Stat, with basilar artery near occlusion. Code NI actual was activated and patient was transferred to . Patient is s/p mechanical thrombectomy with full recanalization of aneurysmal basilar artery; procedure was completed under general anesthesia.  Post procedurally Repeat HCT showed contrast staining and no bleed.       Yesterday's Plan:  - Continue Integrilin drip , obtain cbc stat and then q 4, monitor platelets closely  - Maintain SBP goal: 120-160  - Keep lower extremity straight, maintain reverse Trendelenburg position for 4 hours  - Neuro checks including NIHSS, neurovascular checks including groin and distal pedal pulses, vitals Q15 min x 2 hrs, then Q30 min x 4 hrs, and then Q1h x 18 hrs  - Maintain stirct I/O  - Start Lipitor 80 mg q 24  - Follow institutional protocol for IV-tPA  - STAT CT head for any acute neurological changes and call Stroke Code        4.Last 24 hour updates:  -S/p Mechanical thrombectomy of distal basilar artery (tici 3 achieved)  -On Integrilin drip  -Patient intubated but remains comatose, Decerebration noted ,   -Noted movement of Right >left side   -Was started on nicardipine for bp control which is currently off      5.Medications:  atorvastatin 80 milligrams Oral at bedtime  chlorhexidine 0.12% Liquid 15 milliliter Oral Mucosa every 12 hours  chlorhexidine 4% Liquid 1 Application(s) Topical <User Schedule>  Dexmedetomidine Infusion 1 Micrograms' IV Continuous <Continuous>  dextrose 5%. 1000 milliliter IV Continuous <Continuous>  dextrose 50% Injectable 12.5 Gram(s) IV Push once  dextrose 50% Injectable 25 Gram(s) IV Push once  dextrose 50% Injectable 25 Gram(s) IV Push once  eptifibatide Infusion 75 mg/100 mL 0.5 Microgram's IV Continuous <Continuous>  insulin lispro (HumaLOG) corrective regimen sliding scale   Subcutaneous three times a day before meals  Nicardipine Infusion 5 mG/Hr IV Continuous <Continuous>  pantoprazole  Injectable 40 milligram IV Push daily  sodium chloride 0.9%. 1000 milliliter IV Continuous <Continuous>      6.Ancillary Management  Chest PT[]  Head of bed >35 [x]  Out of bed to chair []  PT/OT/ST []  Spirometry[]  DVT prophalaxis[x]      7.Neuro Exam:  Awake [x]no[]spontaneously[]occasionally[]to stimuli  AI []y[x]n   Following commands:[]y[x]n  Oriented:[x]0[]1[]2[]3    Tracking:[]y[x]n  Language: intubated   Time off sedation for exam: not on sedation  Pupils:  Right 2 >  1.5     Left  2  >  2            Corneal: sluggish on the right , no corneal reflex on the left      Gag reflex:( +)  breathes over the vent occasionally     EOMI: No dolls, left eye remains fixed at midline, no mvmts noted with occulocephalics.  power : moves right >left (right LE moves at random)  sensory: Responds to pain Right >left  Upgoing right toe      NIHSS 34  LOC:  3    Questions:  2     Commands: 2   VF:  2       Gaze: 2      Facial:  0      RUE: 3  RLE:  4    LUE: 4    LLE: 4  Ataxia:  0              Sensory: 3  Language:   3         Dysarthria: 2  Extinction: 0        Last CTH:  < from: CT Head No Cont (07.16.20 @ 23:54) >  IMPRESSION:     1.  Patient is status post angiogram with residual IV contrast present within the intracranial vessels and dura.    2.  Hyperdense areas of the right temporal lobe and bilateralcerebellar hemispheres likely reflecting parenchymal contrast staining and less likely hemorrhage. No acute midline shift.    3.  Recommend short-term follow-up imaging as clinically appropriate.    4.  Stable enlarged and calcified vertebral and basilar arteries.      ******PRELIMINARY REPORT******    ******PRELIMINARY REPORT******          VIVIANE PAULINO M.D., RESIDENT RADIOLOGIST      < end of copied text >    Last CTA  < from: CT Angio Neck w/ IV Cont (07.16.20 @ 16:08) >  IMPRESSION:    1.  *Occlusion of the right vertebral artery from about the level of the C2 vertebral body to the vertebrobasilar junction (V3 and V4 segments).  2.  *Thrombus within the distal basilar artery measuring about 6 mm in length and producing about 80% luminal stenosis.    Other extensive atheromatous changes including:  3.  Moderate/severe stenosis of the right subclavian artery origin.   4.  Moderate/severe stenosis of the right vertebral artery origin.   5.  Moderate stenosis of the right ICA petrous segment.  6.  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  7.  Fusiform dilatation of the left vertebral artery up to 7 mm, and the basilar artery up to 1.1 cm, with compression of the indira.    *Findings were relayed to Dr. Tellez at 4:23 pm 7/16/2020      < end of copied text >      Last CTP:      8.CVS:  Vital Signs Last 24 Hrs  T(C): 35.9 (16 Jul 2020 15:45), Max: 35.9 (16 Jul 2020 15:45)  T(F): 96.6 (16 Jul 2020 15:45), Max: 96.6 (16 Jul 2020 15:45)  HR: 90 (16 Jul 2020 20:42) (64 - 135)  BP: 184/89 (16 Jul 2020 20:42) (138/71 - 210/100)  BP(mean): --  RR: 14 (16 Jul 2020 20:42) (14 - 18)  SpO2: 100% (16 Jul 2020 20:42) (95% - 100%)        9.Respiratory:  Mode: AC/ CMV (Assist Control/ Continuous Mandatory Ventilation)  RR (machine): 140  TV (machine): 450  FiO2: 40  PEEP: 5  ITime: 1  MAP: 10  PIP: 18      10.GI:  Prophylaxis    GI: Protonix 40 mg q 24   LIVER FUNCTIONS - ( 16 Jul 2020 15:44 )  Alb: 4.5 g/dL / Pro: 8.4 g/dL / ALK PHOS: 54 U/L / ALT: 22 U/L / AST: 26 U/L / GGT: x             11.Renal/Fluids/Electrolytes:    07-16    137  |  101  |  19  ----------------------------<  165<H>  3.6   |  21  |  1.0    Ca    10.0      16 Jul 2020 15:44    TPro  8.4<H>  /  Alb  4.5  /  TBili  0.8  /  DBili  x   /  AST  26  /  ALT  22  /  AlkPhos  54  07-16        13.Hematology:                        15.2   14.33 )-----------( 268      ( 16 Jul 2020 15:44 )             44.7     PT/INR - ( 16 Jul 2020 15:44 )   PT: 13.20 sec;   INR: 1.15 ratio         PTT - ( 16 Jul 2020 15:44 )  PTT:26.1 sec        DVT Prophylaxis  Lovenox[]   Heparin[]   Venodynes[]   SCD's[x]    Impression:          Suggestions:    Disposition: 1.Presentation: Expressive Aphasia and Confusion    2. 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 CTA H/N revealed a right vertebral artery occlusion and distal basilar artery thrombus. Patient was sent to [facility] for neurointerventional eval and post TPA care. On initial neuro eval at [place] , patients expressive aphasia persisted . Pt. was on cardine drip to maintain BP below 185. While in ED Rapid response team was activated for sudden unresponsiveness, witnessed posturing. Pt. was intubated for airway protection and his NIHSS worsened to 39 at this time. Stat CTH was negative for bleed, CT perfusion Stat, with basilar artery near occlusion. Code NI actual was activated and patient was transferred to . Patient is s/p mechanical thrombectomy with full recanalization of aneurysmal basilar artery; procedure was completed under general anesthesia.  Post procedurally Repeat HCT showed contrast staining and no bleed.       Yesterday's Plan:  - Continue Integrilin drip , obtain cbc stat and then q 4, monitor platelets closely  - Maintain SBP goal: 120-160  - Keep lower extremity straight, maintain reverse Trendelenburg position for 4 hours  - Neuro checks including NIHSS, neurovascular checks including groin and distal pedal pulses, vitals Q15 min x 2 hrs, then Q30 min x 4 hrs, and then Q1h x 18 hrs  - Maintain stirct I/O  - Start Lipitor 80 mg q 24  - Follow institutional protocol for IV-tPA  - STAT CT head for any acute neurological changes and call Stroke Code        4.Last 24 hour updates:  -S/p Mechanical thrombectomy of distal basilar artery (tici 3 achieved)  -Deteriorated in ICU this morning with worsening edema and ischemia  -No brain stem reflexes      5.Medications:  atorvastatin 80 milligrams Oral at bedtime  chlorhexidine 0.12% Liquid 15 milliliter Oral Mucosa every 12 hours  chlorhexidine 4% Liquid 1 Application(s) Topical <User Schedule>  Dexmedetomidine Infusion 1 Micrograms' IV Continuous <Continuous>  dextrose 5%. 1000 milliliter IV Continuous <Continuous>  dextrose 50% Injectable 12.5 Gram(s) IV Push once  dextrose 50% Injectable 25 Gram(s) IV Push once  dextrose 50% Injectable 25 Gram(s) IV Push once  eptifibatide Infusion 75 mg/100 mL 0.5 Microgram's IV Continuous <Continuous>  insulin lispro (HumaLOG) corrective regimen sliding scale   Subcutaneous three times a day before meals  Nicardipine Infusion 5 mG/Hr IV Continuous <Continuous>  pantoprazole  Injectable 40 milligram IV Push daily  sodium chloride 0.9%. 1000 milliliter IV Continuous <Continuous>      6.Ancillary Management  Chest PT[]  Head of bed >35 [x]  Out of bed to chair []  PT/OT/ST []  Spirometry[]  DVT prophalaxis[x]      7.Neuro Exam:  Awake [x]no[]spontaneously[]occasionally[]to stimuli  AI []y[x]n   Following commands:[]y[x]n  Oriented:[x]0[]1[]2[]3    Tracking:[]y[x]n  Language: intubated   Time off sedation for exam: not on sedation  Pupils:  Right 2 >  1.5     Left  2  >  2            Corneal: sluggish on the right , no corneal reflex on the left      Gag reflex:( +)  breathes over the vent occasionally     EOMI: No dolls, left eye remains fixed at midline, no mvmts noted with occulocephalics.  power : moves right >left (right LE moves at random)  sensory: Responds to pain Right >left  Upgoing right toe      NIHSS 34  LOC:  3    Questions:  2     Commands: 2   VF:  2       Gaze: 2      Facial:  0      RUE: 3  RLE:  4    LUE: 4    LLE: 4  Ataxia:  0              Sensory: 3  Language:   3         Dysarthria: 2  Extinction: 0        Last CTH:  < from: CT Head No Cont (07.16.20 @ 23:54) >  IMPRESSION:     1.  Patient is status post angiogram with residual IV contrast present within the intracranial vessels and dura.    2.  Hyperdense areas of the right temporal lobe and bilateralcerebellar hemispheres likely reflecting parenchymal contrast staining and less likely hemorrhage. No acute midline shift.    3.  Recommend short-term follow-up imaging as clinically appropriate.    4.  Stable enlarged and calcified vertebral and basilar arteries.      ******PRELIMINARY REPORT******    ******PRELIMINARY REPORT******          VIVIANE PAULINO M.D., RESIDENT RADIOLOGIST      < end of copied text >    Last CTA  < from: CT Angio Neck w/ IV Cont (07.16.20 @ 16:08) >  IMPRESSION:    1.  *Occlusion of the right vertebral artery from about the level of the C2 vertebral body to the vertebrobasilar junction (V3 and V4 segments).  2.  *Thrombus within the distal basilar artery measuring about 6 mm in length and producing about 80% luminal stenosis.    Other extensive atheromatous changes including:  3.  Moderate/severe stenosis of the right subclavian artery origin.   4.  Moderate/severe stenosis of the right vertebral artery origin.   5.  Moderate stenosis of the right ICA petrous segment.  6.  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  7.  Fusiform dilatation of the left vertebral artery up to 7 mm, and the basilar artery up to 1.1 cm, with compression of the indira.    *Findings were relayed to Dr. Tellez at 4:23 pm 7/16/2020      < end of copied text >      Last CTP:      8.CVS:  Vital Signs Last 24 Hrs  T(C): 35.9 (16 Jul 2020 15:45), Max: 35.9 (16 Jul 2020 15:45)  T(F): 96.6 (16 Jul 2020 15:45), Max: 96.6 (16 Jul 2020 15:45)  HR: 90 (16 Jul 2020 20:42) (64 - 135)  BP: 184/89 (16 Jul 2020 20:42) (138/71 - 210/100)  BP(mean): --  RR: 14 (16 Jul 2020 20:42) (14 - 18)  SpO2: 100% (16 Jul 2020 20:42) (95% - 100%)        9.Respiratory:  Mode: AC/ CMV (Assist Control/ Continuous Mandatory Ventilation)  RR (machine): 140  TV (machine): 450  FiO2: 40  PEEP: 5  ITime: 1  MAP: 10  PIP: 18      10.GI:  Prophylaxis    GI: Protonix 40 mg q 24   LIVER FUNCTIONS - ( 16 Jul 2020 15:44 )  Alb: 4.5 g/dL / Pro: 8.4 g/dL / ALK PHOS: 54 U/L / ALT: 22 U/L / AST: 26 U/L / GGT: x             11.Renal/Fluids/Electrolytes:    07-16    137  |  101  |  19  ----------------------------<  165<H>  3.6   |  21  |  1.0    Ca    10.0      16 Jul 2020 15:44    TPro  8.4<H>  /  Alb  4.5  /  TBili  0.8  /  DBili  x   /  AST  26  /  ALT  22  /  AlkPhos  54  07-16        13.Hematology:                        15.2   14.33 )-----------( 268      ( 16 Jul 2020 15:44 )             44.7     PT/INR - ( 16 Jul 2020 15:44 )   PT: 13.20 sec;   INR: 1.15 ratio         PTT - ( 16 Jul 2020 15:44 )  PTT:26.1 sec        DVT Prophylaxis  Lovenox[]   Heparin[]   Venodynes[]   SCD's[x]

## 2020-07-17 NOTE — PROGRESS NOTE ADULT - ASSESSMENT
CVA s/p TPA s/p thrombectomy (Basiliar Artery) CVA s/p TPA s/p thrombectomy (Basiliar Artery)    CNS:  - repeat CT head urgent:  Interval increase in posterior fossa edema and mass effect consistent with evolving infarct  with resultant new effacement of the fourth ventricle and new mild obstructive hydrocephalus. Increased hyperdensity within the left ambient and quadrigeminal plate cisterns, and new hyperdensity within the right ambient and quadrigeminal plate cisterns, the suprasellar cistern, the right sylvian fissure and scattered sulci suspicious worsening/new subarachnoid hemorrhage.  -s/p 23% NS, 50g mannitol, 3% hypertonic saline drip   - off precedex  - integrillin completed    PULM:  - hyperventilated to decrease PCO2   - will decrease after 4 hrs  - head of bed at 45 degrees    CVS:  - maintain -160    GI:  - NPO  - GI PPx: protonix     RENAL:  - f/u lytes  - correct as needed    HEMATOLOGY:  - DVT PPx    ENDO:  - f/u fingersticks     FULL CODE

## 2020-07-18 NOTE — DIETITIAN INITIAL EVALUATION ADULT. - OTHER INFO
Pt presented from [facility] s/p stroke s/p TPA. Sent to [facility] for neurointerventional eval and post TPA care. Rapid response called in ED for posturing, shaking b/l upper extremities & not following commands--> intubated, levophed in place. s/p mechanical thrombectomy 7/17. Per Neuro: pt deteriorating, CTH indicating worsening cytotoxic edema with ischemia, high likelihood of brain death, will need official brainstem exam. Repeat CTH today, 3% NS as per Neuro.

## 2020-07-18 NOTE — PROGRESS NOTE ADULT - SUBJECTIVE AND OBJECTIVE BOX
Over Night Events:        ROS:  See HPI    PHYSICAL EXAM    ICU Vital Signs Last 24 Hrs  T(C): 37.1 (18 Jul 2020 04:00), Max: 38.2 (17 Jul 2020 12:00)  T(F): 98.7 (18 Jul 2020 04:00), Max: 100.8 (17 Jul 2020 16:00)  HR: 48 (18 Jul 2020 06:45) (48 - 94)  BP: 117/64 (17 Jul 2020 11:00) (52/39 - 159/89)  BP(mean): 83 (17 Jul 2020 11:00) (44 - 117)  ABP: 132/56 (18 Jul 2020 06:45) (120/54 - 194/80)  ABP(mean): 82 (18 Jul 2020 06:45) (70 - 122)  RR: 24 (18 Jul 2020 06:45) (14 - 28)  SpO2: 100% (18 Jul 2020 06:45) (99% - 100%)      General:  HEENT:          Lungs:   Cardiovascular:   Abdomen:   Extremities:   Skin:   Neurological:       07-17-20 @ 07:01  -  07-18-20 @ 07:00  --------------------------------------------------------  IN:    dexmedetomidine Infusion: 21.2 mL    eptifibatide Infusion 75 mg/100 mL: 3.4 mL    IV PiggyBack: 1000 mL    norepinephrine Infusion: 43 mL    norepinephrine Infusion: 30.4 mL    sodium chloride 0.9%: 150 mL    sodium chloride 3%.: 1600 mL  Total IN: 2848 mL    OUT:    Intermittent Catheterization - Urethral: 1745 mL  Total OUT: 1745 mL    Total NET: 1103 mL          LABS:                          11.2   13.62 )-----------( 205      ( 18 Jul 2020 04:30 )             34.7                                               07-18             11.2   13.62 )-----------( 205      ( 07-18 @ 04:30 )             34.7                12.1   12.34 )-----------( 230      ( 07-17 @ 13:25 )             35.8                15.4   15.90 )-----------( 259      ( 07-17 @ 04:04 )             46.3                15.2   14.33 )-----------( 268      ( 07-16 @ 15:44 )             44.7       157<H>  |  126<H>  |  22<H>  ----------------------------<  176<H>  4.0   |  20  |  1.2    18 Jul 2020 04:30    157<H>  |  126<H>  |  22<H>  ----------------------------<  176<H>  4.0     |  20     |  1.2    17 Jul 2020 13:25    142    |  109    |  18     ----------------------------<  176<H>  4.2     |  19     |  1.2      Ca    7.8<L>      18 Jul 2020 04:30  Ca    7.9<L>      17 Jul 2020 13:25  Phos  3.9       17 Jul 2020 04:04  Mg     2.3       18 Jul 2020 04:30  Mg     2.1       17 Jul 2020 13:25    TPro  6.1    /  Alb  3.5    /  TBili  1.0    /  DBili  x      /  AST  23     /  ALT  12     /  AlkPhos  38     18 Jul 2020 04:30  TPro  6.5    /  Alb  3.5    /  TBili  1.4<H>  /  DBili  x      /  AST  24     /  ALT  15     /  AlkPhos  43     17 Jul 2020 13:25    Ca    7.8<L>      18 Jul 2020 04:30  Phos  3.9     07-17  Mg     2.3     07-18    TPro  6.1  /  Alb  3.5  /  TBili  1.0  /  DBili  x   /  AST  23  /  ALT  12  /  AlkPhos  38  07-18      PT/INR - ( 17 Jul 2020 13:25 )   PT: 14.30 sec;   INR: 1.24 ratio         PTT - ( 17 Jul 2020 13:25 )  PTT:23.8 sec                                           CARDIAC MARKERS ( 17 Jul 2020 13:25 )  x     / <0.01 ng/mL / x     / x     / x      CARDIAC MARKERS ( 16 Jul 2020 15:44 )  x     / <0.01 ng/mL / x     / x     / x                                                LIVER FUNCTIONS - ( 18 Jul 2020 04:30 )  Alb: 3.5 g/dL / Pro: 6.1 g/dL / ALK PHOS: 38 U/L / ALT: 12 U/L / AST: 23 U/L / GGT: x                                                                                               Mode: AC/ CMV (Assist Control/ Continuous Mandatory Ventilation)  RR (machine): 24  TV (machine): 450  FiO2: 30  PEEP: 5  ITime: 1  MAP: 10  PIP: 17                                      ABG - ( 18 Jul 2020 04:11 )  pH, Arterial: 7.37  pH, Blood: x     /  pCO2: 36    /  pO2: 100   / HCO3: 21    / Base Excess: -3.5  /  SaO2: 98                  MEDICATIONS  (STANDING):  atorvastatin 80 milliGRAM(s) Oral at bedtime  chlorhexidine 0.12% Liquid 15 milliLiter(s) Oral Mucosa every 12 hours  chlorhexidine 4% Liquid 1 Application(s) Topical <User Schedule>  dextrose 5%. 1000 milliLiter(s) (50 mL/Hr) IV Continuous <Continuous>  dextrose 50% Injectable 12.5 Gram(s) IV Push once  dextrose 50% Injectable 25 Gram(s) IV Push once  dextrose 50% Injectable 25 Gram(s) IV Push once  insulin lispro (HumaLOG) corrective regimen sliding scale   SubCutaneous three times a day before meals  niCARdipine Infusion 5 mG/Hr (25 mL/Hr) IV Continuous <Continuous>  norepinephrine Infusion 0.05 MICROgram(s)/kG/Min (3.96 mL/Hr) IV Continuous <Continuous>  pantoprazole  Injectable 40 milliGRAM(s) IV Push daily  sodium chloride 3%. 500 milliLiter(s) (80 mL/Hr) IV Continuous <Continuous>    MEDICATIONS  (PRN):  dextrose 40% Gel 15 Gram(s) Oral once PRN Blood Glucose LESS THAN 70 milliGRAM(s)/deciliter  glucagon  Injectable 1 milliGRAM(s) IntraMuscular once PRN Glucose LESS THAN 70 milligrams/deciliter      Xrays:    < from: Xray Chest 1 View- PORTABLE-Routine (07.18.20 @ 06:46) >  No radiographic evidence of acute cardiopulmonary disease.    ETT.    < end of copied text >                                                                                   ECHO not done  < from: CT Head No Cont (07.17.20 @ 10:27) >  1.  Interval resorption of the IV contrast within the intracranial vasculature.    2.  Interval increase in posterior fossa edema and mass effect consistent with evolving infarct (involving both cerebellar hemispheres and possibly the brainstem) with resultant new effacement of the fourth ventricle and new mild obstructive hydrocephalus.    3.  Evolving infarct also noted within the right temporal lobe.    4.  Increased hyperdensity within the left ambient and quadrigeminal plate cisterns, and new hyperdensity within the right ambient and quadrigeminal plate cisterns, the suprasellar cistern, the right sylvian fissure and scattered sulci suspicious worsening/new subarachnoid hemorrhage.    5.  Redemonstrated enlarged dolichoectatic vertebrobasilar vessels with mass effect upon the brainstem.    < end of copied text > Over Night Events: Remains critically ill and on MV, s/p Repeat CTH ; On levophed 0.03   ; on 3 % NS  80 cc/hr     ROS:  See HPI    PHYSICAL EXAM    ICU Vital Signs Last 24 Hrs  T(C): 37.1 (18 Jul 2020 04:00), Max: 38.2 (17 Jul 2020 12:00)  T(F): 98.7 (18 Jul 2020 04:00), Max: 100.8 (17 Jul 2020 16:00)  HR: 48 (18 Jul 2020 06:45) (48 - 94)  BP: 117/64 (17 Jul 2020 11:00) (52/39 - 159/89)  BP(mean): 83 (17 Jul 2020 11:00) (44 - 117)  ABP: 132/56 (18 Jul 2020 06:45) (120/54 - 194/80)  ABP(mean): 82 (18 Jul 2020 06:45) (70 - 122)  RR: 24 (18 Jul 2020 06:45) (14 - 28)  SpO2: 100% (18 Jul 2020 06:45) (99% - 100%)      General: Intubated  HEENT: Non reactive pupils            Lungs: b/l Breath sounds +  Cardiovascular: regular  Abdomen: non tender, soft  Extremities: no edema  Skin: warm  Neurological: withdraws b/l LE to pain L> R, No corneal, no gag reflex      07-17-20 @ 07:01  -  07-18-20 @ 07:00  --------------------------------------------------------  IN:    dexmedetomidine Infusion: 21.2 mL    eptifibatide Infusion 75 mg/100 mL: 3.4 mL    IV PiggyBack: 1000 mL    norepinephrine Infusion: 43 mL    norepinephrine Infusion: 30.4 mL    sodium chloride 0.9%: 150 mL    sodium chloride 3%.: 1600 mL  Total IN: 2848 mL    OUT:    Intermittent Catheterization - Urethral: 1745 mL  Total OUT: 1745 mL    Total NET: 1103 mL          LABS:                          11.2   13.62 )-----------( 205      ( 18 Jul 2020 04:30 )             34.7                                               07-18             11.2   13.62 )-----------( 205      ( 07-18 @ 04:30 )             34.7                12.1   12.34 )-----------( 230      ( 07-17 @ 13:25 )             35.8                15.4   15.90 )-----------( 259      ( 07-17 @ 04:04 )             46.3                15.2   14.33 )-----------( 268      ( 07-16 @ 15:44 )             44.7       157<H>  |  126<H>  |  22<H>  ----------------------------<  176<H>  4.0   |  20  |  1.2    18 Jul 2020 04:30    157<H>  |  126<H>  |  22<H>  ----------------------------<  176<H>  4.0     |  20     |  1.2    17 Jul 2020 13:25    142    |  109    |  18     ----------------------------<  176<H>  4.2     |  19     |  1.2      Ca    7.8<L>      18 Jul 2020 04:30  Ca    7.9<L>      17 Jul 2020 13:25  Phos  3.9       17 Jul 2020 04:04  Mg     2.3       18 Jul 2020 04:30  Mg     2.1       17 Jul 2020 13:25    TPro  6.1    /  Alb  3.5    /  TBili  1.0    /  DBili  x      /  AST  23     /  ALT  12     /  AlkPhos  38     18 Jul 2020 04:30  TPro  6.5    /  Alb  3.5    /  TBili  1.4<H>  /  DBili  x      /  AST  24     /  ALT  15     /  AlkPhos  43     17 Jul 2020 13:25    Ca    7.8<L>      18 Jul 2020 04:30  Phos  3.9     07-17  Mg     2.3     07-18    TPro  6.1  /  Alb  3.5  /  TBili  1.0  /  DBili  x   /  AST  23  /  ALT  12  /  AlkPhos  38  07-18      PT/INR - ( 17 Jul 2020 13:25 )   PT: 14.30 sec;   INR: 1.24 ratio         PTT - ( 17 Jul 2020 13:25 )  PTT:23.8 sec                                           CARDIAC MARKERS ( 17 Jul 2020 13:25 )  x     / <0.01 ng/mL / x     / x     / x      CARDIAC MARKERS ( 16 Jul 2020 15:44 )  x     / <0.01 ng/mL / x     / x     / x                                                LIVER FUNCTIONS - ( 18 Jul 2020 04:30 )  Alb: 3.5 g/dL / Pro: 6.1 g/dL / ALK PHOS: 38 U/L / ALT: 12 U/L / AST: 23 U/L / GGT: x                                                                                               Mode: AC/ CMV (Assist Control/ Continuous Mandatory Ventilation)  RR (machine): 24  TV (machine): 450  FiO2: 30  PEEP: 5  ITime: 1  MAP: 10  PIP: 17                                      ABG - ( 18 Jul 2020 04:11 )  pH, Arterial: 7.37  pH, Blood: x     /  pCO2: 36    /  pO2: 100   / HCO3: 21    / Base Excess: -3.5  /  SaO2: 98    lact 1.0           MEDICATIONS  (STANDING):  atorvastatin 80 milliGRAM(s) Oral at bedtime  chlorhexidine 0.12% Liquid 15 milliLiter(s) Oral Mucosa every 12 hours  chlorhexidine 4% Liquid 1 Application(s) Topical <User Schedule>  dextrose 5%. 1000 milliLiter(s) (50 mL/Hr) IV Continuous <Continuous>  dextrose 50% Injectable 12.5 Gram(s) IV Push once  dextrose 50% Injectable 25 Gram(s) IV Push once  dextrose 50% Injectable 25 Gram(s) IV Push once  insulin lispro (HumaLOG) corrective regimen sliding scale   SubCutaneous three times a day before meals  niCARdipine Infusion 5 mG/Hr (25 mL/Hr) IV Continuous <Continuous>  norepinephrine Infusion 0.05 MICROgram(s)/kG/Min (3.96 mL/Hr) IV Continuous <Continuous>  pantoprazole  Injectable 40 milliGRAM(s) IV Push daily  sodium chloride 3%. 500 milliLiter(s) (80 mL/Hr) IV Continuous <Continuous>    MEDICATIONS  (PRN):  dextrose 40% Gel 15 Gram(s) Oral once PRN Blood Glucose LESS THAN 70 milliGRAM(s)/deciliter  glucagon  Injectable 1 milliGRAM(s) IntraMuscular once PRN Glucose LESS THAN 70 milligrams/deciliter      Xrays:    < from: Xray Chest 1 View- PORTABLE-Routine (07.18.20 @ 06:46) >  No radiographic evidence of acute cardiopulmonary disease.    ETT.    < end of copied text >                                                                                   ECHO not done  < from: CT Head No Cont (07.17.20 @ 10:27) >  1.  Interval resorption of the IV contrast within the intracranial vasculature.    2.  Interval increase in posterior fossa edema and mass effect consistent with evolving infarct (involving both cerebellar hemispheres and possibly the brainstem) with resultant new effacement of the fourth ventricle and new mild obstructive hydrocephalus.    3.  Evolving infarct also noted within the right temporal lobe.    4.  Increased hyperdensity within the left ambient and quadrigeminal plate cisterns, and new hyperdensity within the right ambient and quadrigeminal plate cisterns, the suprasellar cistern, the right sylvian fissure and scattered sulci suspicious worsening/new subarachnoid hemorrhage.    5.  Redemonstrated enlarged dolichoectatic vertebrobasilar vessels with mass effect upon the brainstem.    < end of copied text > Over Night Events: Remains critically ill and on MV, s/p Repeat CTH ; On levophed 0.03   ; on 3 % NS  80 cc/hr     ROS:  See HPI    PHYSICAL EXAM    ICU Vital Signs Last 24 Hrs  T(C): 37.1 (18 Jul 2020 04:00), Max: 38.2 (17 Jul 2020 12:00)  T(F): 98.7 (18 Jul 2020 04:00), Max: 100.8 (17 Jul 2020 16:00)  HR: 48 (18 Jul 2020 06:45) (48 - 94)  BP: 117/64 (17 Jul 2020 11:00) (52/39 - 159/89)  BP(mean): 83 (17 Jul 2020 11:00) (44 - 117)  ABP: 132/56 (18 Jul 2020 06:45) (120/54 - 194/80)  ABP(mean): 82 (18 Jul 2020 06:45) (70 - 122)  RR: 24 (18 Jul 2020 06:45) (14 - 28)  SpO2: 100% (18 Jul 2020 06:45) (99% - 100%)      General: Intubated  HEENT: Non reactive pupils   2mm         Lungs: b/l Breath sounds +  Cardiovascular: regular  Abdomen: non tender, soft  Extremities: no edema  Skin: warm  Neurological: withdraws b/l LE to pain L> R, No corneal, no gag reflex, no spontanoeus breathing      07-17-20 @ 07:01  -  07-18-20 @ 07:00  --------------------------------------------------------  IN:    dexmedetomidine Infusion: 21.2 mL    eptifibatide Infusion 75 mg/100 mL: 3.4 mL    IV PiggyBack: 1000 mL    norepinephrine Infusion: 43 mL    norepinephrine Infusion: 30.4 mL    sodium chloride 0.9%: 150 mL    sodium chloride 3%.: 1600 mL  Total IN: 2848 mL    OUT:    Intermittent Catheterization - Urethral: 1745 mL  Total OUT: 1745 mL    Total NET: 1103 mL          LABS:                          11.2   13.62 )-----------( 205      ( 18 Jul 2020 04:30 )             34.7                                               07-18             11.2   13.62 )-----------( 205      ( 07-18 @ 04:30 )             34.7                12.1   12.34 )-----------( 230      ( 07-17 @ 13:25 )             35.8                15.4   15.90 )-----------( 259      ( 07-17 @ 04:04 )             46.3                15.2   14.33 )-----------( 268      ( 07-16 @ 15:44 )             44.7       157<H>  |  126<H>  |  22<H>  ----------------------------<  176<H>  4.0   |  20  |  1.2    18 Jul 2020 04:30    157<H>  |  126<H>  |  22<H>  ----------------------------<  176<H>  4.0     |  20     |  1.2    17 Jul 2020 13:25    142    |  109    |  18     ----------------------------<  176<H>  4.2     |  19     |  1.2      Ca    7.8<L>      18 Jul 2020 04:30  Ca    7.9<L>      17 Jul 2020 13:25  Phos  3.9       17 Jul 2020 04:04  Mg     2.3       18 Jul 2020 04:30  Mg     2.1       17 Jul 2020 13:25    TPro  6.1    /  Alb  3.5    /  TBili  1.0    /  DBili  x      /  AST  23     /  ALT  12     /  AlkPhos  38     18 Jul 2020 04:30  TPro  6.5    /  Alb  3.5    /  TBili  1.4<H>  /  DBili  x      /  AST  24     /  ALT  15     /  AlkPhos  43     17 Jul 2020 13:25    Ca    7.8<L>      18 Jul 2020 04:30  Phos  3.9     07-17  Mg     2.3     07-18    TPro  6.1  /  Alb  3.5  /  TBili  1.0  /  DBili  x   /  AST  23  /  ALT  12  /  AlkPhos  38  07-18      PT/INR - ( 17 Jul 2020 13:25 )   PT: 14.30 sec;   INR: 1.24 ratio         PTT - ( 17 Jul 2020 13:25 )  PTT:23.8 sec                                           CARDIAC MARKERS ( 17 Jul 2020 13:25 )  x     / <0.01 ng/mL / x     / x     / x      CARDIAC MARKERS ( 16 Jul 2020 15:44 )  x     / <0.01 ng/mL / x     / x     / x                                                LIVER FUNCTIONS - ( 18 Jul 2020 04:30 )  Alb: 3.5 g/dL / Pro: 6.1 g/dL / ALK PHOS: 38 U/L / ALT: 12 U/L / AST: 23 U/L / GGT: x                                                                                               Mode: AC/ CMV (Assist Control/ Continuous Mandatory Ventilation)  RR (machine): 24  TV (machine): 450  FiO2: 30  PEEP: 5  ITime: 1  MAP: 10  PIP: 17                                      ABG - ( 18 Jul 2020 04:11 )  pH, Arterial: 7.37  pH, Blood: x     /  pCO2: 36    /  pO2: 100   / HCO3: 21    / Base Excess: -3.5  /  SaO2: 98    lact 1.0           MEDICATIONS  (STANDING):  atorvastatin 80 milliGRAM(s) Oral at bedtime  chlorhexidine 0.12% Liquid 15 milliLiter(s) Oral Mucosa every 12 hours  chlorhexidine 4% Liquid 1 Application(s) Topical <User Schedule>  dextrose 5%. 1000 milliLiter(s) (50 mL/Hr) IV Continuous <Continuous>  dextrose 50% Injectable 12.5 Gram(s) IV Push once  dextrose 50% Injectable 25 Gram(s) IV Push once  dextrose 50% Injectable 25 Gram(s) IV Push once  insulin lispro (HumaLOG) corrective regimen sliding scale   SubCutaneous three times a day before meals  niCARdipine Infusion 5 mG/Hr (25 mL/Hr) IV Continuous <Continuous>  norepinephrine Infusion 0.05 MICROgram(s)/kG/Min (3.96 mL/Hr) IV Continuous <Continuous>  pantoprazole  Injectable 40 milliGRAM(s) IV Push daily  sodium chloride 3%. 500 milliLiter(s) (80 mL/Hr) IV Continuous <Continuous>    MEDICATIONS  (PRN):  dextrose 40% Gel 15 Gram(s) Oral once PRN Blood Glucose LESS THAN 70 milliGRAM(s)/deciliter  glucagon  Injectable 1 milliGRAM(s) IntraMuscular once PRN Glucose LESS THAN 70 milligrams/deciliter      Xrays:    < from: Xray Chest 1 View- PORTABLE-Routine (07.18.20 @ 06:46) >  No radiographic evidence of acute cardiopulmonary disease.    ETT.    < end of copied text >                                                                                   ECHO not done  < from: CT Head No Cont (07.17.20 @ 10:27) >  1.  Interval resorption of the IV contrast within the intracranial vasculature.    2.  Interval increase in posterior fossa edema and mass effect consistent with evolving infarct (involving both cerebellar hemispheres and possibly the brainstem) with resultant new effacement of the fourth ventricle and new mild obstructive hydrocephalus.    3.  Evolving infarct also noted within the right temporal lobe.    4.  Increased hyperdensity within the left ambient and quadrigeminal plate cisterns, and new hyperdensity within the right ambient and quadrigeminal plate cisterns, the suprasellar cistern, the right sylvian fissure and scattered sulci suspicious worsening/new subarachnoid hemorrhage.    5.  Redemonstrated enlarged dolichoectatic vertebrobasilar vessels with mass effect upon the brainstem.    < end of copied text >

## 2020-07-18 NOTE — DIETITIAN INITIAL EVALUATION ADULT. - ADD RECOMMEND
RD will monitor diet order, energy intake, nutrition related labs, body composition, NFPF (TF tolerance, GI s/s, GOC/POC)

## 2020-07-18 NOTE — DIETITIAN INITIAL EVALUATION ADULT. - PHYSICAL APPEARANCE
BMI 26.7 (186#, 70in). unable to obtain UBW. skin intact. no edema documented in RN flowsheet but noted in MD progress note, degree not specified.

## 2020-07-18 NOTE — DIETITIAN INITIAL EVALUATION ADULT. - PERTINENT LABORATORY DATA
(7/18/2020) WBC 13.62, RBC 3.84, H/H 11.2/34.7, POCT 170/148, glucose 176, Na 157, Cl 126, BUN 22, Ca 7.8

## 2020-07-18 NOTE — PROVIDER CONTACT NOTE (CHANGE IN STATUS NOTIFICATION) - ACTION/TREATMENT ORDERED:
12 lead EKG done; MD Clark at bedside to assess. No interventions at present time; Will continue to observe.

## 2020-07-18 NOTE — PROGRESS NOTE ADULT - ASSESSMENT
IMPRESSION:    Stroke s/p tpa, s/p Neuro inter ( thrombectomy basilar artery ) s/p integrelin drip ; s/p repeat head ct 7/18       PLAN:    CNS: Repeat CTH today as per neurology; MRI brain when stable . Neuro f/up, 3% NS     HEENT:  Oral care    PULMONARY:  HOB @ 45 degrees, no changes in vent settings     CARDIOVASCULAR: echo, monitor BP , atorvastatin. Wean levophed as tolerated     GI: GI prophylaxis                                          Feeding OG tube and OG feeding     RENAL:  F/u  lytes.  Correct as needed. accurate I/O    INFECTIOUS DISEASE: NO ABX; f/u cultures    HEMATOLOGICAL:  DVT prophylaxis.    ENDOCRINE:  Follow up FS.  Insulin protocol if needed.    CODE STATUS: FULL CODE    DISPOSITION: Pt requires continued monitoring in the MICU    Poor prognosis IMPRESSION:    Stroke s/p tpa, s/p Neuro inter ( thrombectomy basilar artery ) s/p integrelin drip ; s/p repeat head ct 7/18 / worsening Neuro status       PLAN:    CNS: Repeat CTH today as per neurology; Neuro f/up, 3% NS    HEENT:  Oral care    PULMONARY:  HOB @ 45 degrees, no changes in vent settings     CARDIOVASCULAR: echo, monitor BP , atorvastatin. Wean levophed as tolerated     GI: GI prophylaxis                                          Feeding OG tube and OG feeding     RENAL:  F/u  lytes.  Correct as needed. accurate I/O    INFECTIOUS DISEASE: NO ABX; f/u cultures    HEMATOLOGICAL:  DVT prophylaxis.    ENDOCRINE:  Follow up FS.  Insulin protocol if needed.    CODE STATUS: FULL CODE    DISPOSITION: Pt requires continued monitoring in the MICU    Poor prognosis  organ donation

## 2020-07-18 NOTE — DIETITIAN INITIAL EVALUATION ADULT. - FACTORS AFF FOOD INTAKE
intubated, OGT in place. NPO. no BM since admit. abdomen soft/nontender. MAP from 10am-10:45am: 94, 102, 96, 86.

## 2020-07-18 NOTE — PROGRESS NOTE ADULT - SUBJECTIVE AND OBJECTIVE BOX
1.Presentation: Aphasia and ams      2. Today's Acute Problems:  -Basilar artery near occlusion is S/P Mechanical thrombectomy of aneurysmal basilar artery thrombus 7/16  -Basilar artery aneurysm  -Severe cytotoxic brain edema in the posterior fossa  -Absent brain stem reflexes  -BP augmented with vasopressor           3.History:  64 year old male with pmh of DM, CAD with PCI x3 in 2017, HTN, DLD, presented to  [facility] initially for expressive aphasia and confusion with NIHSS of 9 was s/p tpa at 16:46 hrs . CTA H/N revealed a right vertebral artery occlusion and distal basilar artery thrombus. Patient was sent to [facility] for neurointerventional eval and post TPA care. On initial neuro eval at [facility] , patients expressive aphasia persisted . Pt. was on cardine drip to maintain BP below 185. While in ED Rapid response team was activated for sudden unresponsiveness, witnessed posturing. Pt. was intubated for airway protection and his NIHSS worsened to 39 at this time. Stat CTH was negative for bleed, CT perfusion Stat, with basilar artery near occlusion. Code NI actual was activated and patient was transferred to IR. Patient is s/p mechanical thrombectomy with full recanalization of aneurysmal basilar artery; procedure was completed under general anesthesia.  Post procedurally Repeat HCT showed contrast staining and no bleed.       Yesterday's Plan:  -Patient became fully comatose (GCS of 3) with absent brain stem reflexes associated with hypotension while went for CT.likely completion of infarction in posterior fossa Patient was given 30 cc of 23.5% saline, hyperventilated briefly   -BP augmented with vasopressor and bolus of mannitol administered. BP improved without any change in neurologic status.   -Volume resuscitation and pressors to keep  to 150  -Breif hyperventilation by increasing set rate: Keep PCO2 30-35 for few hours with gradual increase to normal, non alkalotic ranges, keep airway pressures low by adjusting TV  -3% N/saline to keep S Na 150 to 160, avoid hypovolemia/hypotension, check volume responsiveness with IVC diameter  -Prophylaxis with PPI  -mechanical and pharmacological .           Last 24 hr updates  -Patient intubated , has no brain stem exam  -on Levophed for Bp support  -On Hypertonic saline at 80 cc/hr          5.Medications:  atorvastatin 80 milliGRAM(s) Oral at bedtime  chlorhexidine 0.12% Liquid 15 milliLiter(s) Oral Mucosa every 12 hours  chlorhexidine 4% Liquid 1 Application(s) Topical <User Schedule>  dextrose 5%. 1000 milliLiter(s) IV Continuous <Continuous>  dextrose 50% Injectable 12.5 Gram(s) IV Push once  dextrose 50% Injectable 25 Gram(s) IV Push once  dextrose 50% Injectable 25 Gram(s) IV Push once  insulin lispro (HumaLOG) corrective regimen sliding scale   SubCutaneous three times a day before meals  niCARdipine Infusion 5 mG/Hr IV Continuous <Continuous>  norepinephrine Infusion 0.05 MICROgram(s)/kG/Min IV Continuous <Continuous>  pantoprazole  Injectable 40 milliGRAM(s) IV Push daily  sodium chloride 3%. 500 milliLiter(s) IV Continuous <Continuous>      6.Ancillary Mangement:  Chest PT[]  Head of bed >35 [x]  Out of bed to chair []  PT/OT/ST []  Spirometry[]  DVT prophalaxis [x]        7.Neuro Exam:  Awake [x]no[]spontaneously[]occasionally[]to stimuli  AI []y[x]n   Following commands:[]y[x]n  Oriented:[x]0[]1[]2[]3    Tracking:[]y[x]n  Language: Intubated  Time off sedation for exam: not on sedation  Pupils:  Right 2 > 2      Left  2  >  2            Corneal:  (-)     Gag reflex: (-)      EOMI: eyes midline , no dolls   No spontaneous extremity mvmts  Responds to deep pain in the right >left LE  plantar response mute          NIHSS 35  LOC:  3    Questions:  2     Commands: 2   VF:  2       Gaze: 2      Facial:  0      RUE: 4  RLE:  4    LUE: 4    LLE: 4  Ataxia:  0              Sensory: 3  Language:   3         Dysarthria: 2  Extinction: 0        mRs  0 No symptoms at all  1 No significant disability despite symptoms; able to carry out all usual duties and activities without assistance  2 Slight disability; unable to carry out all previous activities, but able to look after own affairs  3 Moderate disability; requiring some help, but able to walk without assistance  4 Moderately severe disability; unable to walk without assistance and unable to attend to own bodily needs without assistance  5 Severe disability; bedridden, incontinent and requiring constant nursing care and attention  6 Dead        Last CTH:  < from: CT Head No Cont (07.17.20 @ 10:27) >  FINDINGS/  IMPRESSION:    1.  Interval resorption of the IV contrast within the intracranial vasculature.    2.  Interval increase in posterior fossa edema and mass effect consistent with evolving infarct (involving both cerebellar hemispheres and possibly the brainstem) with resultant new effacement of the fourth ventricle and new mild obstructive hydrocephalus.    3.  Evolving infarct also noted within the right temporal lobe.    4.  Increased hyperdensity within the left ambient and quadrigeminal plate cisterns, and new hyperdensity within the right ambient and quadrigeminal plate cisterns, the suprasellar cistern, the right sylvian fissure and scattered sulci suspicious worsening/new subarachnoid hemorrhage.    5.  Redemonstrated enlarged dolichoectatic vertebrobasilar vessels with mass effect upon the brainstem.    Case was discussed with ICU intern Dr. Sage 10:48 am 7/17/2020    < end of copied text >        Last CTA /Perfusion  < from: CT Perfusion w/ Maps w/ IV Cont (07.16.20 @ 20:08) >  IMPRESSION:    Since most recent examination the previously noted distal basilar clot is less well-defined on this examination though now with diminished contrast enhancement of the distal basilar which likely represents residual thrombus. Contrast enhancement within the right PCA is diminished as compared to prior.    There has been extension of the right vertebral artery thrombus inferiorly now extending to the level of the C7 vertebral body.    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    Multifocal areas of moderate to severe stenosis of the bilateral ICA and MCA branches with unchangedbroad-based 2 mm aneurysms noted of the bilateral M1 segments.    Approximately 301 mL ischemic penumbra noted involving the posterior circulation without core infarction        < end of copied text >        GCS:    3  E1 V1 M1      8.CVS:  Vital Signs Last 24 Hrs  T(C): 37.4 (18 Jul 2020 00:00), Max: 38.2 (17 Jul 2020 12:00)  T(F): 99.4 (18 Jul 2020 00:00), Max: 100.8 (17 Jul 2020 16:00)  HR: 56 (18 Jul 2020 02:00) (54 - 94)  BP: 117/64 (17 Jul 2020 11:00) (52/39 - 164/88)  BP(mean): 83 (17 Jul 2020 11:00) (44 - 117)  RR: 17 (18 Jul 2020 02:00) (14 - 36)  SpO2: 100% (18 Jul 2020 02:00) (99% - 100%)          Last EKG:  < from: 12 Lead ECG (07.16.20 @ 16:10) >  Ventricular Rate 73 BPM    Atrial Rate 73 BPM    P-R Interval 228 ms    QRS Duration 98 ms    Q-T Interval 418 ms    QTC Calculation(Bezet) 460 ms    P Axis 48 degrees    R Axis -37 degrees    T Axis 70 degrees    Diagnosis Line Sinus rhythm with 1st degree A-V block  Left axis deviation  Voltage criteria for left ventricular hypertrophy  Abnormal ECG    Confirmed by RIMA BULTER MD ([id number]) on 7/17/2020 11:53:56 AM    < end of copied text >      9.Respiratory:  ABG:ABG - ( 17 Jul 2020 16:14 )  pH, Arterial: 7.44  pH, Blood: x     /  pCO2: 31    /  pO2: 95    / HCO3: 21    / Base Excess: -2.2  /  SaO2: 96            Chest Xray:  < from: Xray Chest 1 View- PORTABLE-Routine (07.17.20 @ 06:00) >  FINDINGS:    Support devices: ET tube terminates 1.6 cm above the shyann, retraction by 2 cm is recommended.     Cardiac/mediastinum/hilum: Stable.    Lung parenchyma/Pleura: Improved left lower lobe opacity.  There is no pneumothorax.    Skeleton/soft tissues: Stable.    IMPRESSION:     ET tube terminates 1.6 cm above the shyann, retraction by 2 cm is recommended.    Improved left lower lobe opacity.    < end of copied text >    Mode: AC/ CMV (Assist Control/ Continuous Mandatory Ventilation)  RR (machine): 24  TV (machine): 450  FiO2: 30  PEEP: 5  ITime: 1  MAP: 10  PIP: 17      10.GI:  Prophalaxis:    GI:  PROTONIX 40 MG Q 24  LIVER FUNCTIONS - ( 17 Jul 2020 13:25 )  Alb: 3.5 g/dL / Pro: 6.5 g/dL / ALK PHOS: 43 U/L / ALT: 15 U/L / AST: 24 U/L / GGT: x             11.Renal/Fluids/Electrolytes:    07-17    142  |  109  |  18  ----------------------------<  176<H>  4.2   |  19  |  1.2    Ca    7.9<L>      17 Jul 2020 13:25  Phos  3.9     07-17  Mg     2.1     07-17    TPro  6.5  /  Alb  3.5  /  TBili  1.4<H>  /  DBili  x   /  AST  24  /  ALT  15  /  AlkPhos  43  07-17          I&O's Detail    16 Jul 2020 07:01  -  17 Jul 2020 07:00  --------------------------------------------------------  IN:    eptifibatide Infusion 75 mg/100 mL: 27.2 mL    IV PiggyBack: 50 mL    niCARdipine Infusion: 112.5 mL    sodium chloride 0.9%: 600 mL  Total IN: 789.7 mL    OUT:    Intermittent Catheterization - Urethral: 2450 mL  Total OUT: 2450 mL    Total NET: -1660.3 mL      17 Jul 2020 07:01  -  18 Jul 2020 03:14  --------------------------------------------------------  IN:    dexmedetomidine Infusion: 21.2 mL    eptifibatide Infusion 75 mg/100 mL: 3.4 mL    IV PiggyBack: 1000 mL    norepinephrine Infusion: 30.4 mL    norepinephrine Infusion: 36.7 mL    sodium chloride 0.9%: 150 mL    sodium chloride 3%.: 1280 mL  Total IN: 2521.7 mL    OUT:    Intermittent Catheterization - Urethral: 1525 mL  Total OUT: 1525 mL    Total NET: 996.7 mL          13.Hematology:                        12.1   12.34 )-----------( 230      ( 17 Jul 2020 13:25 )             35.8       PT/INR - ( 17 Jul 2020 13:25 )   PT: 14.30 sec;   INR: 1.24 ratio         PTT - ( 17 Jul 2020 13:25 )  PTT:23.8 sec      DVT Prophylaxis  Lovenox[]   Heparin[]   Venodynes[]   SCD's[x]

## 2020-07-18 NOTE — DIETITIAN INITIAL EVALUATION ADULT. - ENERGY NEEDS
estimated calorie needs = 2066 kcal/day (PSE 2003b Ve: 10.7, Tmax 38.2)   estimated protein needs = 101-127 g/day (1.2-1.5 g/kg CBW)   estimated fluid needs = per ICU team

## 2020-07-18 NOTE — DIETITIAN INITIAL EVALUATION ADULT. - ENERGY INTAKE
As per LIP covering (Dr. Sage) OGT just placed, awaiting confirmation of placement and then cleared to initiate feeds. Recommendations at end of document d/w LIP. Currently NPO < 5 days

## 2020-07-18 NOTE — DIETITIAN INITIAL EVALUATION ADULT. - ENTERAL
Once OGT placement confirmed initiated following regimen: Osmolite 1.5 starting @ 15mL/hr and increase by 10mL q4H to GOAL RATE of 45mL/hr x24H + 4 packs/day Prosource TF. TF at goal rate (including prosource) will provide 1780kcal, 111g protein, 821mL free H2O (not including additional 180mL from Prosource) and 100% RDIs. Flushes per ICU.

## 2020-07-18 NOTE — PROGRESS NOTE ADULT - ASSESSMENT
Impression:  64 year old male with pmh of DM, CAD with PCI x3 in 2017, HTN, DLD, presented to  [facility] initially for expressive aphasia and confusion with NIHSS of 9 was s/p tpa at 16:46 hrs . CTA/ perfusion Stat, with basilar artery near occlusion.  Patient  s/p mechanical thrombectomy with full recanalization of aneurysmal basilar artery. Post procedurally Repeat HCT showed contrast staining and no bleed. Patient was monitored being monitored in  ICU when as as of 8am last morning patient deteriorated and became hemodynamically unstable with absent brain stem reflexes . Stat HCT revealed worsening edema and ischemia in the posterior circulation. Patient continues to be monitored in ICU with no brain stem exam.         Suggestions:  -Keep syst -150  -Continue 3% at 80 cc an hour  -Keep sodium level 150-160 range  -Avoid hypovolemia/hypotension,   -Prophylaxis with PPI  -Mechanical and pharmacological   -Poor prognosis.                   Suggestions:    Disposition: Impression:  64 year old male with pmh of DM, CAD with PCI x3 in 2017, HTN, DLD, presented to  [facility] initially for expressive aphasia and confusion with NIHSS of 9 was s/p tpa at 16:46 hrs . CTA/ perfusion Stat, with basilar artery near occlusion.  Patient  s/p mechanical thrombectomy with full recanalization of aneurysmal basilar artery. Post procedurally Repeat HCT showed contrast staining and no bleed. Patient was monitored being monitored in  ICU when as as of 8am last morning patient deteriorated and became hemodynamically unstable with absent brain stem reflexes . Stat HCT revealed worsening edema and ischemia in the posterior circulation. Patient continues to be monitored in ICU with no brain stem exam.         Suggestions:  -Keep syst -150  -Continue 3% at 80 cc an hour  -Keep sodium level 150-160 range  -Avoid hypovolemia/hypotension,   -Prophylaxis with PPI  -Poor prognosis.                   Suggestions:    Disposition:

## 2020-07-18 NOTE — PROVIDER CONTACT NOTE (CHANGE IN STATUS NOTIFICATION) - SITUATION
Pt noted to become increasingly bradycardic throughout shift. HR was previously noted between 48-58. Pt now dropping to 40 and maintained between 40-45. Pt remains on low dose Levo to -160 with no changes noted in BP with heartrate fluctuation.

## 2020-07-19 NOTE — PROVIDER CONTACT NOTE (CHANGE IN STATUS NOTIFICATION) - ACTION/TREATMENT ORDERED:
MD at bedside to assess and reports no intervention necessary at present time as pt returned to HR of 42-45. Pt in sinus sadaf per previous EKG. Atropine placed at bedside. Will cont to observe

## 2020-07-19 NOTE — PROGRESS NOTE ADULT - ASSESSMENT
CVA s/p TPA s/p thrombectomy (Basiliar Artery)    CNS:  - repeat CT head urgent:  Interval increase in posterior fossa edema and mass effect consistent with evolving infarct  with resultant new effacement of the fourth ventricle and new mild obstructive hydrocephalus. Increased hyperdensity within the left ambient and quadrigeminal plate cisterns, and new hyperdensity within the right ambient and quadrigeminal plate cisterns, the suprasellar cistern, the right sylvian fissure and scattered sulci suspicious worsening/new subarachnoid hemorrhage.  -s/p 23% NS, 50g mannitol, 3% hypertonic saline drip   - off precedex  - integrillin completed    PULM:  - hyperventilated to decrease PCO2   - will decrease after 4 hrs  - head of bed at 45 degrees    CVS:  - maintain -160    GI:  - NPO  - GI PPx: protonix     RENAL:  - f/u lytes  - correct as needed    HEMATOLOGY:  - DVT PPx    ENDO:  - f/u fingersticks     FULL CODE CVA s/p TPA s/p thrombectomy (Basiliar Artery)    CNS:  - repeat CT head urgent:  Interval increase in posterior fossa edema and mass effect consistent with evolving infarct  with resultant new effacement of the fourth ventricle and new mild obstructive hydrocephalus. Increased hyperdensity within the left ambient and quadrigeminal plate cisterns, and new hyperdensity within the right ambient and quadrigeminal plate cisterns, the suprasellar cistern, the right sylvian fissure and scattered sulci suspicious worsening/new subarachnoid hemorrhage.  -s/p 23% NS, 50g mannitol, 3% hypertonic saline drip   - off precedex  - integrillin completed  - 3% hypertonic saline stop at 5am  - Na 170, 2x 300cc free water given, follow up with BMP  - target Na 160. Give free water q4    PULM:  - head of bed at 45 degrees  - ventilated    CVS:  - maintain -160  - increased levo today    GI:  - started tube feeds  - GI PPx: protonix     RENAL:  - f/u lytes  - correct as needed    HEMATOLOGY:  - DVT PPx    ENDO:  - f/u fingersticks     FULL CODE

## 2020-07-19 NOTE — PROVIDER CONTACT NOTE (CHANGE IN STATUS NOTIFICATION) - SITUATION
Pt remains bradycardic, noted to sadaf down to 36 during care with no changed in BP. Pt remains on low dose Levo to -160.

## 2020-07-19 NOTE — CHART NOTE - NSCHARTNOTEFT_GEN_A_CORE
Na this morning was 170  Patient is off 3% hypertonic since 5am  As per Neurology recs: 300cc of free water is given stat, with another 300cc for 4pm.  Check Na again at 8-10pm  Stop when Na is 160

## 2020-07-19 NOTE — PROGRESS NOTE ADULT - SUBJECTIVE AND OBJECTIVE BOX
OVERNIGHT EVENTS: events noted, bradycardia overnight, s.p Neuro no brainstem activity pupil 2.5 mm ??, increase Na, S/P dc hypertonic ( was decreased prior due to increase Na), now levophed 0.007    Vital Signs Last 24 Hrs  T(C): 35.9 (19 Jul 2020 04:00), Max: 37 (19 Jul 2020 00:00)  T(F): 96.6 (19 Jul 2020 04:00), Max: 98.6 (19 Jul 2020 00:00)  HR: 41 (19 Jul 2020 07:59) (35 - 60)  RR: 24 (19 Jul 2020 07:30) (17 - 24)  SpO2: 100% (19 Jul 2020 07:59) (100% - 100%)    PHYSICAL EXAMINATION:    GENERAL: no sedation  HEENT: Head is normocephalic and atraumatic.     NECK: Supple.    LUNGS: dec bs both bases    HEART: Regular rate and rhythm without murmur.    ABDOMEN: Soft, nontender, and nondistended.      EXTREMITIES: Without any cyanosis, clubbing, rash, lesions or edema.    NEUROLOGIC: no brainstem activity present, pupil 2.5 mm ( both not reactive), intermittently moves bl LE to painful stimuli    SKIN: No ulceration or induration present.      LABS:                        10.9   14.08 )-----------( 178      ( 18 Jul 2020 16:25 )             33.6     07-19    168<HH>  |  140<H>  |  21<H>  ----------------------------<  160<H>  3.5   |  18  |  1.0    Ca    7.9<L>      19 Jul 2020 00:00  Mg     2.3     07-18    TPro  5.9<L>  /  Alb  3.4<L>  /  TBili  0.9  /  DBili  x   /  AST  26  /  ALT  11  /  AlkPhos  35  07-18    PT/INR - ( 17 Jul 2020 13:25 )   PT: 14.30 sec;   INR: 1.24 ratio         PTT - ( 17 Jul 2020 13:25 )  PTT:23.8 sec    ABG - ( 19 Jul 2020 05:24 )  pH, Arterial: 7.39  pH, Blood: x     /  pCO2: 31    /  pO2: 99    / HCO3: 19    / Base Excess: -5.0  /  SaO2: 97        450/24/30/5        CARDIAC MARKERS ( 17 Jul 2020 13:25 )  x     / <0.01 ng/mL / x     / x     / x                      07-18-20 @ 07:01  -  07-19-20 @ 07:00  --------------------------------------------------------  IN: 1365.7 mL / OUT: 1320 mL / NET: 45.7 mL        MICROBIOLOGY:      MEDICATIONS  (STANDING):  atorvastatin 80 milliGRAM(s) Oral at bedtime  chlorhexidine 0.12% Liquid 15 milliLiter(s) Oral Mucosa every 12 hours  chlorhexidine 4% Liquid 1 Application(s) Topical <User Schedule>  dextrose 5%. 1000 milliLiter(s) (50 mL/Hr) IV Continuous <Continuous>  dextrose 50% Injectable 12.5 Gram(s) IV Push once  dextrose 50% Injectable 25 Gram(s) IV Push once  dextrose 50% Injectable 25 Gram(s) IV Push once  insulin lispro (HumaLOG) corrective regimen sliding scale   SubCutaneous three times a day before meals  norepinephrine Infusion 0.05 MICROgram(s)/kG/Min (3.96 mL/Hr) IV Continuous <Continuous>  pantoprazole  Injectable 40 milliGRAM(s) IV Push daily    MEDICATIONS  (PRN):  dextrose 40% Gel 15 Gram(s) Oral once PRN Blood Glucose LESS THAN 70 milliGRAM(s)/deciliter  glucagon  Injectable 1 milliGRAM(s) IntraMuscular once PRN Glucose LESS THAN 70 milligrams/deciliter      RADIOLOGY & ADDITIONAL STUDIES:

## 2020-07-19 NOTE — PROGRESS NOTE ADULT - SUBJECTIVE AND OBJECTIVE BOX
Patient is 63 y/o M with PMH of DM, CAD s/p PCI x3 (2017), HTN, DLD, trigeminal neuralgia presented to ED [facility] on 07/16/20 for AMS and aphasia. 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 Patient was given TPA at 16:46 and transferred to [facility] for post-TPA care and neurointerventional evaluation.     Initial exam in ED, patient was reported to have expressive aphasia, able to move all extremities with weakness of bilateral LE. Also on Nicardipine drip to maintain BP below 185.  RRT called while in the ED, patient posturing with shaking of B/L upper extremities, not following commands. Patient was intubated for airway protection. Neurointerventional and neurological evaluation, recommended emergent thrombectomy. Mechanical thrombectomy of aneurysmal basiliar occlusion completed. Upgraded to ICU for further monitoring      INTERVAL HPI/OVERNIGHT EVENTS:   Patient's HR continues to fluctuate overnight, dropping as low as 36. Atropine at bedside    ICU Vital Signs Last 24 Hrs  T(C): 35.6 (19 Jul 2020 16:00), Max: 37 (19 Jul 2020 00:00)  T(F): 96.1 (19 Jul 2020 16:00), Max: 98.6 (19 Jul 2020 00:00)  HR: 42 (19 Jul 2020 17:30) (35 - 58)  BP: --  BP(mean): --  ABP: 184/70 (19 Jul 2020 17:30) (92/42 - 188/78)  ABP(mean): 110 (19 Jul 2020 17:30) (60 - 122)  RR: 24 (19 Jul 2020 17:30) (23 - 24)  SpO2: 100% (19 Jul 2020 17:30) (100% - 100%)    I&O's Summary    18 Jul 2020 07:01  -  19 Jul 2020 07:00  --------------------------------------------------------  IN: 1365.7 mL / OUT: 1320 mL / NET: 45.7 mL    19 Jul 2020 07:01  -  19 Jul 2020 17:45  --------------------------------------------------------  IN: 693.1 mL / OUT: 185 mL / NET: 508.1 mL      Mode: AC/ CMV (Assist Control/ Continuous Mandatory Ventilation)  RR (machine): 24  TV (machine): 450  FiO2: 30  PEEP: 5  ITime: 1  MAP: 10  PIP: 17      LABS:                        10.7   13.88 )-----------( 153      ( 19 Jul 2020 16:20 )             33.7     07-19    169<HH>  |  141<H>  |  28<H>  ----------------------------<  143<H>  3.4<L>   |  19  |  1.1    Ca    8.1<L>      19 Jul 2020 16:20  Phos  1.1     07-19  Mg     2.6     07-19    TPro  6.0  /  Alb  3.3<L>  /  TBili  0.8  /  DBili  x   /  AST  34  /  ALT  12  /  AlkPhos  36  07-19        CAPILLARY BLOOD GLUCOSE  146 (19 Jul 2020 05:45)  171 (19 Jul 2020 00:00)      POCT Blood Glucose.: 140 mg/dL (19 Jul 2020 12:00)  POCT Blood Glucose.: 146 mg/dL (19 Jul 2020 05:35)    ABG - ( 19 Jul 2020 13:55 )  pH, Arterial: 7.39  pH, Blood: x     /  pCO2: 31    /  pO2: 107   / HCO3: 19    / Base Excess: -5.0  /  SaO2: 97                  RADIOLOGY & ADDITIONAL TESTS:    Consultant(s) Notes Reviewed:  [x ] YES  [ ] NO    MEDICATIONS  (STANDING):  atorvastatin 80 milliGRAM(s) Oral at bedtime  chlorhexidine 0.12% Liquid 15 milliLiter(s) Oral Mucosa every 12 hours  chlorhexidine 4% Liquid 1 Application(s) Topical <User Schedule>  dextrose 5%. 1000 milliLiter(s) (50 mL/Hr) IV Continuous <Continuous>  dextrose 50% Injectable 12.5 Gram(s) IV Push once  dextrose 50% Injectable 25 Gram(s) IV Push once  dextrose 50% Injectable 25 Gram(s) IV Push once  insulin lispro (HumaLOG) corrective regimen sliding scale   SubCutaneous three times a day before meals  norepinephrine Infusion 0.05 MICROgram(s)/kG/Min (3.96 mL/Hr) IV Continuous <Continuous>    MEDICATIONS  (PRN):  dextrose 40% Gel 15 Gram(s) Oral once PRN Blood Glucose LESS THAN 70 milliGRAM(s)/deciliter  glucagon  Injectable 1 milliGRAM(s) IntraMuscular once PRN Glucose LESS THAN 70 milligrams/deciliter      PHYSICAL EXAM:  GENERAL:   HEAD:  Atraumatic, Normocephalic  EYES: EOMI, PERRLA, conjunctiva and sclera clear  NECK: Supple, No JVD, Normal thyroid, no enlarged nodes  NERVOUS SYSTEM:  Alert & Awake.   CHEST/LUNG: B/L good air entry; No rales, rhonchi, or wheezing  HEART: S1S2 normal, no S3, Regular rate and rhythm; No murmurs  ABDOMEN: Soft, Nontender, Nondistended; Bowel sounds present  EXTREMITIES:  2+ Peripheral Pulses, No clubbing, cyanosis, or edema  LYMPH: No lymphadenopathy noted  SKIN: No rashes or lesions    Care Discussed with Consultants/Other Providers [ x] YES  [ ] NO Patient is 65 y/o M with PMH of DM, CAD s/p PCI x3 (2017), HTN, DLD, trigeminal neuralgia presented to ED [facility] on 07/16/20 for AMS and aphasia.  Patient was given TPA at 16:46 and transferred to [facility] for post-TPA care and neurointerventional evaluation.     Initial exam in ED, patient was reported to have expressive aphasia, able to move all extremities with weakness of bilateral LE. Also on Nicardipine drip to maintain BP below 185.  RRT called while in the ED, patient posturing with shaking of B/L upper extremities, not following commands. Patient was intubated for airway protection. Neurointerventional and neurological evaluation, recommended emergent thrombectomy. Mechanical thrombectomy of aneurysmal basiliar occlusion completed. Upgraded to ICU for further monitoring      INTERVAL HPI/OVERNIGHT EVENTS:   Patient's HR continues to fluctuate overnight, dropping as low as 36. Atropine at bedside    ICU Vital Signs Last 24 Hrs  T(C): 35.6 (19 Jul 2020 16:00), Max: 37 (19 Jul 2020 00:00)  T(F): 96.1 (19 Jul 2020 16:00), Max: 98.6 (19 Jul 2020 00:00)  HR: 42 (19 Jul 2020 17:30) (35 - 58)  BP: --  BP(mean): --  ABP: 184/70 (19 Jul 2020 17:30) (92/42 - 188/78)  ABP(mean): 110 (19 Jul 2020 17:30) (60 - 122)  RR: 24 (19 Jul 2020 17:30) (23 - 24)  SpO2: 100% (19 Jul 2020 17:30) (100% - 100%)    I&O's Summary    18 Jul 2020 07:01  -  19 Jul 2020 07:00  --------------------------------------------------------  IN: 1365.7 mL / OUT: 1320 mL / NET: 45.7 mL    19 Jul 2020 07:01  -  19 Jul 2020 17:45  --------------------------------------------------------  IN: 693.1 mL / OUT: 185 mL / NET: 508.1 mL      Mode: AC/ CMV (Assist Control/ Continuous Mandatory Ventilation)  RR (machine): 24  TV (machine): 450  FiO2: 30  PEEP: 5  ITime: 1  MAP: 10  PIP: 17      LABS:                        10.7   13.88 )-----------( 153      ( 19 Jul 2020 16:20 )             33.7     07-19    169<HH>  |  141<H>  |  28<H>  ----------------------------<  143<H>  3.4<L>   |  19  |  1.1    Ca    8.1<L>      19 Jul 2020 16:20  Phos  1.1     07-19  Mg     2.6     07-19    TPro  6.0  /  Alb  3.3<L>  /  TBili  0.8  /  DBili  x   /  AST  34  /  ALT  12  /  AlkPhos  36  07-19        CAPILLARY BLOOD GLUCOSE  146 (19 Jul 2020 05:45)  171 (19 Jul 2020 00:00)      POCT Blood Glucose.: 140 mg/dL (19 Jul 2020 12:00)  POCT Blood Glucose.: 146 mg/dL (19 Jul 2020 05:35)    ABG - ( 19 Jul 2020 13:55 )  pH, Arterial: 7.39  pH, Blood: x     /  pCO2: 31    /  pO2: 107   / HCO3: 19    / Base Excess: -5.0  /  SaO2: 97                  RADIOLOGY & ADDITIONAL TESTS:    Consultant(s) Notes Reviewed:  [x ] YES  [ ] NO    MEDICATIONS  (STANDING):  atorvastatin 80 milliGRAM(s) Oral at bedtime  chlorhexidine 0.12% Liquid 15 milliLiter(s) Oral Mucosa every 12 hours  chlorhexidine 4% Liquid 1 Application(s) Topical <User Schedule>  dextrose 5%. 1000 milliLiter(s) (50 mL/Hr) IV Continuous <Continuous>  dextrose 50% Injectable 12.5 Gram(s) IV Push once  dextrose 50% Injectable 25 Gram(s) IV Push once  dextrose 50% Injectable 25 Gram(s) IV Push once  insulin lispro (HumaLOG) corrective regimen sliding scale   SubCutaneous three times a day before meals  norepinephrine Infusion 0.05 MICROgram(s)/kG/Min (3.96 mL/Hr) IV Continuous <Continuous>    MEDICATIONS  (PRN):  dextrose 40% Gel 15 Gram(s) Oral once PRN Blood Glucose LESS THAN 70 milliGRAM(s)/deciliter  glucagon  Injectable 1 milliGRAM(s) IntraMuscular once PRN Glucose LESS THAN 70 milligrams/deciliter      PHYSICAL EXAM:  GENERAL:   HEAD:  Atraumatic, Normocephalic  EYES: EOMI, PERRLA, conjunctiva and sclera clear  NECK: Supple, No JVD, Normal thyroid, no enlarged nodes  NERVOUS SYSTEM:  Alert & Awake.   CHEST/LUNG: B/L good air entry; No rales, rhonchi, or wheezing  HEART: S1S2 normal, no S3, Regular rate and rhythm; No murmurs  ABDOMEN: Soft, Nontender, Nondistended; Bowel sounds present  EXTREMITIES:  2+ Peripheral Pulses, No clubbing, cyanosis, or edema  LYMPH: No lymphadenopathy noted  SKIN: No rashes or lesions    Care Discussed with Consultants/Other Providers [ x] YES  [ ] NO Patient is 63 y/o M with PMH of DM, CAD s/p PCI x3 (2017), HTN, DLD, trigeminal neuralgia presented to ED [facility] on 07/16/20 for AMS and aphasia, s/p TPA at 16:46 and transferred to [facility] for post-TPA care and neurointerventional evaluation.   RRT called while in the ED, patient posturing with shaking of B/L upper extremities, not following commands. Patient was intubated for airway protection. Neurointerventional and neurological evaluation, recommended emergent thrombectomy. Mechanical thrombectomy of aneurysmal basiliar occlusion completed. Upgraded to ICU for further monitoring      INTERVAL HPI/OVERNIGHT EVENTS:   Patient's HR continues to fluctuate overnight, dropping as low as 36. Atropine at bedside    ICU Vital Signs Last 24 Hrs  T(C): 35.6 (19 Jul 2020 16:00), Max: 37 (19 Jul 2020 00:00)  T(F): 96.1 (19 Jul 2020 16:00), Max: 98.6 (19 Jul 2020 00:00)  HR: 42 (19 Jul 2020 17:30) (35 - 58)  BP: --  BP(mean): --  ABP: 184/70 (19 Jul 2020 17:30) (92/42 - 188/78)  ABP(mean): 110 (19 Jul 2020 17:30) (60 - 122)  RR: 24 (19 Jul 2020 17:30) (23 - 24)  SpO2: 100% (19 Jul 2020 17:30) (100% - 100%)    I&O's Summary    18 Jul 2020 07:01  -  19 Jul 2020 07:00  --------------------------------------------------------  IN: 1365.7 mL / OUT: 1320 mL / NET: 45.7 mL    19 Jul 2020 07:01  -  19 Jul 2020 17:45  --------------------------------------------------------  IN: 693.1 mL / OUT: 185 mL / NET: 508.1 mL      Mode: AC/ CMV (Assist Control/ Continuous Mandatory Ventilation)  RR (machine): 24  TV (machine): 450  FiO2: 30  PEEP: 5  ITime: 1  MAP: 10  PIP: 17      LABS:                        10.7   13.88 )-----------( 153      ( 19 Jul 2020 16:20 )             33.7     07-19    169<HH>  |  141<H>  |  28<H>  ----------------------------<  143<H>  3.4<L>   |  19  |  1.1    Ca    8.1<L>      19 Jul 2020 16:20  Phos  1.1     07-19  Mg     2.6     07-19    TPro  6.0  /  Alb  3.3<L>  /  TBili  0.8  /  DBili  x   /  AST  34  /  ALT  12  /  AlkPhos  36  07-19        CAPILLARY BLOOD GLUCOSE  146 (19 Jul 2020 05:45)  171 (19 Jul 2020 00:00)      POCT Blood Glucose.: 140 mg/dL (19 Jul 2020 12:00)  POCT Blood Glucose.: 146 mg/dL (19 Jul 2020 05:35)    ABG - ( 19 Jul 2020 13:55 )  pH, Arterial: 7.39  pH, Blood: x     /  pCO2: 31    /  pO2: 107   / HCO3: 19    / Base Excess: -5.0  /  SaO2: 97                  RADIOLOGY & ADDITIONAL TESTS:    Consultant(s) Notes Reviewed:  [x ] YES  [ ] NO    MEDICATIONS  (STANDING):  atorvastatin 80 milliGRAM(s) Oral at bedtime  chlorhexidine 0.12% Liquid 15 milliLiter(s) Oral Mucosa every 12 hours  chlorhexidine 4% Liquid 1 Application(s) Topical <User Schedule>  dextrose 5%. 1000 milliLiter(s) (50 mL/Hr) IV Continuous <Continuous>  dextrose 50% Injectable 12.5 Gram(s) IV Push once  dextrose 50% Injectable 25 Gram(s) IV Push once  dextrose 50% Injectable 25 Gram(s) IV Push once  insulin lispro (HumaLOG) corrective regimen sliding scale   SubCutaneous three times a day before meals  norepinephrine Infusion 0.05 MICROgram(s)/kG/Min (3.96 mL/Hr) IV Continuous <Continuous>    MEDICATIONS  (PRN):  dextrose 40% Gel 15 Gram(s) Oral once PRN Blood Glucose LESS THAN 70 milliGRAM(s)/deciliter  glucagon  Injectable 1 milliGRAM(s) IntraMuscular once PRN Glucose LESS THAN 70 milligrams/deciliter      PHYSICAL EXAM:  GENERAL: non-arousable to name and sternal rub  HEAD:  Atraumatic, Normocephalic  EYES: fixed pupils  CHEST/LUNG: ventilated  HEART: S1S2 normal, no S3, Regular rate and rhythm; No murmurs  ABDOMEN: Soft, Nondistended  EXTREMITIES: weak peripheral pulses  NEURO: no withdrawal to pain    Care Discussed with Consultants/Other Providers [ x] YES  [ ] NO

## 2020-07-19 NOTE — PROGRESS NOTE ADULT - SUBJECTIVE AND OBJECTIVE BOX
Neurocritical Care Progress Note:    1. Brief Presentation: Aphasia    2. 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 Relevant brief History: 64 year old male with pmh of DM, CAD with PCI x3 in 2017, HTN, DLD, presented to  [facility] initially for expressive aphasia and confusion with NIHSS of 9 was s/p tpa at 16:46 hrs . CTA H/N revealed a right vertebral artery occlusion and distal basilar artery thrombus. Patient was sent to [facility] for neurointerventional eval and post TPA care. On initial neuro eval at [place] , patients expressive aphasia persisted . Pt. was on cardine drip to maintain BP below 185. While in ED Rapid response team was activated for sudden unresponsiveness, witnessed posturing. Pt. was intubated for airway protection and his NIHSS worsened to 39 at this time. Stat CTH was negative for bleed, CT perfusion Stat, with basilar artery near occlusion. Code NI actual was activated and patient was transferred to IR. Patient is s/p mechanical thrombectomy with full recanalization of aneurysmal basilar artery; procedure was completed under general anesthesia.     4-Yesterday's Plan:  -Keep syst -150  -Continue 3% at 80 cc an hour  -Keep sodium level 150-160 range  -Avoid hypovolemia/hypotension,   -Prophylaxis with PPI  -Poor prognosis. Brain death exam pending    5. Last 24 hour updates: Became progressively bradycardic overnight.    6. Medications:   atorvastatin 80 milliGRAM(s) Oral at bedtime  chlorhexidine 0.12% Liquid 15 milliLiter(s) Oral Mucosa every 12 hours  chlorhexidine 4% Liquid 1 Application(s) Topical <User Schedule>  dextrose 5%. 1000 milliLiter(s) IV Continuous <Continuous>  dextrose 50% Injectable 12.5 Gram(s) IV Push once  dextrose 50% Injectable 25 Gram(s) IV Push once  dextrose 50% Injectable 25 Gram(s) IV Push once  insulin lispro (HumaLOG) corrective regimen sliding scale   SubCutaneous three times a day before meals  norepinephrine Infusion 0.05 MICROgram(s)/kG/Min IV Continuous <Continuous>  pantoprazole  Injectable 40 milliGRAM(s) IV Push daily  sodium chloride 3%. 500 milliLiter(s) IV Continuous <Continuous>      7. Ancillary Management:   Chest PT[ ]   Head of bed >35 [ ]   Out of bed to chair [ ]   PT/OT/SP Eval [ ]   Spirometry[ ]   DVT prophylaxis ]    8. Neurologic Examination:  Mentation: Comatose.  Cranial Nerves:  	II – No blink to threat.  	III/IV/VI – Pupils 3 mm. Pupils non-reactive. No gaze.  	V – Absent corneal b/l.  	VII – No facial palsy.  	VIII – Absent oculocephalic.  	IX/X – No cough to ET suction.  	XI – Deferred.  	XII – Deferred.  Motor: Triple flexion at knee b/l (spinally mediated). No spontaneous movement.  Sensory: No response to noxious stimuli.  Reflexes: Absent Babinski.  Cerebellum: Unable.      NIH STROKE SCALE  Item	                                                        Score  1 a.	Level of Consciousness	               	3  1 b. LOC Questions	                                2  1 c.	LOC Commands	                               	2  2.	Best Gaze	                                        0  3.	Visual	                                                3  4.	Facial Palsy	                                        0  5 a.	Motor Arm - Left	                                4  5 b.	Motor Arm - Right	                        4  6 a.	Motor Leg - Left	                                4  6 b.	Motor Leg - Right	                                4  7.	Limb Ataxia	                                        0  8.	Sensory	                                                2  9.	Language	                                        3  10.	Dysarthria	                                        UN  11.	Extinction and Inattention  	        2  ______________________________________  TOTAL	                                                        33      mRS:  0 No symptoms at all  1 No significant disability despite symptoms; able to carry out all usual duties and activities without assistance  2 Slight disability; unable to carry out all previous activities, but able to look after own affairs  3 Moderate disability; requiring some help, but able to walk without assistance  4 Moderately severe disability; unable to walk without assistance and unable to attend to own bodily needs without assistance  5 Severe disability; bedridden, incontinent and requiring constant nursing care and attention  6 Dead      Hunt&Arroyo:  Grade I = Asymptomatic, mild headache, slight nuchal rigidity  Grade II = Moderate to severe headache, nuchal rigidity, no neurological deficit other than cranial nerve palsy  Grade III = Drowiness, confusion, mild focal neurological deficit  Grade IV = Stupor, moderate to severe hemiparesis  Grade V = Coma, decerebrate posturing    Last CTH:   < from: CT Head No Cont (07.17.20 @ 10:27) >  IMPRESSION:    1.  Interval resorption of the IV contrast within the intracranial vasculature.    2.  Interval increase in posterior fossa edema and mass effect consistent with evolving infarct (involving both cerebellar hemispheres and possibly the brainstem) with resultant new effacement of the fourth ventricle and new mild obstructive hydrocephalus.    3.  Evolving infarct also noted within the right temporal lobe.    4.  Increased hyperdensity within the left ambient and quadrigeminal plate cisterns, and new hyperdensity within the right ambient and quadrigeminal plate cisterns, the suprasellar cistern, the right sylvian fissure and scattered sulci suspicious worsening/new subarachnoid hemorrhage.    5.  Redemonstrated enlarged dolichoectatic vertebrobasilar vessels with mass effect upon the brainstem.    < end of copied text >    9. Cardiovascular:   Vital Signs Last 24 Hrs  T(C): 35.9 (19 Jul 2020 04:00), Max: 37 (19 Jul 2020 00:00)  T(F): 96.6 (19 Jul 2020 04:00), Max: 98.6 (19 Jul 2020 00:00)  HR: 46 (19 Jul 2020 05:30) (35 - 66)  BP: 118/60 (18 Jul 2020 07:45) (118/60 - 118/60)  BP(mean): 82 (18 Jul 2020 07:45) (82 - 82)  RR: 24 (19 Jul 2020 05:30) (17 - 24)  SpO2: 100% (19 Jul 2020 05:30) (100% - 100%)     Last Echo:    Last EKG: < from: 12 Lead ECG (07.16.20 @ 16:10) >  Sinus rhythm with 1st degree A-V block  Left axis deviation  Voltage criteria for left ventricular hypertrophy  Abnormal ECG    < end of copied text >      CVP   MAP/CPP/SBP target:   CO:      CI:       Enzymes/Trop:    10. Respiratory:   ABG:ABG - ( 19 Jul 2020 05:24 )  pH, Arterial: 7.39  pH, Blood: x     /  pCO2: 31    /  pO2: 99    / HCO3: 19    / Base Excess: -5.0  /  SaO2: 97          VBG:    Chest Xray: < from: Xray Chest 1 View- PORTABLE-Urgent (07.18.20 @ 13:14) >  Impression:      No radiographic evidence of acute cardiopulmonary disease.    Support tubes as above.    < end of copied text >      Mode: AC/ CMV (Assist Control/ Continuous Mandatory Ventilation)  RR (machine): 24  TV (machine): 450  FiO2: 30  PEEP: 5  ITime: 1  MAP: 10  PIP: 16      Peak Pressure/Annabella Pressure:    11.GI:    Prophylaxis     Bowel mvt:     Abd distension:   LIVER FUNCTIONS - ( 18 Jul 2020 16:25 )  Alb: 3.4 g/dL / Pro: 5.9 g/dL / ALK PHOS: 35 U/L / ALT: 11 U/L / AST: 26 U/L / GGT: x             12.Renal/Fluids/Electrolytes:    07-18    166<HH>  |  138<H>  |  20  ----------------------------<  162<H>  3.7   |  19  |  1.0    Ca    8.1<L>      18 Jul 2020 20:04  Mg     2.3     07-18    TPro  5.9<L>  /  Alb  3.4<L>  /  TBili  0.9  /  DBili  x   /  AST  26  /  ALT  11  /  AlkPhos  35  07-18      I&O's Detail    17 Jul 2020 07:01  -  18 Jul 2020 07:00  --------------------------------------------------------  IN:    dexmedetomidine Infusion: 21.2 mL    eptifibatide Infusion 75 mg/100 mL: 3.4 mL    IV PiggyBack: 1000 mL    norepinephrine Infusion: 44.2 mL    norepinephrine Infusion: 30.4 mL    sodium chloride 0.9%: 150 mL    sodium chloride 3%: 1680 mL  Total IN: 2929.2 mL    OUT:    Intermittent Catheterization - Urethral: 1785 mL  Total OUT: 1785 mL    Total NET: 1144.2 mL      18 Jul 2020 07:01  -  19 Jul 2020 05:53  --------------------------------------------------------  IN:    norepinephrine Infusion: 24.7 mL    Oral Fluid: 100 mL    sodium chloride 3%: 800 mL    sodium chloride 3%.: 440 mL  Total IN: 1364.7 mL    OUT:    Intermittent Catheterization - Urethral: 1270 mL  Total OUT: 1270 mL    Total NET: 94.7 mL          13.ID:   TMax:   Vital Signs Last 24 Hrs  T(C): 35.9 (19 Jul 2020 04:00), Max: 37 (19 Jul 2020 00:00)  T(F): 96.6 (19 Jul 2020 04:00), Max: 98.6 (19 Jul 2020 00:00)  HR: 46 (19 Jul 2020 05:30) (35 - 66)  BP: 118/60 (18 Jul 2020 07:45) (118/60 - 118/60)  BP(mean): 82 (18 Jul 2020 07:45) (82 - 82)  RR: 24 (19 Jul 2020 05:30) (17 - 24)  SpO2: 100% (19 Jul 2020 05:30) (100% - 100%)   Lactate, Blood: 2.0 mmol/L (07-17 @ 13:25)  Lactate, Blood: 3.2 mmol/L (07-16 @ 15:44)          Lines: Central[] Date inserted: Peripheral[]    14. Hematology:                         10.9   14.08 )-----------( 178      ( 18 Jul 2020 16:25 )             33.6      07-18    166<HH>  |  138<H>  |  20  ----------------------------<  162<H>  3.7   |  19  |  1.0    Ca    8.1<L>      18 Jul 2020 20:04  Mg     2.3     07-18    TPro  5.9<L>  /  Alb  3.4<L>  /  TBili  0.9  /  DBili  x   /  AST  26  /  ALT  11  /  AlkPhos  35  07-18     PT/INR - ( 17 Jul 2020 13:25 )   PT: 14.30 sec;   INR: 1.24 ratio         PTT - ( 17 Jul 2020 13:25 )  PTT:23.8 sec    DVT Prophylaxis Lovenox[ ] Heparin[ ] Venodynes[ ] SCD's[ ]    15. 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 Suggestions:    - Keep -150  - Sodium 166, can stop 3% (goal 150-160)  - Irreversible brain injury, brain death examination in progress    17. Disposition: ICU

## 2020-07-19 NOTE — PROGRESS NOTE ADULT - ASSESSMENT
IMPRESSION:    Stroke s/p tpa, s/p Neuro inter ( thrombectomy basilar artery ) s/p integrelin drip ; s/p repeat head ct 7/18 / worsening Neuro status / no brainstem acitivity (  pupil 2.5 mm non reactive) was on hypertonic ?? increase NS, now bradycardic / herniation      PLAN:    CNS: Neuro f/up/ dc hypertonic    HEENT:  Oral care    PULMONARY:  HOB @ 45 degrees, no changes in vent settings     CARDIOVASCULAR: echo, monitor BP , atorvastatin. Wean levophed as tolerated     GI: GI prophylaxis                                          Feeding OG tube and OG feeding     RENAL:  F/u  lytes.  Correct as needed. accurate I/O fup cmp q 4 h    INFECTIOUS DISEASE: NO ABX; f/u cultures    HEMATOLOGICAL:  DVT prophylaxis.    ENDOCRINE:  Follow up FS.  Insulin protocol if needed.    CODE STATUS: FULL CODE    DISPOSITION: Pt requires continued monitoring in the MICU    very Poor prognosis  organ donation

## 2020-07-20 NOTE — PROGRESS NOTE ADULT - SUBJECTIVE AND OBJECTIVE BOX
OVERNIGHT EVENTS: still intubated, ventilated, levophed 0.02, s/p Neuro    Vital Signs Last 24 Hrs  T(C): 36.8 (20 Jul 2020 04:00), Max: 37 (20 Jul 2020 00:00)  T(F): 98.2 (20 Jul 2020 04:00), Max: 98.6 (20 Jul 2020 00:00)  HR: 46 (20 Jul 2020 07:00) (36 - 72)  BP: 112/57 (19 Jul 2020 20:15) (112/57 - 112/57)  BP(mean): 74 (19 Jul 2020 20:15) (74 - 74)  RR: 23 (20 Jul 2020 07:00) (22 - 24)  SpO2: 100% (20 Jul 2020 07:00) (100% - 100%)    PHYSICAL EXAMINATION:    GENERAL: ill looking    HEENT: Head is normocephalic and atraumatic. Extraocular muscles are intact. Mucous membranes are moist.    NECK: Supple.    LUNGS: dec bs both abses    HEART: Regular rate and rhythm without murmur.    ABDOMEN: Soft, nontender, and nondistended.      EXTREMITIES: Without any cyanosis, clubbing, rash, lesions or edema.    NEUROLOGIC: no brainstem activity, pupil 3 mm not reactive    SKIN: No ulceration or induration present.      LABS:                        11.0   14.42 )-----------( 153      ( 20 Jul 2020 04:03 )             34.3     07-20    164<HH>  |  136<H>  |  29<H>  ----------------------------<  273<H>  3.2<L>   |  19  |  1.2    Ca    8.5      20 Jul 2020 04:03  Phos  1.4     07-20  Mg     2.7     07-20    TPro  6.2  /  Alb  3.2<L>  /  TBili  0.9  /  DBili  x   /  AST  40  /  ALT  15  /  AlkPhos  43  07-20        ABG - ( 20 Jul 2020 03:53 )  pH, Arterial: 7.36  pH, Blood: x     /  pCO2: 35    /  pO2: 84    / HCO3: 20    / Base Excess: -4.9  /  SaO2: 96          450/24/30/5                      07-19-20 @ 07:01  -  07-20-20 @ 07:00  --------------------------------------------------------  IN: 2140.4 mL / OUT: 575 mL / NET: 1565.4 mL        MICROBIOLOGY:      MEDICATIONS  (STANDING):  atorvastatin 80 milliGRAM(s) Oral at bedtime  chlorhexidine 0.12% Liquid 15 milliLiter(s) Oral Mucosa every 12 hours  chlorhexidine 4% Liquid 1 Application(s) Topical <User Schedule>  dextrose 5%. 1000 milliLiter(s) (50 mL/Hr) IV Continuous <Continuous>  dextrose 50% Injectable 12.5 Gram(s) IV Push once  dextrose 50% Injectable 25 Gram(s) IV Push once  dextrose 50% Injectable 25 Gram(s) IV Push once  insulin lispro (HumaLOG) corrective regimen sliding scale   SubCutaneous three times a day before meals  norepinephrine Infusion 0.05 MICROgram(s)/kG/Min (3.96 mL/Hr) IV Continuous <Continuous>  pantoprazole   Suspension 40 milliGRAM(s) Enteral Tube daily  potassium chloride  20 mEq/100 mL IVPB 20 milliEquivalent(s) IV Intermittent every 2 hours    MEDICATIONS  (PRN):  dextrose 40% Gel 15 Gram(s) Oral once PRN Blood Glucose LESS THAN 70 milliGRAM(s)/deciliter  glucagon  Injectable 1 milliGRAM(s) IntraMuscular once PRN Glucose LESS THAN 70 milligrams/deciliter      RADIOLOGY & ADDITIONAL STUDIES:

## 2020-07-20 NOTE — PROGRESS NOTE ADULT - SUBJECTIVE AND OBJECTIVE BOX
Neurocritical Care Progress Note:    1. Brief Presentation: Aphasia    2. 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 Relevant brief History: 64 year old male with pmh of DM, CAD with PCI x3 in 2017, HTN, DLD, presented to  [facility] initially for expressive aphasia and confusion with NIHSS of 9 was s/p tpa at 16:46 hrs . CTA H/N revealed a right vertebral artery occlusion and distal basilar artery thrombus. Patient was sent to [facility] for neurointerventional eval and post TPA care. On initial neuro eval at [facility] , patients expressive aphasia persisted . Pt. was on cardine drip to maintain BP below 185. While in ED Rapid response team was activated for sudden unresponsiveness, witnessed posturing. Pt. was intubated for airway protection and his NIHSS worsened to 39 at this time. Stat CTH was negative for bleed, CT perfusion Stat, with basilar artery near occlusion. Code NI actual was activated and patient was transferred to IR. Patient is s/p mechanical thrombectomy with full recanalization of aneurysmal basilar artery; procedure was completed under general anesthesia.     4-Yesterday's Plan:  - Keep -150  - Sodium 166, can stop 3% (goal 150-160)  -  cc Q4 until sodium 160  - Irreversible brain injury, brain death examination in progress    5. Last 24 hour updates: Became progressively bradycardic overnight.    6. Medications:   atorvastatin 80 milliGRAM(s) Oral at bedtime  chlorhexidine 0.12% Liquid 15 milliLiter(s) Oral Mucosa every 12 hours  chlorhexidine 4% Liquid 1 Application(s) Topical <User Schedule>  dextrose 5%. 1000 milliLiter(s) IV Continuous <Continuous>  dextrose 50% Injectable 12.5 Gram(s) IV Push once  dextrose 50% Injectable 25 Gram(s) IV Push once  dextrose 50% Injectable 25 Gram(s) IV Push once  insulin lispro (HumaLOG) corrective regimen sliding scale   SubCutaneous three times a day before meals  norepinephrine Infusion 0.05 MICROgram(s)/kG/Min IV Continuous <Continuous>  pantoprazole  Injectable 40 milliGRAM(s) IV Push daily  sodium chloride 3%. 500 milliLiter(s) IV Continuous <Continuous>      7. Ancillary Management:   Chest PT[ ]   Head of bed >35 [ ]   Out of bed to chair [ ]   PT/OT/SP Eval [ ]   Spirometry[ ]   DVT prophylaxis ]    8. Neurologic Examination:  Mentation: Comatose.  Cranial Nerves:  	II – No blink to threat.  	III/IV/VI – Pupils 3 mm. Pupils non-reactive. No gaze.  	V – Absent corneal b/l.  	VII – No facial palsy.  	VIII – Absent oculocephalic.  	IX/X – No cough to ET suction.  	XI – Deferred.  	XII – Deferred.  Motor: Triple flexion at knee b/l (spinally mediated). No spontaneous movement.  Sensory: No response to noxious stimuli.  Reflexes: Absent Babinski.  Cerebellum: Unable.      NIH STROKE SCALE  Item	                                                        Score  1 a.	Level of Consciousness	               	3  1 b. LOC Questions	                                2  1 c.	LOC Commands	                               	2  2.	Best Gaze	                                        0  3.	Visual	                                                3  4.	Facial Palsy	                                        0  5 a.	Motor Arm - Left	                                4  5 b.	Motor Arm - Right	                        4  6 a.	Motor Leg - Left	                                4  6 b.	Motor Leg - Right	                                4  7.	Limb Ataxia	                                        0  8.	Sensory	                                                2  9.	Language	                                        3  10.	Dysarthria	                                        UN  11.	Extinction and Inattention  	        2  ______________________________________  TOTAL	                                                        33      mRS:  0 No symptoms at all  1 No significant disability despite symptoms; able to carry out all usual duties and activities without assistance  2 Slight disability; unable to carry out all previous activities, but able to look after own affairs  3 Moderate disability; requiring some help, but able to walk without assistance  4 Moderately severe disability; unable to walk without assistance and unable to attend to own bodily needs without assistance  5 Severe disability; bedridden, incontinent and requiring constant nursing care and attention  6 Dead      [employer]:  Grade I = Asymptomatic, mild headache, slight nuchal rigidity  Grade II = Moderate to severe headache, nuchal rigidity, no neurological deficit other than cranial nerve palsy  Grade III = Drowiness, confusion, mild focal neurological deficit  Grade IV = Stupor, moderate to severe hemiparesis  Grade V = Coma, decerebrate posturing    Last CTH:   < from: CT Head No Cont (07.17.20 @ 10:27) >  IMPRESSION:    1.  Interval resorption of the IV contrast within the intracranial vasculature.    2.  Interval increase in posterior fossa edema and mass effect consistent with evolving infarct (involving both cerebellar hemispheres and possibly the brainstem) with resultant new effacement of the fourth ventricle and new mild obstructive hydrocephalus.    3.  Evolving infarct also noted within the right temporal lobe.    4.  Increased hyperdensity within the left ambient and quadrigeminal plate cisterns, and new hyperdensity within the right ambient and quadrigeminal plate cisterns, the suprasellar cistern, the right sylvian fissure and scattered sulci suspicious worsening/new subarachnoid hemorrhage.    5.  Redemonstrated enlarged dolichoectatic vertebrobasilar vessels with mass effect upon the brainstem.    < end of copied text >    9. Cardiovascular:   Vital Signs Last 24 Hrs  T(C): 35.9 (19 Jul 2020 04:00), Max: 37 (19 Jul 2020 00:00)  T(F): 96.6 (19 Jul 2020 04:00), Max: 98.6 (19 Jul 2020 00:00)  HR: 46 (19 Jul 2020 05:30) (35 - 66)  BP: 118/60 (18 Jul 2020 07:45) (118/60 - 118/60)  BP(mean): 82 (18 Jul 2020 07:45) (82 - 82)  RR: 24 (19 Jul 2020 05:30) (17 - 24)  SpO2: 100% (19 Jul 2020 05:30) (100% - 100%)     Last Echo:    Last EKG: < from: 12 Lead ECG (07.16.20 @ 16:10) >  Sinus rhythm with 1st degree A-V block  Left axis deviation  Voltage criteria for left ventricular hypertrophy  Abnormal ECG    < end of copied text >      CVP   MAP/CPP/SBP target:   CO:      CI:       Enzymes/Trop:    10. Respiratory:   ABG:ABG - ( 19 Jul 2020 05:24 )  pH, Arterial: 7.39  pH, Blood: x     /  pCO2: 31    /  pO2: 99    / HCO3: 19    / Base Excess: -5.0  /  SaO2: 97          VBG:    Chest Xray: < from: Xray Chest 1 View- PORTABLE-Urgent (07.18.20 @ 13:14) >  Impression:      No radiographic evidence of acute cardiopulmonary disease.    Support tubes as above.    < end of copied text >      Mode: AC/ CMV (Assist Control/ Continuous Mandatory Ventilation)  RR (machine): 24  TV (machine): 450  FiO2: 30  PEEP: 5  ITime: 1  MAP: 10  PIP: 16      Peak Pressure/[place] Pressure:    11.GI:    Prophylaxis     Bowel mvt:     Abd distension:   LIVER FUNCTIONS - ( 18 Jul 2020 16:25 )  Alb: 3.4 g/dL / Pro: 5.9 g/dL / ALK PHOS: 35 U/L / ALT: 11 U/L / AST: 26 U/L / GGT: x             12.Renal/Fluids/Electrolytes:    07-18    166<HH>  |  138<H>  |  20  ----------------------------<  162<H>  3.7   |  19  |  1.0    Ca    8.1<L>      18 Jul 2020 20:04  Mg     2.3     07-18    TPro  5.9<L>  /  Alb  3.4<L>  /  TBili  0.9  /  DBili  x   /  AST  26  /  ALT  11  /  AlkPhos  35  07-18      I&O's Detail    17 Jul 2020 07:01  -  18 Jul 2020 07:00  --------------------------------------------------------  IN:    dexmedetomidine Infusion: 21.2 mL    eptifibatide Infusion 75 mg/100 mL: 3.4 mL    IV PiggyBack: 1000 mL    norepinephrine Infusion: 44.2 mL    norepinephrine Infusion: 30.4 mL    sodium chloride 0.9%: 150 mL    sodium chloride 3%: 1680 mL  Total IN: 2929.2 mL    OUT:    Intermittent Catheterization - Urethral: 1785 mL  Total OUT: 1785 mL    Total NET: 1144.2 mL      18 Jul 2020 07:01  -  19 Jul 2020 05:53  --------------------------------------------------------  IN:    norepinephrine Infusion: 24.7 mL    Oral Fluid: 100 mL    sodium chloride 3%: 800 mL    sodium chloride 3%.: 440 mL  Total IN: 1364.7 mL    OUT:    Intermittent Catheterization - Urethral: 1270 mL  Total OUT: 1270 mL    Total NET: 94.7 mL          13.ID:   TMax:   Vital Signs Last 24 Hrs  T(C): 35.9 (19 Jul 2020 04:00), Max: 37 (19 Jul 2020 00:00)  T(F): 96.6 (19 Jul 2020 04:00), Max: 98.6 (19 Jul 2020 00:00)  HR: 46 (19 Jul 2020 05:30) (35 - 66)  BP: 118/60 (18 Jul 2020 07:45) (118/60 - 118/60)  BP(mean): 82 (18 Jul 2020 07:45) (82 - 82)  RR: 24 (19 Jul 2020 05:30) (17 - 24)  SpO2: 100% (19 Jul 2020 05:30) (100% - 100%)   Lactate, Blood: 2.0 mmol/L (07-17 @ 13:25)  Lactate, Blood: 3.2 mmol/L (07-16 @ 15:44)          Lines: Central[] Date inserted: Peripheral[]    14. Hematology:                         10.9   14.08 )-----------( 178      ( 18 Jul 2020 16:25 )             33.6      07-18    166<HH>  |  138<H>  |  20  ----------------------------<  162<H>  3.7   |  19  |  1.0    Ca    8.1<L>      18 Jul 2020 20:04  Mg     2.3     07-18    TPro  5.9<L>  /  Alb  3.4<L>  /  TBili  0.9  /  DBili  x   /  AST  26  /  ALT  11  /  AlkPhos  35  07-18     PT/INR - ( 17 Jul 2020 13:25 )   PT: 14.30 sec;   INR: 1.24 ratio         PTT - ( 17 Jul 2020 13:25 )  PTT:23.8 sec    DVT Prophylaxis Lovenox[ ] Heparin[ ] Venodynes[ ] SCD's[ ]    15. 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 Suggestions:    - Keep -150  - Off 3%, continue serial BMPs  -  cc Q4 until sodium 160 then stop  - Irreversible brain injury, brain death examination pending  - GOC conversation with family today    17. Disposition: ICU

## 2020-07-20 NOTE — PROGRESS NOTE ADULT - SUBJECTIVE AND OBJECTIVE BOX
Hospital Day:  4d    Chief Complaint: Patient is a 64y old male who presents with a chief complaint of Stroke s/p tpa (20 Jul 2020 03:43)    24 hour events:    Past Medical Hx:   HTN (hypertension)  Diabetes    Past Sx:  No significant past surgical history    Allergies:  No Known Allergies    Current Meds:   Standing Meds:  atorvastatin 80 milliGRAM(s) Oral at bedtime  chlorhexidine 0.12% Liquid 15 milliLiter(s) Oral Mucosa every 12 hours  chlorhexidine 4% Liquid 1 Application(s) Topical <User Schedule>  dextrose 5%. 1000 milliLiter(s) (50 mL/Hr) IV Continuous <Continuous>  dextrose 50% Injectable 12.5 Gram(s) IV Push once  dextrose 50% Injectable 25 Gram(s) IV Push once  dextrose 50% Injectable 25 Gram(s) IV Push once  insulin lispro (HumaLOG) corrective regimen sliding scale   SubCutaneous three times a day before meals  norepinephrine Infusion 0.05 MICROgram(s)/kG/Min (3.96 mL/Hr) IV Continuous <Continuous>  pantoprazole   Suspension 40 milliGRAM(s) Enteral Tube daily    PRN Meds:  dextrose 40% Gel 15 Gram(s) Oral once PRN Blood Glucose LESS THAN 70 milliGRAM(s)/deciliter  glucagon  Injectable 1 milliGRAM(s) IntraMuscular once PRN Glucose LESS THAN 70 milligrams/deciliter    HOME MEDICATIONS:  ALPRAZolam 0.5 mg oral tablet, extended release: 1 tab(s) orally once a day (at bedtime)  amLODIPine 5 mg oral tablet: 1 tab(s) orally once a day  clopidogrel 75 mg oral tablet: 1 tab(s) orally once a day  losartan 50 mg oral tablet: 1 tab(s) orally once a day  metFORMIN 500 mg oral tablet, extended release: 1 tab(s) orally once a day  omeprazole 20 mg oral delayed release capsule: 1 cap(s) orally once a day  OXcarbazepine 150 mg oral tablet: 1 tab(s) orally once a day  OXcarbazepine 300 mg oral tablet: 1 tab(s) orally once a day (at bedtime)  trihexyphenidyl: 1 milligram(s) orally 2 times a day      Vital Signs:   T(F): 98.2 (07-20-20 @ 04:00), Max: 98.6 (07-20-20 @ 00:00)  HR: 48 (07-20-20 @ 06:00) (36 - 72)  BP: 112/57 (07-19-20 @ 20:15) (112/57 - 112/57)  RR: 23 (07-20-20 @ 06:00) (22 - 24)  SpO2: 100% (07-20-20 @ 06:00) (100% - 100%)      07-18-20 @ 07:01  -  07-19-20 @ 07:00  --------------------------------------------------------  IN: 1365.7 mL / OUT: 1320 mL / NET: 45.7 mL    07-19-20 @ 07:01  -  07-20-20 @ 06:12  --------------------------------------------------------  IN: 2138.4 mL / OUT: 525 mL / NET: 1613.4 mL    Physical Exam:   GENERAL: NAD  HEENT: NCAT  CHEST/LUNG: CTAB  HEART: Regular rate and rhythm; s1 s2 appreciated, No murmurs, rubs, or gallops  ABDOMEN: Soft, Nontender, Nondistended; Bowel sounds present  EXTREMITIES: No LE edema b/l  SKIN: no rashes, no new lesions  NERVOUS SYSTEM:  Alert & Oriented X3  LINES/CATHETERS:    Labs:                         11.0   14.42 )-----------( 153      ( 20 Jul 2020 04:03 )             34.3     Neutophil% 79.1, Lymphocyte% 11.9, Monocyte% 8.0, Bands% 0.7 07-19-20 @ 08:10    20 Jul 2020 04:03    164    |  136    |  29     ----------------------------<  273    3.2     |  19     |  1.2      Ca    8.5        20 Jul 2020 04:03  Phos  1.4       20 Jul 2020 04:03  Mg     2.7       20 Jul 2020 04:03    TPro  6.2    /  Alb  3.2    /  TBili  0.9    /  DBili  x      /  AST  40     /  ALT  15     /  AlkPhos  43     20 Jul 2020 04:03    Troponin <0.01, CKMB --, CK -- 07-17-20 @ 13:25  Troponin <0.01, CKMB --, CK -- 07-16-20 @ 15:44    Radiology:     Assessment and Plan:   64y old male who presents with a chief complaint of Stroke s/p tpa    # CVA s/p TPA s/p thrombectomy (Basiliar Artery)  - repeat CT head urgent:  Interval increase in posterior fossa edema and mass effect consistent with evolving infarct  with resultant new effacement of the fourth ventricle and new mild obstructive hydrocephalus. Increased hyperdensity within the left ambient and quadrigeminal plate cisterns, and new hyperdensity within the right ambient and quadrigeminal plate cisterns, the suprasellar cistern, the right sylvian fissure and scattered sulci suspicious worsening/new subarachnoid hemorrhage.  - s/p 23% NS, 50g mannitol, 3% hypertonic saline drip   - off precedex  - integrillin completed  - 3% hypertonic saline stop at 5am  - Na 170, 2x 300cc free water given, follow up with BMP  - target Na 160. Give free water q4    PULM:  - head of bed at 45 degrees  - ventilated    CVS:  - maintain -160  - increased levo today    GI:  - started tube feeds  - GI PPx: protonix     HEMATOLOGY:  - DVT PPx    ENDO:  - f/u fingersticks     Diet: Tube feeds  DVT ppx:  GI ppx: Protonix  Code Status: FULL CODE    DISPO: Acute Hospital Day:  4d    Chief Complaint: Patient is a 64y old male who presents with a chief complaint of Stroke s/p tpa (20 Jul 2020 03:43)    24 hour events: No acute overnight events. Patient seen unresponsive in bed this morning.     Past Medical Hx:   HTN (hypertension)  Diabetes    Past Sx:  No significant past surgical history    Allergies:  No Known Allergies    Current Meds:   Standing Meds:  atorvastatin 80 milliGRAM(s) Oral at bedtime  chlorhexidine 0.12% Liquid 15 milliLiter(s) Oral Mucosa every 12 hours  chlorhexidine 4% Liquid 1 Application(s) Topical <User Schedule>  dextrose 5%. 1000 milliLiter(s) (50 mL/Hr) IV Continuous <Continuous>  dextrose 50% Injectable 12.5 Gram(s) IV Push once  dextrose 50% Injectable 25 Gram(s) IV Push once  dextrose 50% Injectable 25 Gram(s) IV Push once  insulin lispro (HumaLOG) corrective regimen sliding scale   SubCutaneous three times a day before meals  norepinephrine Infusion 0.05 MICROgram(s)/kG/Min (3.96 mL/Hr) IV Continuous <Continuous>  pantoprazole   Suspension 40 milliGRAM(s) Enteral Tube daily    PRN Meds:  dextrose 40% Gel 15 Gram(s) Oral once PRN Blood Glucose LESS THAN 70 milliGRAM(s)/deciliter  glucagon  Injectable 1 milliGRAM(s) IntraMuscular once PRN Glucose LESS THAN 70 milligrams/deciliter    HOME MEDICATIONS:  ALPRAZolam 0.5 mg oral tablet, extended release: 1 tab(s) orally once a day (at bedtime)  amLODIPine 5 mg oral tablet: 1 tab(s) orally once a day  clopidogrel 75 mg oral tablet: 1 tab(s) orally once a day  losartan 50 mg oral tablet: 1 tab(s) orally once a day  metFORMIN 500 mg oral tablet, extended release: 1 tab(s) orally once a day  omeprazole 20 mg oral delayed release capsule: 1 cap(s) orally once a day  OXcarbazepine 150 mg oral tablet: 1 tab(s) orally once a day  OXcarbazepine 300 mg oral tablet: 1 tab(s) orally once a day (at bedtime)  trihexyphenidyl: 1 milligram(s) orally 2 times a day      Vital Signs:   T(F): 98.2 (07-20-20 @ 04:00), Max: 98.6 (07-20-20 @ 00:00)  HR: 48 (07-20-20 @ 06:00) (36 - 72)  BP: 112/57 (07-19-20 @ 20:15) (112/57 - 112/57)  RR: 23 (07-20-20 @ 06:00) (22 - 24)  SpO2: 100% (07-20-20 @ 06:00) (100% - 100%)      07-18-20 @ 07:01  -  07-19-20 @ 07:00  --------------------------------------------------------  IN: 1365.7 mL / OUT: 1320 mL / NET: 45.7 mL    07-19-20 @ 07:01  -  07-20-20 @ 06:12  --------------------------------------------------------  IN: 2138.4 mL / OUT: 525 mL / NET: 1613.4 mL    Physical Exam:   GENERAL: NAD  HEENT: NCAT  CHEST/LUNG: CTAB  HEART: Regular rate and rhythm; s1 s2 appreciated, No murmurs, rubs, or gallops  ABDOMEN: Soft, Nontender, Nondistended; Bowel sounds present  EXTREMITIES: No LE edema b/l  SKIN: no rashes, no new lesions  NERVOUS SYSTEM:  Alert & Oriented X3  LINES/CATHETERS:    Labs:                         11.0   14.42 )-----------( 153      ( 20 Jul 2020 04:03 )             34.3     Neutophil% 79.1, Lymphocyte% 11.9, Monocyte% 8.0, Bands% 0.7 07-19-20 @ 08:10    20 Jul 2020 04:03    164    |  136    |  29     ----------------------------<  273    3.2     |  19     |  1.2      Ca    8.5        20 Jul 2020 04:03  Phos  1.4       20 Jul 2020 04:03  Mg     2.7       20 Jul 2020 04:03    TPro  6.2    /  Alb  3.2    /  TBili  0.9    /  DBili  x      /  AST  40     /  ALT  15     /  AlkPhos  43     20 Jul 2020 04:03    Troponin <0.01, CKMB --, CK -- 07-17-20 @ 13:25  Troponin <0.01, CKMB --, CK -- 07-16-20 @ 15:44    Radiology:   < from: CT Head No Cont (07.17.20 @ 10:27) >  IMPRESSION:    1.  Interval resorption of the IV contrast within the intracranial vasculature.    2.  Interval increase in posterior fossa edema and mass effect consistent with evolving infarct (involving both cerebellar hemispheres and possibly the brainstem) with resultant new effacement of the fourth ventricle and new mild obstructive hydrocephalus.    3.  Evolving infarct also noted within the right temporal lobe.    4.  Increased hyperdensity within the left ambient and quadrigeminal plate cisterns, and new hyperdensity within the right ambient and quadrigeminal plate cisterns, the suprasellar cistern, the right sylvian fissure and scattered sulci suspicious worsening/new subarachnoid hemorrhage.    5.  Redemonstrated enlarged dolichoectatic vertebrobasilar vessels with mass effect upon the brainstem.    Case was discussed with ICU intern Dr. Sage 10:48 am 7/17/2020    < end of copied text >  < from: Transthoracic Echocardiogram (07.18.20 @ 08:09) >  Summary:   1. Normal global left ventricular systolic function.   2. LV Ejection Fraction by Fuentes's Method with a biplane EF of 65 %.   3. Mild mitral valve regurgitation.   4. Color flow doppler and intravenous injection ofagitated saline demonstrates the presence of an intact intra atrial septum.    < end of copied text >  < from: Xray Chest 1 View- PORTABLE-Urgent (07.18.20 @ 13:14) >  Impression:      No radiographic evidence of acute cardiopulmonary disease.    Support tubes as above.    < end of copied text >    Assessment and Plan:   64y old male who presents with a chief complaint of Stroke s/p tpa    # CVA s/p TPA s/p thrombectomy (Basiliar Artery) - unresponsive   - repeat CT head urgent:  Interval increase in posterior fossa edema and mass effect consistent with evolving infarct  with resultant new effacement of the fourth ventricle and new mild obstructive hydrocephalus. Increased hyperdensity within the left ambient and quadrigeminal plate cisterns, and new hyperdensity within the right ambient and quadrigeminal plate cisterns, the suprasellar cistern, the right sylvian fissure and scattered sulci suspicious worsening/new subarachnoid hemorrhage.  - s/p 23% NS, 50g mannitol, 3% hypertonic saline drip   - remained ventilated, off sedation.  - off precedex  - integrillin completed  - Na 164, follow up with BMP  - target Na 155. Give free water q4    PULM:  - head of bed at 45 degrees  - ventilated    CVS:  - maintain -160  - increased levo today    GI:  - started tube feeds  - GI PPx: protonix     HEMATOLOGY:  - DVT PPx    ENDO:  - f/u fingersticks     Diet: Tube feeds  DVT ppx:  GI ppx: Protonix  Code Status: FULL CODE    DISPO: Acute Hospital Day:  4d    Chief Complaint: Patient is a 64y old male who presents with a chief complaint of Stroke s/p tpa (20 Jul 2020 03:43)    24 hour events: No acute overnight events. Patient seen unresponsive in bed this morning.     Past Medical Hx:   HTN (hypertension)  Diabetes    Past Sx:  No significant past surgical history    Allergies:  No Known Allergies    Current Meds:   Standing Meds:  atorvastatin 80 milliGRAM(s) Oral at bedtime  chlorhexidine 0.12% Liquid 15 milliLiter(s) Oral Mucosa every 12 hours  chlorhexidine 4% Liquid 1 Application(s) Topical <User Schedule>  dextrose 5%. 1000 milliLiter(s) (50 mL/Hr) IV Continuous <Continuous>  dextrose 50% Injectable 12.5 Gram(s) IV Push once  dextrose 50% Injectable 25 Gram(s) IV Push once  dextrose 50% Injectable 25 Gram(s) IV Push once  insulin lispro (HumaLOG) corrective regimen sliding scale   SubCutaneous three times a day before meals  norepinephrine Infusion 0.05 MICROgram(s)/kG/Min (3.96 mL/Hr) IV Continuous <Continuous>  pantoprazole   Suspension 40 milliGRAM(s) Enteral Tube daily    PRN Meds:  dextrose 40% Gel 15 Gram(s) Oral once PRN Blood Glucose LESS THAN 70 milliGRAM(s)/deciliter  glucagon  Injectable 1 milliGRAM(s) IntraMuscular once PRN Glucose LESS THAN 70 milligrams/deciliter    HOME MEDICATIONS:  ALPRAZolam 0.5 mg oral tablet, extended release: 1 tab(s) orally once a day (at bedtime)  amLODIPine 5 mg oral tablet: 1 tab(s) orally once a day  clopidogrel 75 mg oral tablet: 1 tab(s) orally once a day  losartan 50 mg oral tablet: 1 tab(s) orally once a day  metFORMIN 500 mg oral tablet, extended release: 1 tab(s) orally once a day  omeprazole 20 mg oral delayed release capsule: 1 cap(s) orally once a day  OXcarbazepine 150 mg oral tablet: 1 tab(s) orally once a day  OXcarbazepine 300 mg oral tablet: 1 tab(s) orally once a day (at bedtime)  trihexyphenidyl: 1 milligram(s) orally 2 times a day      Vital Signs:   T(F): 98.2 (07-20-20 @ 04:00), Max: 98.6 (07-20-20 @ 00:00)  HR: 48 (07-20-20 @ 06:00) (36 - 72)  BP: 112/57 (07-19-20 @ 20:15) (112/57 - 112/57)  RR: 23 (07-20-20 @ 06:00) (22 - 24)  SpO2: 100% (07-20-20 @ 06:00) (100% - 100%)      07-18-20 @ 07:01  -  07-19-20 @ 07:00  --------------------------------------------------------  IN: 1365.7 mL / OUT: 1320 mL / NET: 45.7 mL    07-19-20 @ 07:01  -  07-20-20 @ 06:12  --------------------------------------------------------  IN: 2138.4 mL / OUT: 525 mL / NET: 1613.4 mL    Physical Exam:   GENERAL: non-arousable to stimuli off sedation  HEENT: NCAT, nonreactive fixed pupils  CHEST/LUNG: ventilated   HEART: Regular rate and rhythm; s1 s2 appreciated  ABDOMEN: Soft Nondistended abdomen  EXTREMITIES: No LE edema b/l  NERVOUS SYSTEM:  no brainstem activity noted    Labs:                         11.0   14.42 )-----------( 153      ( 20 Jul 2020 04:03 )             34.3     Neutophil% 79.1, Lymphocyte% 11.9, Monocyte% 8.0, Bands% 0.7 07-19-20 @ 08:10    20 Jul 2020 04:03    164    |  136    |  29     ----------------------------<  273    3.2     |  19     |  1.2      Ca    8.5        20 Jul 2020 04:03  Phos  1.4       20 Jul 2020 04:03  Mg     2.7       20 Jul 2020 04:03    TPro  6.2    /  Alb  3.2    /  TBili  0.9    /  DBili  x      /  AST  40     /  ALT  15     /  AlkPhos  43     20 Jul 2020 04:03    Troponin <0.01, CKMB --, CK -- 07-17-20 @ 13:25  Troponin <0.01, CKMB --, CK -- 07-16-20 @ 15:44    Radiology:   < from: CT Head No Cont (07.17.20 @ 10:27) >  IMPRESSION:    1.  Interval resorption of the IV contrast within the intracranial vasculature.    2.  Interval increase in posterior fossa edema and mass effect consistent with evolving infarct (involving both cerebellar hemispheres and possibly the brainstem) with resultant new effacement of the fourth ventricle and new mild obstructive hydrocephalus.    3.  Evolving infarct also noted within the right temporal lobe.    4.  Increased hyperdensity within the left ambient and quadrigeminal plate cisterns, and new hyperdensity within the right ambient and quadrigeminal plate cisterns, the suprasellar cistern, the right sylvian fissure and scattered sulci suspicious worsening/new subarachnoid hemorrhage.    5.  Redemonstrated enlarged dolichoectatic vertebrobasilar vessels with mass effect upon the brainstem.    Case was discussed with ICU intern Dr. Sage 10:48 am 7/17/2020    < end of copied text >  < from: Transthoracic Echocardiogram (07.18.20 @ 08:09) >  Summary:   1. Normal global left ventricular systolic function.   2. LV Ejection Fraction by Fuentes's Method with a biplane EF of 65 %.   3. Mild mitral valve regurgitation.   4. Color flow doppler and intravenous injection ofagitated saline demonstrates the presence of an intact intra atrial septum.    < end of copied text >  < from: Xray Chest 1 View- PORTABLE-Urgent (07.18.20 @ 13:14) >  Impression:      No radiographic evidence of acute cardiopulmonary disease.    Support tubes as above.    < end of copied text >    Assessment and Plan:   64y old male who presents with a chief complaint of Stroke s/p tpa    # CVA s/p TPA s/p thrombectomy (Basiliar Artery) - unresponsive off sedation, still on pressors   - repeat CT head urgent:  Interval increase in posterior fossa edema and mass effect consistent with evolving infarct  with resultant new effacement of the fourth ventricle and new mild obstructive hydrocephalus. Increased hyperdensity within the left ambient and quadrigeminal plate cisterns, and new hyperdensity within the right ambient and quadrigeminal plate cisterns, the suprasellar cistern, the right sylvian fissure and scattered sulci suspicious worsening/new subarachnoid hemorrhage.  - s/p 23% NS, 50g mannitol, 3% hypertonic saline drip   - remained ventilated, off sedation  - integrillin completed  - remains on levo, try to wean levo.   - no notable neurologic activity.   - target Na 155. Na 164, follow up with BMP. Give free water to put in range appropriately.  - Follow up neuro recommendations    Diet: Tube feeds  DVT ppx: SCDs   GI ppx: Protonix  Code Status: FULL CODE    DISPO: Acute Hospital Day:  4d    Chief Complaint: Patient is a 64y old male who presents with a chief complaint of Stroke s/p tpa (20 Jul 2020 03:43)    24 hour events: No acute overnight events. Patient seen unresponsive in bed this morning.     Past Medical Hx:   HTN (hypertension)  Diabetes    Past Sx:  No significant past surgical history    Allergies:  No Known Allergies    Current Meds:   Standing Meds:  atorvastatin 80 milliGRAM(s) Oral at bedtime  chlorhexidine 0.12% Liquid 15 milliLiter(s) Oral Mucosa every 12 hours  chlorhexidine 4% Liquid 1 Application(s) Topical <User Schedule>  dextrose 5%. 1000 milliLiter(s) (50 mL/Hr) IV Continuous <Continuous>  dextrose 50% Injectable 12.5 Gram(s) IV Push once  dextrose 50% Injectable 25 Gram(s) IV Push once  dextrose 50% Injectable 25 Gram(s) IV Push once  insulin lispro (HumaLOG) corrective regimen sliding scale   SubCutaneous three times a day before meals  norepinephrine Infusion 0.05 MICROgram(s)/kG/Min (3.96 mL/Hr) IV Continuous <Continuous>  pantoprazole   Suspension 40 milliGRAM(s) Enteral Tube daily    PRN Meds:  dextrose 40% Gel 15 Gram(s) Oral once PRN Blood Glucose LESS THAN 70 milliGRAM(s)/deciliter  glucagon  Injectable 1 milliGRAM(s) IntraMuscular once PRN Glucose LESS THAN 70 milligrams/deciliter    HOME MEDICATIONS:  ALPRAZolam 0.5 mg oral tablet, extended release: 1 tab(s) orally once a day (at bedtime)  amLODIPine 5 mg oral tablet: 1 tab(s) orally once a day  clopidogrel 75 mg oral tablet: 1 tab(s) orally once a day  losartan 50 mg oral tablet: 1 tab(s) orally once a day  metFORMIN 500 mg oral tablet, extended release: 1 tab(s) orally once a day  omeprazole 20 mg oral delayed release capsule: 1 cap(s) orally once a day  OXcarbazepine 150 mg oral tablet: 1 tab(s) orally once a day  OXcarbazepine 300 mg oral tablet: 1 tab(s) orally once a day (at bedtime)  trihexyphenidyl: 1 milligram(s) orally 2 times a day      Vital Signs:   T(F): 98.2 (07-20-20 @ 04:00), Max: 98.6 (07-20-20 @ 00:00)  HR: 48 (07-20-20 @ 06:00) (36 - 72)  BP: 112/57 (07-19-20 @ 20:15) (112/57 - 112/57)  RR: 23 (07-20-20 @ 06:00) (22 - 24)  SpO2: 100% (07-20-20 @ 06:00) (100% - 100%)      07-18-20 @ 07:01  -  07-19-20 @ 07:00  --------------------------------------------------------  IN: 1365.7 mL / OUT: 1320 mL / NET: 45.7 mL    07-19-20 @ 07:01  -  07-20-20 @ 06:12  --------------------------------------------------------  IN: 2138.4 mL / OUT: 525 mL / NET: 1613.4 mL    Physical Exam:   GENERAL: non-arousable to stimuli off sedation  HEENT: NCAT, nonreactive fixed pupils  CHEST/LUNG: ventilated   HEART: Regular rate and rhythm; s1 s2 appreciated  ABDOMEN: Soft Nondistended abdomen  EXTREMITIES: No LE edema b/l  NERVOUS SYSTEM:  no brainstem activity noted    Labs:                         11.0   14.42 )-----------( 153      ( 20 Jul 2020 04:03 )             34.3     Neutophil% 79.1, Lymphocyte% 11.9, Monocyte% 8.0, Bands% 0.7 07-19-20 @ 08:10    20 Jul 2020 04:03    164    |  136    |  29     ----------------------------<  273    3.2     |  19     |  1.2      Ca    8.5        20 Jul 2020 04:03  Phos  1.4       20 Jul 2020 04:03  Mg     2.7       20 Jul 2020 04:03    TPro  6.2    /  Alb  3.2    /  TBili  0.9    /  DBili  x      /  AST  40     /  ALT  15     /  AlkPhos  43     20 Jul 2020 04:03    Troponin <0.01, CKMB --, CK -- 07-17-20 @ 13:25  Troponin <0.01, CKMB --, CK -- 07-16-20 @ 15:44    Radiology:   < from: CT Head No Cont (07.17.20 @ 10:27) >  IMPRESSION:    1.  Interval resorption of the IV contrast within the intracranial vasculature.    2.  Interval increase in posterior fossa edema and mass effect consistent with evolving infarct (involving both cerebellar hemispheres and possibly the brainstem) with resultant new effacement of the fourth ventricle and new mild obstructive hydrocephalus.    3.  Evolving infarct also noted within the right temporal lobe.    4.  Increased hyperdensity within the left ambient and quadrigeminal plate cisterns, and new hyperdensity within the right ambient and quadrigeminal plate cisterns, the suprasellar cistern, the right sylvian fissure and scattered sulci suspicious worsening/new subarachnoid hemorrhage.    5.  Redemonstrated enlarged dolichoectatic vertebrobasilar vessels with mass effect upon the brainstem.    Case was discussed with ICU intern Dr. Sage 10:48 am 7/17/2020    < end of copied text >  < from: Transthoracic Echocardiogram (07.18.20 @ 08:09) >  Summary:   1. Normal global left ventricular systolic function.   2. LV Ejection Fraction by Fuentes's Method with a biplane EF of 65 %.   3. Mild mitral valve regurgitation.   4. Color flow doppler and intravenous injection ofagitated saline demonstrates the presence of an intact intra atrial septum.    < end of copied text >  < from: Xray Chest 1 View- PORTABLE-Urgent (07.18.20 @ 13:14) >  Impression:      No radiographic evidence of acute cardiopulmonary disease.    Support tubes as above.    < end of copied text >    Assessment and Plan:   64 year old male pmh of DM, CAD with PCI x3 in 2017, HTN, DLD, trigeminal neuralgia who presents to ED from [facility] s/p TPA for stroke.    # CVA s/p TPA s/p thrombectomy (Basiliar Artery) - unresponsive off sedation, still on pressors   - repeat CT head urgent:  Interval increase in posterior fossa edema and mass effect consistent with evolving infarct  with resultant new effacement of the fourth ventricle and new mild obstructive hydrocephalus. Increased hyperdensity within the left ambient and quadrigeminal plate cisterns, and new hyperdensity within the right ambient and quadrigeminal plate cisterns, the suprasellar cistern, the right sylvian fissure and scattered sulci suspicious worsening/new subarachnoid hemorrhage.  - s/p 23% NS, 50g mannitol, 3% hypertonic saline drip   - remained ventilated, off sedation  - integrillin completed  - remains on levo, try to wean levo.   - no notable neurologic activity.   - target Na 155. Na 164, follow up with BMP. Started free water 400mL q4h and D5w at 75mL/hr. Continuous monitoring of BMP to not over correct too quickly.   - Follow up neuro recommendations    # DM   - HbA1c 7.5%   - Moniter FS  - continue with SSI unless FS consistently> 180     # DLD   - continue with atorvastatin 80mg     Diet: Tube feeds  DVT ppx: SCDs   GI ppx: Protonix  Code Status: FULL CODE    DISPO: Acute

## 2020-07-20 NOTE — PROGRESS NOTE ADULT - ASSESSMENT
IMPRESSION:    Stroke s/p tpa, s/p Neuro inter ( thrombectomy basilar artery ) s/p integrelin drip ; s/p repeat head ct 7/18 / worsening Neuro status / no brainstem acitivity (  pupil 2.5 mm non reactive) was on hypertonic ?? increase Na,  now bradycardic / herniation/ FREE WATER  metabolic acidosis      PLAN:    CNS: Neuro f/up/, initiate brain death protocol, f/up Na    HEENT:  Oral care    PULMONARY:  HOB @ 45 degrees, no changes in vent settings , repeat CXR    CARDIOVASCULAR: echo, monitor BP , atorvastatin. Wean levophed as tolerated     GI: GI prophylaxis                                          Feeding OG tube and OG feeding     RENAL:  F/u  lytes.  Correct as needed. accurate I/O fup cmp q 4 h    INFECTIOUS DISEASE: NO ABX; f/u cultures    HEMATOLOGICAL:  DVT prophylaxis.    ENDOCRINE:  Follow up FS.  Insulin protocol if needed.    CODE STATUS: FULL CODE    DISPOSITION: Pt requires continued monitoring in the MICU    very Poor prognosis  organ donation

## 2020-07-20 NOTE — CHART NOTE - NSCHARTNOTEFT_GEN_A_CORE
Suggestions:  -Sodium goal of 155 with replacement of 4 liters over 20 hours for brain death exam:            *Give 400ml free water through NGT Q4 hrs            *Also, start D5W at 100ml/hr           *Please check sodium level BID; 1hr after AM dose of free water and 1hr after PM dose of free water  -Please order transcranial doppler for tomorrow (7/20) as part of brain death exam    Abril Johns, MIRANDA  [phone] Suggestions:  -Sodium goal of 155 with replacement of 4 liters over 20 hours for possible brain death exam:            *Give 400ml free water through NGT Q4 hrs            *Also, start D5W at 100ml/hr           *Please check sodium level BID; 1hr after AM dose of free water and 1hr after PM dose of free water  -Continue to keep -150  -Please order transcranial doppler for tomorrow (7/20) as part of possible brain death exam  -Please continue with goals of care conversation with family    Abril Johns, MIRANDA  [phone]

## 2020-07-21 NOTE — PROGRESS NOTE ADULT - SUBJECTIVE AND OBJECTIVE BOX
Neurocritical Care Progress Note:    1. Brief Presentation: Aphasia    2. 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 Relevant brief History: 64 year old male with pmh of DM, CAD with PCI x3 in 2017, HTN, DLD, presented to  [facility] initially for expressive aphasia and confusion with NIHSS of 9 was s/p tpa at 16:46 hrs . CTA H/N revealed a right vertebral artery occlusion and distal basilar artery thrombus. Patient was sent to [facility] for neurointerventional eval and post TPA care. On initial neuro eval at [place] , patients expressive aphasia persisted . Pt. was on cardine drip to maintain BP below 185. While in ED Rapid response team was activated for sudden unresponsiveness, witnessed posturing. Pt. was intubated for airway protection and his NIHSS worsened to 39 at this time. Stat CTH was negative for bleed, CT perfusion Stat, with basilar artery near occlusion. Code NI actual was activated and patient was transferred to . Patient is s/p mechanical thrombectomy with full recanalization of aneurysmal basilar artery; procedure was completed under general anesthesia.     4-Yesterday's Plan:   -Sodium goal of 155 with replacement of 4 liters over 20 hours for possible brain death exam:  *Give 400ml free water through NGT Q4 hrs  *Also, start D5W at 100ml/hr  *Please check sodium level BID; 1hr after AM dose of free water and 1hr after PM dose of free water  -Continue to keep -150  -Please order transcranial doppler for tomorrow () as part of possible brain death exam  -Please continue with goals of care conversation with family    5. Last 24 hour updates: Remains comatose. No brain stem reflexes noted    6. Medications:   atorvastatin 80 milliGRAM(s) Oral at bedtime  chlorhexidine 0.12% Liquid 15 milliLiter(s) Oral Mucosa every 12 hours  chlorhexidine 4% Liquid 1 Application(s) Topical <User Schedule>  dextrose 5%. 1000 milliLiter(s) IV Continuous <Continuous>  dextrose 50% Injectable 12.5 Gram(s) IV Push once  dextrose 50% Injectable 25 Gram(s) IV Push once  dextrose 50% Injectable 25 Gram(s) IV Push once  insulin lispro (HumaLOG) corrective regimen sliding scale   SubCutaneous three times a day before meals  norepinephrine Infusion 0.05 MICROgram(s)/kG/Min IV Continuous <Continuous>  pantoprazole   Suspension 40 milliGRAM(s) Enteral Tube daily  sodium chloride 0.9%. 1000 milliLiter(s) IV Continuous <Continuous>      7. Ancillary Management:   Chest PT[ ]   Head of bed >35 [x ]   Out of bed to chair [ ]   PT/OT/SP Eval [ ]   Spirometry[ ]   DVT prophalaxis[x ]    8.Neuro:   Neurologic Exam:  Mental status: Comatose  Language: Intubated; no sedation on board. Not following commands.  Cranial nerves: no b/l pupillary reflex, no corneal reflex, no occulocephalic reflex, no gag reflex   Motor: Flaccid, no tremors. No posturing noted  Sensation: No withdrawal or localization to noxious stimuli   Reflex: Babinski mute    mRS:  0 No symptoms at all  1 No significant disability despite symptoms; able to carry out all usual duties and activities without assistance  2 Slight disability; unable to carry out all previous activities, but able to look after own affairs  3 Moderate disability; requiring some help, but able to walk without assistance  4 Moderately severe disability; unable to walk without assistance and unable to attend to own bodily needs without assistance  5 Severe disability; bedridden, incontinent and requiring constant nursing care and attention  6 Dead    Last CTH: < from: CT Head No Cont (20 @ 10:27) >  Comparison is made with the CT scans of the head and CTA exams from the prior day.    FINDINGS/  IMPRESSION:    1.  Interval resorption of the IV contrast within the intracranial vasculature.    2.  Interval increase in posterior fossa edema and mass effect consistent with evolving infarct (involving both cerebellar hemispheres and possibly the brainstem) with resultant new effacement of the fourth ventricle and new mild obstructive hydrocephalus.    3.  Evolving infarct also noted within the right temporal lobe.    4.  Increased hyperdensity within the left ambient and quadrigeminal plate cisterns, and new hyperdensity within the right ambient and quadrigeminal plate cisterns, the suprasellar cistern, the right sylvian fissure and scattered sulci suspicious worsening/new subarachnoid hemorrhage.    5.  Redemonstrated enlarged dolichoectatic vertebrobasilar vessels with mass effect upon the brainstem.    < end of copied text >      Last CTA/MRA:    Last CTP:    Last MRI:    Last TCD:    Last EEG:    EVD: [ ] [place]: [ ]     ICP:     CPP:     Level(cm):     24hr(ml):     CSF:     W:     R:     C:     P:     G:     LA:    9. Cardiovascular:   Vital Signs Last 24 Hrs  T(C): 36.2 (2020 08:00), Max: 36.2 (2020 08:00)  T(F): 97.2 (2020 08:00), Max: 97.2 (2020 08:00)  HR: 78 (2020 13:45) (44 - 104)  BP: --  BP(mean): --  RR: 23 (2020 13:45) (23 - 30)  SpO2: 100% (2020 13:45) (99% - 100%)     Last Echo:    Last EKG:    CVP   MAP/CPP/SBP target:   CO:      CI:       Enzymes/Trop:    10. Respiratory:   ABG:ABG - ( 2020 04:36 )  pH, Arterial: 7.36  pH, Blood: x     /  pCO2: 36    /  pO2: 89    / HCO3: 20    / Base Excess: -4.5  /  SaO2: 96                  VBG:    Chest Xray:    Mode: AC/ CMV (Assist Control/ Continuous Mandatory Ventilation)  RR (machine): 24  TV (machine): 450  FiO2: 30  PEEP: 5  ITime: 1  MAP: 10  PIP: 17      Peak Pressure/[place] Pressure:    11.GI:    Prophalaxis:     Bowel mvt:     Abd distension:   LIVER FUNCTIONS - ( 2020 06:20 )  Alb: Results removed g/dL / Pro: Results removed g/dL / ALK PHOS: Results removed U/L / ALT: Results removed U/L / AST: Results removed U/L / GGT: x             12.Renal/Fluids/Electrolytes:    -    167<HH>  |  137<H>  |  20  ----------------------------<  303<H>  3.7   |  20  |  1.4    Ca    8.4<L>      2020 07:15  Phos  2.1     07-21  Mg     2.6     07-    TPro  Results removed  /  Alb  Results removed  /  TBili  Results removed  /  DBili  x   /  AST  Results removed  /  ALT  Results removed  /  AlkPhos  Results removed        I&O's Detail    2020 07:01  -  2020 07:00  --------------------------------------------------------  IN:    dextrose 5%.: 1575 mL    Free Water: 2300 mL    norepinephrine Infusion: 48.5 mL    Oral Fluid: 50 mL    Osmolite: 1080 mL  Total IN: 5053.5 mL    OUT:    Intermittent Catheterization - Urethral: 3580 mL  Total OUT: 3580 mL    Total NET: 1473.5 mL      2020 07:01  -  2020 14:37  --------------------------------------------------------  IN:    dextrose 5%.: 500 mL    Free Water: 800 mL    norepinephrine Infusion: 7.4 mL    Osmolite: 315 mL    sodium chloride 0.9%.: 200 mL  Total IN: 1822.4 mL    OUT:    Indwelling Catheter - Urethral: 300 mL    Intermittent Catheterization - Urethral: 1725 mL  Total OUT: 2025 mL    Total NET: -202.6 mL          13.ID:   TMax:   Vital Signs Last 24 Hrs  T(C): 36.2 (2020 08:00), Max: 36.2 (2020 08:00)  T(F): 97.2 (2020 08:00), Max: 97.2 (2020 08:00)  HR: 78 (2020 13:45) (44 - 104)  BP: --  BP(mean): --  RR: 23 (2020 13:45) (23 - 30)  SpO2: 100% (2020 13:45) (99% - 100%)      Urinalysis Basic - ( 2020 04:35 )    Color: Colorless / Appearance: Clear / S.004 / pH: x  Gluc: x / Ketone: Negative  / Bili: Negative / Urobili: <2 mg/dL   Blood: x / Protein: Trace / Nitrite: Negative   Leuk Esterase: Negative / RBC: 3 /HPF / WBC 2 /HPF   Sq Epi: x / Non Sq Epi: 2 /HPF / Bacteria: Negative         Lines: Central[] Date inserted: Peripheral[]    14. Hematology:                         11.0   13.14 )-----------( 138      ( 2020 04:15 )             34.0      07-    167<HH>  |  137<H>  |  20  ----------------------------<  303<H>  3.7   |  20  |  1.4    Ca    8.4<L>      2020 07:15  Phos  2.1       Mg     2.6         TPro  Results removed  /  Alb  Results removed  /  TBili  Results removed  /  DBili  x   /  AST  Results removed  /  ALT  Results removed  /  AlkPhos  Results removed           DVT Prophylaxis Lovenox[ ] Heparin[ ] Venodynes[ ] SCD's[ ]    15. Impression:      16. Suggestions:  -Please give free water to bring sodium level down to goal of <160 for brain death exam  -Continue to keep -150  -Please order transcranial doppler as part of brain death exam  -Please continue with goals of care conversation with family    17. Disposition:  -Continue with medical management on ICU Neurocritical Care Progress Note:    1. Brief Presentation: Aphasia    2. 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 Relevant brief History: 64 year old male with pmh of DM, CAD with PCI x3 in 2017, HTN, DLD, presented to  [facility] initially for expressive aphasia and confusion with NIHSS of 9 was s/p tpa at 16:46 hrs . CTA H/N revealed a right vertebral artery occlusion and distal basilar artery thrombus. Patient was sent to [facility] for neurointerventional eval and post TPA care. On initial neuro eval at [place] , patients expressive aphasia persisted . Pt. was on cardine drip to maintain BP below 185. While in ED Rapid response team was activated for sudden unresponsiveness, witnessed posturing. Pt. was intubated for airway protection and his NIHSS worsened to 39 at this time. Stat CTH was negative for bleed, CT perfusion Stat, with basilar artery near occlusion. Code NI actual was activated and patient was transferred to . Patient is s/p mechanical thrombectomy with full recanalization of aneurysmal basilar artery; procedure was completed under general anesthesia.     4-Yesterday's Plan:   -Sodium goal of 155 with replacement of 4 liters over 20 hours for possible brain death exam:  *Give 400ml free water through NGT Q4 hrs  *Also, start D5W at 100ml/hr  *Please check sodium level BID; 1hr after AM dose of free water and 1hr after PM dose of free water  -Continue to keep -150  -Please order transcranial doppler for tomorrow () as part of possible brain death exam  -Please continue with goals of care conversation with family    5. Last 24 hour updates: Remains comatose. No brain stem reflexes noted    6. Medications:   atorvastatin 80 milliGRAM(s) Oral at bedtime  chlorhexidine 0.12% Liquid 15 milliLiter(s) Oral Mucosa every 12 hours  chlorhexidine 4% Liquid 1 Application(s) Topical <User Schedule>  dextrose 5%. 1000 milliLiter(s) IV Continuous <Continuous>  dextrose 50% Injectable 12.5 Gram(s) IV Push once  dextrose 50% Injectable 25 Gram(s) IV Push once  dextrose 50% Injectable 25 Gram(s) IV Push once  insulin lispro (HumaLOG) corrective regimen sliding scale   SubCutaneous three times a day before meals  norepinephrine Infusion 0.05 MICROgram(s)/kG/Min IV Continuous <Continuous>  pantoprazole   Suspension 40 milliGRAM(s) Enteral Tube daily  sodium chloride 0.9%. 1000 milliLiter(s) IV Continuous <Continuous>    7. Ancillary Management:   Chest PT[ ]   Head of bed >35 [x ]   Out of bed to chair [ ]   PT/OT/SP Eval [ ]   Spirometry[ ]   DVT prophalaxis[x ]    8.Neuro:   Neurologic Exam:  Mental status: Comatose  Language: Intubated; no sedation on board. Not following commands.  Cranial nerves: no b/l pupillary reflex, no corneal reflex, no occulocephalic reflex, no gag reflex   Motor: Flaccid, no tremors. No posturing noted  Sensation: No withdrawal or localization to noxious stimuli   Reflex: Babinski mute    mRS:  0 No symptoms at all  1 No significant disability despite symptoms; able to carry out all usual duties and activities without assistance  2 Slight disability; unable to carry out all previous activities, but able to look after own affairs  3 Moderate disability; requiring some help, but able to walk without assistance  4 Moderately severe disability; unable to walk without assistance and unable to attend to own bodily needs without assistance  5 Severe disability; bedridden, incontinent and requiring constant nursing care and attention  6 Dead    Last CTH: < from: CT Head No Cont (20 @ 10:27) >  Comparison is made with the CT scans of the head and CTA exams from the prior day.    FINDINGS/  IMPRESSION:    1.  Interval resorption of the IV contrast within the intracranial vasculature.    2.  Interval increase in posterior fossa edema and mass effect consistent with evolving infarct (involving both cerebellar hemispheres and possibly the brainstem) with resultant new effacement of the fourth ventricle and new mild obstructive hydrocephalus.    3.  Evolving infarct also noted within the right temporal lobe.    4.  Increased hyperdensity within the left ambient and quadrigeminal plate cisterns, and new hyperdensity within the right ambient and quadrigeminal plate cisterns, the suprasellar cistern, the right sylvian fissure and scattered sulci suspicious worsening/new subarachnoid hemorrhage.    5.  Redemonstrated enlarged dolichoectatic vertebrobasilar vessels with mass effect upon the brainstem.    < end of copied text >      Last CTA/MRA: < from: CT Angio Neck w/ IV Cont (20 @ 16:08) >  IMPRESSION:    1.  *Occlusion of the right vertebral artery from about the level of the C2 vertebral body to the vertebrobasilar junction (V3 and V4 segments).  2.  *Thrombus within the distal basilar artery measuring about 6 mm in length and producing about 80% luminal stenosis.    Other extensive atheromatous changes including:  3.  Moderate/severe stenosis of the right subclavian artery origin.   4.  Moderate/severe stenosis of the right vertebral artery origin.   5.  Moderate stenosis of the right ICA petrous segment.  6.  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  7.  Fusiform dilatation of the left vertebral artery up to 7 mm, and the basilar artery up to 1.1 cm, with compression of the indira.    < end of copied text >      Last CTP: < from: CT Perfusion w/ Maps w/ IV Cont (20 @ 20:08) >  IMPRESSION:    Since most recent examination the previously noted distal basilar clot is less well-defined on this examination though now with diminished contrast enhancement of the distal basilar which likely represents residual thrombus. Contrast enhancement within the right PCA is diminished as compared to prior.    There has been extension of the right vertebral artery thrombus inferiorly now extending to the level of the C7 vertebral body.    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    Multifocal areas of moderate to severe stenosis of the bilateral ICA and MCA branches with unchangedbroad-based 2 mm aneurysms noted of the bilateral M1 segments.    Approximately 301 mL ischemic penumbra noted involving the posterior circulation without core infarction    < end of copied text >      Last MRI: n/a    Last TCD: n/a    Last EEG: < from: EEG (20 @ 13:00) >  Impression  -  Abnormal due to the presence of: focal slowing as above, generalized slowing as above  -    Clinical Correlation & Recommendations   Consistent with diffuse cerebral electrophysiological dysfunction.  Secondary to none specific cause. Consistent with focal electrophysiological dysfunction.  Secondary to structural/metabolic cause.    < end of copied text >    9. Cardiovascular:   Vital Signs Last 24 Hrs  T(C): 36.2 (2020 08:00), Max: 36.2 (2020 08:00)  T(F): 97.2 (2020 08:00), Max: 97.2 (2020 08:00)  HR: 78 (2020 13:45) (44 - 104)  BP: --  BP(mean): --  RR: 23 (2020 13:45) (23 - 30)  SpO2: 100% (2020 13:45) (99% - 100%)   Blood Gas Arterial, Lactate (20 @ 04:36)    Blood Gas Arterial, Lactate: 1.1 mmoL/L    Last Echo: < from: Transthoracic Echocardiogram (20 @ 08:09) >  Summary:   1. Normal global left ventricular systolic function.   2. LV Ejection Fraction by Fuentes's Method with a biplane EF of 65 %.   3. Mild mitral valve regurgitation.   4. Color flow doppler and intravenous injection ofagitated saline demonstrates the presence of an intact intra atrial septum.    PHYSICIAN INTERPRETATION:  Left Ventricle: The left ventricular internal cavity size is normal. Left ventricular wall thickness is normal. Global LV systolic function was normal. Spectral Doppler shows normal pattern of LV diastolic filling.  Right Ventricle: Normal right ventricular size and function.  Left Atrium: Normal left atrial size. Color flow doppler and intravenous injection of agitated saline demonstrates the presence of an intact intra atrial septum.  Right Atrium: Normal right atrial size.  Pericardium: There is no evidence of pericardial effusion.  Mitral Valve: The mitral valve is normal in structure. Peak transmitral mean gradient equals 1.6 mmHg, calculated mitral valve area by pressure half time equals 4.46 cm² consistent with No evidence of mitral stenosis. Mild mitral valve regurgitation is seen.  Tricuspid Valve: The tricuspid valve is normal in structure. Mild tricuspid regurgitation is visualized.  Aortic Valve: Normal trileaflet aortic valve with normal opening. No evidence of aortic valve regurgitation is seen.  Pulmonic Valve: Structurally normal pulmonic valve, with normal leaflet excursion.  Aorta: The aortic root is normal in size and structure.  Pulmonary Artery: The pulmonary artery is of normal size and origin.  Shunts: Agitated saline contrast was given intravenously to evaluate for intracardiac shunting.    < end of copied text >    Last EKG: < from: 12 Lead ECG (20 @ 22:44) >  Diagnosis Line Marked sinus bradycardia with 1st degree A-V block  T wave abnormality, consider lateral ischemia  Abnormal ECG    < end of copied text >    CVP   MAP/CPP/SBP target:   CO:      CI:       Enzymes/Trop:  Troponin T, Serum (20 @ 13:25)    Troponin T, Serum: <0.01 ng/mL    10. Respiratory:   ABG:ABG - ( 2020 04:36 )  pH, Arterial: 7.36  pH, Blood: x     /  pCO2: 36    /  pO2: 89    / HCO3: 20    / Base Excess: -4.5  /  SaO2: 96        VBG: Blood Gas Profile - Venous (20 @ 20:39)    pH, Venous: 7.40    pCO2, Venous: 35 mmHg    pO2, Venous: 93 mmHg    HCO3, Venous: 22 mmoL/L    Base Excess, Venous: -2.7 mmoL/L    Oxygen Saturation, Venous: 96 %    Chest Xray: < from: Xray Chest 1 View- PORTABLE-Urgent (20 @ 08:52) >  Impression:    Stable left base opacity.    < end of copied text >    Mode: AC/ CMV (Assist Control/ Continuous Mandatory Ventilation)  RR (machine): 24  TV (machine): 450  FiO2: 30  PEEP: 5  ITime: 1  MAP: 10  PIP: 17    Peak Pressure/[place] Pressure: n/a    11.GI:  Prophalaxis:  pantoprazole      Bowel mvt:     Abd distension:   LIVER FUNCTIONS - ( 2020 06:20 )  Alb: Results removed g/dL / Pro: Results removed g/dL / ALK PHOS: Results removed U/L / ALT: Results removed U/L / AST: Results removed U/L / GGT: x           12.Renal/Fluids/Electrolytes:        167<HH>  |  137<H>  |  20  ----------------------------<  303<H>  3.7   |  20  |  1.4    Ca    8.4<L>      2020 07:15  Phos  2.1       Mg     2.6         TPro  Results removed  /  Alb  Results removed  /  TBili  Results removed  /  DBili  x   /  AST  Results removed  /  ALT  Results removed  /  AlkPhos  Results removed        I&O's Detail    2020 07:01  -  2020 07:00  --------------------------------------------------------  IN:    dextrose 5%.: 1575 mL    Free Water: 2300 mL    norepinephrine Infusion: 48.5 mL    Oral Fluid: 50 mL    Osmolite: 1080 mL  Total IN: 5053.5 mL    OUT:    Intermittent Catheterization - Urethral: 3580 mL  Total OUT: 3580 mL    Total NET: 1473.5 mL      2020 07:01  -  2020 14:37  --------------------------------------------------------  IN:    dextrose 5%.: 500 mL    Free Water: 800 mL    norepinephrine Infusion: 7.4 mL    Osmolite: 315 mL    sodium chloride 0.9%.: 200 mL  Total IN: 1822.4 mL    OUT:    Indwelling Catheter - Urethral: 300 mL    Intermittent Catheterization - Urethral: 1725 mL  Total OUT: 2025 mL    Total NET: -202.6 mL    13.ID:    Urinalysis Basic - ( 2020 04:35 )    Color: Colorless / Appearance: Clear / S.004 / pH: x  Gluc: x / Ketone: Negative  / Bili: Negative / Urobili: <2 mg/dL   Blood: x / Protein: Trace / Nitrite: Negative   Leuk Esterase: Negative / RBC: 3 /HPF / WBC 2 /HPF   Sq Epi: x / Non Sq Epi: 2 /HPF / Bacteria: Negative  Creatinine, Random Urine (20 @ 04:35)    Creatinine, Random Urine: 13: Reference Ranges have NOT been established for random urine analytes due  to variability in fluid intake and concentration. mg/dL    Lines: Central[] Date inserted: Peripheral[x]    14. Hematology:                         11.0   13.14 )-----------( 138      ( 2020 04:15 )             34.0          167<HH>  |  137<H>  |  20  ----------------------------<  303<H>  3.7   |  20  |  1.4    Ca    8.4<L>      2020 07:15  Phos  2.1       Mg     2.6         TPro  Results removed  /  Alb  Results removed  /  TBili  Results removed  /  DBili  x   /  AST  Results removed  /  ALT  Results removed  /  AlkPhos  Results removed       DVT Prophylaxis Lovenox[ ] Heparin[ ] Venodynes[ ] SCD's[x ]    15. Impression:      16. Suggestions:  -Please give free water to bring sodium level down to goal of <160 for brain death exam  -Continue to keep -150  -Please order transcranial doppler as part of brain death exam  -Please continue with goals of care conversation with family    17. Disposition:  -Continue with medical management on ICU Neurocritical Care Progress Note:    1. Brief Presentation: Aphasia    2. 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 Relevant brief History: 64 year old male with pmh of DM, CAD with PCI x3 in 2017, HTN, DLD, presented to  [facility] initially for expressive aphasia and confusion with NIHSS of 9 was s/p tpa at 16:46 hrs . CTA H/N revealed a right vertebral artery occlusion and distal basilar artery thrombus. Patient was sent to [facility] for neurointerventional eval and post TPA care. On initial neuro eval at [place] , patients expressive aphasia persisted . Pt. was on cardine drip to maintain BP below 185. While in ED Rapid response team was activated for sudden unresponsiveness, witnessed posturing. Pt. was intubated for airway protection and his NIHSS worsened to 39 at this time. Stat CTH was negative for bleed, CT perfusion Stat, with basilar artery near occlusion. Code NI actual was activated and patient was transferred to . Patient is s/p mechanical thrombectomy with full recanalization of aneurysmal basilar artery; procedure was completed under general anesthesia.     4-Yesterday's Plan:   -Sodium goal of 155 with replacement of 4 liters over 20 hours for possible brain death exam:  *Give 400ml free water through NGT Q4 hrs  *Also, start D5W at 100ml/hr  *Please check sodium level BID; 1hr after AM dose of free water and 1hr after PM dose of free water  -Continue to keep -150  -Please order transcranial doppler for tomorrow () as part of possible brain death exam  -Please continue with goals of care conversation with family    5. Last 24 hour updates: Remains comatose. No brain stem reflexes noted    6. Medications:   atorvastatin 80 milliGRAM(s) Oral at bedtime  chlorhexidine 0.12% Liquid 15 milliLiter(s) Oral Mucosa every 12 hours  chlorhexidine 4% Liquid 1 Application(s) Topical <User Schedule>  dextrose 5%. 1000 milliLiter(s) IV Continuous <Continuous>  dextrose 50% Injectable 12.5 Gram(s) IV Push once  dextrose 50% Injectable 25 Gram(s) IV Push once  dextrose 50% Injectable 25 Gram(s) IV Push once  insulin lispro (HumaLOG) corrective regimen sliding scale   SubCutaneous three times a day before meals  norepinephrine Infusion 0.05 MICROgram(s)/kG/Min IV Continuous <Continuous>  pantoprazole   Suspension 40 milliGRAM(s) Enteral Tube daily  sodium chloride 0.9%. 1000 milliLiter(s) IV Continuous <Continuous>    7. Ancillary Management:   Chest PT[ ]   Head of bed >35 [x ]   Out of bed to chair [ ]   PT/OT/SP Eval [ ]   Spirometry[ ]   DVT prophalaxis[x ]    8.Neuro:   Neurologic Exam:  Mental status: Comatose  Language: Intubated; no sedation on board. Not following commands.  Cranial nerves: no b/l pupillary reflex, no corneal reflex, no occulocephalic reflex, no gag reflex   Motor: Flaccid, no tremors. No posturing noted  Sensation: No withdrawal or localization to noxious stimuli   Reflex: Babinski mute    mRS:  0 No symptoms at all  1 No significant disability despite symptoms; able to carry out all usual duties and activities without assistance  2 Slight disability; unable to carry out all previous activities, but able to look after own affairs  3 Moderate disability; requiring some help, but able to walk without assistance  4 Moderately severe disability; unable to walk without assistance and unable to attend to own bodily needs without assistance  5 Severe disability; bedridden, incontinent and requiring constant nursing care and attention  6 Dead    Last CTH: < from: CT Head No Cont (20 @ 10:27) >  Comparison is made with the CT scans of the head and CTA exams from the prior day.    FINDINGS/  IMPRESSION:    1.  Interval resorption of the IV contrast within the intracranial vasculature.    2.  Interval increase in posterior fossa edema and mass effect consistent with evolving infarct (involving both cerebellar hemispheres and possibly the brainstem) with resultant new effacement of the fourth ventricle and new mild obstructive hydrocephalus.    3.  Evolving infarct also noted within the right temporal lobe.    4.  Increased hyperdensity within the left ambient and quadrigeminal plate cisterns, and new hyperdensity within the right ambient and quadrigeminal plate cisterns, the suprasellar cistern, the right sylvian fissure and scattered sulci suspicious worsening/new subarachnoid hemorrhage.    5.  Redemonstrated enlarged dolichoectatic vertebrobasilar vessels with mass effect upon the brainstem.    < end of copied text >      Last CTA/MRA: < from: CT Angio Neck w/ IV Cont (20 @ 16:08) >  IMPRESSION:    1.  *Occlusion of the right vertebral artery from about the level of the C2 vertebral body to the vertebrobasilar junction (V3 and V4 segments).  2.  *Thrombus within the distal basilar artery measuring about 6 mm in length and producing about 80% luminal stenosis.    Other extensive atheromatous changes including:  3.  Moderate/severe stenosis of the right subclavian artery origin.   4.  Moderate/severe stenosis of the right vertebral artery origin.   5.  Moderate stenosis of the right ICA petrous segment.  6.  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  7.  Fusiform dilatation of the left vertebral artery up to 7 mm, and the basilar artery up to 1.1 cm, with compression of the indira.    < end of copied text >      Last CTP: < from: CT Perfusion w/ Maps w/ IV Cont (20 @ 20:08) >  IMPRESSION:    Since most recent examination the previously noted distal basilar clot is less well-defined on this examination though now with diminished contrast enhancement of the distal basilar which likely represents residual thrombus. Contrast enhancement within the right PCA is diminished as compared to prior.    There has been extension of the right vertebral artery thrombus inferiorly now extending to the level of the C7 vertebral body.    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    Multifocal areas of moderate to severe stenosis of the bilateral ICA and MCA branches with unchangedbroad-based 2 mm aneurysms noted of the bilateral M1 segments.    Approximately 301 mL ischemic penumbra noted involving the posterior circulation without core infarction    < end of copied text >      Last MRI: n/a    Last TCD: n/a    Last EEG: < from: EEG (20 @ 13:00) >  Impression  -  Abnormal due to the presence of: focal slowing as above, generalized slowing as above  -    Clinical Correlation & Recommendations   Consistent with diffuse cerebral electrophysiological dysfunction.  Secondary to none specific cause. Consistent with focal electrophysiological dysfunction.  Secondary to structural/metabolic cause.    < end of copied text >    9. Cardiovascular:   Vital Signs Last 24 Hrs  T(C): 36.2 (2020 08:00), Max: 36.2 (2020 08:00)  T(F): 97.2 (2020 08:00), Max: 97.2 (2020 08:00)  HR: 78 (2020 13:45) (44 - 104)  BP: --  BP(mean): --  RR: 23 (2020 13:45) (23 - 30)  SpO2: 100% (2020 13:45) (99% - 100%)   Blood Gas Arterial, Lactate (20 @ 04:36)    Blood Gas Arterial, Lactate: 1.1 mmoL/L    Last Echo: < from: Transthoracic Echocardiogram (20 @ 08:09) >  Summary:   1. Normal global left ventricular systolic function.   2. LV Ejection Fraction by Fuentes's Method with a biplane EF of 65 %.   3. Mild mitral valve regurgitation.   4. Color flow doppler and intravenous injection ofagitated saline demonstrates the presence of an intact intra atrial septum.    PHYSICIAN INTERPRETATION:  Left Ventricle: The left ventricular internal cavity size is normal. Left ventricular wall thickness is normal. Global LV systolic function was normal. Spectral Doppler shows normal pattern of LV diastolic filling.  Right Ventricle: Normal right ventricular size and function.  Left Atrium: Normal left atrial size. Color flow doppler and intravenous injection of agitated saline demonstrates the presence of an intact intra atrial septum.  Right Atrium: Normal right atrial size.  Pericardium: There is no evidence of pericardial effusion.  Mitral Valve: The mitral valve is normal in structure. Peak transmitral mean gradient equals 1.6 mmHg, calculated mitral valve area by pressure half time equals 4.46 cm² consistent with No evidence of mitral stenosis. Mild mitral valve regurgitation is seen.  Tricuspid Valve: The tricuspid valve is normal in structure. Mild tricuspid regurgitation is visualized.  Aortic Valve: Normal trileaflet aortic valve with normal opening. No evidence of aortic valve regurgitation is seen.  Pulmonic Valve: Structurally normal pulmonic valve, with normal leaflet excursion.  Aorta: The aortic root is normal in size and structure.  Pulmonary Artery: The pulmonary artery is of normal size and origin.  Shunts: Agitated saline contrast was given intravenously to evaluate for intracardiac shunting.    < end of copied text >    Last EKG: < from: 12 Lead ECG (20 @ 22:44) >  Diagnosis Line Marked sinus bradycardia with 1st degree A-V block  T wave abnormality, consider lateral ischemia  Abnormal ECG    < end of copied text >    CVP   MAP/CPP/SBP target:   CO:      CI:       Enzymes/Trop:  Troponin T, Serum (20 @ 13:25)    Troponin T, Serum: <0.01 ng/mL    10. Respiratory:   ABG:ABG - ( 2020 04:36 )  pH, Arterial: 7.36  pH, Blood: x     /  pCO2: 36    /  pO2: 89    / HCO3: 20    / Base Excess: -4.5  /  SaO2: 96        VBG: Blood Gas Profile - Venous (20 @ 20:39)    pH, Venous: 7.40    pCO2, Venous: 35 mmHg    pO2, Venous: 93 mmHg    HCO3, Venous: 22 mmoL/L    Base Excess, Venous: -2.7 mmoL/L    Oxygen Saturation, Venous: 96 %    Chest Xray: < from: Xray Chest 1 View- PORTABLE-Urgent (20 @ 08:52) >  Impression:    Stable left base opacity.    < end of copied text >    Mode: AC/ CMV (Assist Control/ Continuous Mandatory Ventilation)  RR (machine): 24  TV (machine): 450  FiO2: 30  PEEP: 5  ITime: 1  MAP: 10  PIP: 17    Peak Pressure/[place] Pressure: n/a    11.GI:  Prophalaxis:  pantoprazole      Bowel mvt:     Abd distension:   LIVER FUNCTIONS - ( 2020 06:20 )  Alb: Results removed g/dL / Pro: Results removed g/dL / ALK PHOS: Results removed U/L / ALT: Results removed U/L / AST: Results removed U/L / GGT: x           12.Renal/Fluids/Electrolytes:        167<HH>  |  137<H>  |  20  ----------------------------<  303<H>  3.7   |  20  |  1.4    Ca    8.4<L>      2020 07:15  Phos  2.1       Mg     2.6         TPro  Results removed  /  Alb  Results removed  /  TBili  Results removed  /  DBili  x   /  AST  Results removed  /  ALT  Results removed  /  AlkPhos  Results removed        I&O's Detail    2020 07:01  -  2020 07:00  --------------------------------------------------------  IN:    dextrose 5%.: 1575 mL    Free Water: 2300 mL    norepinephrine Infusion: 48.5 mL    Oral Fluid: 50 mL    Osmolite: 1080 mL  Total IN: 5053.5 mL    OUT:    Intermittent Catheterization - Urethral: 3580 mL  Total OUT: 3580 mL    Total NET: 1473.5 mL      2020 07:01  -  2020 14:37  --------------------------------------------------------  IN:    dextrose 5%.: 500 mL    Free Water: 800 mL    norepinephrine Infusion: 7.4 mL    Osmolite: 315 mL    sodium chloride 0.9%.: 200 mL  Total IN: 1822.4 mL    OUT:    Indwelling Catheter - Urethral: 300 mL    Intermittent Catheterization - Urethral: 1725 mL  Total OUT: 2025 mL    Total NET: -202.6 mL    13.ID:    Urinalysis Basic - ( 2020 04:35 )    Color: Colorless / Appearance: Clear / S.004 / pH: x  Gluc: x / Ketone: Negative  / Bili: Negative / Urobili: <2 mg/dL   Blood: x / Protein: Trace / Nitrite: Negative   Leuk Esterase: Negative / RBC: 3 /HPF / WBC 2 /HPF   Sq Epi: x / Non Sq Epi: 2 /HPF / Bacteria: Negative  Creatinine, Random Urine (20 @ 04:35)    Creatinine, Random Urine: 13: Reference Ranges have NOT been established for random urine analytes due  to variability in fluid intake and concentration. mg/dL    Lines: Central[] Date inserted: Peripheral[x]    14. Hematology:                         11.0   13.14 )-----------( 138      ( 2020 04:15 )             34.0          167<HH>  |  137<H>  |  20  ----------------------------<  303<H>  3.7   |  20  |  1.4    Ca    8.4<L>      2020 07:15  Phos  2.1       Mg     2.6         TPro  Results removed  /  Alb  Results removed  /  TBili  Results removed  /  DBili  x   /  AST  Results removed  /  ALT  Results removed  /  AlkPhos  Results removed       DVT Prophylaxis Lovenox[ ] Heparin[ ] Venodynes[ ] SCD's[x ]    15. Impression: 64 year old gentleman with history significant for DM, CAD with PCI x3 in 2017, HTN, DLD, trigeminal neuralgia presents to the ED for aphasia s/p tPA for stroke. Baseline is AAOx3. Initial NIHSS 9. While in the ED, patient was with sudden body posturing and not following commands. Intubated for airway protection. Stat CTH unremarkable. CTA showed right vertebral artery occlusion and distal basilar artery thrombus. CTP with basilar artery near occlusion. Code NI actual was activated and patient was transferred to [facility] for neurointervention. S/p basilar artery thrombectomy TICI 3 and on integrillin gtt, now since stopped. 3% started for severe cytotoxic edema and brainstem compression. In 24 hours, sodium level had risen to 170, so 3% was discontinued. I/Os over 24 hours and normal renal function do not support central DI, so administration of free water and D5W until sodium level of 160 is achieved. Today, patient remains brainstem areflexic, intubated with no sedation on board, with sodium level 167 this AM. Wife aware of very poor prognosis. Awaiting TCD as part of brain death exam. Plan to continue free water until sodium decreases so that brain death exam can occur.      16. Suggestions:  -Please give free water to bring sodium level down to goal of <160 for brain death exam  -Continue to keep -150  -Please order transcranial doppler as part of brain death exam  -Please continue with goals of care conversation with family    17. Disposition:  -Continue with medical management on ICU    Abril Johns, MIRANDA  [phone]

## 2020-07-21 NOTE — PROVIDER CONTACT NOTE (CHANGE IN STATUS NOTIFICATION) - ACTION/TREATMENT ORDERED:
Call placed to MD Ashton to re-eval need for more frequent fingersticks and adjustments to sliding scale to better control glucose. MD to eval for appropriate treatments. Will continue to observe.

## 2020-07-21 NOTE — PROGRESS NOTE ADULT - SUBJECTIVE AND OBJECTIVE BOX
Hospital Day:  5d    Chief Complaint: Patient is a 64y old  Male who presents with a chief complaint of Stroke s/p tpa (2020 07:29)    24 hour events:     Past Medical Hx:   HTN (hypertension)  Diabetes    Past Sx:  No significant past surgical history    Allergies:  No Known Allergies    Current Meds:   Standing Meds:  atorvastatin 80 milliGRAM(s) Oral at bedtime  chlorhexidine 0.12% Liquid 15 milliLiter(s) Oral Mucosa every 12 hours  chlorhexidine 4% Liquid 1 Application(s) Topical <User Schedule>  dextrose 5%. 1000 milliLiter(s) (50 mL/Hr) IV Continuous <Continuous>  dextrose 5%. 1000 milliLiter(s) (100 mL/Hr) IV Continuous <Continuous>  dextrose 50% Injectable 12.5 Gram(s) IV Push once  dextrose 50% Injectable 25 Gram(s) IV Push once  dextrose 50% Injectable 25 Gram(s) IV Push once  insulin lispro (HumaLOG) corrective regimen sliding scale   SubCutaneous three times a day before meals  norepinephrine Infusion 0.05 MICROgram(s)/kG/Min (3.96 mL/Hr) IV Continuous <Continuous>  pantoprazole   Suspension 40 milliGRAM(s) Enteral Tube daily    PRN Meds:  dextrose 40% Gel 15 Gram(s) Oral once PRN Blood Glucose LESS THAN 70 milliGRAM(s)/deciliter  glucagon  Injectable 1 milliGRAM(s) IntraMuscular once PRN Glucose LESS THAN 70 milligrams/deciliter    HOME MEDICATIONS:  ALPRAZolam 0.5 mg oral tablet, extended release: 1 tab(s) orally once a day (at bedtime)  amLODIPine 5 mg oral tablet: 1 tab(s) orally once a day  clopidogrel 75 mg oral tablet: 1 tab(s) orally once a day  losartan 50 mg oral tablet: 1 tab(s) orally once a day  metFORMIN 500 mg oral tablet, extended release: 1 tab(s) orally once a day  omeprazole 20 mg oral delayed release capsule: 1 cap(s) orally once a day  OXcarbazepine 150 mg oral tablet: 1 tab(s) orally once a day  OXcarbazepine 300 mg oral tablet: 1 tab(s) orally once a day (at bedtime)  trihexyphenidyl: 1 milligram(s) orally 2 times a day      Vital Signs:   T(F): 96.7 (20 @ 00:00), Max: 97.8 (20 @ 08:00)  HR: 64 (20 @ 04:45) (36 - 98)  BP: --  RR: 24 (20 @ 04:45) (23 - 30)  SpO2: 100% (20 @ 04:45) (99% - 100%)      20 @ 07:01  -  20 @ 07:00  --------------------------------------------------------  IN: 2140.4 mL / OUT: 575 mL / NET: 1565.4 mL    20 @ 07:01  -  20 @ 05:43  --------------------------------------------------------  IN: 4584.7 mL / OUT: 3040 mL / NET: 1544.7 mL        Physical Exam:   GENERAL: NAD  HEENT: NCAT  CHEST/LUNG: CTAB  HEART: Regular rate and rhythm; s1 s2 appreciated, No murmurs, rubs, or gallops  ABDOMEN: Soft, Nontender, Nondistended; Bowel sounds present  EXTREMITIES: No LE edema b/l  SKIN: no rashes, no new lesions  NERVOUS SYSTEM:  Alert & Oriented X3  LINES/CATHETERS:        Labs:                         11.0   13.14 )-----------( 138      ( 2020 04:15 )             34.0       2020 04:15    165    |  136    |  20     ----------------------------<  226    3.7     |  21     |  1.3      Ca    8.4        2020 04:15  Phos  2.1       2020 04:15  Mg     2.6       2020 04:15    TPro  6.0    /  Alb  3.0    /  TBili  0.9    /  DBili  x      /  AST  48     /  ALT  25     /  AlkPhos  52     2020 04:15    Troponin <0.01, CKMB --, CK -- 20 @ 13:25    Urinalysis Basic - ( 2020 04:35 )    Color: Colorless / Appearance: Clear / S.004 / pH: x  Gluc: x / Ketone: Negative  / Bili: Negative / Urobili: <2 mg/dL   Blood: x / Protein: Trace / Nitrite: Negative   Leuk Esterase: Negative / RBC: 3 /HPF / WBC 2 /HPF   Sq Epi: x / Non Sq Epi: 2 /HPF / Bacteria: Negative    Radiology:     Assessment and Plan:   64 year old male pmh of DM, CAD with PCI x3 in 2017, HTN, DLD, trigeminal neuralgia who presents to ED from [facility] s/p TPA for stroke.    # CVA s/p TPA s/p thrombectomy (Basiliar Artery) - unresponsive off sedation, still on pressors   - repeat CT head urgent:  Interval increase in posterior fossa edema and mass effect consistent with evolving infarct  with resultant new effacement of the fourth ventricle and new mild obstructive hydrocephalus. Increased hyperdensity within the left ambient and quadrigeminal plate cisterns, and new hyperdensity within the right ambient and quadrigeminal plate cisterns, the suprasellar cistern, the right sylvian fissure and scattered sulci suspicious worsening/new subarachnoid hemorrhage.  - s/p 23% NS, 50g mannitol, 3% hypertonic saline drip   - remained ventilated, off sedation  - integrillin completed  - remains on levo, try to wean levo.   - no notable neurologic activity.   - target Na 155. Na 164, follow up with BMP. Give free water to put in range appropriately.  - Follow up neuro recommendations    # DM   - HbA1c 7.5%   - Moniter FS  - continue with SSI unless FS consistently> 180     # DLD   - continue with atorvastatin 80mg      Diet: Tube feeds  DVT ppx: SCDs   GI ppx: Protonix  Code Status: FULL CODE    DISPO: Acute Hospital Day:  5d    Chief Complaint: Patient is a 64y old  Male who presents with a chief complaint of Stroke s/p tpa (2020 07:29)    24 hour events: No acute overnight events. Patient seen unresponsive in bed this AM.     Past Medical Hx:   HTN (hypertension)  Diabetes    Past Sx:  No significant past surgical history    Allergies:  No Known Allergies    Current Meds:   Standing Meds:  atorvastatin 80 milliGRAM(s) Oral at bedtime  chlorhexidine 0.12% Liquid 15 milliLiter(s) Oral Mucosa every 12 hours  chlorhexidine 4% Liquid 1 Application(s) Topical <User Schedule>  dextrose 5%. 1000 milliLiter(s) (50 mL/Hr) IV Continuous <Continuous>  dextrose 5%. 1000 milliLiter(s) (100 mL/Hr) IV Continuous <Continuous>  dextrose 50% Injectable 12.5 Gram(s) IV Push once  dextrose 50% Injectable 25 Gram(s) IV Push once  dextrose 50% Injectable 25 Gram(s) IV Push once  insulin lispro (HumaLOG) corrective regimen sliding scale   SubCutaneous three times a day before meals  norepinephrine Infusion 0.05 MICROgram(s)/kG/Min (3.96 mL/Hr) IV Continuous <Continuous>  pantoprazole   Suspension 40 milliGRAM(s) Enteral Tube daily    PRN Meds:  dextrose 40% Gel 15 Gram(s) Oral once PRN Blood Glucose LESS THAN 70 milliGRAM(s)/deciliter  glucagon  Injectable 1 milliGRAM(s) IntraMuscular once PRN Glucose LESS THAN 70 milligrams/deciliter    HOME MEDICATIONS:  ALPRAZolam 0.5 mg oral tablet, extended release: 1 tab(s) orally once a day (at bedtime)  amLODIPine 5 mg oral tablet: 1 tab(s) orally once a day  clopidogrel 75 mg oral tablet: 1 tab(s) orally once a day  losartan 50 mg oral tablet: 1 tab(s) orally once a day  metFORMIN 500 mg oral tablet, extended release: 1 tab(s) orally once a day  omeprazole 20 mg oral delayed release capsule: 1 cap(s) orally once a day  OXcarbazepine 150 mg oral tablet: 1 tab(s) orally once a day  OXcarbazepine 300 mg oral tablet: 1 tab(s) orally once a day (at bedtime)  trihexyphenidyl: 1 milligram(s) orally 2 times a day      Vital Signs:   T(F): 96.7 (20 @ 00:00), Max: 97.8 (20 @ 08:00)  HR: 64 (20 @ 04:45) (36 - 98)  BP: --  RR: 24 (20 @ 04:45) (23 - 30)  SpO2: 100% (20 @ 04:45) (99% - 100%)      20 @ 07:01  -  20 @ 07:00  --------------------------------------------------------  IN: 2140.4 mL / OUT: 575 mL / NET: 1565.4 mL    20 @ 07:01  -  20 @ 05:43  --------------------------------------------------------  IN: 4584.7 mL / OUT: 3040 mL / NET: 1544.7 mL    Physical Exam:   GENERAL: unresponsive   HEENT: NCAT, pupils fixed, +ET tube  CHEST/LUNG: audible breath sounds b/l   HEART: Regular rate and rhythm; s1 s2 appreciated  ABDOMEN: Soft, Nontender, Nondistended  EXTREMITIES: No LE edema b/l, +SCDs  NERVOUS SYSTEM:  unresponsive to painful stimuli, no apparent brain stem reflexes   LINES/CATHETERS: +Central line     Labs:                         11.0   13.14 )-----------( 138      ( 2020 04:15 )             34.0       2020 04:15    165    |  136    |  20     ----------------------------<  226    3.7     |  21     |  1.3      Ca    8.4        2020 04:15  Phos  2.1       2020 04:15  Mg     2.6       2020 04:15    TPro  6.0    /  Alb  3.0    /  TBili  0.9    /  DBili  x      /  AST  48     /  ALT  25     /  AlkPhos  52     2020 04:15    Troponin <0.01, CKMB --, CK -- 20 @ 13:25    Urinalysis Basic - ( 2020 04:35 )    Color: Colorless / Appearance: Clear / S.004 / pH: x  Gluc: x / Ketone: Negative  / Bili: Negative / Urobili: <2 mg/dL   Blood: x / Protein: Trace / Nitrite: Negative   Leuk Esterase: Negative / RBC: 3 /HPF / WBC 2 /HPF   Sq Epi: x / Non Sq Epi: 2 /HPF / Bacteria: Negative    Radiology:   < from: Xray Chest 1 View- PORTABLE-Urgent (20 @ 08:52) >  Impression:    Stable left base opacity.    < end of copied text >    Assessment and Plan:   64 year old male pmh of DM, CAD with PCI x3 in 2017, HTN, DLD, trigeminal neuralgia who presents to ED from [facility] s/p TPA for stroke.    # CVA s/p TPA s/p thrombectomy (Basiliar Artery) - unresponsive off sedation, still on pressors   - repeat CT head urgent:  Interval increase in posterior fossa edema and mass effect consistent with evolving infarct  with resultant new effacement of the fourth ventricle and new mild obstructive hydrocephalus. Increased hyperdensity within the left ambient and quadrigeminal plate cisterns, and new hyperdensity within the right ambient and quadrigeminal plate cisterns, the suprasellar cistern, the right sylvian fissure and scattered sulci suspicious worsening/new subarachnoid hemorrhage.  - s/p 23% NS, 50g mannitol, 3% hypertonic saline drip   - remained ventilated, off sedation  - integrillin completed  - remains on levo, try to wean levo.   - no notable neurologic activity.   - target Na 155. Na 167, follow up with BMP.   - Follow up neuro recommendations    # Possible Central DI likely secondary to CVA   - hold free water   - start NS at 100 mL   - start DDAVP 2mcg   - Moniter BMP q4h. Try not to over correct 10 mEq Na over 24 hours.     # DM   - HbA1c 7.5%   - Moniter FS  - continue with SSI unless FS consistently> 180     # DLD   - continue with atorvastatin 80mg      Diet: Tube feeds  DVT ppx: SCDs   GI ppx: Protonix  Code Status: FULL CODE    DISPO: Acute

## 2020-07-21 NOTE — PROGRESS NOTE ADULT - SUBJECTIVE AND OBJECTIVE BOX
Patient is a 64y old  Male who presents with a chief complaint of Stroke s/p tpa (2020 05:43)        Over Night Events:  Remains critically ill on MV.  On Levophed 0.02.   No improvement in MS         ROS:     All ROS are negative except HPI         PHYSICAL EXAM    ICU Vital Signs Last 24 Hrs  T(C): 35.9 (2020 00:00), Max: 35.9 (2020 00:00)  T(F): 96.7 (2020 00:00), Max: 96.7 (2020 00:00)  HR: 92 (2020 08:00) (44 - 98)  BP: --  BP(mean): --  ABP: 124/60 (2020 08:00) (96/46 - 250/108)  ABP(mean): 78 (2020 08:00) (56 - 168)  RR: 24 (2020 08:00) (23 - 30)  SpO2: 100% (2020 08:00) (99% - 100%)      CONSTITUTIONAL:  Well nourished.  NAD    ENT:   Airway patent,   Mouth with normal mucosa.   No thrush    EYES:   Pupils equal,   Round non reactive to light.    CARDIAC:   Normal rate,   Regular rhythm.    edema UE       Vascular:  Normal systolic impulse  No Carotid bruits    RESPIRATORY:   No wheezing  Bilateral BS  Normal chest expansion  Not tachypneic,  No use of accessory muscles    GASTROINTESTINAL:  Abdomen soft,   Non-tender,   No guarding,   + BS    MUSCULOSKELETAL:   Range of motion is not limited,  No clubbing, cyanosis    NEUROLOGICAL:   No response to pain.   No gag, corneal, or spontaneous breathing effort     SKIN:   Skin normal color for race,   Warm and dry and intact.   No evidence of rash.    PSYCHIATRIC:   No apparent risk to self or others.    HEMATOLOGICAL:  No cervical  lymphadenopathy.  no inguinal lymphadenopathy      20 @ 07:01  -  - @ 07:00  --------------------------------------------------------  IN:    dextrose 5%.: 1575 mL    Free Water: 2300 mL    norepinephrine Infusion: 48.5 mL    Oral Fluid: 50 mL    Osmolite: 1080 mL  Total IN: 5053.5 mL    OUT:    Intermittent Catheterization - Urethral: 3580 mL  Total OUT: 3580 mL    Total NET: 1473.5 mL      20 @ 07:01  -  20 @ 08:21  --------------------------------------------------------  IN:    dextrose 5%.: 100 mL    norepinephrine Infusion: 1.8 mL    Osmolite: 45 mL  Total IN: 146.8 mL    OUT:    Intermittent Catheterization - Urethral: 175 mL  Total OUT: 175 mL    Total NET: -28.2 mL          LABS:                            11.0   13.14 )-----------( 138      ( 2020 04:15 )             34.0                                                   135  |  93<L>  |  7<L>  ----------------------------<  81  ?? Repeated stat .    3.6   |  28  |  0.6<L>    Ca    8.2<L>      2020 06:20  Phos  2.1       Mg     2.6         TPro  6.6  /  Alb  4.0  /  TBili  1.6<H>  /  DBili  x   /  AST  68<H>  /  ALT  38  /  AlkPhos  96                                               Urinalysis Basic - ( 2020 04:35 )    Color: Colorless / Appearance: Clear / S.004 / pH: x  Gluc: x / Ketone: Negative  / Bili: Negative / Urobili: <2 mg/dL   Blood: x / Protein: Trace / Nitrite: Negative   Leuk Esterase: Negative / RBC: 3 /HPF / WBC 2 /HPF   Sq Epi: x / Non Sq Epi: 2 /HPF / Bacteria: Negative                                                  LIVER FUNCTIONS - ( 2020 06:20 )  Alb: 4.0 g/dL / Pro: 6.6 g/dL / ALK PHOS: 96 U/L / ALT: 38 U/L / AST: 68 U/L / GGT: x                                                                                               Mode: AC/ CMV (Assist Control/ Continuous Mandatory Ventilation)  RR (machine): 24  TV (machine): 450  FiO2: 30  PEEP: 5  ITime: 1  MAP: 10  PIP: 18                                      ABG - ( 2020 04:36 )  pH, Arterial: 7.36  pH, Blood: x     /  pCO2: 36    /  pO2: 89    / HCO3: 20    / Base Excess: -4.5  /  SaO2: 96                  MEDICATIONS  (STANDING):  atorvastatin 80 milliGRAM(s) Oral at bedtime  chlorhexidine 0.12% Liquid 15 milliLiter(s) Oral Mucosa every 12 hours  chlorhexidine 4% Liquid 1 Application(s) Topical <User Schedule>  dextrose 5%. 1000 milliLiter(s) (50 mL/Hr) IV Continuous <Continuous>  dextrose 5%. 1000 milliLiter(s) (100 mL/Hr) IV Continuous <Continuous>  dextrose 50% Injectable 12.5 Gram(s) IV Push once  dextrose 50% Injectable 25 Gram(s) IV Push once  dextrose 50% Injectable 25 Gram(s) IV Push once  insulin lispro (HumaLOG) corrective regimen sliding scale   SubCutaneous three times a day before meals  norepinephrine Infusion 0.05 MICROgram(s)/kG/Min (3.96 mL/Hr) IV Continuous <Continuous>  pantoprazole   Suspension 40 milliGRAM(s) Enteral Tube daily    MEDICATIONS  (PRN):  dextrose 40% Gel 15 Gram(s) Oral once PRN Blood Glucose LESS THAN 70 milliGRAM(s)/deciliter  glucagon  Injectable 1 milliGRAM(s) IntraMuscular once PRN Glucose LESS THAN 70 milligrams/deciliter      New X-rays reviewed:                                                                                  ECHO    CXR interpreted by me:  ET OG OK>  No infiltrates

## 2020-07-21 NOTE — PROGRESS NOTE ADULT - ASSESSMENT
IMPRESSION:    Acute hypoxemic respiratory failure   Stroke SP TAP And thrombectomy  NAEL  Probable DI       PLAN:    CNS: FU with Neurology.  FU MS.  Keep off sedation     HEENT:  Oral care    PULMONARY:  HOB @ 45 degrees.  Vent changes:  None     CARDIOVASCULAR: Continue BP control.      GI: GI prophylaxis                                          Feeding OG tube and OG feeding     RENAL:  F/u repeat lytes.  Adjsut IVF after repeat Lytes.  Urine osm.  and Start DDAVP      INFECTIOUS DISEASE: NO ABX; f/u cultures    HEMATOLOGICAL:  DVT prophylaxis.    ENDOCRINE:  Follow up FS.  Insulin protocol if needed.    CODE STATUS: FULL CODE    DISPOSITION: Pt requires continued monitoring in the MICU    Overall very Poor prognosis    Organ donation on board

## 2020-07-21 NOTE — CHART NOTE - NSCHARTNOTEFT_GEN_A_CORE
Registered Dietitian Follow-Up     Patient Profile Reviewed                           Yes [x]   No []     Nutrition History Previously Obtained        Yes [x]  No []       Pertinent Subjective Information: Pt. remains intubated, on low dose pressor. TF running at goal rate. Ve: 11.1, Tmax 35.7, MAP 92.     Pertinent Medical Interventions: # CVA s/p TPA s/p thrombectomy (Basiliar Artery) - unresponsive off sedation, still on pressors. - target Na 155. Na 164, follow up with BMP. Give free water to put in range appropriately. DM2: insulin regimen. Neuro following: no notable neurologic activity.      Diet order: Osmolite 1.5 @45ml/h with No Carb Prosource TF 4x daily.      Anthropometrics:  - Ht. 177.8cm  - Wt. 86.8kg on 7/21 vs. 84.5kg on 7/18 possibly fluid shifts, will continue to monitor   - %wt change  - BMI 26.7 using 84.5kg   - IBW 166lbs      Pertinent Lab Data: (7/21) WBC 13.14, RBC 3.64, Hg 11.8, Hct 34.0, Na 167, , glu 303, eGFR 52     Pertinent Meds: Lispro, Protonix, Atorvastatin      Physical Findings:  - Appearance: intubated  - GI function: no symptoms noted, last BM 7/21  - Tubes: OGT  - Oral/Mouth cavity: NPO  - Skin: (ecchymosis (BS 19)      Nutrition Requirements (from RD note on 7/18)   Weight Used: 84.5kg     Estimated Energy Needs    Continue []  Adjust [x] ~1680kcal (PSE 2003b)   Adjusted Energy Recommendations:   kcal/day        Estimated Protein Needs    Continue [x]  Adjust [] 101-127 g/day (1.2-1.5 g/kg CBW)   Adjusted Protein Recommendations:   gm/day        Estimated Fluid Needs        Continue [x]  Adjust [] per ICU team   Adjusted Fluid Recommendations:   mL/day     Nutrient Intake: current TF regimen provides 1780kcal, 111g protein, 821mL free H2O (not including additional 180mL from Prosource) and 100% RDIs. (106% est calorie needs, 100% est protein needs).         [] Previous Nutrition Diagnosis: Inadequate Energy Intake            [] Ongoing          [x] Resolved    However, pt. remains intubated, will continue to follow.      Nutrition Intervention: enteral and parenteral nutrition    Rec: Continue current TF regimen Osmolite 1.5 @45ml/h with No Carb Prosource TF 4x daily. Feed only if MAP consistently >65. Maintain all aspiration precautions. Additional free H2O flush per LIP.      Goal/Expected Outcome: In 3 days TF to provide >85% est energy needs, bit not exceed 106%     Indicator/Monitoring: diet order, energy intake, nutrition related labs, body composition, NFPF

## 2020-07-21 NOTE — PROVIDER CONTACT NOTE (CHANGE IN STATUS NOTIFICATION) - SITUATION
Pt is currently on continuous tube feeds and D5W at 100ml/hr for hypernatremia. Pt with hx of diabetes. Pt is currently on FA AC&HS with lispro sliding scale. Per review of glucose on pt's CMP remain elevated >200.

## 2020-07-22 NOTE — CONSULT NOTE ADULT - ASSESSMENT
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 Prosource TF recommended, but not documented as given at all.  Suggest changing feeds to Peptamen AF at 65 ml/h to provide 1872 k, 119 gm protein, with lower carb content, better omega6:3 ratio, and no need for adding modulars. It is a hydrolyzed formula, so there will be less stool produced, which will help with the loose BMs, for now.  d/w medical residents  Adjust insulin drip once this formula starts.  Follow phos level and replete to > 3.5 prior to evaluating for extubation.

## 2020-07-22 NOTE — PROGRESS NOTE ADULT - SUBJECTIVE AND OBJECTIVE BOX
Hospital Day:  6d    Chief Complaint: Patient is a 64y old  Male who presents with a chief complaint of Stroke s/p tpa (2020 08:05)    24 hour events: No acute overnight events. Patient seen this morning, unresponsive, in bed.     Past Medical Hx:   HTN (hypertension)  Diabetes    Past Sx:  No significant past surgical history    Allergies:  No Known Allergies    Current Meds:   Standing Meds:  atorvastatin 80 milliGRAM(s) Oral at bedtime  chlorhexidine 0.12% Liquid 15 milliLiter(s) Oral Mucosa every 12 hours  chlorhexidine 4% Liquid 1 Application(s) Topical <User Schedule>  desmopressin 0.2 milliGRAM(s) Oral every 12 hours  dextrose 50% Injectable 12.5 Gram(s) IV Push once  dextrose 50% Injectable 25 Gram(s) IV Push once  dextrose 50% Injectable 25 Gram(s) IV Push once  heparin   Injectable 5000 Unit(s) SubCutaneous every 8 hours  insulin lispro (HumaLOG) corrective regimen sliding scale   SubCutaneous three times a day before meals  insulin regular Infusion 1 Unit(s)/Hr (1 mL/Hr) IV Continuous <Continuous>  norepinephrine Infusion 0.05 MICROgram(s)/kG/Min (3.96 mL/Hr) IV Continuous <Continuous>  pantoprazole   Suspension 40 milliGRAM(s) Enteral Tube daily  potassium chloride  20 mEq/100 mL IVPB 20 milliEquivalent(s) IV Intermittent every 2 hours    PRN Meds:  dextrose 40% Gel 15 Gram(s) Oral once PRN Blood Glucose LESS THAN 70 milliGRAM(s)/deciliter  glucagon  Injectable 1 milliGRAM(s) IntraMuscular once PRN Glucose LESS THAN 70 milligrams/deciliter    HOME MEDICATIONS:  ALPRAZolam 0.5 mg oral tablet, extended release: 1 tab(s) orally once a day (at bedtime)  amLODIPine 5 mg oral tablet: 1 tab(s) orally once a day  clopidogrel 75 mg oral tablet: 1 tab(s) orally once a day  losartan 50 mg oral tablet: 1 tab(s) orally once a day  metFORMIN 500 mg oral tablet, extended release: 1 tab(s) orally once a day  omeprazole 20 mg oral delayed release capsule: 1 cap(s) orally once a day  OXcarbazepine 150 mg oral tablet: 1 tab(s) orally once a day  OXcarbazepine 300 mg oral tablet: 1 tab(s) orally once a day (at bedtime)  trihexyphenidyl: 1 milligram(s) orally 2 times a day      Vital Signs:   T(F): 97.8 (20 @ 04:00), Max: 97.9 (20 @ 20:00)  HR: 73 (20 @ 08:02) (62 - 96)  BP: --  RR: 24 (20 @ 07:00) (23 - 24)  SpO2: 100% (20 @ 08:02) (99% - 100%)      20 @ 07:01  -  20 @ 07:00  --------------------------------------------------------  IN: 6893.4 mL / OUT: 2780 mL / NET: 4113.4 mL    Physical Exam:   GENERAL: unresponsive   HEENT: NCAT, pupils fixed, +ET tube  CHEST/LUNG: audible breath sounds b/l   HEART: Regular rate and rhythm; s1 s2 appreciated  ABDOMEN: Soft, Nontender, Nondistended  EXTREMITIES: No LE edema b/l, +SCDs  NERVOUS SYSTEM:  unresponsive to painful stimuli, no apparent brain stem reflexes   LINES/CATHETERS: +Central line     Labs:                         9.8    10.97 )-----------( 111      ( 2020 04:20 )             30.9       2020 08:10    158    |  128    |  19     ----------------------------<  397    3.1     |  20     |  1.6      Ca    8.2        2020 08:10  Phos  2.2       2020 04:20  Mg     2.3       2020 04:20    TPro  5.4    /  Alb  2.5    /  TBili  0.8    /  DBili  x      /  AST  28     /  ALT  21     /  AlkPhos  45     2020 04:20    Urinalysis Basic - ( 2020 04:35 )    Color: Colorless / Appearance: Clear / S.004 / pH: x  Gluc: x / Ketone: Negative  / Bili: Negative / Urobili: <2 mg/dL   Blood: x / Protein: Trace / Nitrite: Negative   Leuk Esterase: Negative / RBC: 3 /HPF / WBC 2 /HPF   Sq Epi: x / Non Sq Epi: 2 /HPF / Bacteria: Negative    Radiology:   < from: Xray Chest 1 View- PORTABLE-Routine (20 @ 05:29) >  Impression:      Stable left basilar opacity. No new focal consolidation, effusion or pneumothorax.    Support devices as described.    < end of copied text >    Assessment and Plan:   64 year old male pmh of DM, CAD with PCI x3 in 2017, HTN, DLD, trigeminal neuralgia who presents to ED from [facility] s/p TPA for stroke.    # CVA s/p TPA s/p thrombectomy (Basiliar Artery) - unresponsive off sedation, still on pressors   - repeat CT head:  Interval increase in posterior fossa edema and mass effect consistent with evolving infarct  with resultant new effacement of the fourth ventricle and new mild obstructive hydrocephalus. Increased hyperdensity within the left ambient and quadrigeminal plate cisterns, and new hyperdensity within the right ambient and quadrigeminal plate cisterns, the suprasellar cistern, the right sylvian fissure and scattered sulci suspicious worsening/new subarachnoid hemorrhage.  - s/p 23% NS, 50g mannitol, 3% hypertonic saline drip   - remained ventilated, off sedation  - integrillin completed  - remains on levo, try to wean levo.   - no notable neurologic activity. Apnea test performed (), patient took a breath.  - target Na 155. Na 158. q4h BMP.  - started Subq heparin for DVT ppx, cleared by neurology.   - Follow up neuro recommendations    # Likely Central DI likely secondary to CVA   - Na 158 at 8:30 AM BMP  - fluids held   - continue with DDAVP 2mcg   - Moniter BMP q4h. Try not to over correct 10 mEq Na over 24 hours.     # DM   - HbA1c 7.5%   - insulin gtt   - q1h FS     # DLD   - continue with atorvastatin 80mg      # Diet: Tube feeds  # DVT ppx: Subq heparin   # GI ppx: Protonix  # Code Status: FULL CODE    # DISPO: Acute

## 2020-07-22 NOTE — PROGRESS NOTE ADULT - ASSESSMENT
IMPRESSION:    Acute hypoxemic respiratory failure   Stroke SP TAP And thrombectomy  NAEL  Probable DI   Hypernatremia improving       PLAN:    CNS: FU with Neurology.  FU MS.  Keep off sedation     HEENT:  Oral care    PULMONARY:  HOB @ 45 degrees.  Vent changes:  None     CARDIOVASCULAR: Continue BP control.      GI: GI prophylaxis                                          Feeding OG tube and OG feeding     RENAL:  F/u repeat lytes.  Adjsut IVF after repeat Lytes this am.  Continue DDAVP      INFECTIOUS DISEASE: NO ABX; f/u cultures    HEMATOLOGICAL:  DVT prophylaxis.    ENDOCRINE:  Follow up FS.  Insulin protocol if needed.    CODE STATUS: FULL CODE    DISPOSITION: Pt requires continued monitoring in the MICU    Overall very Poor prognosis    Organ donor on board     Organ donation on board

## 2020-07-22 NOTE — PROGRESS NOTE ADULT - SUBJECTIVE AND OBJECTIVE BOX
Neurocritical Care Progress Note:    1. Brief Presentation: Aphasia    2. 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 Relevant brief History: 64 year old male with pmh of DM, CAD with PCI x3 in 2017, HTN, DLD, presented to  [facility] initially for expressive aphasia and confusion with NIHSS of 9 was s/p tpa at 16:46 hrs . CTA H/N revealed a right vertebral artery occlusion and distal basilar artery thrombus. Patient was sent to [facility] for neurointerventional eval and post TPA care. On initial neuro eval at [facility] , patients expressive aphasia persisted . Pt. was on cardine drip to maintain BP below 185. While in ED Rapid response team was activated for sudden unresponsiveness, witnessed posturing. Pt. was intubated for airway protection and his NIHSS worsened to 39 at this time. Stat CTH was negative for bleed, CT perfusion Stat, with basilar artery near occlusion. Code NI actual was activated and patient was transferred to IR. Patient is s/p mechanical thrombectomy with full recanalization of aneurysmal basilar artery; procedure was completed under general anesthesia.     4-Yesterday's Plan:   -Please give free water to bring sodium level down to goal of <160 for brain death exam  -Continue to keep -150  -Please order transcranial doppler as part of brain death exam  -Please continue with goals of care conversation with family    5. Last 24 hour updates: Remains comatose. No brain stem reflexes noted. Apnea test performed today and patient took a breath    6. Medications:   atorvastatin 80 milliGRAM(s) Oral at bedtime  chlorhexidine 0.12% Liquid 15 milliLiter(s) Oral Mucosa every 12 hours  chlorhexidine 4% Liquid 1 Application(s) Topical <User Schedule>  desmopressin 0.2 milliGRAM(s) Oral every 12 hours  dextrose 50% Injectable 12.5 Gram(s) IV Push once  dextrose 50% Injectable 25 Gram(s) IV Push once  dextrose 50% Injectable 25 Gram(s) IV Push once  heparin   Injectable 5000 Unit(s) SubCutaneous every 8 hours  insulin lispro (HumaLOG) corrective regimen sliding scale   SubCutaneous three times a day before meals  insulin regular Infusion 1 Unit(s)/Hr IV Continuous <Continuous>  norepinephrine Infusion 0.05 MICROgram(s)/kG/Min IV Continuous <Continuous>  pantoprazole   Suspension 40 milliGRAM(s) Enteral Tube daily      7. Ancillary Management:   Chest PT[ ]   Head of bed >35 [x ]   Out of bed to chair [ ]   PT/OT/SP Eval [ ]   Spirometry[ ]   DVT prophalaxis[x ]    8.Neuro:   Neurologic Exam:  Mental status: Comatose  Language: Intubated; no sedation on board. Not following commands.  Cranial nerves: no b/l pupillary reflex, no corneal reflex, no occulocephalic reflex, no gag reflex   Motor: Flaccid, no tremors. No posturing noted  Sensation: No withdrawal or localization to noxious stimuli   Reflex: Babinski mute    mRS:  0 No symptoms at all  1 No significant disability despite symptoms; able to carry out all usual duties and activities without assistance  2 Slight disability; unable to carry out all previous activities, but able to look after own affairs  3 Moderate disability; requiring some help, but able to walk without assistance  4 Moderately severe disability; unable to walk without assistance and unable to attend to own bodily needs without assistance  5 Severe disability; bedridden, incontinent and requiring constant nursing care and attention  6 Dead    Last CTH: < from: CT Head No Cont (20 @ 10:27) >  Comparison is made with the CT scans of the head and CTA exams from the prior day.    FINDINGS/  IMPRESSION:    1.  Interval resorption of the IV contrast within the intracranial vasculature.    2.  Interval increase in posterior fossa edema and mass effect consistent with evolving infarct (involving both cerebellar hemispheres and possibly the brainstem) with resultant new effacement of the fourth ventricle and new mild obstructive hydrocephalus.    3.  Evolving infarct also noted within the right temporal lobe.    4.  Increased hyperdensity within the left ambient and quadrigeminal plate cisterns, and new hyperdensity within the right ambient and quadrigeminal plate cisterns, the suprasellar cistern, the right sylvian fissure and scattered sulci suspicious worsening/new subarachnoid hemorrhage.    5.  Redemonstrated enlarged dolichoectatic vertebrobasilar vessels with mass effect upon the brainstem.    < end of copied text >    Last CTA/MRA: < from: CT Angio Neck w/ IV Cont (20 @ 16:08) >  IMPRESSION:    1.  *Occlusion of the right vertebral artery from about the level of the C2 vertebral body to the vertebrobasilar junction (V3 and V4 segments).  2.  *Thrombus within the distal basilar artery measuring about 6 mm in length and producing about 80% luminal stenosis.    Other extensive atheromatous changes including:  3.  Moderate/severe stenosis of the right subclavian artery origin.   4.  Moderate/severe stenosis of the right vertebral artery origin.   5.  Moderate stenosis of the right ICA petrous segment.  6.  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  7.  Fusiform dilatation of the left vertebral artery up to 7 mm, and the basilar artery up to 1.1 cm, with compression of the indira.    < end of copied text >    Last CTP: < from: CT Perfusion w/ Maps w/ IV Cont (20 @ 20:08) >  IMPRESSION:    Since most recent examination the previously noted distal basilar clot is less well-defined on this examination though now with diminished contrast enhancement of the distal basilar which likely represents residual thrombus. Contrast enhancement within the right PCA is diminished as compared to prior.    There has been extension of the right vertebral artery thrombus inferiorly now extending to the level of the C7 vertebral body.    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    Multifocal areas of moderate to severe stenosis of the bilateral ICA and MCA branches with unchangedbroad-based 2 mm aneurysms noted of the bilateral M1 segments.    Approximately 301 mL ischemic penumbra noted involving the posterior circulation without core infarction    < end of copied text >    Last MRI: n/a    Last TCD: n/a    Last EEG: < from: EEG (20 @ 13:00) >  Impression  -  Abnormal due to the presence of: focal slowing as above, generalized slowing as above  -    Clinical Correlation & Recommendations   Consistent with diffuse cerebral electrophysiological dysfunction.  Secondary to none specific cause. Consistent with focal electrophysiological dysfunction.  Secondary to structural/metabolic cause.    < end of copied text >    9. Cardiovascular:   Vital Signs Last 24 Hrs  T(C): 35.9 (2020 12:01), Max: 36.6 (2020 20:00)  T(F): 96.7 (2020 12:01), Max: 97.9 (2020 20:00)  HR: 73 (2020 08:02) (62 - 88)  BP: --  BP(mean): --  RR: 24 (2020 07:00) (23 - 24)  SpO2: 100% (2020 08:02) (99% - 100%)   Blood Gas Arterial, Lactate (20 @ 04:44)    Blood Gas Arterial, Lactate: 0.7 mmoL/L    Last Echo: < from: Transthoracic Echocardiogram (20 @ 08:09) >  Summary:   1. Normal global left ventricular systolic function.   2. LV Ejection Fraction by Fuentes's Method with a biplane EF of 65 %.   3. Mild mitral valve regurgitation.   4. Color flow doppler and intravenous injection ofagitated saline demonstrates the presence of an intact intra atrial septum.    PHYSICIAN INTERPRETATION:  Left Ventricle: The left ventricular internal cavity size is normal. Left ventricular wall thickness is normal. Global LV systolic function was normal. Spectral Doppler shows normal pattern of LV diastolic filling.  Right Ventricle: Normal right ventricular size and function.  Left Atrium: Normal left atrial size. Color flow doppler and intravenous injection of agitated saline demonstrates the presence of an intact intra atrial septum.  Right Atrium: Normal right atrial size.  Pericardium: There is no evidence of pericardial effusion.  Mitral Valve: The mitral valve is normal in structure. Peak transmitral mean gradient equals 1.6 mmHg, calculated mitral valve area by pressure half time equals 4.46 cm² consistent with No evidence of mitral stenosis. Mild mitral valve regurgitation is seen.  Tricuspid Valve: The tricuspid valve is normal in structure. Mild tricuspid regurgitation is visualized.  Aortic Valve: Normal trileaflet aortic valve with normal opening. No evidence of aortic valve regurgitation is seen.  Pulmonic Valve: Structurally normal pulmonic valve, with normal leaflet excursion.  Aorta: The aortic root is normal in size and structure.  Pulmonary Artery: The pulmonary artery is of normal size and origin.  Shunts: Agitated saline contrast was given intravenously to evaluate for intracardiac shunting.    < end of copied text >    Last EKG: < from: 12 Lead ECG (20 @ 22:44) >  Diagnosis Line Marked sinus bradycardia with 1st degree A-V block  T wave abnormality, consider lateral ischemia  Abnormal ECG    < end of copied text >    CVP   MAP/CPP/SBP target:   CO:      CI:       Enzymes/Trop:  Troponin T, Serum (20 @ 13:25)    Troponin T, Serum: <0.01 ng/mL    10. Respiratory:   ABG:ABG - ( 2020 04:44 )  pH, Arterial: 7.39  pH, Blood: x     /  pCO2: 36    /  pO2: 80    / HCO3: 22    / Base Excess: -2.5  /  SaO2: 95        VBG: Blood Gas Profile - Venous (20 @ 20:39)    pH, Venous: 7.40    pCO2, Venous: 35 mmHg    pO2, Venous: 93 mmHg    HCO3, Venous: 22 mmoL/L    Base Excess, Venous: -2.7 mmoL/L    Oxygen Saturation, Venous: 96 %    Chest Xray: < from: Xray Chest 1 View- PORTABLE-Routine (20 @ 05:29) >  Impression:      Stable left basilar opacity. No new focal consolidation, effusion or pneumothorax.    Support devices as described.    < end of copied text >      Mode: AC/ CMV (Assist Control/ Continuous Mandatory Ventilation)  RR (machine): 24  TV (machine): 450  FiO2: 30  PEEP: 5  ITime: 1  MAP: 9  PIP: 16      Peak Pressure/[place] Pressure:    11.GI:    Prophalaxis:     Bowel mvt:     Abd distension:   LIVER FUNCTIONS - ( 2020 04:20 )  Alb: 2.5 g/dL / Pro: 5.4 g/dL / ALK PHOS: 45 U/L / ALT: 21 U/L / AST: 28 U/L / GGT: x             12.Renal/Fluids/Electrolytes:        158<H>  |  128<H>  |  19  ----------------------------<  397<H>  3.1<L>   |  20  |  1.6<H>    Ca    8.2<L>      2020 08:10  Phos  2.2       Mg     2.3         TPro  5.4<L>  /  Alb  2.5<L>  /  TBili  0.8  /  DBili  x   /  AST  28  /  ALT  21  /  AlkPhos  45        I&O's Detail    2020 07:01  -  2020 07:00  --------------------------------------------------------  IN:    dextrose 5% + sodium chloride 0.45%.: 2600 mL    dextrose 5%.: 500 mL    Free Water: 2000 mL    norepinephrine Infusion: 13.4 mL    Osmolite: 1080 mL    sodium chloride 0.9%: 700 mL  Total IN: 6893.4 mL    OUT:    Indwelling Catheter - Urethral: 1005 mL    Intermittent Catheterization - Urethral: 1725 mL    Rectal Tube: 50 mL  Total OUT: 2780 mL    Total NET: 4113.4 mL      2020 07:01  -  2020 15:02  --------------------------------------------------------  IN:    Free Water: 800 mL    Osmolite: 270 mL  Total IN: 1070 mL    OUT:  Total OUT: 0 mL    Total NET: 1070 mL          13.ID:   TMax:   Vital Signs Last 24 Hrs  T(C): 35.9 (2020 12:01), Max: 36.6 (2020 20:00)  T(F): 96.7 (2020 12:01), Max: 97.9 (2020 20:00)  HR: 73 (2020 08:02) (62 - 88)  BP: --  BP(mean): --  RR: 24 (2020 07:00) (23 - 24)  SpO2: 100% (2020 08:02) (99% - 100%)      Urinalysis Basic - ( 2020 04:35 )    Color: Colorless / Appearance: Clear / S.004 / pH: x  Gluc: x / Ketone: Negative  / Bili: Negative / Urobili: <2 mg/dL   Blood: x / Protein: Trace / Nitrite: Negative   Leuk Esterase: Negative / RBC: 3 /HPF / WBC 2 /HPF   Sq Epi: x / Non Sq Epi: 2 /HPF / Bacteria: Negative         Lines: Central[] Date inserted: Peripheral[]    14. Hematology:                         9.8    10.97 )-----------( 111      ( 2020 04:20 )             30.9      07-22    158<H>  |  128<H>  |  19  ----------------------------<  397<H>  3.1<L>   |  20  |  1.6<H>    Ca    8.2<L>      2020 08:10  Phos  2.2       Mg     2.3         TPro  5.4<L>  /  Alb  2.5<L>  /  TBili  0.8  /  DBili  x   /  AST  28  /  ALT  21  /  AlkPhos  45           DVT Prophylaxis Lovenox[ ] Heparin[ ] Venodynes[ ] SCD's[ ]    15. Impression:        16. Suggestions:        17. Disposition: Neurocritical Care Progress Note:    1. Brief Presentation: Aphasia    2. 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 Relevant brief History: 64 year old male with pmh of DM, CAD with PCI x3 in 2017, HTN, DLD, presented to  [facility] initially for expressive aphasia and confusion with NIHSS of 9 was s/p tpa at 16:46 hrs . CTA H/N revealed a right vertebral artery occlusion and distal basilar artery thrombus. Patient was sent to [facility] for neurointerventional eval and post TPA care. On initial neuro eval at [facility] , patients expressive aphasia persisted . Pt. was on cardine drip to maintain BP below 185. While in ED Rapid response team was activated for sudden unresponsiveness, witnessed posturing. Pt. was intubated for airway protection and his NIHSS worsened to 39 at this time. Stat CTH was negative for bleed, CT perfusion Stat, with basilar artery near occlusion. Code NI actual was activated and patient was transferred to IR. Patient is s/p mechanical thrombectomy with full recanalization of aneurysmal basilar artery; procedure was completed under general anesthesia.     4-Yesterday's Plan:   -Please give free water to bring sodium level down to goal of <160 for brain death exam  -Continue to keep -150  -Please order transcranial doppler as part of brain death exam  -Please continue with goals of care conversation with family    5. Last 24 hour updates: Remains comatose. No brain stem reflexes noted. Apnea test performed today and patient took a breath    6. Medications:   atorvastatin 80 milliGRAM(s) Oral at bedtime  chlorhexidine 0.12% Liquid 15 milliLiter(s) Oral Mucosa every 12 hours  chlorhexidine 4% Liquid 1 Application(s) Topical <User Schedule>  desmopressin 0.2 milliGRAM(s) Oral every 12 hours  dextrose 50% Injectable 12.5 Gram(s) IV Push once  dextrose 50% Injectable 25 Gram(s) IV Push once  dextrose 50% Injectable 25 Gram(s) IV Push once  heparin   Injectable 5000 Unit(s) SubCutaneous every 8 hours  insulin lispro (HumaLOG) corrective regimen sliding scale   SubCutaneous three times a day before meals  insulin regular Infusion 1 Unit(s)/Hr IV Continuous <Continuous>  norepinephrine Infusion 0.05 MICROgram(s)/kG/Min IV Continuous <Continuous>  pantoprazole   Suspension 40 milliGRAM(s) Enteral Tube daily      7. Ancillary Management:   Chest PT[ ]   Head of bed >35 [x ]   Out of bed to chair [ ]   PT/OT/SP Eval [ ]   Spirometry[ ]   DVT prophalaxis[x ]    8.Neuro:   Neurologic Exam:  Mental status: Comatose  Language: Intubated; no sedation on board. Not following commands.  Cranial nerves: no b/l pupillary reflex, no corneal reflex, no occulocephalic reflex, no gag reflex   Motor: Flaccid, no tremors. No posturing noted  Sensation: No withdrawal or localization to noxious stimuli   Reflex: Babinski mute    mRS:  0 No symptoms at all  1 No significant disability despite symptoms; able to carry out all usual duties and activities without assistance  2 Slight disability; unable to carry out all previous activities, but able to look after own affairs  3 Moderate disability; requiring some help, but able to walk without assistance  4 Moderately severe disability; unable to walk without assistance and unable to attend to own bodily needs without assistance  5 Severe disability; bedridden, incontinent and requiring constant nursing care and attention  6 Dead    Last CTH: < from: CT Head No Cont (20 @ 10:27) >  Comparison is made with the CT scans of the head and CTA exams from the prior day.    FINDINGS/  IMPRESSION:    1.  Interval resorption of the IV contrast within the intracranial vasculature.    2.  Interval increase in posterior fossa edema and mass effect consistent with evolving infarct (involving both cerebellar hemispheres and possibly the brainstem) with resultant new effacement of the fourth ventricle and new mild obstructive hydrocephalus.    3.  Evolving infarct also noted within the right temporal lobe.    4.  Increased hyperdensity within the left ambient and quadrigeminal plate cisterns, and new hyperdensity within the right ambient and quadrigeminal plate cisterns, the suprasellar cistern, the right sylvian fissure and scattered sulci suspicious worsening/new subarachnoid hemorrhage.    5.  Redemonstrated enlarged dolichoectatic vertebrobasilar vessels with mass effect upon the brainstem.    < end of copied text >    Last CTA/MRA: < from: CT Angio Neck w/ IV Cont (20 @ 16:08) >  IMPRESSION:    1.  *Occlusion of the right vertebral artery from about the level of the C2 vertebral body to the vertebrobasilar junction (V3 and V4 segments).  2.  *Thrombus within the distal basilar artery measuring about 6 mm in length and producing about 80% luminal stenosis.    Other extensive atheromatous changes including:  3.  Moderate/severe stenosis of the right subclavian artery origin.   4.  Moderate/severe stenosis of the right vertebral artery origin.   5.  Moderate stenosis of the right ICA petrous segment.  6.  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  7.  Fusiform dilatation of the left vertebral artery up to 7 mm, and the basilar artery up to 1.1 cm, with compression of the indira.    < end of copied text >    Last CTP: < from: CT Perfusion w/ Maps w/ IV Cont (20 @ 20:08) >  IMPRESSION:    Since most recent examination the previously noted distal basilar clot is less well-defined on this examination though now with diminished contrast enhancement of the distal basilar which likely represents residual thrombus. Contrast enhancement within the right PCA is diminished as compared to prior.    There has been extension of the right vertebral artery thrombus inferiorly now extending to the level of the C7 vertebral body.    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    Multifocal areas of moderate to severe stenosis of the bilateral ICA and MCA branches with unchangedbroad-based 2 mm aneurysms noted of the bilateral M1 segments.    Approximately 301 mL ischemic penumbra noted involving the posterior circulation without core infarction    < end of copied text >    Last MRI: n/a    Last TCD: n/a    Last EEG: < from: EEG (20 @ 13:00) >  Impression  -  Abnormal due to the presence of: focal slowing as above, generalized slowing as above  -    Clinical Correlation & Recommendations   Consistent with diffuse cerebral electrophysiological dysfunction.  Secondary to none specific cause. Consistent with focal electrophysiological dysfunction.  Secondary to structural/metabolic cause.    < end of copied text >    9. Cardiovascular:   Vital Signs Last 24 Hrs  T(C): 35.9 (2020 12:01), Max: 36.6 (2020 20:00)  T(F): 96.7 (2020 12:01), Max: 97.9 (2020 20:00)  HR: 73 (2020 08:02) (62 - 88)  BP: --  BP(mean): --  RR: 24 (2020 07:00) (23 - 24)  SpO2: 100% (2020 08:02) (99% - 100%)   Blood Gas Arterial, Lactate (20 @ 04:44)    Blood Gas Arterial, Lactate: 0.7 mmoL/L    Last Echo: < from: Transthoracic Echocardiogram (20 @ 08:09) >  Summary:   1. Normal global left ventricular systolic function.   2. LV Ejection Fraction by Fuentes's Method with a biplane EF of 65 %.   3. Mild mitral valve regurgitation.   4. Color flow doppler and intravenous injection ofagitated saline demonstrates the presence of an intact intra atrial septum.    PHYSICIAN INTERPRETATION:  Left Ventricle: The left ventricular internal cavity size is normal. Left ventricular wall thickness is normal. Global LV systolic function was normal. Spectral Doppler shows normal pattern of LV diastolic filling.  Right Ventricle: Normal right ventricular size and function.  Left Atrium: Normal left atrial size. Color flow doppler and intravenous injection of agitated saline demonstrates the presence of an intact intra atrial septum.  Right Atrium: Normal right atrial size.  Pericardium: There is no evidence of pericardial effusion.  Mitral Valve: The mitral valve is normal in structure. Peak transmitral mean gradient equals 1.6 mmHg, calculated mitral valve area by pressure half time equals 4.46 cm² consistent with No evidence of mitral stenosis. Mild mitral valve regurgitation is seen.  Tricuspid Valve: The tricuspid valve is normal in structure. Mild tricuspid regurgitation is visualized.  Aortic Valve: Normal trileaflet aortic valve with normal opening. No evidence of aortic valve regurgitation is seen.  Pulmonic Valve: Structurally normal pulmonic valve, with normal leaflet excursion.  Aorta: The aortic root is normal in size and structure.  Pulmonary Artery: The pulmonary artery is of normal size and origin.  Shunts: Agitated saline contrast was given intravenously to evaluate for intracardiac shunting.    < end of copied text >    Last EKG: < from: 12 Lead ECG (20 @ 22:44) >  Diagnosis Line Marked sinus bradycardia with 1st degree A-V block  T wave abnormality, consider lateral ischemia  Abnormal ECG    < end of copied text >    CVP   MAP/CPP/SBP target:   CO:      CI:       Enzymes/Trop:  Troponin T, Serum (20 @ 13:25)    Troponin T, Serum: <0.01 ng/mL    10. Respiratory:   ABG:ABG - ( 2020 04:44 )  pH, Arterial: 7.39  pH, Blood: x     /  pCO2: 36    /  pO2: 80    / HCO3: 22    / Base Excess: -2.5  /  SaO2: 95        VBG: Blood Gas Profile - Venous (20 @ 20:39)    pH, Venous: 7.40    pCO2, Venous: 35 mmHg    pO2, Venous: 93 mmHg    HCO3, Venous: 22 mmoL/L    Base Excess, Venous: -2.7 mmoL/L    Oxygen Saturation, Venous: 96 %    Chest Xray: < from: Xray Chest 1 View- PORTABLE-Routine (20 @ 05:29) >  Impression:      Stable left basilar opacity. No new focal consolidation, effusion or pneumothorax.    Support devices as described.    < end of copied text >    Mode: AC/ CMV (Assist Control/ Continuous Mandatory Ventilation)  RR (machine): 24  TV (machine): 450  FiO2: 30  PEEP: 5  ITime: 1  MAP: 9  PIP: 16    11.GI:  Prophalaxis: pantoprazole       Bowel mvt:     Abd distension:   LIVER FUNCTIONS - ( 2020 04:20 )  Alb: 2.5 g/dL / Pro: 5.4 g/dL / ALK PHOS: 45 U/L / ALT: 21 U/L / AST: 28 U/L / GGT: x           12.Renal/Fluids/Electrolytes:        158<H>  |  128<H>  |  19  ----------------------------<  397<H>  3.1<L>   |  20  |  1.6<H>    Ca    8.2<L>      2020 08:10  Phos  2.2       Mg     2.3         TPro  5.4<L>  /  Alb  2.5<L>  /  TBili  0.8  /  DBili  x   /  AST  28  /  ALT  21  /  AlkPhos  45        I&O's Detail    2020 07:01  -  2020 07:00  --------------------------------------------------------  IN:    dextrose 5% + sodium chloride 0.45%.: 2600 mL    dextrose 5%.: 500 mL    Free Water: 2000 mL    norepinephrine Infusion: 13.4 mL    Osmolite: 1080 mL    sodium chloride 0.9%: 700 mL  Total IN: 6893.4 mL    OUT:    Indwelling Catheter - Urethral: 1005 mL    Intermittent Catheterization - Urethral: 1725 mL    Rectal Tube: 50 mL  Total OUT: 2780 mL    Total NET: 4113.4 mL      2020 07:01  -  2020 15:02  --------------------------------------------------------  IN:    Free Water: 800 mL    Osmolite: 270 mL  Total IN: 1070 mL    OUT:  Total OUT: 0 mL    Total NET: 1070 mL    13.ID:   Urinalysis Basic - ( 2020 04:35 )    Color: Colorless / Appearance: Clear / S.004 / pH: x  Gluc: x / Ketone: Negative  / Bili: Negative / Urobili: <2 mg/dL   Blood: x / Protein: Trace / Nitrite: Negative   Leuk Esterase: Negative / RBC: 3 /HPF / WBC 2 /HPF   Sq Epi: x / Non Sq Epi: 2 /HPF / Bacteria: Negative    Creatinine, Random Urine (20 @ 04:35)    Creatinine, Random Urine: 13: Reference Ranges have NOT been established for random urine analytes due  to variability in fluid intake and concentration. mg/dL    Lines: Central[] Date inserted: Peripheral[x]    14. Hematology:                         9.8    10.97 )-----------( 111      ( 2020 04:20 )             30.9      07-22    158<H>  |  128<H>  |  19  ----------------------------<  397<H>  3.1<L>   |  20  |  1.6<H>    Ca    8.2<L>      2020 08:10  Phos  2.2     07-  Mg     2.3     -    TPro  5.4<L>  /  Alb  2.5<L>  /  TBili  0.8  /  DBili  x   /  AST  28  /  ALT  21  /  AlkPhos  45  07-22         DVT Prophylaxis Lovenox[ ] Heparin[x ] Venodynes[ ] SCD's[ ]    15. Impression: 64 year old gentleman with history significant for DM, CAD with PCI x3 in 2017, HTN, DLD, trigeminal neuralgia presents to the ED for aphasia s/p tPA for stroke. Baseline is AAOx3. Initial NIHSS 9. While in the ED, patient was with sudden body posturing and not following commands. Intubated for airway protection. Stat CTH unremarkable. CTA showed right vertebral artery occlusion and distal basilar artery thrombus. CTP with basilar artery near occlusion. Code NI actual was activated and patient was transferred to [facility] for neurointervention. S/p basilar artery thrombectomy TICI 3 and on integrillin gtt, now since stopped. 3% started for severe cytotoxic edema and brainstem compression. In 24 hours, sodium level had risen to 170, so 3% was discontinued. I/Os over 24 hours and normal renal function do not support central DI, so administration of free water and D5W until sodium level of 160 is achieved. Today, patient remains brainstem areflexic, intubated with no sedation on board, with sodium level 158 this AM. Apnea test performed today and patient took a breath. Wife aware of very poor prognosis. Awaiting TCD as part of brain death exam. Plan to keep sodium <160 if brain death exam is requested by family.     16. Suggestions:  -Continue to keep sodium goal of <160 for possible brain death exam  -Continue to keep -150  -Please order transcranial doppler as part of brain death exam  -Please continue with goals of care conversation with family  -Medical management as per primary team    17. Disposition:  -Continue medical management on ICU    Abril Johns NP  [phone]

## 2020-07-22 NOTE — PROGRESS NOTE ADULT - SUBJECTIVE AND OBJECTIVE BOX
Patient is a 64y old  Male who presents with a chief complaint of Stroke s/p tpa (2020 14:36)        Over Night Events:  On MV.  on Levophed 0.01.  Off sedation.  No changes in MS         ROS:     All ROS are negative except HPI         PHYSICAL EXAM    ICU Vital Signs Last 24 Hrs  T(C): 36.6 (2020 04:00), Max: 36.6 (2020 20:00)  T(F): 97.8 (2020 04:00), Max: 97.9 (2020 20:00)  HR: 66 (2020 07:00) (62 - 104)  BP: --  BP(mean): --  ABP: 108/40 (2020 07:00) (76/36 - 226/108)  ABP(mean): 60 (2020 07:00) (48 - 164)  RR: 24 (2020 07:00) (23 - 24)  SpO2: 100% (:00) (99% - 100%)      CONSTITUTIONAL:  Well nourished.  NAD    ENT:   Airway patent,   Mouth with normal mucosa.   No thrush    EYES:   Pupils equal,   Round non reactive to light.    CARDIAC:   Normal rate,   Regular rhythm.    edema      Vascular:  Normal systolic impulse  No Carotid bruits    RESPIRATORY:   No wheezing  Bilateral BS  Normal chest expansion  Not tachypneic,  No use of accessory muscles    GASTROINTESTINAL:  Abdomen soft,   Non-tender,   No guarding,   + BS    MUSCULOSKELETAL:   Range of motion is not limited,  No clubbing, cyanosis    NEUROLOGICAL:   No response to pain.  No gag, No corneal, and No spontaneous breathing effort.      SKIN:   Skin normal color for race,   Warm and dry  No evidence of rash.    PSYCHIATRIC:   No apparent risk to self or others.    HEMATOLOGICAL:  No cervical  lymphadenopathy.  no inguinal lymphadenopathy      20 @ 07:01  -  20 @ 07:00  --------------------------------------------------------  IN:    dextrose 5% + sodium chloride 0.45%.: 2600 mL    dextrose 5%.: 500 mL    Free Water: 2000 mL    norepinephrine Infusion: 13.4 mL    Osmolite: 1080 mL    sodium chloride 0.9%: 700 mL  Total IN: 6893.4 mL    OUT:    Indwelling Catheter - Urethral: 1005 mL    Intermittent Catheterization - Urethral: 1725 mL    Rectal Tube: 50 mL  Total OUT: 2780 mL    Total NET: 4113.4 mL          LABS:                            9.8    10.97 )-----------( 111      ( 2020 04:20 )             30.9                                               07-22    161<HH>  |  132<H>  |  22<H>  ----------------------------<  402<H>  3.4<L>   |  21  |  1.5    Ca    8.1<L>      2020 04:20  Phos  2.2     -  Mg     2.3     -22    TPro  5.4<L>  /  Alb  2.5<L>  /  TBili  0.8  /  DBili  x   /  AST  28  /  ALT  21  /  AlkPhos  45  07-22                                             Urinalysis Basic - ( 2020 04:35 )    Color: Colorless / Appearance: Clear / S.004 / pH: x  Gluc: x / Ketone: Negative  / Bili: Negative / Urobili: <2 mg/dL   Blood: x / Protein: Trace / Nitrite: Negative   Leuk Esterase: Negative / RBC: 3 /HPF / WBC 2 /HPF   Sq Epi: x / Non Sq Epi: 2 /HPF / Bacteria: Negative                                                  LIVER FUNCTIONS - ( 2020 04:20 )  Alb: 2.5 g/dL / Pro: 5.4 g/dL / ALK PHOS: 45 U/L / ALT: 21 U/L / AST: 28 U/L / GGT: x                                                                                               Mode: AC/ CMV (Assist Control/ Continuous Mandatory Ventilation)  RR (machine): 24  TV (machine): 450  FiO2: 30  PEEP: 5  ITime: 1  MAP: 10  PIP: 17                                      ABG - ( 2020 04:44 )  pH, Arterial: 7.39  pH, Blood: x     /  pCO2: 36    /  pO2: 80    / HCO3: 22    / Base Excess: -2.5  /  SaO2: 95    LAc 0.7              MEDICATIONS  (STANDING):  atorvastatin 80 milliGRAM(s) Oral at bedtime  chlorhexidine 0.12% Liquid 15 milliLiter(s) Oral Mucosa every 12 hours  chlorhexidine 4% Liquid 1 Application(s) Topical <User Schedule>  desmopressin 0.2 milliGRAM(s) Oral every 12 hours  dextrose 5% + sodium chloride 0.45%. 1000 milliLiter(s) (200 mL/Hr) IV Continuous <Continuous>  dextrose 50% Injectable 12.5 Gram(s) IV Push once  dextrose 50% Injectable 25 Gram(s) IV Push once  dextrose 50% Injectable 25 Gram(s) IV Push once  heparin   Injectable 5000 Unit(s) SubCutaneous every 8 hours  insulin lispro (HumaLOG) corrective regimen sliding scale   SubCutaneous three times a day before meals  insulin regular Infusion 1 Unit(s)/Hr (1 mL/Hr) IV Continuous <Continuous>  norepinephrine Infusion 0.05 MICROgram(s)/kG/Min (3.96 mL/Hr) IV Continuous <Continuous>  pantoprazole   Suspension 40 milliGRAM(s) Enteral Tube daily  potassium chloride  20 mEq/100 mL IVPB 20 milliEquivalent(s) IV Intermittent every 2 hours    MEDICATIONS  (PRN):  dextrose 40% Gel 15 Gram(s) Oral once PRN Blood Glucose LESS THAN 70 milliGRAM(s)/deciliter  glucagon  Injectable 1 milliGRAM(s) IntraMuscular once PRN Glucose LESS THAN 70 milligrams/deciliter      New X-rays reviewed:                                                                                  ECHO    CXR interpreted by me:    ET OG OK>  no infiltrates

## 2020-07-23 NOTE — PROGRESS NOTE ADULT - SUBJECTIVE AND OBJECTIVE BOX
Neurocritical Care Progress Note:    1. Brief Presentation: Aphasia    2. 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 Relevant brief History: 64 year old male with pmh of DM, CAD with PCI x3 in 2017, HTN, DLD, presented to  [facility] initially for expressive aphasia and confusion with NIHSS of 9 was s/p tpa at 16:46 hrs . CTA H/N revealed a right vertebral artery occlusion and distal basilar artery thrombus. Patient was sent to [facility] for neurointerventional eval and post TPA care. On initial neuro eval at [facility] , patients expressive aphasia persisted . Pt. was on cardine drip to maintain BP below 185. While in ED Rapid response team was activated for sudden unresponsiveness, witnessed posturing. Pt. was intubated for airway protection and his NIHSS worsened to 39 at this time. Stat CTH was negative for bleed, CT perfusion Stat, with basilar artery near occlusion. Code NI actual was activated and patient was transferred to IR. Patient is s/p mechanical thrombectomy with full recanalization of aneurysmal basilar artery; procedure was completed under general anesthesia.     4-Yesterday's Plan:   -Continue to keep sodium goal of <160 for possible brain death exam  -Continue to keep -150  -Please order transcranial doppler as part of brain death exam  -Please continue with goals of care conversation with family  -Medical management as per primary team    5. Last 24 hour updates: Remains comatose. No brain stem reflexes noted. Apnea test performed yesterday and patient took a breath on his own    6. Medications:   aMIOdarone Infusion 1 mG/Min IV Continuous <Continuous>  atorvastatin 80 milliGRAM(s) Oral at bedtime  chlorhexidine 0.12% Liquid 15 milliLiter(s) Oral Mucosa every 12 hours  chlorhexidine 4% Liquid 1 Application(s) Topical <User Schedule>  desmopressin Injectable 4 MICROGram(s) IV Push two times a day  dextrose 50% Injectable 12.5 Gram(s) IV Push once  dextrose 50% Injectable 25 Gram(s) IV Push once  dextrose 50% Injectable 25 Gram(s) IV Push once  heparin   Injectable 5000 Unit(s) SubCutaneous every 8 hours  insulin regular Infusion 1 Unit(s)/Hr IV Continuous <Continuous>  norepinephrine Infusion 0.05 MICROgram(s)/kG/Min IV Continuous <Continuous>  pantoprazole   Suspension 40 milliGRAM(s) Enteral Tube daily  phenylephrine    Infusion 0.1 MICROgram(s)/kG/Min IV Continuous <Continuous>  sodium chloride 0.45%. 1000 milliLiter(s) IV Continuous <Continuous>  vasopressin Infusion 0.04 Unit(s)/Min IV Continuous <Continuous>    7. Ancillary Management:   Chest PT[ ]   Head of bed >35 [x ]   Out of bed to chair [ ]   PT/OT/SP Eval [ ]   Spirometry[ ]   DVT prophalaxis[x ]    8.Neuro:   Neurologic Exam:  Mental status: Comatose  Language: Intubated; no sedation on board. Not following commands.  Cranial nerves: no b/l pupillary reflex, no corneal reflex, no occulocephalic reflex, no gag reflex   Motor: Flaccid, no tremors. No posturing noted  Sensation: No withdrawal or localization to noxious stimuli   Reflex: Babinski mute    GCS:    E1 V1 M1 --> 3    mRS:  0 No symptoms at all  1 No significant disability despite symptoms; able to carry out all usual duties and activities without assistance  2 Slight disability; unable to carry out all previous activities, but able to look after own affairs  3 Moderate disability; requiring some help, but able to walk without assistance  4 Moderately severe disability; unable to walk without assistance and unable to attend to own bodily needs without assistance  5 Severe disability; bedridden, incontinent and requiring constant nursing care and attention  6 Dead    Last CTH: < from: CT Head No Cont (07.17.20 @ 10:27) >  Comparison is made with the CT scans of the head and CTA exams from the prior day.    FINDINGS/  IMPRESSION:    1.  Interval resorption of the IV contrast within the intracranial vasculature.    2.  Interval increase in posterior fossa edema and mass effect consistent with evolving infarct (involving both cerebellar hemispheres and possibly the brainstem) with resultant new effacement of the fourth ventricle and new mild obstructive hydrocephalus.    3.  Evolving infarct also noted within the right temporal lobe.    4.  Increased hyperdensity within the left ambient and quadrigeminal plate cisterns, and new hyperdensity within the right ambient and quadrigeminal plate cisterns, the suprasellar cistern, the right sylvian fissure and scattered sulci suspicious worsening/new subarachnoid hemorrhage.    5.  Redemonstrated enlarged dolichoectatic vertebrobasilar vessels with mass effect upon the brainstem.    < end of copied text >    Last CTA/MRA: < from: CT Angio Neck w/ IV Cont (07.16.20 @ 16:08) >  IMPRESSION:    1.  *Occlusion of the right vertebral artery from about the level of the C2 vertebral body to the vertebrobasilar junction (V3 and V4 segments).  2.  *Thrombus within the distal basilar artery measuring about 6 mm in length and producing about 80% luminal stenosis.    Other extensive atheromatous changes including:  3.  Moderate/severe stenosis of the right subclavian artery origin.   4.  Moderate/severe stenosis of the right vertebral artery origin.   5.  Moderate stenosis of the right ICA petrous segment.  6.  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  7.  Fusiform dilatation of the left vertebral artery up to 7 mm, and the basilar artery up to 1.1 cm, with compression of the indira.    < end of copied text >    Last CTP: < from: CT Perfusion w/ Maps w/ IV Cont (07.16.20 @ 20:08) >  IMPRESSION:    Since most recent examination the previously noted distal basilar clot is less well-defined on this examination though now with diminished contrast enhancement of the distal basilar which likely represents residual thrombus. Contrast enhancement within the right PCA is diminished as compared to prior.    There has been extension of the right vertebral artery thrombus inferiorly now extending to the level of the C7 vertebral body.    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    Multifocal areas of moderate to severe stenosis of the bilateral ICA and MCA branches with unchangedbroad-based 2 mm aneurysms noted of the bilateral M1 segments.    Approximately 301 mL ischemic penumbra noted involving the posterior circulation without core infarction    < end of copied text >    Last MRI: n/a    Last TCD: n/a    Last EEG: < from: EEG (07.17.20 @ 13:00) >  Impression  -  Abnormal due to the presence of: focal slowing as above, generalized slowing as above  -    Clinical Correlation & Recommendations   Consistent with diffuse cerebral electrophysiological dysfunction.  Secondary to none specific cause. Consistent with focal electrophysiological dysfunction.  Secondary to structural/metabolic cause.    < end of copied text >    9. Cardiovascular:   Vital Signs Last 24 Hrs  T(C): 36.3 (23 Jul 2020 08:00), Max: 36.7 (23 Jul 2020 04:00)  T(F): 97.3 (23 Jul 2020 08:00), Max: 98 (23 Jul 2020 04:00)  HR: 74 (23 Jul 2020 11:30) (68 - 148)  BP: 66/37 (23 Jul 2020 01:30) (66/37 - 92/46)  BP(mean): 47 (23 Jul 2020 01:30) (47 - 60)  RR: 10 (23 Jul 2020 11:30) (10 - 24)  SpO2: 98% (23 Jul 2020 11:30) (97% - 100%)   Blood Gas Arterial, Lactate (07.23.20 @ 12:07)    Blood Gas Arterial, Lactate: 1.4 mmoL/L    Last Echo: < from: Transthoracic Echocardiogram (07.18.20 @ 08:09) >  Summary:   1. Normal global left ventricular systolic function.   2. LV Ejection Fraction by Fuentes's Method with a biplane EF of 65 %.   3. Mild mitral valve regurgitation.   4. Color flow doppler and intravenous injection ofagitated saline demonstrates the presence of an intact intra atrial septum.    PHYSICIAN INTERPRETATION:  Left Ventricle: The left ventricular internal cavity size is normal. Left ventricular wall thickness is normal. Global LV systolic function was normal. Spectral Doppler shows normal pattern of LV diastolic filling.  Right Ventricle: Normal right ventricular size and function.  Left Atrium: Normal left atrial size. Color flow doppler and intravenous injection of agitated saline demonstrates the presence of an intact intra atrial septum.  Right Atrium: Normal right atrial size.  Pericardium: There is no evidence of pericardial effusion.  Mitral Valve: The mitral valve is normal in structure. Peak transmitral mean gradient equals 1.6 mmHg, calculated mitral valve area by pressure half time equals 4.46 cm² consistent with No evidence of mitral stenosis. Mild mitral valve regurgitation is seen.  Tricuspid Valve: The tricuspid valve is normal in structure. Mild tricuspid regurgitation is visualized.  Aortic Valve: Normal trileaflet aortic valve with normal opening. No evidence of aortic valve regurgitation is seen.  Pulmonic Valve: Structurally normal pulmonic valve, with normal leaflet excursion.  Aorta: The aortic root is normal in size and structure.  Pulmonary Artery: The pulmonary artery is of normal size and origin.  Shunts: Agitated saline contrast was given intravenously to evaluate for intracardiac shunting.    < end of copied text >    Last EKG: < from: 12 Lead ECG (07.18.20 @ 22:44) >  Diagnosis Line Marked sinus bradycardia with 1st degree A-V block  T wave abnormality, consider lateral ischemia  Abnormal ECG    < end of copied text >    CVP   MAP/CPP/SBP target:   CO:      CI:       Enzymes/Trop:  Troponin T, Serum (07.17.20 @ 13:25)    Troponin T, Serum: <0.01 ng/mL    10. Respiratory:   ABG:ABG - ( 23 Jul 2020 12:07 )  pH, Arterial: 7.32  pH, Blood: x     /  pCO2: 43    /  pO2: 63    / HCO3: 22    / Base Excess: -3.6  /  SaO2: 92        VBG: Blood Gas Profile - Venous (07.17.20 @ 20:39)    pH, Venous: 7.40    pCO2, Venous: 35 mmHg    pO2, Venous: 93 mmHg    HCO3, Venous: 22 mmoL/L    Base Excess, Venous: -2.7 mmoL/L    Oxygen Saturation, Venous: 96 %    Chest Xray: < from: Xray Chest 1 View- PORTABLE-Routine (07.22.20 @ 05:29) >  Impression:      Stable left basilar opacity. No new focal consolidation, effusion or pneumothorax.    Support devices as described.    < end of copied text >    Mode: AC/ CMV (Assist Control/ Continuous Mandatory Ventilation)  RR (machine): 24  TV (machine): 450  FiO2: 30  PEEP: 5  ITime: 1  MAP: 10  PIP: 19    Peak Pressure/[place] Pressure: n/a    11.GI:  Prophalaxis:  pantoprazole      Bowel mvt:     Abd distension:   LIVER FUNCTIONS - ( 23 Jul 2020 04:45 )  Alb: 2.5 g/dL / Pro: 5.4 g/dL / ALK PHOS: 81 U/L / ALT: 23 U/L / AST: 35 U/L / GGT: x           12.Renal/Fluids/Electrolytes:    07-23    158<H>  |  127<H>  |  20  ----------------------------<  194<H>  3.6   |  21  |  2.1<H>    Ca    8.7      23 Jul 2020 08:28  Phos  3.1     07-23  Mg     2.1     07-23    TPro  5.4<L>  /  Alb  2.5<L>  /  TBili  1.2  /  DBili  x   /  AST  35  /  ALT  23  /  AlkPhos  81  07-23      I&O's Detail    22 Jul 2020 07:01  -  23 Jul 2020 07:00  --------------------------------------------------------  IN:    Free Water: 2400 mL    insulin regular Infusion: 46 mL    insulin regular Infusion: 103 mL    IV PiggyBack: 100 mL    Lactated Ringers IV Bolus: 1000 mL    norepinephrine Infusion: 74.6 mL    Osmolite: 1320 mL    phenylephrine   Infusion: 28.5 mL    sodium chloride 0.45% with potassium chloride 20 mEq/L: 400 mL  Total IN: 5472.1 mL    OUT:    Indwelling Catheter - Urethral: 3330 mL    Stool: 50 mL  Total OUT: 3380 mL    Total NET: 2092.1 mL      23 Jul 2020 07:01  -  23 Jul 2020 12:36  --------------------------------------------------------  IN:    amiodarone Infusion: 166.5 mL    insulin regular Infusion: 37 mL    phenylephrine   Infusion: 112.5 mL    sodium chloride 0.45% with potassium chloride 20 mEq/L: 50 mL    sodium chloride 0.45%.: 1600 mL  Total IN: 1966 mL    OUT:    Indwelling Catheter - Urethral: 1425 mL  Total OUT: 1425 mL    Total NET: 541 mL    13.ID:   Urinalysis (07.21.20 @ 04:35)    Glucose Qualitative, Urine: Negative    Blood, Urine: Moderate    pH Urine: 6.5    Color: Colorless    Urine Appearance: Clear    Bilirubin: Negative    Ketone - Urine: Negative    Specific Gravity: 1.004    Protein, Urine: Trace    Urobilinogen: <2 mg/dL    Nitrite: Negative    Leukocyte Esterase Concentration: Negative    Creatinine, Random Urine (07.21.20 @ 04:35)    Creatinine, Random Urine: 13: Reference Ranges have NOT been established for random urine analytes due  to variability in fluid intake and concentration. mg/dL    Lines: Central[] Date inserted: Peripheral[x]    14. Hematology:                         11.0   15.56 )-----------( 138      ( 23 Jul 2020 04:45 )             35.1      07-23    158<H>  |  127<H>  |  20  ----------------------------<  194<H>  3.6   |  21  |  2.1<H>    Ca    8.7      23 Jul 2020 08:28  Phos  3.1     07-23  Mg     2.1     07-23    TPro  5.4<L>  /  Alb  2.5<L>  /  TBili  1.2  /  DBili  x   /  AST  35  /  ALT  23  /  AlkPhos  81  07-23       DVT Prophylaxis Lovenox[ ] Heparin[x ] Venodynes[ ] SCD's[ ]    15. Impression: 64 year old gentleman with history significant for DM, CAD with PCI x3 in 2017, HTN, DLD, trigeminal neuralgia presents to the ED for aphasia s/p tPA for stroke. Baseline is AAOx3. Initial NIHSS 9. While in the ED, patient was with sudden body posturing and not following commands. Intubated for airway protection. Stat CTH unremarkable. CTA showed right vertebral artery occlusion and distal basilar artery thrombus. CTP with basilar artery near occlusion. Code NI actual was activated and patient was transferred to [facility] for neurointervention. S/p basilar artery thrombectomy TICI 3 and on integrillin gtt, now since stopped. 3% started for severe cytotoxic edema and brainstem compression. In 24 hours, sodium level had risen to 170, so 3% was discontinued. I/Os over 24 hours and normal renal function do not support central DI, so administration of free water and D5W until sodium level of 160 is achieved. Today, patient remains brainstem areflexic, intubated with no sedation on board, with sodium level 158 this AM. Apnea test performed yesterday and patient took a breath on his own. Wife aware of very poor prognosis. Awaiting TCD as part of brain death exam. Plan to keep sodium <160 if brain death exam is requested by family.     16. Suggestions:  -Continue to keep sodium goal of <160 for possible brain death exam  -Continue to keep -150  -Please order transcranial doppler as part of brain death exam  -Please continue with goals of care conversation with family  -Medical management as per primary team    17. Disposition:  -Continue medical management on ICU    Abril Johns NP  [phone]

## 2020-07-23 NOTE — PROGRESS NOTE ADULT - SUBJECTIVE AND OBJECTIVE BOX
Patient is a 64y old  Male who presents with a chief complaint of Stroke s/p tpa (22 Jul 2020 15:02)        Over Night Events:  On MV.  On Rusty 1.9.  SP cardioversion.  On Amiodarone drip.  Had Spontaneous breathing during apnea test yesterday.          ROS:     All ROS are negative except HPI         PHYSICAL EXAM    ICU Vital Signs Last 24 Hrs  T(C): 36.7 (23 Jul 2020 04:00), Max: 36.7 (23 Jul 2020 04:00)  T(F): 98 (23 Jul 2020 04:00), Max: 98 (23 Jul 2020 04:00)  HR: 100 (23 Jul 2020 07:00) (66 - 148)  BP: 66/37 (23 Jul 2020 01:30) (66/37 - 92/46)  BP(mean): 47 (23 Jul 2020 01:30) (47 - 60)  ABP: 90/46 (23 Jul 2020 07:00) (68/36 - 158/64)  ABP(mean): 58 (23 Jul 2020 07:00) (44 - 90)  RR: 24 (23 Jul 2020 07:00) (20 - 24)  SpO2: 98% (23 Jul 2020 07:00) (97% - 100%)      CONSTITUTIONAL:  Well nourished.  NAD    ENT:   Airway patent,   Mouth with normal mucosa.   No thrush    EYES:   Pupils equal,   Round non reactive to light.    CARDIAC:   Normal rate,   Regular rhythm.    edema      Vascular:  Normal systolic impulse  No Carotid bruits    RESPIRATORY:   No wheezing  Bilateral BS  Normal chest expansion  Not tachypneic,  No use of accessory muscles    GASTROINTESTINAL:  Abdomen soft,   Non-tender,   No guarding,   + BS    MUSCULOSKELETAL:   Range of motion is not limited,  No clubbing, cyanosis    NEUROLOGICAL:   No gag, No corneal,   Withdraws RLE ot pain .    SKIN:   Skin normal color for race,   Warm and dry   No evidence of rash.    PSYCHIATRIC:   No apparent risk to self or others.    HEMATOLOGICAL:  No cervical  lymphadenopathy.  no inguinal lymphadenopathy      07-22-20 @ 07:01  -  07-23-20 @ 07:00  --------------------------------------------------------  IN:    Free Water: 2400 mL    insulin regular Infusion: 46 mL    insulin regular Infusion: 103 mL    IV PiggyBack: 100 mL    Lactated Ringers IV Bolus: 1000 mL    norepinephrine Infusion: 74.6 mL    Osmolite: 1320 mL    phenylephrine   Infusion: 28.5 mL    sodium chloride 0.45% with potassium chloride 20 mEq/L: 400 mL  Total IN: 5472.1 mL    OUT:    Indwelling Catheter - Urethral: 3330 mL    Stool: 50 mL  Total OUT: 3380 mL    Total NET: 2092.1 mL          LABS:                            11.0   15.56 )-----------( 138      ( 23 Jul 2020 04:45 )             35.1                                               07-23    161<HH>  |  129<H>  |  20  ----------------------------<  206<H>  3.4<L>   |  22  |  2.2<H>    Ca    8.6      23 Jul 2020 04:45  Phos  3.1     07-23  Mg     2.1     07-23    TPro  5.4<L>  /  Alb  2.5<L>  /  TBili  1.2  /  DBili  x   /  AST  35  /  ALT  23  /  AlkPhos  81  07-23                                                                                           LIVER FUNCTIONS - ( 23 Jul 2020 04:45 )  Alb: 2.5 g/dL / Pro: 5.4 g/dL / ALK PHOS: 81 U/L / ALT: 23 U/L / AST: 35 U/L / GGT: x                                                                                               Mode: AC/ CMV (Assist Control/ Continuous Mandatory Ventilation)  RR (machine): 24  TV (machine): 450  FiO2: 30  PEEP: 5  ITime: 1  MAP: 10  PIP: 17                                      ABG - ( 23 Jul 2020 04:11 )  pH, Arterial: 7.34  pH, Blood: x     /  pCO2: 41    /  pO2: 73    / HCO3: 22    / Base Excess: -3.6  /  SaO2: 94                  MEDICATIONS  (STANDING):  aMIOdarone Infusion 1 mG/Min (33.3 mL/Hr) IV Continuous <Continuous>  atorvastatin 80 milliGRAM(s) Oral at bedtime  chlorhexidine 0.12% Liquid 15 milliLiter(s) Oral Mucosa every 12 hours  chlorhexidine 4% Liquid 1 Application(s) Topical <User Schedule>  desmopressin 0.2 milliGRAM(s) Oral every 12 hours  dextrose 50% Injectable 12.5 Gram(s) IV Push once  dextrose 50% Injectable 25 Gram(s) IV Push once  dextrose 50% Injectable 25 Gram(s) IV Push once  heparin   Injectable 5000 Unit(s) SubCutaneous every 8 hours  insulin regular Infusion 1 Unit(s)/Hr (1 mL/Hr) IV Continuous <Continuous>  norepinephrine Infusion 0.05 MICROgram(s)/kG/Min (3.96 mL/Hr) IV Continuous <Continuous>  pantoprazole   Suspension 40 milliGRAM(s) Enteral Tube daily  phenylephrine    Infusion 0.1 MICROgram(s)/kG/Min (1.58 mL/Hr) IV Continuous <Continuous>  sodium chloride 0.45% with potassium chloride 20 mEq/L 1000 milliLiter(s) (50 mL/Hr) IV Continuous <Continuous>    MEDICATIONS  (PRN):  dextrose 40% Gel 15 Gram(s) Oral once PRN Blood Glucose LESS THAN 70 milliGRAM(s)/deciliter  glucagon  Injectable 1 milliGRAM(s) IntraMuscular once PRN Glucose LESS THAN 70 milligrams/deciliter      New X-rays reviewed:                                                                                  ECHO    CXR interpreted by me:

## 2020-07-23 NOTE — PROGRESS NOTE ADULT - SUBJECTIVE AND OBJECTIVE BOX
Hospital Day:  7d    Chief Complaint: Patient is a 64y old male who presents with a chief complaint of Stroke s/p tpa (2020 14:26)    24 hour events: - Early in the AM patient was hemodynamically unstable with a rhythm of PACs, HR> 120. s/p cardioversion. s/p amio push, on amio gtt. Pressors swtiched to phenylephrine and vasopressin.   - Patient seen this AM, unresponsive, intubated, not sedated in bed.     Past Medical Hx:   HTN (hypertension)  Diabetes    Past Sx:  No significant past surgical history    Allergies:  No Known Allergies    Current Meds:   Standing Meds:  aMIOdarone Infusion 1 mG/Min (33.3 mL/Hr) IV Continuous <Continuous>  aMIOdarone Infusion 0.5 mG/Min (16.7 mL/Hr) IV Continuous <Continuous>  atorvastatin 80 milliGRAM(s) Oral at bedtime  chlorhexidine 0.12% Liquid 15 milliLiter(s) Oral Mucosa every 12 hours  chlorhexidine 4% Liquid 1 Application(s) Topical <User Schedule>  desmopressin Injectable 4 MICROGram(s) IV Push two times a day  dextrose 50% Injectable 12.5 Gram(s) IV Push once  dextrose 50% Injectable 25 Gram(s) IV Push once  dextrose 50% Injectable 25 Gram(s) IV Push once  heparin   Injectable 5000 Unit(s) SubCutaneous every 8 hours  insulin regular Infusion 1 Unit(s)/Hr (1 mL/Hr) IV Continuous <Continuous>  norepinephrine Infusion 0.05 MICROgram(s)/kG/Min (3.96 mL/Hr) IV Continuous <Continuous>  pantoprazole   Suspension 40 milliGRAM(s) Enteral Tube daily  phenylephrine    Infusion 0.1 MICROgram(s)/kG/Min (1.58 mL/Hr) IV Continuous <Continuous>  sodium chloride 0.45%. 1000 milliLiter(s) (50 mL/Hr) IV Continuous <Continuous>  vasopressin Infusion 0.04 Unit(s)/Min (2.4 mL/Hr) IV Continuous <Continuous>    PRN Meds:  acetaminophen   Tablet .. 650 milliGRAM(s) Oral every 6 hours PRN Temp greater or equal to 38C (100.4F)  dextrose 40% Gel 15 Gram(s) Oral once PRN Blood Glucose LESS THAN 70 milliGRAM(s)/deciliter  glucagon  Injectable 1 milliGRAM(s) IntraMuscular once PRN Glucose LESS THAN 70 milligrams/deciliter    HOME MEDICATIONS:  ALPRAZolam 0.5 mg oral tablet, extended release: 1 tab(s) orally once a day (at bedtime)  amLODIPine 5 mg oral tablet: 1 tab(s) orally once a day  clopidogrel 75 mg oral tablet: 1 tab(s) orally once a day  losartan 50 mg oral tablet: 1 tab(s) orally once a day  metFORMIN 500 mg oral tablet, extended release: 1 tab(s) orally once a day  omeprazole 20 mg oral delayed release capsule: 1 cap(s) orally once a day  OXcarbazepine 150 mg oral tablet: 1 tab(s) orally once a day  OXcarbazepine 300 mg oral tablet: 1 tab(s) orally once a day (at bedtime)  trihexyphenidyl: 1 milligram(s) orally 2 times a day      Vital Signs:   T(F): 101.1 (20 @ 20:00), Max: 101.1 (20 @ 20:00)  HR: 76 (20 @ 20:15) (70 - 148)  BP: 66/37 (20 @ 01:30) (66/37 - 92/46)  RR: 24 (20 @ 20:15) (10 - 27)  SpO2: 86% (20 @ 20:15) (86% - 100%)      20 @ 07:01  -  20 @ 07:00  --------------------------------------------------------  IN: 5472.1 mL / OUT: 3380 mL / NET: 2092.1 mL    20 @ 07:  -  20 @ 20:25  --------------------------------------------------------  IN: 4513.4 mL / OUT: 2625 mL / NET: 1888.4 mL    Physical Exam:   GENERAL: unresponsive   HEENT: NCAT, pupils fixed, +ET tube  CHEST/LUNG: audible breath sounds b/l   HEART: Regular rate and rhythm; s1 s2 appreciated  ABDOMEN: Soft, Nontender, Nondistended  EXTREMITIES: No LE edema b/l, +SCDs  NERVOUS SYSTEM:  unresponsive to painful stimuli, no apparent brain stem reflexes   LINES/CATHETERS: +Central line, a line     Labs:                         11.0   16.68 )-----------( 152      ( 2020 18:00 )             34.1       2020 18:00    150    |  116    |  24     ----------------------------<  206    4.2     |  21     |  2.2      Ca    8.3        2020 18:00  Phos  3.1       2020 04:45  Mg     2.1       2020 04:45    TPro  5.4    /  Alb  2.5    /  TBili  1.2    /  DBili  x      /  AST  35     /  ALT  23     /  AlkPhos  81     2020 04:45    Urinalysis Basic - ( 2020 04:35 )    Color: Colorless / Appearance: Clear / S.004 / pH: x  Gluc: x / Ketone: Negative  / Bili: Negative / Urobili: <2 mg/dL   Blood: x / Protein: Trace / Nitrite: Negative   Leuk Esterase: Negative / RBC: 3 /HPF / WBC 2 /HPF   Sq Epi: x / Non Sq Epi: 2 /HPF / Bacteria: Negative    Radiology:     Assessment and Plan:   64 year old male pmh of DM, CAD with PCI x3 in 2017, HTN, DLD, trigeminal neuralgia who presents to ED from [facility] s/p TPA for stroke.    # CVA s/p TPA s/p thrombectomy (Basiliar Artery) - unresponsive off sedation, pressors changed to vasopressin and phenylephrine   - repeat CT head:  Interval increase in posterior fossa edema and mass effect consistent with evolving infarct  with resultant new effacement of the fourth ventricle and new mild obstructive hydrocephalus. Increased hyperdensity within the left ambient and quadrigeminal plate cisterns, and new hyperdensity within the right ambient and quadrigeminal plate cisterns, the suprasellar cistern, the right sylvian fissure and scattered sulci suspicious worsening/new subarachnoid hemorrhage.  - s/p 23% NS, 50g mannitol, 3% hypertonic saline drip   - remained ventilated, off sedation  - integrillin completed  - remains on levo, try to wean levo.   - no notable neurologic activity. Apnea test performed (), patient took a breath.  - target Na 150-155  - continue with Subq heparin for DVT ppx, cleared by neurology.   - Transcranial doppler?   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    # DM   - HbA1c 7.5%   - insulin gtt   - q1h FS     # Diarrhea - not large volume  - hold tube feeds for 24h    # DLD   - continue with atorvastatin 80mg      # Activity: Bed rest   # Diet: NPO  # DVT ppx: Subq heparin   # GI ppx: Protonix  # Code Status: FULL CODE    # DISPO: Acute

## 2020-07-23 NOTE — PROGRESS NOTE ADULT - ASSESSMENT
ASSESSMENT/PLAN  - Basilar artery occlusion (POD #3 s/p mechanical thrombectomy and tPA)  - Basilar artery aneurysm  - Perimesencephalic cisternal SAH  - Severe cytotoxic edema and brainstem compression  - Acute respiratory failure  - hypernatremia, probable central DI  - h/o DM with poor control pta  Estimated REE by PSE today 1872 and protein needs 114-126 gm/d  -continue with  Peptamen AF at 65 ml/h to provide 1872 k, 119 gm protein, with lower carb content, better omega6:3 ratio, and no need for adding modulars.  Adjust insulin drip once this formula starts.

## 2020-07-23 NOTE — PROGRESS NOTE ADULT - ASSESSMENT
IMPRESSION:    Acute hypoxemic respiratory failure   Stroke SP TAP And thrombectomy  NAEL  DI       PLAN:    CNS: FU with Neurology.  FU MS.  Keep off sedation     HEENT:  Oral care    PULMONARY:  HOB @ 45 degrees.  Vent changes:  None     CARDIOVASCULAR: Wean Rusty.      GI: GI prophylaxis                                          Feeding OG tube and OG feeding     RENAL:  F/u repeat lytes.  Increase DDAVP    INFECTIOUS DISEASE: NO ABX; f/u cultures    HEMATOLOGICAL:  DVT prophylaxis.    ENDOCRINE:  Follow up FS.  Insulin protocol if needed.    CODE STATUS: FULL CODE    DISPOSITION: Pt requires continued monitoring in the MICU    Overall very Poor prognosis

## 2020-07-23 NOTE — PROGRESS NOTE ADULT - SUBJECTIVE AND OBJECTIVE BOX
Patient is a 64y old  Male who presents with a chief complaint of Stroke s/p tpa (23 Jul 2020 12:35)  pt remain vented  s/p cardioversion  on tube feed     ICU Vital Signs Last 24 Hrs  T(C): 36.8 (23 Jul 2020 12:00), Max: 36.8 (23 Jul 2020 12:00)  T(F): 98.2 (23 Jul 2020 12:00), Max: 98.2 (23 Jul 2020 12:00)  HR: 72 (23 Jul 2020 14:15) (70 - 148)  BP: 66/37 (23 Jul 2020 01:30) (66/37 - 92/46)  BP(mean): 47 (23 Jul 2020 01:30) (47 - 60)  ABP: 174/84 (23 Jul 2020 14:15) (68/36 - 176/94)  ABP(mean): 118 (23 Jul 2020 14:15) (34 - 122)  RR: 22 (23 Jul 2020 14:15) (10 - 27)  SpO2: 93% (23 Jul 2020 14:15) (92% - 100%)      Drug Dosing Weight  Height (cm): 177.8 (17 Jul 2020 00:00)  Weight (kg): 84.5 (16 Jul 2020 20:42)  BMI (kg/m2): 26.7 (17 Jul 2020 00:00)  BSA (m2): 2.03 (17 Jul 2020 00:00)    I&O's Detail    22 Jul 2020 07:01  -  23 Jul 2020 07:00  --------------------------------------------------------  IN:    Free Water: 2400 mL    insulin regular Infusion: 46 mL    insulin regular Infusion: 103 mL    IV PiggyBack: 100 mL    Lactated Ringers IV Bolus: 1000 mL    norepinephrine Infusion: 74.6 mL    Osmolite: 1320 mL    phenylephrine   Infusion: 28.5 mL    sodium chloride 0.45% with potassium chloride 20 mEq/L: 400 mL  Total IN: 5472.1 mL    OUT:    Indwelling Catheter - Urethral: 3330 mL    Stool: 50 mL  Total OUT: 3380 mL    Total NET: 2092.1 mL      23 Jul 2020 07:01  -  23 Jul 2020 14:27  --------------------------------------------------------  IN:    amiodarone Infusion: 199.8 mL    insulin regular Infusion: 37 mL    phenylephrine   Infusion: 112.5 mL    sodium chloride 0.45% with potassium chloride 20 mEq/L: 50 mL    sodium chloride 0.45%.: 2000 mL    vasopressin Infusion: 7.2 mL  Total IN: 2406.5 mL    OUT:    Indwelling Catheter - Urethral: 1475 mL  Total OUT: 1475 mL    Total NET: 931.5 mL    MEDICATIONS  (STANDING):  aMIOdarone Infusion 1 mG/Min (33.3 mL/Hr) IV Continuous <Continuous>  atorvastatin 80 milliGRAM(s) Oral at bedtime  chlorhexidine 0.12% Liquid 15 milliLiter(s) Oral Mucosa every 12 hours  chlorhexidine 4% Liquid 1 Application(s) Topical <User Schedule>  desmopressin Injectable 4 MICROGram(s) IV Push two times a day  dextrose 50% Injectable 12.5 Gram(s) IV Push once  dextrose 50% Injectable 25 Gram(s) IV Push once  dextrose 50% Injectable 25 Gram(s) IV Push once  heparin   Injectable 5000 Unit(s) SubCutaneous every 8 hours  insulin regular Infusion 1 Unit(s)/Hr (1 mL/Hr) IV Continuous <Continuous>  norepinephrine Infusion 0.05 MICROgram(s)/kG/Min (3.96 mL/Hr) IV Continuous <Continuous>  pantoprazole   Suspension 40 milliGRAM(s) Enteral Tube daily  phenylephrine    Infusion 0.1 MICROgram(s)/kG/Min (1.58 mL/Hr) IV Continuous <Continuous>  sodium chloride 0.45%. 1000 milliLiter(s) (400 mL/Hr) IV Continuous <Continuous>  vasopressin Infusion 0.04 Unit(s)/Min (2.4 mL/Hr) IV Continuous <Continuous>      Diet, NPO with Tube Feed:   Tube Feeding Modality: Orogastric  Peptamen A.F. Formula  Total Volume for 24 Hours (mL): 1560  Continuous  Until Goal Tube Feed Rate (mL per Hour): 65  Tube Feed Duration (in Hours): 24  Tube Feed Start Time: 15:00 (07-22-20 @ 14:30)      LABS  07-23    158<H>  |  127<H>  |  20  ----------------------------<  194<H>  3.6   |  21  |  2.1<H>    Ca    8.7      23 Jul 2020 08:28  Phos  3.1     07-23  Mg     2.1     07-23    TPro  5.4<L>  /  Alb  2.5<L>  /  TBili  1.2  /  DBili  x   /  AST  35  /  ALT  23  /  AlkPhos  81  07-23                          11.0   15.56 )-----------( 138      ( 23 Jul 2020 04:45 )             35.1     CAPILLARY BLOOD GLUCOSE  POCT Blood Glucose.: 161 mg/dL (23 Jul 2020 13:08)  POCT Blood Glucose.: 158 mg/dL (23 Jul 2020 12:30)   RADIOLOGY STUDIES  < from: Xray Abdomen 1 View PORTABLE -Routine (07.23.20 @ 06:00) >  Impression:    Nonobstructive bowel gas pattern.  Support devices as described.  < from: Xray Chest 1 View- PORTABLE-Routine (07.22.20 @ 05:29) >    Impression:    Stable left basilar opacity. No new focal consolidation, effusion or pneumothorax.  Support devices as described.

## 2020-07-24 NOTE — PROGRESS NOTE ADULT - SUBJECTIVE AND OBJECTIVE BOX
Hospital Day:  8d    Chief Complaint: Patient is a 64y old  Male who presents with a chief complaint of Stroke s/p tpa (2020 20:15)    24 hour events:     Past Medical Hx:   HTN (hypertension)  Diabetes    Past Sx:  No significant past surgical history    Allergies:  No Known Allergies    Current Meds:   Standing Meds:  aMIOdarone Infusion 1 mG/Min (33.3 mL/Hr) IV Continuous <Continuous>  aMIOdarone Infusion 0.5 mG/Min (16.7 mL/Hr) IV Continuous <Continuous>  atorvastatin 80 milliGRAM(s) Oral at bedtime  chlorhexidine 0.12% Liquid 15 milliLiter(s) Oral Mucosa every 12 hours  chlorhexidine 4% Liquid 1 Application(s) Topical <User Schedule>  desmopressin Injectable 4 MICROGram(s) IV Push two times a day  dextrose 50% Injectable 12.5 Gram(s) IV Push once  dextrose 50% Injectable 25 Gram(s) IV Push once  dextrose 50% Injectable 25 Gram(s) IV Push once  heparin   Injectable 5000 Unit(s) SubCutaneous every 8 hours  insulin regular Infusion 1 Unit(s)/Hr (1 mL/Hr) IV Continuous <Continuous>  norepinephrine Infusion 0.05 MICROgram(s)/kG/Min (3.96 mL/Hr) IV Continuous <Continuous>  pantoprazole   Suspension 40 milliGRAM(s) Enteral Tube daily  phenylephrine    Infusion 0.1 MICROgram(s)/kG/Min (1.58 mL/Hr) IV Continuous <Continuous>  sodium chloride 0.45%. 1000 milliLiter(s) (25 mL/Hr) IV Continuous <Continuous>  vasopressin Infusion 0.04 Unit(s)/Min (2.4 mL/Hr) IV Continuous <Continuous>    PRN Meds:  acetaminophen   Tablet .. 650 milliGRAM(s) Oral every 6 hours PRN Temp greater or equal to 38C (100.4F)  dextrose 40% Gel 15 Gram(s) Oral once PRN Blood Glucose LESS THAN 70 milliGRAM(s)/deciliter  glucagon  Injectable 1 milliGRAM(s) IntraMuscular once PRN Glucose LESS THAN 70 milligrams/deciliter    HOME MEDICATIONS:  ALPRAZolam 0.5 mg oral tablet, extended release: 1 tab(s) orally once a day (at bedtime)  amLODIPine 5 mg oral tablet: 1 tab(s) orally once a day  clopidogrel 75 mg oral tablet: 1 tab(s) orally once a day  losartan 50 mg oral tablet: 1 tab(s) orally once a day  metFORMIN 500 mg oral tablet, extended release: 1 tab(s) orally once a day  omeprazole 20 mg oral delayed release capsule: 1 cap(s) orally once a day  OXcarbazepine 150 mg oral tablet: 1 tab(s) orally once a day  OXcarbazepine 300 mg oral tablet: 1 tab(s) orally once a day (at bedtime)  trihexyphenidyl: 1 milligram(s) orally 2 times a day      Vital Signs:   T(F): 100.1 (20 @ 00:00), Max: 101.1 (20 @ 20:00)  HR: 78 (20 @ 05:45) (68 - 134)  BP: --  RR: 24 (20 @ 05:45) (10 - 27)  SpO2: 98% (20 @ 05:45) (86% - 100%)      20 @ 07:01  -  20 @ 07:00  --------------------------------------------------------  IN: 5472.1 mL / OUT: 3380 mL / NET: 2092.1 mL    20 @ 07:01  -  20 @ 06:18  --------------------------------------------------------  IN: 5338 mL / OUT: 3900 mL / NET: 1438 mL    Physical Exam:   GENERAL: NAD  HEENT: NCAT  CHEST/LUNG: CTAB  HEART: Regular rate and rhythm; s1 s2 appreciated, No murmurs, rubs, or gallops  ABDOMEN: Soft, Nontender, Nondistended; Bowel sounds present  EXTREMITIES: No LE edema b/l  SKIN: no rashes, no new lesions  NERVOUS SYSTEM:  Alert & Oriented X3  LINES/CATHETERS:    Labs:                         9.9    14.42 )-----------( 142      ( 2020 04:50 )             30.9       2020 04:50    147    |  113    |  28     ----------------------------<  172    4.1     |  22     |  2.3      Ca    8.0        2020 04:50  Phos  4.5       2020 04:50  Mg     1.9       2020 04:50    TPro  5.4    /  Alb  2.3    /  TBili  1.3    /  DBili  x      /  AST  46     /  ALT  20     /  AlkPhos  96     2020 04:50    Urinalysis Basic - ( 2020 21:50 )    Color: Yellow / Appearance: Slightly Turbid / S.014 / pH: x  Gluc: x / Ketone: Negative  / Bili: Negative / Urobili: <2 mg/dL   Blood: x / Protein: 100 mg/dL / Nitrite: Positive   Leuk Esterase: Negative / RBC: 6 /HPF / WBC 14 /HPF   Sq Epi: x / Non Sq Epi: 4 /HPF / Bacteria: Negative    Radiology:     Assessment and Plan:   64 year old male pmh of DM, CAD with PCI x3 in 2017, HTN, DLD, trigeminal neuralgia who presents to ED from [facility] s/p TPA for stroke.    # CVA s/p TPA s/p thrombectomy (Basiliar Artery) - unresponsive off sedation, pressors changed to vasopressin and phenylephrine   - repeat CT head:  Interval increase in posterior fossa edema and mass effect consistent with evolving infarct  with resultant new effacement of the fourth ventricle and new mild obstructive hydrocephalus. Increased hyperdensity within the left ambient and quadrigeminal plate cisterns, and new hyperdensity within the right ambient and quadrigeminal plate cisterns, the suprasellar cistern, the right sylvian fissure and scattered sulci suspicious worsening/new subarachnoid hemorrhage.  - s/p 23% NS, 50g mannitol, 3% hypertonic saline drip   - remained ventilated, off sedation  - integrillin completed  - remains on levo, try to wean levo.   - no notable neurologic activity. Apnea test performed (), patient took a breath.  - target Na 150-155  - continue with Subq heparin for DVT ppx, cleared by neurology.   - Transcranial doppler?   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    # DM   - HbA1c 7.5%   - insulin gtt   - q1h FS     # Diarrhea - not large volume  - hold tube feeds for 24h    # DLD   - continue with atorvastatin 80mg      # Activity: Bed rest   # Diet: NPO  # DVT ppx: Subq heparin   # GI ppx: Protonix  # Code Status: FULL CODE    # DISPO: Acute Hospital Day:  8d    Chief Complaint: Patient is a 64y old  Male who presents with a chief complaint of Stroke s/p tpa (2020 20:15)    24 hour events: Patient had a fever overnight. BCx, UA, and CXR performed.  - No other events overnight. Patient seen the morning, unresponsive, in bed.     Past Medical Hx:   HTN (hypertension)  Diabetes    Past Sx:  No significant past surgical history    Allergies:  No Known Allergies    Current Meds:   Standing Meds:  aMIOdarone Infusion 1 mG/Min (33.3 mL/Hr) IV Continuous <Continuous>  aMIOdarone Infusion 0.5 mG/Min (16.7 mL/Hr) IV Continuous <Continuous>  atorvastatin 80 milliGRAM(s) Oral at bedtime  chlorhexidine 0.12% Liquid 15 milliLiter(s) Oral Mucosa every 12 hours  chlorhexidine 4% Liquid 1 Application(s) Topical <User Schedule>  desmopressin Injectable 4 MICROGram(s) IV Push two times a day  dextrose 50% Injectable 12.5 Gram(s) IV Push once  dextrose 50% Injectable 25 Gram(s) IV Push once  dextrose 50% Injectable 25 Gram(s) IV Push once  heparin   Injectable 5000 Unit(s) SubCutaneous every 8 hours  insulin regular Infusion 1 Unit(s)/Hr (1 mL/Hr) IV Continuous <Continuous>  norepinephrine Infusion 0.05 MICROgram(s)/kG/Min (3.96 mL/Hr) IV Continuous <Continuous>  pantoprazole   Suspension 40 milliGRAM(s) Enteral Tube daily  phenylephrine    Infusion 0.1 MICROgram(s)/kG/Min (1.58 mL/Hr) IV Continuous <Continuous>  sodium chloride 0.45%. 1000 milliLiter(s) (25 mL/Hr) IV Continuous <Continuous>  vasopressin Infusion 0.04 Unit(s)/Min (2.4 mL/Hr) IV Continuous <Continuous>    PRN Meds:  acetaminophen   Tablet .. 650 milliGRAM(s) Oral every 6 hours PRN Temp greater or equal to 38C (100.4F)  dextrose 40% Gel 15 Gram(s) Oral once PRN Blood Glucose LESS THAN 70 milliGRAM(s)/deciliter  glucagon  Injectable 1 milliGRAM(s) IntraMuscular once PRN Glucose LESS THAN 70 milligrams/deciliter    HOME MEDICATIONS:  ALPRAZolam 0.5 mg oral tablet, extended release: 1 tab(s) orally once a day (at bedtime)  amLODIPine 5 mg oral tablet: 1 tab(s) orally once a day  clopidogrel 75 mg oral tablet: 1 tab(s) orally once a day  losartan 50 mg oral tablet: 1 tab(s) orally once a day  metFORMIN 500 mg oral tablet, extended release: 1 tab(s) orally once a day  omeprazole 20 mg oral delayed release capsule: 1 cap(s) orally once a day  OXcarbazepine 150 mg oral tablet: 1 tab(s) orally once a day  OXcarbazepine 300 mg oral tablet: 1 tab(s) orally once a day (at bedtime)  trihexyphenidyl: 1 milligram(s) orally 2 times a day      Vital Signs:   T(F): 100.1 (20 @ 00:00), Max: 101.1 (20 @ 20:00)  HR: 78 (20 @ 05:45) (68 - 134)  BP: --  RR: 24 (20 @ 05:45) (10 - 27)  SpO2: 98% (20 @ 05:45) (86% - 100%)      20 @ 07:01  -  20 @ 07:00  --------------------------------------------------------  IN: 5472.1 mL / OUT: 3380 mL / NET: 2092.1 mL    20 @ 07:01  -  20 @ 06:18  --------------------------------------------------------  IN: 5338 mL / OUT: 3900 mL / NET: 1438 mL    Physical Exam:   GENERAL: unresponsive   HEENT: NCAT, pupils fixed, +ET tube  CHEST/LUNG: audible breath sounds b/l   HEART: Regular rate and rhythm; s1 s2 appreciated  ABDOMEN: Soft, Nontender, Nondistended  EXTREMITIES: No LE edema b/l, +UE edema  NERVOUS SYSTEM:  some withdrawal to painful stimuli in LE?, no apparent brain stem reflexes   LINES/CATHETERS: +Central line, a line     Labs:                         9.9    14.42 )-----------( 142      ( 2020 04:50 )             30.9       2020 04:50    147    |  113    |  28     ----------------------------<  172    4.1     |  22     |  2.3      Ca    8.0        2020 04:50  Phos  4.5       2020 04:50  Mg     1.9       2020 04:50    TPro  5.4    /  Alb  2.3    /  TBili  1.3    /  DBili  x      /  AST  46     /  ALT  20     /  AlkPhos  96     2020 04:50    Urinalysis Basic - ( 2020 21:50 )    Color: Yellow / Appearance: Slightly Turbid / S.014 / pH: x  Gluc: x / Ketone: Negative  / Bili: Negative / Urobili: <2 mg/dL   Blood: x / Protein: 100 mg/dL / Nitrite: Positive   Leuk Esterase: Negative / RBC: 6 /HPF / WBC 14 /HPF   Sq Epi: x / Non Sq Epi: 4 /HPF / Bacteria: Negative    Radiology:     Assessment and Plan:   64 year old male pmh of DM, CAD with PCI x3 in 2017, HTN, DLD, trigeminal neuralgia who presents to ED from [facility] s/p TPA for stroke.    # CVA s/p TPA s/p thrombectomy (Basiliar Artery) - unresponsive off sedation, pressors changed to vasopressin and phenylephrine   - repeat CT head:  Interval increase in posterior fossa edema and mass effect consistent with evolving infarct  with resultant new effacement of the fourth ventricle and new mild obstructive hydrocephalus. Increased hyperdensity within the left ambient and quadrigeminal plate cisterns, and new hyperdensity within the right ambient and quadrigeminal plate cisterns, the suprasellar cistern, the right sylvian fissure and scattered sulci suspicious worsening/new subarachnoid hemorrhage.  - s/p 23% NS, 50g mannitol, 3% hypertonic saline drip   - remained ventilated, off sedation  - integrillin completed  - patient on phenylephrine and vasopressin   - no notable neurologic activity. Apnea test performed (), patient took a breath.  - target Na 150-155  - continue with Subq heparin for DVT ppx, cleared by neurology.   - Transcranial doppler? 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    # Fever of unspecified origin   - UA positive for Nitrites   - vergara changed   - Follow up BCx UCx and MRSA nares   - start vancomycin and meropenem renally dosed.     # DM   - HbA1c 7.5%   - insulin gtt   - q1h FS     # Diarrhea likely secondary to tube feeds  - held tube feeds for 24h to assess.   - start tube feeds as per Nutritional services   - continue with insulin gtt    # DLD   - continue with atorvastatin 80mg      # Activity: Bed rest   # Diet: NPO with tube feeds  # DVT ppx: Subq heparin   # GI ppx: Protonix  # Code Status: FULL CODE    # DISPO: Acute

## 2020-07-24 NOTE — PROGRESS NOTE ADULT - SUBJECTIVE AND OBJECTIVE BOX
Patient is a 64y old  Male who presents with a chief complaint of Stroke s/p tpa (24 Jul 2020 07:50)  pt seen and evaluated   remain vented   on pressors  ICU Vital Signs Last 24 Hrs  T(C): 37.8 (24 Jul 2020 12:00), Max: 38.4 (23 Jul 2020 20:00)  T(F): 100 (24 Jul 2020 12:00), Max: 101.1 (23 Jul 2020 20:00)  HR: 82 (24 Jul 2020 14:00) (68 - 96)  ABP: 148/52 (24 Jul 2020 14:00) (80/42 - 206/76)  ABP(mean): 78 (24 Jul 2020 14:00) (54 - 134)  RR: 24 (24 Jul 2020 14:00) (23 - 24)  SpO2: 99% (24 Jul 2020 14:00) (86% - 100%)      Drug Dosing Weight  Height (cm): 177.8 (17 Jul 2020 00:00)  Weight (kg): 84.5 (16 Jul 2020 20:42)  BMI (kg/m2): 26.7 (17 Jul 2020 00:00)  BSA (m2): 2.03 (17 Jul 2020 00:00)    I&O's Detail    23 Jul 2020 07:01  -  24 Jul 2020 07:00  --------------------------------------------------------  IN:    amiodarone Infusion: 233.2 mL    amiodarone Infusion: 267.2 mL    Free Water: 800 mL    insulin regular Infusion: 130 mL    phenylephrine   Infusion: 644.7 mL    sodium chloride 0.45%: 2450 mL    sodium chloride 0.45% with potassium chloride 20 mEq/L: 50 mL    sodium chloride 0.45%.: 675 mL    vasopressin Infusion: 48 mL  Total IN: 5298.1 mL    OUT:    Indwelling Catheter - Urethral: 3800 mL    Rectal Tube: 300 mL  Total OUT: 4100 mL    Total NET: 1198.1 mL      24 Jul 2020 07:01  -  24 Jul 2020 15:01  --------------------------------------------------------  IN:    amiodarone Infusion: 66.8 mL    insulin regular Infusion: 50 mL    IV PiggyBack: 50 mL    phenylephrine   Infusion: 54 mL    sodium chloride 0.45%.: 175 mL    vasopressin Infusion: 2.4 mL  Total IN: 398.2 mL    OUT:    Indwelling Catheter - Urethral: 425 mL  Total OUT: 425 mL    Total NET: -26.8 mL     PHYSICAL EXAM:  Constitutional:  remain vented  OGT in place  Gastrointestinal:  soft n/d  rectal tube in place  MEDICATIONS  (STANDING):  aMIOdarone    Tablet   Oral   aMIOdarone    Tablet 400 milliGRAM(s) Oral every 12 hours  aMIOdarone Infusion 1 mG/Min (33.3 mL/Hr) IV Continuous <Continuous>  aMIOdarone Infusion 0.5 mG/Min (16.7 mL/Hr) IV Continuous <Continuous>  atorvastatin 80 milliGRAM(s) Oral at bedtime  chlorhexidine 0.12% Liquid 15 milliLiter(s) Oral Mucosa every 12 hours  chlorhexidine 4% Liquid 1 Application(s) Topical <User Schedule>  desmopressin Injectable 4 MICROGram(s) IV Push two times a day  dextrose 50% Injectable 12.5 Gram(s) IV Push once  dextrose 50% Injectable 25 Gram(s) IV Push once  dextrose 50% Injectable 25 Gram(s) IV Push once  heparin   Injectable 5000 Unit(s) SubCutaneous every 8 hours  insulin regular Infusion 1 Unit(s)/Hr (1 mL/Hr) IV Continuous <Continuous>  meropenem  IVPB      norepinephrine Infusion 0.05 MICROgram(s)/kG/Min (3.96 mL/Hr) IV Continuous <Continuous>  pantoprazole   Suspension 40 milliGRAM(s) Enteral Tube daily  phenylephrine    Infusion 0.1 MICROgram(s)/kG/Min (1.58 mL/Hr) IV Continuous <Continuous>  sodium chloride 0.45%. 1000 milliLiter(s) (25 mL/Hr) IV Continuous <Continuous>  vancomycin  IVPB 1250 milliGRAM(s) IV Intermittent once  vancomycin  IVPB      vasopressin Infusion 0.04 Unit(s)/Min (2.4 mL/Hr) IV Continuous <Continuous>      Diet, NPO:   Except Medications (07-23-20 @ 15:30)      LABS  07-24    147<H>  |  113<H>  |  30<H>  ----------------------------<  129<H>  4.3   |  21  |  2.5<H>    Ca    8.0<L>      24 Jul 2020 13:40  Phos  4.5     07-24  Mg     1.9     07-24    TPro  5.3<L>  /  Alb  2.3<L>  /  TBili  1.2  /  DBili  x   /  AST  59<H>  /  ALT  20  /  AlkPhos  155<H>  07-24                          9.9    14.42 )-----------( 142      ( 24 Jul 2020 04:50 )             30.9     CAPILLARY BLOOD GLUCOSE  POCT Blood Glucose.: 127 mg/dL (24 Jul 2020 14:13)  POCT Blood Glucose.: 139 mg/dL (24 Jul 2020 12:34)  POCT Blood Glucose.: 157 mg/dL (24 Jul 2020 08:36)     RADIOLOGY STUDIES  < from: Xray Chest 1 View- PORTABLE-Urgent (07.23.20 @ 21:59) >  Impression:    Worsening bilateral opacifications. Support devices as described.

## 2020-07-24 NOTE — PROGRESS NOTE ADULT - ASSESSMENT
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  Adjust insulin drip once this formula starts.

## 2020-07-24 NOTE — PROGRESS NOTE ADULT - ASSESSMENT
IMPRESSION:    Acute hypoxemic respiratory failure   Stroke SP TAP And thrombectomy  NAEL  DI       PLAN:    CNS: FU with Neurology.  FU MS.  Keep off sedation     HEENT:  Oral care    PULMONARY:  HOB @ 45 degrees.  Vent changes:  None     CARDIOVASCULAR: I=O     GI: GI prophylaxis.  Feeding OG tube    RENAL:  F/u repeat lytes.  Continue DDAVP    INFECTIOUS DISEASE: f/u cultures. Send Ucx, Bcx, Change vergara. Nasal MRSA, Start Rakan/vanco    HEMATOLOGICAL:  DVT prophylaxis.    ENDOCRINE:  Follow up FS.  Insulin protocol if needed.    CODE STATUS: FULL CODE    DISPOSITION: Pt requires continued monitoring in the MICU    Overall very Poor prognosis

## 2020-07-24 NOTE — PROGRESS NOTE ADULT - SUBJECTIVE AND OBJECTIVE BOX
Patient is a 64y old  Male who presents with a chief complaint of Stroke s/p tpa (2020 06:17)        Over Night Events: Remains on MV. Off pressors. 1/2NS @ 25cc/hr. Insulin gtt 8u/hr.     ROS:     All ROS are negative except HPI         PHYSICAL EXAM    ICU Vital Signs Last 24 Hrs  T(C): 37.8 (2020 00:00), Max: 38.4 (2020 20:00)  T(F): 100.1 (2020 00:00), Max: 101.1 (2020 20:00)  HR: 78 (2020 06:00) (68 - 92)  ABP: 134/50 (2020 06:00) (80/42 - 206/76)  ABP(mean): 70 (2020 06:00) (34 - 134)  RR: 24 (2020 06:00) (10 - 27)  SpO2: 97% (2020 06:00) (86% - 100%)    CONSTITUTIONAL:  Well nourished.  NAD    ENT:   Airway patent,   Mouth with normal mucosa.   No thrush    EYES:   Pupils equal,   Round non reactive to light.    CARDIAC:   Normal rate,   Regular rhythm.    edema      Vascular:  Normal systolic impulse  No Carotid bruits    RESPIRATORY:   No wheezing  Bilateral BS  Normal chest expansion  Not tachypneic,  No use of accessory muscles    GASTROINTESTINAL:  Abdomen soft,   Non-tender,   No guarding,   + BS    MUSCULOSKELETAL:   Range of motion is not limited,  No clubbing, cyanosis    NEUROLOGICAL:   No gag, No corneal,   Withdraws RLE ot pain .    SKIN:   Skin normal color for race,   Warm and dry   No evidence of rash.    PSYCHIATRIC:   No apparent risk to self or others.    HEMATOLOGICAL:  No cervical  lymphadenopathy.  no inguinal lymphadenopathy      20 @ 07:01  -  20 @ 07:00  --------------------------------------------------------  IN:    amiodarone Infusion: 233.2 mL    amiodarone Infusion: 267.2 mL    Free Water: 800 mL    insulin regular Infusion: 130 mL    phenylephrine   Infusion: 644.7 mL    sodium chloride 0.45%: 2450 mL    sodium chloride 0.45% with potassium chloride 20 mEq/L: 50 mL    sodium chloride 0.45%.: 800 mL    vasopressin Infusion: 48 mL  Total IN: 5423.1 mL    OUT:    Indwelling Catheter - Urethral: 3725 mL    Rectal Tube: 300 mL  Total OUT: 4025 mL    Total NET: 1398.1 mL    LABS:                        9.9    14.42 )-----------( 142      ( 2020 04:50 )             30.9                                               07-24    147<H>  |  113<H>  |  28<H>  ----------------------------<  172<H>  4.1   |  22  |  2.3<H>    Ca    8.0<L>      2020 04:50  Phos  4.5     07-  Mg     1.9     -24    TPro  5.4<L>  /  Alb  2.3<L>  /  TBili  1.3<H>  /  DBili  x   /  AST  46<H>  /  ALT  20  /  AlkPhos  96  07-24                                         Urinalysis Basic - ( 2020 21:50 )    Color: Yellow / Appearance: Slightly Turbid / S.014 / pH: x  Gluc: x / Ketone: Negative  / Bili: Negative / Urobili: <2 mg/dL   Blood: x / Protein: 100 mg/dL / Nitrite: Positive   Leuk Esterase: Negative / RBC: 6 /HPF / WBC 14 /HPF   Sq Epi: x / Non Sq Epi: 4 /HPF / Bacteria: Negative                                              LIVER FUNCTIONS - ( 2020 04:50 )  Alb: 2.3 g/dL / Pro: 5.4 g/dL / ALK PHOS: 96 U/L / ALT: 20 U/L / AST: 46 U/L / GGT: x                                                  Mode: AC/ CMV (Assist Control/ Continuous Mandatory Ventilation)  RR (machine): 24  TV (machine): 450  FiO2: 30  PEEP: 5  ITime: 1  MAP: 10  PIP: 18                                      ABG - ( 2020 04:16 )  pH, Arterial: 7.40  pH, Blood: x     /  pCO2: 38    /  pO2: 70    / HCO3: 24    / Base Excess: -0.9  /  SaO2: 93    LA 1.7      MEDICATIONS  (STANDING):  aMIOdarone Infusion 1 mG/Min (33.3 mL/Hr) IV Continuous <Continuous>  aMIOdarone Infusion 0.5 mG/Min (16.7 mL/Hr) IV Continuous <Continuous>  atorvastatin 80 milliGRAM(s) Oral at bedtime  chlorhexidine 0.12% Liquid 15 milliLiter(s) Oral Mucosa every 12 hours  chlorhexidine 4% Liquid 1 Application(s) Topical <User Schedule>  desmopressin Injectable 4 MICROGram(s) IV Push two times a day  dextrose 50% Injectable 12.5 Gram(s) IV Push once  dextrose 50% Injectable 25 Gram(s) IV Push once  dextrose 50% Injectable 25 Gram(s) IV Push once  heparin   Injectable 5000 Unit(s) SubCutaneous every 8 hours  insulin regular Infusion 1 Unit(s)/Hr (1 mL/Hr) IV Continuous <Continuous>  norepinephrine Infusion 0.05 MICROgram(s)/kG/Min (3.96 mL/Hr) IV Continuous <Continuous>  pantoprazole   Suspension 40 milliGRAM(s) Enteral Tube daily  phenylephrine    Infusion 0.1 MICROgram(s)/kG/Min (1.58 mL/Hr) IV Continuous <Continuous>  sodium chloride 0.45%. 1000 milliLiter(s) (25 mL/Hr) IV Continuous <Continuous>  vasopressin Infusion 0.04 Unit(s)/Min (2.4 mL/Hr) IV Continuous <Continuous>    MEDICATIONS  (PRN):  acetaminophen   Tablet .. 650 milliGRAM(s) Oral every 6 hours PRN Temp greater or equal to 38C (100.4F)  dextrose 40% Gel 15 Gram(s) Oral once PRN Blood Glucose LESS THAN 70 milliGRAM(s)/deciliter  glucagon  Injectable 1 milliGRAM(s) IntraMuscular once PRN Glucose LESS THAN 70 milligrams/deciliter

## 2020-07-25 NOTE — PROGRESS NOTE ADULT - ASSESSMENT
IMPRESSION:    Acute hypoxemic respiratory failure   Stroke SP TAP And thrombectomy/ no brainstem acitivity ( didnt pass apnea test  NAEL  E coli bactermia        PLAN:    CNS: FU with Neurology.  FU MS.  Keep off sedation     HEENT:  Oral care    PULMONARY:  HOB @ 45 degrees.  Vent changes:  None     CARDIOVASCULAR: I=O , taper pressors    GI: GI prophylaxis.  Feeding OG tube    RENAL:  F/u repeat lytes.  hold ddavp    INFECTIOUS DISEASE: f/u cultures. Send Ucx, Bcx, Change vergara. Nasal MRSA, Start Rakan dc vanco, TLC femoral since 7/17 no redness, ( family to make decision this weekend for liberation if note, change TLC)    HEMATOLOGICAL:  DVT prophylaxis.    ENDOCRINE:  Follow up FS.  Insulin protocol if needed.    CODE STATUS: FULL CODE    DISPOSITION: Pt requires continued monitoring in the MICU    Overall very Poor prognosis

## 2020-07-25 NOTE — PROGRESS NOTE ADULT - SUBJECTIVE AND OBJECTIVE BOX
OVERNIGHT EVENTS: still intubated, ventilated, Rusty 0.6 and insulin drip, no sedation, spiking T, Blood cx E coli    Vital Signs Last 24 Hrs  T(C): 38.5 (2020 08:00), Max: 38.9 (2020 04:00)  T(F): 101.3 (2020 08:00), Max: 102.1 (2020 04:00)  HR: 82 (2020 09:00) (74 - 90)  RR: 23 (2020 09:00) (23 - 24)  SpO2: 99% (2020 09:00) (97% - 100%)    PHYSICAL EXAMINATION:    GENERAL: not following commands    HEENT: Head is normocephalic and atraumatic. Extraocular muscles are intact    NECK: Supple.    LUNGS: dec bs both bases  HEART: LOVE 3/6    ABDOMEN: Soft, nontender, and nondistended.      EXTREMITIES: Without any cyanosis, clubbing, rash, lesions or edema.    NEUROLOGIC: No brainstem activity present        LABS:                        9.2    16.44 )-----------( 142      ( 2020 04:30 )             28.6     07-25    146  |  111<H>  |  34<H>  ----------------------------<  157<H>  3.6   |  22  |  3.0<H>    Ca    7.8<L>      2020 04:30  Phos  3.4     07-25  Mg     2.2     07-25    TPro  5.1<L>  /  Alb  2.1<L>  /  TBili  1.2  /  DBili  x   /  AST  55<H>  /  ALT  18  /  AlkPhos  99  07-25      Urinalysis Basic - ( 2020 21:50 )    Color: Yellow / Appearance: Slightly Turbid / S.014 / pH: x  Gluc: x / Ketone: Negative  / Bili: Negative / Urobili: <2 mg/dL   Blood: x / Protein: 100 mg/dL / Nitrite: Positive   Leuk Esterase: Negative / RBC: 6 /HPF / WBC 14 /HPF   Sq Epi: x / Non Sq Epi: 4 /HPF / Bacteria: Negative      ABG - ( 2020 04:01 )  pH, Arterial: 7.44  pH, Blood: x     /  pCO2: 36    /  pO2: 69    / HCO3: 24    / Base Excess: 0.4   /  SaO2: 94        450/24/30/5                        20 @ 07:01  -  20 @ 07:00  --------------------------------------------------------  IN: 1849.2 mL / OUT: 1355 mL / NET: 494.2 mL        MICROBIOLOGY:  Culture Results:   Growth in anaerobic bottle: Gram Negative Rods  "Due to technical problems, Proteus sp. will Not be reported as part of  the BCID panel until further notice"  ***Blood Panel PCR results on this specimen are available  approximately 3 hours after the Gram stain result.***  Gram stain, PCR, and/or culture results may not always  correspond due to difference in methodologies.  ************************************************************  This PCR assay was performed using [employer].  The following targets are tested for: Enterococcus,  vancomycin resistant enterococci, Listeria monocytogenes,  coagulase negative staphylococci, S. aureus,  methicillin resistant S. aureus, Streptococcus agalactiae  (Group B), S. pneumoniae, S. pyogenes (Group A),  Acinetobacter baumannii, Enterobacter cloacae, E. coli,  Klebsiella oxytoca, K. pneumoniae, Proteus sp.,  Serratia marcescens, Haemophilus influenzae,  Neisseria meningitidis, Pseudomonas aeruginosa, Candida  albicans, C. glabrata, C krusei, C parapsilosis,  C. tropicalis and the KPC resistance gene. ( @ 21:05)      MEDICATIONS  (STANDING):  aMIOdarone    Tablet   Oral   aMIOdarone    Tablet 400 milliGRAM(s) Oral every 12 hours  aMIOdarone Infusion 1 mG/Min (33.3 mL/Hr) IV Continuous <Continuous>  aMIOdarone Infusion 0.5 mG/Min (16.7 mL/Hr) IV Continuous <Continuous>  atorvastatin 80 milliGRAM(s) Oral at bedtime  chlorhexidine 0.12% Liquid 15 milliLiter(s) Oral Mucosa every 12 hours  chlorhexidine 4% Liquid 1 Application(s) Topical <User Schedule>  desmopressin Injectable 2 MICROGram(s) IV Push two times a day  dextrose 50% Injectable 12.5 Gram(s) IV Push once  dextrose 50% Injectable 25 Gram(s) IV Push once  dextrose 50% Injectable 25 Gram(s) IV Push once  heparin   Injectable 5000 Unit(s) SubCutaneous every 8 hours  insulin regular Infusion 1 Unit(s)/Hr (1 mL/Hr) IV Continuous <Continuous>  meropenem  IVPB      meropenem  IVPB 500 milliGRAM(s) IV Intermittent every 12 hours  norepinephrine Infusion 0.05 MICROgram(s)/kG/Min (3.96 mL/Hr) IV Continuous <Continuous>  pantoprazole   Suspension 40 milliGRAM(s) Enteral Tube daily  phenylephrine    Infusion 0.1 MICROgram(s)/kG/Min (1.58 mL/Hr) IV Continuous <Continuous>  vancomycin  IVPB 1250 milliGRAM(s) IV Intermittent every 24 hours  vancomycin  IVPB      vasopressin Infusion 0.04 Unit(s)/Min (2.4 mL/Hr) IV Continuous <Continuous>    MEDICATIONS  (PRN):  acetaminophen   Tablet .. 650 milliGRAM(s) Oral every 6 hours PRN Temp greater or equal to 38C (100.4F)  dextrose 40% Gel 15 Gram(s) Oral once PRN Blood Glucose LESS THAN 70 milliGRAM(s)/deciliter  glucagon  Injectable 1 milliGRAM(s) IntraMuscular once PRN Glucose LESS THAN 70 milligrams/deciliter      RADIOLOGY & ADDITIONAL STUDIES:

## 2020-07-25 NOTE — PROVIDER CONTACT NOTE (OTHER) - SITUATION
Insulin 16u/hr tubefeed held for 1hr.
On Intensive Insulin Therapy protocol,  MD Avi Herzog; hold tubefeed. Insulin @ 16u/hr.

## 2020-07-25 NOTE — PROGRESS NOTE ADULT - SUBJECTIVE AND OBJECTIVE BOX
Hospital Day:  9d    Chief Complaint: Patient is a 64y old  Male who presents with a chief complaint of Stroke s/p tpa (2020 09:06)    24 hour events:     Past Medical Hx:   HTN (hypertension)  Diabetes    Past Sx:  No significant past surgical history    Allergies:  No Known Allergies    Current Meds:   Standing Meds:  aMIOdarone    Tablet   Oral   aMIOdarone    Tablet 400 milliGRAM(s) Oral every 12 hours  aMIOdarone Infusion 1 mG/Min (33.3 mL/Hr) IV Continuous <Continuous>  aMIOdarone Infusion 0.5 mG/Min (16.7 mL/Hr) IV Continuous <Continuous>  atorvastatin 80 milliGRAM(s) Oral at bedtime  chlorhexidine 0.12% Liquid 15 milliLiter(s) Oral Mucosa every 12 hours  chlorhexidine 4% Liquid 1 Application(s) Topical <User Schedule>  dextrose 50% Injectable 12.5 Gram(s) IV Push once  dextrose 50% Injectable 25 Gram(s) IV Push once  dextrose 50% Injectable 25 Gram(s) IV Push once  heparin   Injectable 5000 Unit(s) SubCutaneous every 8 hours  insulin regular Infusion 1 Unit(s)/Hr (1 mL/Hr) IV Continuous <Continuous>  meropenem  IVPB      meropenem  IVPB 500 milliGRAM(s) IV Intermittent every 12 hours  norepinephrine Infusion 0.05 MICROgram(s)/kG/Min (3.96 mL/Hr) IV Continuous <Continuous>  pantoprazole   Suspension 40 milliGRAM(s) Enteral Tube daily  phenylephrine    Infusion 0.1 MICROgram(s)/kG/Min (1.58 mL/Hr) IV Continuous <Continuous>  vasopressin Infusion 0.04 Unit(s)/Min (2.4 mL/Hr) IV Continuous <Continuous>    PRN Meds:  acetaminophen   Tablet .. 650 milliGRAM(s) Oral every 6 hours PRN Temp greater or equal to 38C (100.4F)  dextrose 40% Gel 15 Gram(s) Oral once PRN Blood Glucose LESS THAN 70 milliGRAM(s)/deciliter  glucagon  Injectable 1 milliGRAM(s) IntraMuscular once PRN Glucose LESS THAN 70 milligrams/deciliter    HOME MEDICATIONS:  ALPRAZolam 0.5 mg oral tablet, extended release: 1 tab(s) orally once a day (at bedtime)  amLODIPine 5 mg oral tablet: 1 tab(s) orally once a day  clopidogrel 75 mg oral tablet: 1 tab(s) orally once a day  losartan 50 mg oral tablet: 1 tab(s) orally once a day  metFORMIN 500 mg oral tablet, extended release: 1 tab(s) orally once a day  omeprazole 20 mg oral delayed release capsule: 1 cap(s) orally once a day  OXcarbazepine 150 mg oral tablet: 1 tab(s) orally once a day  OXcarbazepine 300 mg oral tablet: 1 tab(s) orally once a day (at bedtime)  trihexyphenidyl: 1 milligram(s) orally 2 times a day      Vital Signs:   T(F): 101.9 (20 @ 20:00), Max: 104 (20 @ 16:00)  HR: 72 (20 @ 22:30) (72 - 92)  BP: --  RR: 24 (20 @ 22:30) (23 - 24)  SpO2: 97% (20 @ 22:30) (96% - 100%)      20 @ 07:01  -  20 @ 07:00  --------------------------------------------------------  IN: 1849.2 mL / OUT: 1355 mL / NET: 494.2 mL    20 @ 07:01  -  20 @ 23:09  --------------------------------------------------------  IN: 1058.2 mL / OUT: 470 mL / NET: 588.2 mL        Physical Exam:   GENERAL: ill appearing unresponsive male in NAD  HEENT: NCAT  CHEST/LUNG: audible breath sounds b/l   HEART: Regular rate and rhythm; s1 s2 appreciated  ABDOMEN: Soft, Nontender, Nondistended; Bowel sounds present  EXTREMITIES: +UE edema  NERVOUS SYSTEM: no brainstem reflexes present.  LINES/CATHETERS: +TLC, +A-line     Labs:                         9.2    16.44 )-----------( 142      ( 2020 04:30 )             28.6       2020 20:10    142    |  111    |  46     ----------------------------<  356    3.8     |  20     |  3.8      Ca    7.5        2020 20:10  Phos  3.4       2020 04:30  Mg     2.2       2020 04:30    TPro  5.1    /  Alb  2.1    /  TBili  1.2    /  DBili  x      /  AST  55     /  ALT  18     /  AlkPhos  99     2020 04:30    Urinalysis Basic - ( 2020 21:50 )    Color: Yellow / Appearance: Slightly Turbid / S.014 / pH: x  Gluc: x / Ketone: Negative  / Bili: Negative / Urobili: <2 mg/dL   Blood: x / Protein: 100 mg/dL / Nitrite: Positive   Leuk Esterase: Negative / RBC: 6 /HPF / WBC 14 /HPF   Sq Epi: x / Non Sq Epi: 4 /HPF / Bacteria: Negative    Culture - Blood (collected 20 @ 21:05)  Source: .Blood Blood  Gram Stain (20 @ 19:09):    Growth in anaerobic bottle: Gram Negative Rods  Preliminary Report (20 @ 18:18):    Growth in anaerobic bottle: Escherichia coli    "Due to technical problems, Proteus sp. will Not be reported as part of    the BCID panel until further notice"    ***Blood Panel PCR results on this specimen are available    approximately 3 hours after the Gram stain result.***    Gram stain, PCR, and/or culture results may not always    correspond due to difference in methodologies.    ************************************************************    This PCR assay was performed using [employer].    The following targets are tested for: Enterococcus,    vancomycin resistant enterococci, Listeria monocytogenes,    coagulase negative staphylococci, S. aureus,    methicillin resistant S. aureus, Streptococcus agalactiae    (Group B), S. pneumoniae, S. pyogenes (Group A),    Acinetobacter baumannii, Enterobacter cloacae, E. coli,    Klebsiella oxytoca, K. pneumoniae, Proteus sp.,    Serratia marcescens, Haemophilus influenzae,    Neisseria meningitidis, Pseudomonas aeruginosa, Candida    albicans, C. glabrata, C krusei, C parapsilosis,    C. tropicalis and the KPC resistance gene.  Organism: Blood Culture PCR (20 @ 20:53)  Organism: Blood Culture PCR (20 @ 20:53)      -  Escherichia coli: Detec      Method Type: PCR    Radiology:     Assessment and Plan:   64 year old male pmh of DM, CAD with PCI x3 in 2017, HTN, DLD, trigeminal neuralgia who presents to ED from [facility] s/p TPA for stroke.    # CVA s/p TPA s/p thrombectomy (Basiliar Artery) - unresponsive off sedation, pressors changed to vasopressin and phenylephrine   - repeat CT head:  Interval increase in posterior fossa edema and mass effect consistent with evolving infarct  with resultant new effacement of the fourth ventricle and new mild obstructive hydrocephalus. Increased hyperdensity within the left ambient and quadrigeminal plate cisterns, and new hyperdensity within the right ambient and quadrigeminal plate cisterns, the suprasellar cistern, the right sylvian fissure and scattered sulci suspicious worsening/new subarachnoid hemorrhage.  - s/p 23% NS, 50g mannitol, 3% hypertonic saline drip   - remained ventilated, off sedation  - integrillin completed  - patient on phenylephrine and vasopressin   - no notable neurologic activity. Apnea test performed (), patient took a breath.  - target Na 150-155  - continue with Subq heparin for DVT ppx, cleared by neurology.   - Transcranial doppler?   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   - Monitor BMP q4h.    # GNR bacteremia likely secondary to UTI    - UA positive for Nitrites   - vergara changed   - BCx positive for E. Coli. Follow up sensitivities.  - d/c vancomycin   - continue with meropenem renally dosed.     # DM   - HbA1c 7.5%   - insulin gtt   - q1h FS     # Diarrhea likely secondary to tube feeds  - held tube feeds for 24h to assess.   - continue with feeds as per Nutritional services   - continue with insulin gtt    # DLD   - continue with atorvastatin 80mg      # Activity: Bed rest   # Diet: NPO with tube feeds  # DVT ppx: Subq heparin   # GI ppx: Protonix  # Code Status: FULL CODE    # DISPO: Acute Hospital Day:  9d    Chief Complaint: Patient is a 64y old  Male who presents with a chief complaint of Stroke s/p tpa (2020 09:06)    24 hour events:     Past Medical Hx:   HTN (hypertension)  Diabetes    Past Sx:  No significant past surgical history    Allergies:  No Known Allergies    Current Meds:   Standing Meds:  aMIOdarone    Tablet   Oral   aMIOdarone    Tablet 400 milliGRAM(s) Oral every 12 hours  aMIOdarone Infusion 1 mG/Min (33.3 mL/Hr) IV Continuous <Continuous>  aMIOdarone Infusion 0.5 mG/Min (16.7 mL/Hr) IV Continuous <Continuous>  atorvastatin 80 milliGRAM(s) Oral at bedtime  chlorhexidine 0.12% Liquid 15 milliLiter(s) Oral Mucosa every 12 hours  chlorhexidine 4% Liquid 1 Application(s) Topical <User Schedule>  dextrose 50% Injectable 12.5 Gram(s) IV Push once  dextrose 50% Injectable 25 Gram(s) IV Push once  dextrose 50% Injectable 25 Gram(s) IV Push once  heparin   Injectable 5000 Unit(s) SubCutaneous every 8 hours  insulin regular Infusion 1 Unit(s)/Hr (1 mL/Hr) IV Continuous <Continuous>  meropenem  IVPB      meropenem  IVPB 500 milliGRAM(s) IV Intermittent every 12 hours  norepinephrine Infusion 0.05 MICROgram(s)/kG/Min (3.96 mL/Hr) IV Continuous <Continuous>  pantoprazole   Suspension 40 milliGRAM(s) Enteral Tube daily  phenylephrine    Infusion 0.1 MICROgram(s)/kG/Min (1.58 mL/Hr) IV Continuous <Continuous>  vasopressin Infusion 0.04 Unit(s)/Min (2.4 mL/Hr) IV Continuous <Continuous>    PRN Meds:  acetaminophen   Tablet .. 650 milliGRAM(s) Oral every 6 hours PRN Temp greater or equal to 38C (100.4F)  dextrose 40% Gel 15 Gram(s) Oral once PRN Blood Glucose LESS THAN 70 milliGRAM(s)/deciliter  glucagon  Injectable 1 milliGRAM(s) IntraMuscular once PRN Glucose LESS THAN 70 milligrams/deciliter    HOME MEDICATIONS:  ALPRAZolam 0.5 mg oral tablet, extended release: 1 tab(s) orally once a day (at bedtime)  amLODIPine 5 mg oral tablet: 1 tab(s) orally once a day  clopidogrel 75 mg oral tablet: 1 tab(s) orally once a day  losartan 50 mg oral tablet: 1 tab(s) orally once a day  metFORMIN 500 mg oral tablet, extended release: 1 tab(s) orally once a day  omeprazole 20 mg oral delayed release capsule: 1 cap(s) orally once a day  OXcarbazepine 150 mg oral tablet: 1 tab(s) orally once a day  OXcarbazepine 300 mg oral tablet: 1 tab(s) orally once a day (at bedtime)  trihexyphenidyl: 1 milligram(s) orally 2 times a day      Vital Signs:   T(F): 101.9 (20 @ 20:00), Max: 104 (20 @ 16:00)  HR: 72 (20 @ 22:30) (72 - 92)  BP: --  RR: 24 (20 @ 22:30) (23 - 24)  SpO2: 97% (20 @ 22:30) (96% - 100%)      20 @ 07:01  -  20 @ 07:00  --------------------------------------------------------  IN: 1849.2 mL / OUT: 1355 mL / NET: 494.2 mL    20 @ 07:01  -  20 @ 23:09  --------------------------------------------------------  IN: 1058.2 mL / OUT: 470 mL / NET: 588.2 mL        Physical Exam:   GENERAL: ill appearing unresponsive male in NAD  HEENT: NCAT  CHEST/LUNG: audible breath sounds b/l   HEART: Regular rate and rhythm; s1 s2 appreciated  ABDOMEN: Soft, Nontender, Nondistended; Bowel sounds present  EXTREMITIES: +UE edema  NERVOUS SYSTEM: no brainstem reflexes present.  LINES/CATHETERS: +TLC, +A-line     Labs:                         9.2    16.44 )-----------( 142      ( 2020 04:30 )             28.6       2020 20:10    142    |  111    |  46     ----------------------------<  356    3.8     |  20     |  3.8      Ca    7.5        2020 20:10  Phos  3.4       2020 04:30  Mg     2.2       2020 04:30    TPro  5.1    /  Alb  2.1    /  TBili  1.2    /  DBili  x      /  AST  55     /  ALT  18     /  AlkPhos  99     2020 04:30    Urinalysis Basic - ( 2020 21:50 )    Color: Yellow / Appearance: Slightly Turbid / S.014 / pH: x  Gluc: x / Ketone: Negative  / Bili: Negative / Urobili: <2 mg/dL   Blood: x / Protein: 100 mg/dL / Nitrite: Positive   Leuk Esterase: Negative / RBC: 6 /HPF / WBC 14 /HPF   Sq Epi: x / Non Sq Epi: 4 /HPF / Bacteria: Negative    Culture - Blood (collected 20 @ 21:05)  Source: .Blood Blood  Gram Stain (20 @ 19:09):    Growth in anaerobic bottle: Gram Negative Rods  Preliminary Report (20 @ 18:18):    Growth in anaerobic bottle: Escherichia coli    "Due to technical problems, Proteus sp. will Not be reported as part of    the BCID panel until further notice"    ***Blood Panel PCR results on this specimen are available    approximately 3 hours after the Gram stain result.***    Gram stain, PCR, and/or culture results may not always    correspond due to difference in methodologies.    ************************************************************    This PCR assay was performed using [employer].    The following targets are tested for: Enterococcus,    vancomycin resistant enterococci, Listeria monocytogenes,    coagulase negative staphylococci, S. aureus,    methicillin resistant S. aureus, Streptococcus agalactiae    (Group B), S. pneumoniae, S. pyogenes (Group A),    Acinetobacter baumannii, Enterobacter cloacae, E. coli,    Klebsiella oxytoca, K. pneumoniae, Proteus sp.,    Serratia marcescens, Haemophilus influenzae,    Neisseria meningitidis, Pseudomonas aeruginosa, Candida    albicans, C. glabrata, C krusei, C parapsilosis,    C. tropicalis and the KPC resistance gene.  Organism: Blood Culture PCR (20 @ 20:53)  Organism: Blood Culture PCR (20 @ 20:53)      -  Escherichia coli: Detec      Method Type: PCR    Radiology:     Assessment and Plan:   64 year old male pmh of DM, CAD with PCI x3 in 2017, HTN, DLD, trigeminal neuralgia who presents to ED from [facility] s/p TPA for stroke.    # CVA s/p TPA s/p thrombectomy (Basiliar Artery) - unresponsive off sedation, pressors changed to vasopressin and phenylephrine   - repeat CT head:  Interval increase in posterior fossa edema and mass effect consistent with evolving infarct  with resultant new effacement of the fourth ventricle and new mild obstructive hydrocephalus. Increased hyperdensity within the left ambient and quadrigeminal plate cisterns, and new hyperdensity within the right ambient and quadrigeminal plate cisterns, the suprasellar cistern, the right sylvian fissure and scattered sulci suspicious worsening/new subarachnoid hemorrhage.  - s/p 23% NS, 50g mannitol, 3% hypertonic saline drip   - remained ventilated, off sedation  - taper off pressors if possible   - integrillin completed  - patient on phenylephrine and vasopressin   - no notable neurologic activity. Apnea test performed (), patient took a breath.  - target Na 150-155  - continue with Subq heparin for DVT ppx, cleared by neurology.   - Transcranial doppler?   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   - Monitor BMP q4h.    # GNR bacteremia likely secondary to UTI    - UA positive for Nitrites   - vergara changed   - BCx positive for E. Coli. Follow up sensitivities.  - d/c vancomycin   - continue with meropenem renally dosed.     #Hypokalemia   - K was 3.4 repleted with 20mEq x 2 ()   - Follow up BMP     # DM   - HbA1c 7.5%   - insulin gtt   - q1h FS     # Diarrhea likely secondary to tube feeds  - held tube feeds for 24h to assess.   - continue with feeds as per Nutritional services   - continue with insulin gtt    # DLD   - continue with atorvastatin 80mg      # Activity: Bed rest   # Diet: NPO with tube feeds  # DVT ppx: Subq heparin   # GI ppx: Protonix  # Code Status: FULL CODE    # DISPO: Acute Hospital Day:  9d    Chief Complaint: Patient is a 64y old  Male who presents with a chief complaint of Stroke s/p tpa (2020 09:06)    24 hour events: Patient seen, unresponsive, not on sedation, intubated, resting in bed.     Past Medical Hx:   HTN (hypertension)  Diabetes    Past Sx:  No significant past surgical history    Allergies:  No Known Allergies    Current Meds:   Standing Meds:  aMIOdarone    Tablet   Oral   aMIOdarone    Tablet 400 milliGRAM(s) Oral every 12 hours  aMIOdarone Infusion 1 mG/Min (33.3 mL/Hr) IV Continuous <Continuous>  aMIOdarone Infusion 0.5 mG/Min (16.7 mL/Hr) IV Continuous <Continuous>  atorvastatin 80 milliGRAM(s) Oral at bedtime  chlorhexidine 0.12% Liquid 15 milliLiter(s) Oral Mucosa every 12 hours  chlorhexidine 4% Liquid 1 Application(s) Topical <User Schedule>  dextrose 50% Injectable 12.5 Gram(s) IV Push once  dextrose 50% Injectable 25 Gram(s) IV Push once  dextrose 50% Injectable 25 Gram(s) IV Push once  heparin   Injectable 5000 Unit(s) SubCutaneous every 8 hours  insulin regular Infusion 1 Unit(s)/Hr (1 mL/Hr) IV Continuous <Continuous>  meropenem  IVPB      meropenem  IVPB 500 milliGRAM(s) IV Intermittent every 12 hours  norepinephrine Infusion 0.05 MICROgram(s)/kG/Min (3.96 mL/Hr) IV Continuous <Continuous>  pantoprazole   Suspension 40 milliGRAM(s) Enteral Tube daily  phenylephrine    Infusion 0.1 MICROgram(s)/kG/Min (1.58 mL/Hr) IV Continuous <Continuous>  vasopressin Infusion 0.04 Unit(s)/Min (2.4 mL/Hr) IV Continuous <Continuous>    PRN Meds:  acetaminophen   Tablet .. 650 milliGRAM(s) Oral every 6 hours PRN Temp greater or equal to 38C (100.4F)  dextrose 40% Gel 15 Gram(s) Oral once PRN Blood Glucose LESS THAN 70 milliGRAM(s)/deciliter  glucagon  Injectable 1 milliGRAM(s) IntraMuscular once PRN Glucose LESS THAN 70 milligrams/deciliter    HOME MEDICATIONS:  ALPRAZolam 0.5 mg oral tablet, extended release: 1 tab(s) orally once a day (at bedtime)  amLODIPine 5 mg oral tablet: 1 tab(s) orally once a day  clopidogrel 75 mg oral tablet: 1 tab(s) orally once a day  losartan 50 mg oral tablet: 1 tab(s) orally once a day  metFORMIN 500 mg oral tablet, extended release: 1 tab(s) orally once a day  omeprazole 20 mg oral delayed release capsule: 1 cap(s) orally once a day  OXcarbazepine 150 mg oral tablet: 1 tab(s) orally once a day  OXcarbazepine 300 mg oral tablet: 1 tab(s) orally once a day (at bedtime)  trihexyphenidyl: 1 milligram(s) orally 2 times a day      Vital Signs:   T(F): 101.9 (20 @ 20:00), Max: 104 (20 @ 16:00)  HR: 72 (20 @ 22:30) (72 - 92)  BP: --  RR: 24 (20 @ 22:30) (23 - 24)  SpO2: 97% (20 @ 22:30) (96% - 100%)      20 @ 07:  -  20 @ 07:00  --------------------------------------------------------  IN: 1849.2 mL / OUT: 1355 mL / NET: 494.2 mL    20 @ 07:  -  20 @ 23:09  --------------------------------------------------------  IN: 1058.2 mL / OUT: 470 mL / NET: 588.2 mL        Physical Exam:   GENERAL: ill appearing unresponsive male in NAD  HEENT: NCAT  CHEST/LUNG: audible breath sounds b/l   HEART: Regular rate and rhythm; s1 s2 appreciated  ABDOMEN: Soft, Nontender, Nondistended; Bowel sounds present  EXTREMITIES: +UE edema  NERVOUS SYSTEM: no brainstem reflexes present.  LINES/CATHETERS: +TLC, +A-line     Labs:                        9.0    16.65 )-----------( 121      ( 2020 04:30 )             28.2         147<H>  |  114<H>  |  46<H>  ----------------------------<  180<H>  3.6   |  20  |  3.9<H>    Ca    7.9<L>      2020 04:00  Phos  3.4       Mg     2.6         TPro  5.1<L>  /  Alb  2.0<L>  /  TBili  0.6  /  DBili  x   /  AST  71<H>  /  ALT  20  /  AlkPhos  95                           9.2    16.44 )-----------( 142      ( 2020 04:30 )             28.6       2020 20:10    142    |  111    |  46     ----------------------------<  356    3.8     |  20     |  3.8      Ca    7.5        2020 20:10  Phos  3.4       2020 04:30  Mg     2.2       2020 04:30    TPro  5.1    /  Alb  2.1    /  TBili  1.2    /  DBili  x      /  AST  55     /  ALT  18     /  AlkPhos  99     2020 04:30    Urinalysis Basic - ( 2020 21:50 )    Color: Yellow / Appearance: Slightly Turbid / S.014 / pH: x  Gluc: x / Ketone: Negative  / Bili: Negative / Urobili: <2 mg/dL   Blood: x / Protein: 100 mg/dL / Nitrite: Positive   Leuk Esterase: Negative / RBC: 6 /HPF / WBC 14 /HPF   Sq Epi: x / Non Sq Epi: 4 /HPF / Bacteria: Negative    Culture - Blood (collected 20 @ 21:05)  Source: .Blood Blood  Gram Stain (20 @ 19:09):    Growth in anaerobic bottle: Gram Negative Rods  Preliminary Report (20 @ 18:18):    Growth in anaerobic bottle: Escherichia coli    "Due to technical problems, Proteus sp. will Not be reported as part of    the BCID panel until further notice"    ***Blood Panel PCR results on this specimen are available    approximately 3 hours after the Gram stain result.***    Gram stain, PCR, and/or culture results may not always    correspond due to difference in methodologies.    ************************************************************    This PCR assay was performed using [employer].    The following targets are tested for: Enterococcus,    vancomycin resistant enterococci, Listeria monocytogenes,    coagulase negative staphylococci, S. aureus,    methicillin resistant S. aureus, Streptococcus agalactiae    (Group B), S. pneumoniae, S. pyogenes (Group A),    Acinetobacter baumannii, Enterobacter cloacae, E. coli,    Klebsiella oxytoca, K. pneumoniae, Proteus sp.,    Serratia marcescens, Haemophilus influenzae,    Neisseria meningitidis, Pseudomonas aeruginosa, Candida    albicans, C. glabrata, C krusei, C parapsilosis,    C. tropicalis and the KPC resistance gene.  Organism: Blood Culture PCR (20 @ 20:53)  Organism: Blood Culture PCR (20 @ 20:53)      -  Escherichia coli: Detec      Method Type: PCR    Radiology:     Assessment and Plan:   64 year old male pmh of DM, CAD with PCI x3 in 2017, HTN, DLD, trigeminal neuralgia who presents to ED from [facility] s/p TPA for stroke.    # CVA s/p TPA s/p thrombectomy (Basiliar Artery) - unresponsive off sedation, pressors changed to vasopressin and phenylephrine   - repeat CT head:  Interval increase in posterior fossa edema and mass effect consistent with evolving infarct  with resultant new effacement of the fourth ventricle and new mild obstructive hydrocephalus. Increased hyperdensity within the left ambient and quadrigeminal plate cisterns, and new hyperdensity within the right ambient and quadrigeminal plate cisterns, the suprasellar cistern, the right sylvian fissure and scattered sulci suspicious worsening/new subarachnoid hemorrhage.  - s/p 23% NS, 50g mannitol, 3% hypertonic saline drip   - remained ventilated, off sedation  - taper off pressors if possible   - integrillin completed  - patient on phenylephrine  - no notable neurologic activity. Apnea test performed (), patient took a breath.  - continue with Subq heparin for DVT ppx, cleared by neurology.   - Transcranial doppler?   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   - Monitor BMP q4h.    # Diarrhea secondary to C. Diff.  - C. Diff PCR positive  - started on vancomycin 125mg PO     # GNR bacteremia likely secondary to UTI   - patient remains febrile   - UA positive for Nitrites   - vergara changed   - BCx positive for E. Coli. Follow up sensitivities.  - continue with meropenem renally dosed.     # Worsening NAEL   - baseline Cr ~1   - Cr currently 3.8  - Kidney bladder US unremarkable   - sodium bicarb 650mg PO BID    # Hypokalemia   - K was 3.4 repleted with 20mEq x 2 ()   - Follow up BMP     # DM   - HbA1c 7.5%   - insulin gtt   - q1h FS     # DLD   - continue with atorvastatin 80mg      # Activity: Bed rest   # Diet: NPO with tube feeds  # DVT ppx: Subq heparin   # GI ppx: Protonix  # Code Status: FULL CODE    # DISPO: Acute

## 2020-07-26 NOTE — PROGRESS NOTE ADULT - SUBJECTIVE AND OBJECTIVE BOX
OVERNIGHT EVENTS: still intubated, ventilated, insulin drip, rudolph 1 mcg, spiking T    Vital Signs Last 24 Hrs  T(C): 37.8 (26 Jul 2020 04:00), Max: 40 (25 Jul 2020 16:00)  T(F): 100.1 (26 Jul 2020 04:00), Max: 104 (25 Jul 2020 16:00)  HR: 69 (26 Jul 2020 07:15) (62 - 92)  RR: 24 (26 Jul 2020 07:00) (23 - 24)  SpO2: 97% (26 Jul 2020 07:15) (92% - 100%)    PHYSICAL EXAMINATION:    GENERAL: Not following commands    HEENT: Head is normocephalic and atraumatic.    NECK: Supple.    LUNGS: dec bs both bases    HEART: Regular rate and rhythm without murmur.    ABDOMEN: Soft, nontender, and nondistended.      EXTREMITIES: Without any cyanosis, clubbing, rash, lesions or edema.    NEUROLOGIC: no brainstem activity present        LABS:                        9.0    16.65 )-----------( 121      ( 26 Jul 2020 04:30 )             28.2     07-26    147<H>  |  114<H>  |  46<H>  ----------------------------<  180<H>  3.6   |  20  |  3.9<H>    Ca    7.9<L>      26 Jul 2020 04:00  Phos  3.4     07-25  Mg     2.6     07-26    TPro  5.1<L>  /  Alb  2.0<L>  /  TBili  0.6  /  DBili  x   /  AST  71<H>  /  ALT  20  /  AlkPhos  95  07-26        ABG - ( 26 Jul 2020 03:54 )  pH, Arterial: 7.33  pH, Blood: x     /  pCO2: 43    /  pO2: 77    / HCO3: 23    / Base Excess: -3.2  /  SaO2: 95          450/24/30/5                      07-25-20 @ 07:01  -  07-26-20 @ 07:00  --------------------------------------------------------  IN: 1589.6 mL / OUT: 1545 mL / NET: 44.6 mL        MICROBIOLOGY:  Culture Results:   Growth in anaerobic bottle: Escherichia coli  "Due to technical problems, Proteus sp. will Not be reported as part of  the BCID panel until further notice"  ***Blood Panel PCR results on this specimen are available  approximately 3 hours after the Gram stain result.***  Gram stain, PCR, and/or culture results may not always  correspond due to difference in methodologies.  ************************************************************  This PCR assay was performed using [employer].  The following targets are tested for: Enterococcus,  vancomycin resistant enterococci, Listeria monocytogenes,  coagulase negative staphylococci, S. aureus,  methicillin resistant S. aureus, Streptococcus agalactiae  (Group B), S. pneumoniae, S. pyogenes (Group A),  Acinetobacter baumannii, Enterobacter cloacae, E. coli,  Klebsiella oxytoca, K. pneumoniae, Proteus sp.,  Serratia marcescens, Haemophilus influenzae,  Neisseria meningitidis, Pseudomonas aeruginosa, Candida  albicans, C. glabrata, C krusei, C parapsilosis,  C. tropicalis and the KPC resistance gene. (07-23 @ 21:05)  Culture Results:   >100,000 CFU/ml Gram Negative Rods (07-23 @ 11:11)      MEDICATIONS  (STANDING):  aMIOdarone    Tablet   Oral   aMIOdarone    Tablet 400 milliGRAM(s) Oral every 12 hours  aMIOdarone Infusion 1 mG/Min (33.3 mL/Hr) IV Continuous <Continuous>  aMIOdarone Infusion 0.5 mG/Min (16.7 mL/Hr) IV Continuous <Continuous>  atorvastatin 80 milliGRAM(s) Oral at bedtime  chlorhexidine 0.12% Liquid 15 milliLiter(s) Oral Mucosa every 12 hours  chlorhexidine 4% Liquid 1 Application(s) Topical <User Schedule>  dextrose 50% Injectable 12.5 Gram(s) IV Push once  dextrose 50% Injectable 25 Gram(s) IV Push once  dextrose 50% Injectable 25 Gram(s) IV Push once  heparin   Injectable 5000 Unit(s) SubCutaneous every 8 hours  insulin regular Infusion 1 Unit(s)/Hr (1 mL/Hr) IV Continuous <Continuous>  meropenem  IVPB      meropenem  IVPB 500 milliGRAM(s) IV Intermittent every 12 hours  norepinephrine Infusion 0.05 MICROgram(s)/kG/Min (3.96 mL/Hr) IV Continuous <Continuous>  pantoprazole   Suspension 40 milliGRAM(s) Enteral Tube daily  phenylephrine    Infusion 0.1 MICROgram(s)/kG/Min (1.58 mL/Hr) IV Continuous <Continuous>  vasopressin Infusion 0.04 Unit(s)/Min (2.4 mL/Hr) IV Continuous <Continuous>    MEDICATIONS  (PRN):  acetaminophen   Tablet .. 650 milliGRAM(s) Oral every 6 hours PRN Temp greater or equal to 38C (100.4F)  dextrose 40% Gel 15 Gram(s) Oral once PRN Blood Glucose LESS THAN 70 milliGRAM(s)/deciliter  glucagon  Injectable 1 milliGRAM(s) IntraMuscular once PRN Glucose LESS THAN 70 milligrams/deciliter      RADIOLOGY & ADDITIONAL STUDIES:

## 2020-07-26 NOTE — PROGRESS NOTE ADULT - ASSESSMENT
IMPRESSION:    Acute hypoxemic respiratory failure   Stroke SP TAP And thrombectomy/ no brainstem acitivity ( didnt pass apnea test)  NAEL worsening  E coli bactermia  diarrhea        PLAN:    CNS: FU with Neurology. Keep off sedation     HEENT:  Oral care    PULMONARY:  HOB @ 45 degrees.  Vent changes:  INCREASE RR 26     CARDIOVASCULAR: I=O , taper pressors    GI: GI prophylaxis.  Feeding OG tube    RENAL:  F/u repeat lytes.  PO BICARB, repeat CMP    INFECTIOUS DISEASE: f/u cultures. meropenem ( family to make decision this weekend for liberation if note, change TLC), c cdiff, po vanco    HEMATOLOGICAL:  DVT prophylaxis.    ENDOCRINE:  Follow up FS.  Insulin protocol if needed.    CODE STATUS: FULL CODE    DISPOSITION: Pt requires continued monitoring in the MICU    Overall very Poor prognosis

## 2020-07-26 NOTE — CONSULT NOTE ADULT - SUBJECTIVE AND OBJECTIVE BOX
NEPHROLOGY CONSULTATION NOTE    ANA CRISTINA LOVING  64y  Male  MRN-[id number]    CC:   ANA CRISTINA LOVING is a 64y old  Male who presents with a chief complaint of Stroke s/p tpa (2020 07:32)      HPI:  64 year old male pmh of DM, CAD with PCI x3 in 2017, HTN, DLD, trigeminal neuralgia who presents to ED from [facility] s/p TPA for stroke.  History obtained from charts and daughter Vianney [phone] as Pt. is currently aphasic. Per daughter Pt. who is AOx3 at baseline was at home playing with grandchildren and became nausea, had 3 episodes of NBNB vomiting. After laying down for 30 min family found Pt. confused with aphasia and brought Pt. to [facility] ED.  At [facility] stroke code called initial NIHSS 9, CTA showed right vertebral artery occlusion and distal basilar artery thrombus. Other extensive atheromatous changes including moderate/severe subclavian and vertebral stenosis.   Pt. was given TPA at 16:46 and sent to [facility] for neurointerventional eval and post TPA care.  Pt. seen in ED with expressive aphasia although able to move all extremities with weakness of bilateral LE 1/5. Pt. was on cardine drip to maintain BP below 185.   Interval Hx -While in ED RRT called as Pt. posturing and shaking of b/l upper extremities, not following any commands, Pt. was intubated for airway protection. Stat CT done no new hemmorage, CT perfusion Stat, Neuro sx and neurointerventional contacted. NI actual initiated. Pending Neurointerventional recs. (2020 18:12)      PAST MEDICAL & SURGICAL HISTORY:  HTN (hypertension)  Diabetes  No significant past surgical history    Allergies:  No Known Allergies    Home Medications Reviewed  Hospital Medications:   MEDICATIONS  (STANDING):  aMIOdarone    Tablet   Oral   aMIOdarone    Tablet 400 milliGRAM(s) Oral every 12 hours  aMIOdarone Infusion 1 mG/Min (33.3 mL/Hr) IV Continuous <Continuous>  aMIOdarone Infusion 0.5 mG/Min (16.7 mL/Hr) IV Continuous <Continuous>  atorvastatin 80 milliGRAM(s) Oral at bedtime  chlorhexidine 0.12% Liquid 15 milliLiter(s) Oral Mucosa every 12 hours  chlorhexidine 4% Liquid 1 Application(s) Topical <User Schedule>  dextrose 50% Injectable 12.5 Gram(s) IV Push once  dextrose 50% Injectable 25 Gram(s) IV Push once  dextrose 50% Injectable 25 Gram(s) IV Push once  heparin   Injectable 5000 Unit(s) SubCutaneous every 8 hours  insulin regular Infusion 1 Unit(s)/Hr (1 mL/Hr) IV Continuous <Continuous>  meropenem  IVPB      meropenem  IVPB 500 milliGRAM(s) IV Intermittent every 12 hours  norepinephrine Infusion 0.05 MICROgram(s)/kG/Min (3.96 mL/Hr) IV Continuous <Continuous>  pantoprazole   Suspension 40 milliGRAM(s) Enteral Tube daily  phenylephrine    Infusion 0.1 MICROgram(s)/kG/Min (1.58 mL/Hr) IV Continuous <Continuous>  sodium bicarbonate 650 milliGRAM(s) Oral every 8 hours  vancomycin    Solution 125 milliGRAM(s) Oral every 6 hours  vasopressin Infusion 0.04 Unit(s)/Min (2.4 mL/Hr) IV Continuous <Continuous>    MEDICATIONS  (PRN):  acetaminophen   Tablet .. 650 milliGRAM(s) Oral every 6 hours PRN Temp greater or equal to 38C (100.4F)  dextrose 40% Gel 15 Gram(s) Oral once PRN Blood Glucose LESS THAN 70 milliGRAM(s)/deciliter  glucagon  Injectable 1 milliGRAM(s) IntraMuscular once PRN Glucose LESS THAN 70 milligrams/deciliter    Home medications:  Home Medications:  ALPRAZolam 0.5 mg oral tablet, extended release: 1 tab(s) orally once a day (at bedtime) (2020 19:14)  amLODIPine 5 mg oral tablet: 1 tab(s) orally once a day (2020 19:14)  clopidogrel 75 mg oral tablet: 1 tab(s) orally once a day (2020 19:11)  losartan 50 mg oral tablet: 1 tab(s) orally once a day (2020 19:13)  metFORMIN 500 mg oral tablet, extended release: 1 tab(s) orally once a day (2020 19:13)  omeprazole 20 mg oral delayed release capsule: 1 cap(s) orally once a day (2020 19:14)  OXcarbazepine 150 mg oral tablet: 1 tab(s) orally once a day (2020 19:15)  OXcarbazepine 300 mg oral tablet: 1 tab(s) orally once a day (at bedtime) (2020 19:12)  trihexyphenidyl: 1 milligram(s) orally 2 times a day (2020 19:13)      SOCIAL HISTORY:  Social History:   lives with wife at [facility]  NO smoking or Etoh (2020 18:12)      FAMILY HISTORY:      REVIEW OF SYSTEMS:  CONSTITUTIONAL: No weakness, fevers or chills  EYES/ENT: No visual changes;  No vertigo or throat pain   NECK: No pain or stiffness  RESPIRATORY: No cough, wheezing, hemoptysis; No shortness of breath  CARDIOVASCULAR: No chest pain or palpitations.  GASTROINTESTINAL: No abdominal or epigastric pain. No nausea, vomiting, or hematemesis; No diarrhea or constipation. No melena or hematochezia.  GENITOURINARY: No dysuria, frequency, foamy urine, urinary urgency, incontinence or hematuria  NEUROLOGICAL: No numbness or weakness  SKIN: No itching, burning, rashes, or lesions   VASCULAR: No bilateral lower extremity edema.   All other review of systems is negative unless indicated above.    VITALS:  T(F): 100.1 (20 @ 04:00), Max: 104 (20 @ 16:00)  HR: 62 (20 @ 08:30)  BP: --  RR: 25 (20 @ 08:30)  SpO2: 95% (20 @ 08:30)  Mode: AC/ CMV (Assist Control/ Continuous Mandatory Ventilation)  RR (machine): 26  TV (machine): 450  FiO2: 30  PEEP: 5  ITime: 1  MAP: 10  PIP: 20      I&O's Detail    2020 07:01  -  2020 07:00  --------------------------------------------------------  IN:    Enteral Tube Flush: 160 mL    insulin regular Infusion: 185 mL    Peptamen A.F.: 910 mL    phenylephrine   Infusion: 336.6 mL  Total IN: 1591.6 mL    OUT:    Indwelling Catheter - Urethral: 880 mL    Rectal Tube: 705 mL  Total OUT: 1585 mL    Total NET: 6.6 mL      2020 07: 09:35  --------------------------------------------------------  IN:    insulin regular Infusion: 5 mL    Peptamen A.F.: 195 mL    phenylephrine   Infusion: 27.6 mL  Total IN: 227.6 mL    OUT:    Indwelling Catheter - Urethral: 100 mL  Total OUT: 100 mL    Total NET: 127.6 mL          I&O's Summary    2020 07:  -  2020 07:00  --------------------------------------------------------  IN: 1591.6 mL / OUT: 1585 mL / NET: 6.6 mL    2020 07:  -  2020 09:35  --------------------------------------------------------  IN: 227.6 mL / OUT: 100 mL / NET: 127.6 mL        PHYSICAL EXAM:  Gen: NAD  resp: CTA  card: regular, no rub  abd: soft  ext: no edema    LABS:  Daily     Daily Weight in k.5 (2020 04:00)  ABG - ( 2020 03:54 )  pH, Arterial: 7.33  pH, Blood: x     /  pCO2: 43    /  pO2: 77    / HCO3: 23    / Base Excess: -3.2  /  SaO2: 95                    147<H>  |  114<H>  |  46<H>  ----------------------------<  180<H>  3.6   |  20  |  3.9<H>    Ca    7.9<L>      2020 04:00  Phos  3.4     07-25  Mg     2.6         TPro  5.1<L>  /  Alb  2.0<L>  /  TBili  0.6  /  DBili      /  AST  71<H>  /  ALT  20  /  AlkPhos  95      Creatinine Trend:   Creatinine, Serum: 3.9 mg/dL (20 @ 04:00)  Creatinine, Serum: 3.7 mg/dL (20 @ 00:15)  Creatinine, Serum: 3.8 mg/dL (20 @ 20:10)  Creatinine, Serum: 3.6 mg/dL (20 @ 16:06)  Creatinine, Serum: 3.2 mg/dL (20 @ 10:40)  Creatinine, Serum: 3.0 mg/dL (20 @ 04:30)  Creatinine, Serum: 2.8 mg/dL (20 @ 23:10)  Creatinine, Serum: 2.8 mg/dL (20 @ 20:30)  Creatinine, Serum: 2.7 mg/dL (20 @ 16:27)  Creatinine, Serum: 2.5 mg/dL (20 @ 13:40)  Creatinine, Serum: 2.5 mg/dL (20 @ 09:00)  Creatinine, Serum: 2.3 mg/dL (20 @ 04:50)  Creatinine, Serum: 2.4 mg/dL (20 @ 03:00)  Creatinine, Serum: 2.3 mg/dL (20 @ 21:50)  Creatinine, Serum: 2.2 mg/dL (20 @ 18:00)  Creatinine, Serum: 2.2 mg/dL (20 @ 13:52)  Creatinine, Serum: 2.1 mg/dL (20 @ 08:28)  Creatinine, Serum: 2.2 mg/dL (20 @ 04:45)  Creatinine, Serum: 1.9 mg/dL (20 @ 23:55)  Creatinine, Serum: 1.9 mg/dL (20 @ 21:15)                          9.0    16.65 )-----------( 121      ( 2020 04:30 )             28.2     CBC Full  -  ( 2020 04:30 )  WBC Count : 16.65 K/uL  RBC Count : 3.09 M/uL  Hemoglobin : 9.0 g/dL  Hematocrit : 28.2 %  Platelet Count - Automated : 121 K/uL  Mean Cell Volume : 91.3 fL  Mean Cell Hemoglobin : 29.1 pg  Mean Cell Hemoglobin Concentration : 31.9 g/dL  Auto Neutrophil # : x  Auto Lymphocyte # : x  Auto Monocyte # : x  Auto Eosinophil # : x  Auto Basophil # : x  Auto Neutrophil % : x  Auto Lymphocyte % : x  Auto Monocyte % : x  Auto Eosinophil % : x  Auto Basophil % : x    Urine Studies:  Urinalysis Basic - ( 2020 21:50 )    Color: Yellow / Appearance: Slightly Turbid / S.014 / pH:   Gluc:  / Ketone: Negative  / Bili: Negative / Urobili: <2 mg/dL   Blood:  / Protein: 100 mg/dL / Nitrite: Positive   Leuk Esterase: Negative / RBC: 6 /HPF / WBC 14 /HPF   Sq Epi:  / Non Sq Epi: 4 /HPF / Bacteria: Negative      Osmolality, Random Urine: 96 mos/kg ( @ 04:35)  Creatinine, Random Urine: 13 mg/dL ( @ 04:35)          RADIOLOGY & ADDITIONAL STUDIES: NEPHROLOGY CONSULTATION NOTE    ANA CRISTINA LOVING  64y  Male  MRN-[id number]    CC:   ANA CRISTINA LOVING is a 64y old  Male who presents with a chief complaint of Stroke s/p tpa (2020 07:32)      HPI:  64 year old male pmh of DM, CAD with PCI x3 in 2017, HTN, DLD, trigeminal neuralgia who presents to ED from [facility] s/p TPA for stroke.  Pt. who is AOx3 at baseline was at home playing with grandchildren and became nauseated, had 3 episodes of NBNB vomiting. After laying down for 30 min family found Pt. confused with aphasia and brought Pt. to [facility] ED.  At [facility] stroke code called initial NIHSS 9, CTA showed right vertebral artery occlusion and distal basilar artery thrombus. Other extensive atheromatous changes including moderate/severe subclavian and vertebral stenosis.   Pt. was given TPA at 16:46 and sent to [facility] for neurointerventional eval and post TPA care.  Pt. seen in ED with expressive aphasia although able to move all extremities with weakness of bilateral LE 1/5. Pt. was on cardine drip to maintain BP below 185.   Interval Hx -While in ED RRT called as Pt. posturing and shaking of b/l upper extremities, not following any commands, Pt. was intubated for airway protection. Stat CT done no new hemmorage, CT perfusion Stat, Neuro sx and neurointerventional contacted. NI actual initiated. Pending Neurointerventional recs. (2020 18:12)      PAST MEDICAL & SURGICAL HISTORY:  HTN (hypertension)  Diabetes  No significant past surgical history    Allergies:  No Known Allergies    Home Medications Reviewed  Hospital Medications:   MEDICATIONS  (STANDING):  aMIOdarone    Tablet   Oral   aMIOdarone    Tablet 400 milliGRAM(s) Oral every 12 hours  aMIOdarone Infusion 1 mG/Min (33.3 mL/Hr) IV Continuous <Continuous>  aMIOdarone Infusion 0.5 mG/Min (16.7 mL/Hr) IV Continuous <Continuous>  atorvastatin 80 milliGRAM(s) Oral at bedtime  chlorhexidine 0.12% Liquid 15 milliLiter(s) Oral Mucosa every 12 hours  chlorhexidine 4% Liquid 1 Application(s) Topical <User Schedule>  dextrose 50% Injectable 12.5 Gram(s) IV Push once  dextrose 50% Injectable 25 Gram(s) IV Push once  dextrose 50% Injectable 25 Gram(s) IV Push once  heparin   Injectable 5000 Unit(s) SubCutaneous every 8 hours  insulin regular Infusion 1 Unit(s)/Hr (1 mL/Hr) IV Continuous <Continuous>  meropenem  IVPB      meropenem  IVPB 500 milliGRAM(s) IV Intermittent every 12 hours  norepinephrine Infusion 0.05 MICROgram(s)/kG/Min (3.96 mL/Hr) IV Continuous <Continuous>  pantoprazole   Suspension 40 milliGRAM(s) Enteral Tube daily  phenylephrine    Infusion 0.1 MICROgram(s)/kG/Min (1.58 mL/Hr) IV Continuous <Continuous>  sodium bicarbonate 650 milliGRAM(s) Oral every 8 hours  vancomycin    Solution 125 milliGRAM(s) Oral every 6 hours  vasopressin Infusion 0.04 Unit(s)/Min (2.4 mL/Hr) IV Continuous <Continuous>    MEDICATIONS  (PRN):  acetaminophen   Tablet .. 650 milliGRAM(s) Oral every 6 hours PRN Temp greater or equal to 38C (100.4F)  dextrose 40% Gel 15 Gram(s) Oral once PRN Blood Glucose LESS THAN 70 milliGRAM(s)/deciliter  glucagon  Injectable 1 milliGRAM(s) IntraMuscular once PRN Glucose LESS THAN 70 milligrams/deciliter    Home medications:  Home Medications:  ALPRAZolam 0.5 mg oral tablet, extended release: 1 tab(s) orally once a day (at bedtime) (2020 19:14)  amLODIPine 5 mg oral tablet: 1 tab(s) orally once a day (2020 19:14)  clopidogrel 75 mg oral tablet: 1 tab(s) orally once a day (2020 19:11)  losartan 50 mg oral tablet: 1 tab(s) orally once a day (2020 19:13)  metFORMIN 500 mg oral tablet, extended release: 1 tab(s) orally once a day (2020 19:13)  omeprazole 20 mg oral delayed release capsule: 1 cap(s) orally once a day (2020 19:14)  OXcarbazepine 150 mg oral tablet: 1 tab(s) orally once a day (2020 19:15)  OXcarbazepine 300 mg oral tablet: 1 tab(s) orally once a day (at bedtime) (2020 19:12)  trihexyphenidyl: 1 milligram(s) orally 2 times a day (2020 19:13)      SOCIAL HISTORY:  Social History:   lives with wife at [facility]  NO smoking or Etoh (2020 18:12)      FAMILY HISTORY:      REVIEW OF SYSTEMS:  CHASTITY due to critical illness/unresponsive    VITALS:  T(F): 100.1 (20 @ 04:00), Max: 104 (20 @ 16:00)  HR: 62 (20 @ 08:30)  BP: --  RR: 25 (20 @ 08:30)  SpO2: 95% (20 @ 08:30)  Mode: AC/ CMV (Assist Control/ Continuous Mandatory Ventilation)  RR (machine): 26  TV (machine): 450  FiO2: 30  PEEP: 5  ITime: 1  MAP: 10  PIP: 20      I&O's Detail    2020 07:  -  2020 07:00  --------------------------------------------------------  IN:    Enteral Tube Flush: 160 mL    insulin regular Infusion: 185 mL    Peptamen A.F.: 910 mL    phenylephrine   Infusion: 336.6 mL  Total IN: 1591.6 mL    OUT:    Indwelling Catheter - Urethral: 880 mL    Rectal Tube: 705 mL  Total OUT: 1585 mL    Total NET: 6.6 mL      2020 07:  -  2020 09:35  --------------------------------------------------------  IN:    insulin regular Infusion: 5 mL    Peptamen A.F.: 195 mL    phenylephrine   Infusion: 27.6 mL  Total IN: 227.6 mL    OUT:    Indwelling Catheter - Urethral: 100 mL  Total OUT: 100 mL    Total NET: 127.6 mL          I&O's Summary    2020 07:  -  2020 07:00  --------------------------------------------------------  IN: 1591.6 mL / OUT: 1585 mL / NET: 6.6 mL    2020 07:01  -  2020 09:35  --------------------------------------------------------  IN: 227.6 mL / OUT: 100 mL / NET: 127.6 mL        PHYSICAL EXAM:  Gen: intubated, does not respond to verbal stimul  resp: decreased bs at bases  card: regular  abd: distended  ext: +edema in all extremities    LABS:  Daily     Daily Weight in k.5 (2020 04:00)  ABG - ( 2020 03:54 )  pH, Arterial: 7.33  pH, Blood: x     /  pCO2: 43    /  pO2: 77    / HCO3: 23    / Base Excess: -3.2  /  SaO2: 95                    147<H>  |  114<H>  |  46<H>  ----------------------------<  180<H>  3.6   |  20  |  3.9<H>    Ca    7.9<L>      2020 04:00  Phos  3.4       Mg     2.6         TPro  5.1<L>  /  Alb  2.0<L>  /  TBili  0.6  /  DBili      /  AST  71<H>  /  ALT  20  /  AlkPhos  95      Creatinine Trend:   Creatinine, Serum: 3.9 mg/dL (20 @ 04:00)  Creatinine, Serum: 3.7 mg/dL (20 @ 00:15)  Creatinine, Serum: 3.8 mg/dL (20 @ 20:10)  Creatinine, Serum: 3.6 mg/dL (20 @ 16:06)  Creatinine, Serum: 3.2 mg/dL (20 @ 10:40)  Creatinine, Serum: 3.0 mg/dL (20 @ 04:30)  Creatinine, Serum: 2.8 mg/dL (20 @ 23:10)  Creatinine, Serum: 2.8 mg/dL (20 @ 20:30)  Creatinine, Serum: 2.7 mg/dL (20 @ 16:27)  Creatinine, Serum: 2.5 mg/dL (20 @ 13:40)  Creatinine, Serum: 2.5 mg/dL (20 @ 09:00)  Creatinine, Serum: 2.3 mg/dL (20 @ 04:50)  Creatinine, Serum: 2.4 mg/dL (20 @ 03:00)  Creatinine, Serum: 2.3 mg/dL (20 @ 21:50)  Creatinine, Serum: 2.2 mg/dL (20 @ 18:00)  Creatinine, Serum: 2.2 mg/dL (20 @ 13:52)  Creatinine, Serum: 2.1 mg/dL (20 @ 08:28)  Creatinine, Serum: 2.2 mg/dL (20 @ 04:45)  Creatinine, Serum: 1.9 mg/dL (20 @ 23:55)  Creatinine, Serum: 1.9 mg/dL (20 @ 21:15)                          9.0    16.65 )-----------( 121      ( 2020 04:30 )             28.2     CBC Full  -  ( 2020 04:30 )  WBC Count : 16.65 K/uL  RBC Count : 3.09 M/uL  Hemoglobin : 9.0 g/dL  Hematocrit : 28.2 %  Platelet Count - Automated : 121 K/uL  Mean Cell Volume : 91.3 fL    Urine Studies:  Urinalysis Basic - ( 2020 21:50 )  Color: Yellow / Appearance: Slightly Turbid / S.014 / pH:   Gluc:  / Ketone: Negative  / Bili: Negative / Urobili: <2 mg/dL   Blood:  / Protein: 100 mg/dL / Nitrite: Positive   Leuk Esterase: Negative / RBC: 6 /HPF / WBC 14 /HPF   Sq Epi:  / Non Sq Epi: 4 /HPF / Bacteria: Negative    Osmolality, Random Urine: 96 mos/kg ( @ 04:35)  Creatinine, Random Urine: 13 mg/dL ( @ 04:35)    Culture Results:   >100,000 CFU/ml Gram Negative Rods (20 @ 11:11)      RADIOLOGY & ADDITIONAL STUDIES:    < from: Xray Chest 1 View- PORTABLE-Routine (20 @ 20:08) >  Stable ill-defined airspace opacities and trace left pleural effusion.    < end of copied text >    < from: US Kidney and Bladder (20 @ 22:37) >  INTERPRETATION:  CLINICAL HISTORY: Acute kidney insufficiency    COMPARISON: None.    PROCEDURE: Retroperitoneal Ultrasound was performed.    FINDINGS:    RIGHT KIDNEY:Normal in echogenicity, size measuring 10.9 cm in length. No evidence of hydronephrosis, calculus or solid mass. Vascular flow is demonstrated at the hilum.    LEFT KIDNEY: Normal in echogenicity, size measuring 12.5 cm in length. No evidence of hydronephrosis, calculus or solid mass. Vascular flow is demonstrated at the hilum.    URINARY BLADDER: Collapsed around a Abbasi catheter, limiting assessment.    IMPRESSION:    Normal renal and bladder ultrasound.      < end of copied text >

## 2020-07-26 NOTE — CONSULT NOTE ADULT - ASSESSMENT
64M, PMH HTN, CAD, DM, admitted for stroke s/p TPA c/b intracranial hemorrhage, now w/o brainstem function, c/b AHRF on mech vent, E.coli bacteremia/+cdiff, on pressors and non-oliguric NAEL    #NAEL on CKD, p/w creatinine 1.9, unknown baseline, ATN iso CVA/shock  - creatinine rising, non-oliguric  - UA +RBC/WBC/prot, nitrite+, urine cx GNR, ?pyelo, also with GNR bacteremia, on Meropenem, dose for eGFR<10, appreciate ID recs  - DARSHAN noted, non-echogenic kidneys, no HN  - monitor off IVF/diuretics   - phos 3.4, no binders. Ca corrected wnl  - increase free water flushes for hypernatremia dt free water losses  - hgb noted. ?iron stores  - no acute indication for RRT, would not improve prognosis  - GOC discussion, possible liberation  - will follow            - non-oliguric    intubated, febrile, on pressor  resp failure, no brainstem function, E.coli bacteremia, +cdiff 64M, PMH HTN, CAD, DM, admitted for stroke s/p TPA c/b intracranial hemorrhage, now w/o brainstem function, c/b AHRF on mech vent, E.coli bacteremia/+cdiff, on pressors and non-oliguric NAEL    #NAEL on CKD, p/w creatinine 1.9, unknown baseline, ATN iso CVA/shock  - creatinine rising, non-oliguric  - UA +RBC/WBC/prot, nitrite+, urine cx GNR, ?pyelo, also with GNR bacteremia, on Meropenem, dose for eGFR<10, appreciate ID recs  - DARSHAN noted, non-echogenic kidneys, no HN  - monitor off IVF/diuretics   - phos 3.4, no binders. Ca corrected wnl  - increase free water flushes for hypernatremia dt free water losses  - NAGMA iso renal failure/diarrhea. Start sodium bicarb 650mg BID  - hgb noted. ?iron stores  - no acute indication for RRT, would not improve prognosis  - GOC discussion, possible liberation  - will follow            - non-oliguric    intubated, febrile, on pressor  resp failure, no brainstem function, E.coli bacteremia, +cdiff

## 2020-07-27 NOTE — PROGRESS NOTE ADULT - SUBJECTIVE AND OBJECTIVE BOX
Neurocritical Care Progress Note:    1. Brief Presentation: Aphasia    2. 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 Relevant brief History: 64 year old male with pmh of DM, CAD with PCI x3 in 2017, HTN, DLD, presented to  [facility] initially for expressive aphasia and confusion with NIHSS of 9 was s/p tpa at 16:46 hrs . CTA H/N revealed a right vertebral artery occlusion and distal basilar artery thrombus. Patient was sent to [facility] for neurointerventional eval and post TPA care. On initial neuro eval at [place] , patients expressive aphasia persisted . Pt. was on cardine drip to maintain BP below 185. While in ED Rapid response team was activated for sudden unresponsiveness, witnessed posturing. Pt. was intubated for airway protection and his NIHSS worsened to 39 at this time. Stat CTH was negative for bleed, CT perfusion Stat, with basilar artery near occlusion. Code NI actual was activated and patient was transferred to IR. Patient is s/p mechanical thrombectomy with full recanalization of aneurysmal basilar artery; procedure was completed under general anesthesia.     4-Yesterday's Plan:  -Continue to keep sodium goal of <160 for possible brain death exam  -Continue to keep -150  -Please order transcranial doppler as part of brain death exam  -Please continue with goals of care conversation with family  -Medical management as per primary team    5. Last 24 hour updates: Patient remains brainstem areflexic (as per brain death exam today) with inclusion of cold caloric testing with no response, intubated with no sedation on board    6. Medications:   aMIOdarone    Tablet   Oral   aMIOdarone    Tablet 400 milliGRAM(s) Oral every 12 hours  aMIOdarone Infusion 0.5 mG/Min IV Continuous <Continuous>  atorvastatin 80 milliGRAM(s) Oral at bedtime  chlorhexidine 0.12% Liquid 15 milliLiter(s) Oral Mucosa every 12 hours  chlorhexidine 4% Liquid 1 Application(s) Topical <User Schedule>  dextrose 50% Injectable 12.5 Gram(s) IV Push once  dextrose 50% Injectable 25 Gram(s) IV Push once  dextrose 50% Injectable 25 Gram(s) IV Push once  DOPamine Infusion 1 MICROgram(s)/kG/Min IV Continuous <Continuous>  heparin   Injectable 5000 Unit(s) SubCutaneous every 8 hours  insulin regular Infusion 1 Unit(s)/Hr IV Continuous <Continuous>  meropenem  IVPB      meropenem  IVPB 500 milliGRAM(s) IV Intermittent every 12 hours  norepinephrine Infusion 0.05 MICROgram(s)/kG/Min IV Continuous <Continuous>  pantoprazole   Suspension 40 milliGRAM(s) Enteral Tube daily  phenylephrine    Infusion 0.1 MICROgram(s)/kG/Min IV Continuous <Continuous>  sodium bicarbonate 650 milliGRAM(s) Oral every 8 hours  vancomycin    Solution 250 milliGRAM(s) Oral every 6 hours    7. Ancillary Management:   Chest PT[ ]   Head of bed >35 [x ]   Out of bed to chair [ ]   PT/OT/SP Eval [ ]   Spirometry[ ]   DVT prophalaxis[x ]    8.Neuro:   Awake: Spontaneously[ ] Occasionally[ ] To Voice [ ] To painful stimuli [ ]   AIert [ ]. Following commands: 3 steps[ ], 2 steps[ ], 1 step [ ], None [ ]   Orientation: 0[ ], 1[ ], 2[ ], 3[ ]. Tracking objects with eyes: [ ]   Language:   Time off sedation for exam:   Pupils: Right   >   Left    >      Corneal:      Gag reflex:     EOMI:    NIH STROKE SCALE  Item	                                                        Score  1 a.	Level of Consciousness	               	0  1 b. LOC Questions	                                0  1 c.	LOC Commands	                               	0  2.	Best Gaze	                                        0  3.	Visual	                                                0  4.	Facial Palsy	                                        0  5 a.	Motor Arm - Left	                                0  5 b.	Motor Arm - Right	                        0  6 a.	Motor Leg - Left	                                0  6 b.	Motor Leg - Right	                                0  7.	Limb Ataxia	                                        0  8.	Sensory	                                                0  9.	Language	                                        0  10.	Dysarthria	                                        0  11.	Extinction and Inattention  	        0  ______________________________________  TOTAL	                                                        0      mRS:  0 No symptoms at all  1 No significant disability despite symptoms; able to carry out all usual duties and activities without assistance  2 Slight disability; unable to carry out all previous activities, but able to look after own affairs  3 Moderate disability; requiring some help, but able to walk without assistance  4 Moderately severe disability; unable to walk without assistance and unable to attend to own bodily needs without assistance  5 Severe disability; bedridden, incontinent and requiring constant nursing care and attention  6 Dead    ICHs:  Age >=80  GCS Score: 3-4 (2 pts)   GCS Score: 5-12 (1 pt)   ICH Volume: >30  (+) IVH  (+) Infratentorial    Mendez&Arroyo:  Grade I = Asymptomatic, mild headache, slight nuchal rigidity  Grade II = Moderate to severe headache, nuchal rigidity, no neurological deficit other than cranial nerve palsy  Grade III = Drowiness, confusion, mild focal neurological deficit  Grade IV = Stupor, moderate to severe hemiparesis  Grade V = Coma, decerebrate posturing    m-Blackmon:  Grade 0   (No Subarachnoid Hemorrhage (SAH)), No intraventricular hemorrhage (IVH), Incidence of symptomatic vasopasm 0%  Grade 1   (Focal or diffuse, thin SAH), No IVH, incidence of symptomatic vasospasm 24%  Grade 2   (Thin focal or diffuse SAH), IVH present, incidence of symptomatic vasospasm 33%  Grade 3   (Thick focal or diffuse SAH), No IVH, incidence of symptomatic vasospasm 33%  Grade 4   (Thick focal or diffuse SAH), IVH present, incidence of symptomatic vasosasm 40%    Last CTH:    Last CTA/MRA:    Last CTP:    Last MRI:    Last TCD:    Last EEG:    EVD: [ ] [place]: [ ]     ICP:     CPP:     Level(cm):     24hr(ml):     CSF:     W:     R:     C:     P:     G:     LA:    9. Cardiovascular:   Vital Signs Last 24 Hrs  T(C): 38 (27 Jul 2020 12:00), Max: 38.1 (27 Jul 2020 00:00)  T(F): 100.4 (27 Jul 2020 12:00), Max: 100.6 (27 Jul 2020 00:00)  HR: 88 (27 Jul 2020 13:45) (60 - 98)  BP: --  BP(mean): --  RR: 26 (27 Jul 2020 13:45) (13 - 26)  SpO2: 94% (27 Jul 2020 13:45) (91% - 100%)     Last Echo:    Last EKG:    CVP   MAP/CPP/SBP target:   CO:      CI:       Enzymes/Trop:    10. Respiratory:   ABG:ABG - ( 27 Jul 2020 04:41 )  pH, Arterial: 7.33  pH, Blood: x     /  pCO2: 38    /  pO2: 74    / HCO3: 20    / Base Excess: -5.4  /  SaO2: 95                  VBG:    Chest Xray:    Mode: AC/ CMV (Assist Control/ Continuous Mandatory Ventilation)  RR (machine): 26  TV (machine): 450  FiO2: 40  PEEP: 5  ITime: 1  MAP: 11  PIP: 18      Peak Pressure/[place] Pressure:    11.GI:    Prophalaxis:     Bowel mvt:     Abd distension:   LIVER FUNCTIONS - ( 27 Jul 2020 04:30 )  Alb: 2.0 g/dL / Pro: 5.3 g/dL / ALK PHOS: 95 U/L / ALT: 26 U/L / AST: 92 U/L / GGT: x             12.Renal/Fluids/Electrolytes:    07-27    150<H>  |  117<H>  |  62<HH>  ----------------------------<  170<H>  4.0   |  20  |  4.4<HH>    Ca    7.8<L>      27 Jul 2020 04:30  Mg     2.5     07-27    TPro  5.3<L>  /  Alb  2.0<L>  /  TBili  0.6  /  DBili  x   /  AST  92<H>  /  ALT  26  /  AlkPhos  95  07-27      I&O's Detail    26 Jul 2020 07:01  -  27 Jul 2020 07:00  --------------------------------------------------------  IN:    insulin regular Infusion: 54 mL    Peptamen A.F.: 1365 mL    phenylephrine   Infusion: 342.1 mL  Total IN: 1761.1 mL    OUT:    Indwelling Catheter - Urethral: 1060 mL    Rectal Tube: 400 mL  Total OUT: 1460 mL    Total NET: 301.1 mL      27 Jul 2020 07:01  -  27 Jul 2020 14:04  --------------------------------------------------------  IN:    DOPamine Infusion: 12.7 mL    insulin regular Infusion: 47 mL    IV PiggyBack: 250 mL    Peptamen A.F.: 390 mL    phenylephrine   Infusion: 113.2 mL  Total IN: 812.9 mL    OUT:    Indwelling Catheter - Urethral: 150 mL    Rectal Tube: 1200 mL  Total OUT: 1350 mL    Total NET: -537.1 mL          13.ID:   TMax:   Vital Signs Last 24 Hrs  T(C): 38 (27 Jul 2020 12:00), Max: 38.1 (27 Jul 2020 00:00)  T(F): 100.4 (27 Jul 2020 12:00), Max: 100.6 (27 Jul 2020 00:00)  HR: 88 (27 Jul 2020 13:45) (60 - 98)  BP: --  BP(mean): --  RR: 26 (27 Jul 2020 13:45) (13 - 26)  SpO2: 94% (27 Jul 2020 13:45) (91% - 100%)           Lines: Central[] Date inserted: Peripheral[]    14. Hematology:                         9.4    13.65 )-----------( 159      ( 27 Jul 2020 04:30 )             29.4      07-27    150<H>  |  117<H>  |  62<HH>  ----------------------------<  170<H>  4.0   |  20  |  4.4<HH>    Ca    7.8<L>      27 Jul 2020 04:30  Mg     2.5     07-27    TPro  5.3<L>  /  Alb  2.0<L>  /  TBili  0.6  /  DBili  x   /  AST  92<H>  /  ALT  26  /  AlkPhos  95  07-27         DVT Prophylaxis Lovenox[ ] Heparin[ ] Venodynes[ ] SCD's[ ]    15. Impression: 64 year old gentleman with history significant for DM, CAD with PCI x3 in 2017, HTN, DLD, trigeminal neuralgia presents to the ED for aphasia s/p tPA for stroke. Baseline is AAOx3. Initial NIHSS 9. While in the ED, patient was with sudden body posturing and not following commands. Intubated for airway protection. Stat CTH unremarkable. CTA showed right vertebral artery occlusion and distal basilar artery thrombus. CTP with basilar artery near occlusion. Code NI actual was activated and patient was transferred to [facility] for neurointervention. S/p basilar artery thrombectomy TICI 3 and on integrillin gtt, now since stopped. 3% started for severe cytotoxic edema and brainstem compression. In 24 hours, sodium level had risen to 170, so 3% was discontinued. I/Os over 24 hours and normal renal function do not support central DI, so administration of free water and D5W until sodium level of 160 is achieved. Today, patient remains brainstem areflexic (as per brain death exam) with inclusion of cold caloric testing with no response, intubated with no sedation on board, with sodium level 150 this AM. Apnea test performed last week and it was reported that "patient took a breath" on his own. Respirations brought down to 10 today and patient did not breath over the vent. Wife aware of very poor prognosis. Will continue to monitor and medical management as per primary team.     16. Suggestions:  -Continue to keep sodium goal of <160   -Continue to keep -150  -Please continue with goals of care conversation with family  -Medical management as per primary team    17. Disposition:  -Continue medical management in ICU Neurocritical Care Progress Note:    1. Brief Presentation: Aphasia    2. 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 Relevant brief History: 64 year old male with pmh of DM, CAD with PCI x3 in 2017, HTN, DLD, presented to  [facility] initially for expressive aphasia and confusion with NIHSS of 9 was s/p tpa at 16:46 hrs . CTA H/N revealed a right vertebral artery occlusion and distal basilar artery thrombus. Patient was sent to [facility] for neurointerventional eval and post TPA care. On initial neuro eval at [place] , patients expressive aphasia persisted . Pt. was on cardine drip to maintain BP below 185. While in ED Rapid response team was activated for sudden unresponsiveness, witnessed posturing. Pt. was intubated for airway protection and his NIHSS worsened to 39 at this time. Stat CTH was negative for bleed, CT perfusion Stat, with basilar artery near occlusion. Code NI actual was activated and patient was transferred to IR. Patient is s/p mechanical thrombectomy with full recanalization of aneurysmal basilar artery; procedure was completed under general anesthesia.     4-Yesterday's Plan:  -Continue to keep sodium goal of <160 for possible brain death exam  -Continue to keep -150  -Please order transcranial doppler as part of brain death exam  -Please continue with goals of care conversation with family  -Medical management as per primary team    5. Last 24 hour updates: Patient remains brainstem areflexic (as per brain death exam today) with inclusion of cold caloric testing with no response, intubated with no sedation on board    6. Medications:   aMIOdarone    Tablet   Oral   aMIOdarone    Tablet 400 milliGRAM(s) Oral every 12 hours  aMIOdarone Infusion 0.5 mG/Min IV Continuous <Continuous>  atorvastatin 80 milliGRAM(s) Oral at bedtime  chlorhexidine 0.12% Liquid 15 milliLiter(s) Oral Mucosa every 12 hours  chlorhexidine 4% Liquid 1 Application(s) Topical <User Schedule>  dextrose 50% Injectable 12.5 Gram(s) IV Push once  dextrose 50% Injectable 25 Gram(s) IV Push once  dextrose 50% Injectable 25 Gram(s) IV Push once  DOPamine Infusion 1 MICROgram(s)/kG/Min IV Continuous <Continuous>  heparin   Injectable 5000 Unit(s) SubCutaneous every 8 hours  insulin regular Infusion 1 Unit(s)/Hr IV Continuous <Continuous>  meropenem  IVPB      meropenem  IVPB 500 milliGRAM(s) IV Intermittent every 12 hours  norepinephrine Infusion 0.05 MICROgram(s)/kG/Min IV Continuous <Continuous>  pantoprazole   Suspension 40 milliGRAM(s) Enteral Tube daily  phenylephrine    Infusion 0.1 MICROgram(s)/kG/Min IV Continuous <Continuous>  sodium bicarbonate 650 milliGRAM(s) Oral every 8 hours  vancomycin    Solution 250 milliGRAM(s) Oral every 6 hours    7. Ancillary Management:   Chest PT[ ]   Head of bed >35 [x ]   Out of bed to chair [ ]   PT/OT/SP Eval [ ]   Spirometry[ ]   DVT prophalaxis[x ]    8.Neuro:   Neurologic Exam:  Mental status: Comatose  Language: Intubated; no sedation on board. Not following commands.  Cranial nerves: no b/l pupillary reflex, no corneal reflex, no occulocephalic reflex, no gag reflex   Motor: Flaccid, no tremors. No posturing noted  Sensation: No withdrawal or localization to noxious stimuli   Reflex: Babinski mute    GCS:    E1 V1 M1 --> 3    mRS:  0 No symptoms at all  1 No significant disability despite symptoms; able to carry out all usual duties and activities without assistance  2 Slight disability; unable to carry out all previous activities, but able to look after own affairs  3 Moderate disability; requiring some help, but able to walk without assistance  4 Moderately severe disability; unable to walk without assistance and unable to attend to own bodily needs without assistance  5 Severe disability; bedridden, incontinent and requiring constant nursing care and attention  6 Dead    Last CTH: < from: CT Head No Cont (07.17.20 @ 10:27) >  Comparison is made with the CT scans of the head and CTA exams from the prior day.    FINDINGS/  IMPRESSION:    1.  Interval resorption of the IV contrast within the intracranial vasculature.    2.  Interval increase in posterior fossa edema and mass effect consistent with evolving infarct (involving both cerebellar hemispheres and possibly the brainstem) with resultant new effacement of the fourth ventricle and new mild obstructive hydrocephalus.    3.  Evolving infarct also noted within the right temporal lobe.    4.  Increased hyperdensity within the left ambient and quadrigeminal plate cisterns, and new hyperdensity within the right ambient and quadrigeminal plate cisterns, the suprasellar cistern, the right sylvian fissure and scattered sulci suspicious worsening/new subarachnoid hemorrhage.    5.  Redemonstrated enlarged dolichoectatic vertebrobasilar vessels with mass effect upon the brainstem.    < end of copied text >    Last CTA/MRA: < from: CT Angio Neck w/ IV Cont (07.16.20 @ 16:08) >  IMPRESSION:    1.  *Occlusion of the right vertebral artery from about the level of the C2 vertebral body to the vertebrobasilar junction (V3 and V4 segments).  2.  *Thrombus within the distal basilar artery measuring about 6 mm in length and producing about 80% luminal stenosis.    Other extensive atheromatous changes including:  3.  Moderate/severe stenosis of the right subclavian artery origin.   4.  Moderate/severe stenosis of the right vertebral artery origin.   5.  Moderate stenosis of the right ICA petrous segment.  6.  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  7.  Fusiform dilatation of the left vertebral artery up to 7 mm, and the basilar artery up to 1.1 cm, with compression of the indira.    < end of copied text >    Last CTP: < from: CT Perfusion w/ Maps w/ IV Cont (07.16.20 @ 20:08) >  IMPRESSION:    Since most recent examination the previously noted distal basilar clot is less well-defined on this examination though now with diminished contrast enhancement of the distal basilar which likely represents residual thrombus. Contrast enhancement within the right PCA is diminished as compared to prior.    There has been extension of the right vertebral artery thrombus inferiorly now extending to the level of the C7 vertebral body.    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    Multifocal areas of moderate to severe stenosis of the bilateral ICA and MCA branches with unchangedbroad-based 2 mm aneurysms noted of the bilateral M1 segments.    Approximately 301 mL ischemic penumbra noted involving the posterior circulation without core infarction    < end of copied text >    Last MRI: n/a    Last TCD: n/a    Last EEG: < from: EEG (07.17.20 @ 13:00) >  Impression  -  Abnormal due to the presence of: focal slowing as above, generalized slowing as above  -    Clinical Correlation & Recommendations   Consistent with diffuse cerebral electrophysiological dysfunction.  Secondary to none specific cause. Consistent with focal electrophysiological dysfunction.  Secondary to structural/metabolic cause.    < end of copied text >    9. Cardiovascular:   Vital Signs Last 24 Hrs  T(C): 38 (27 Jul 2020 12:00), Max: 38.1 (27 Jul 2020 00:00)  T(F): 100.4 (27 Jul 2020 12:00), Max: 100.6 (27 Jul 2020 00:00)  HR: 88 (27 Jul 2020 13:45) (60 - 98)  BP: --  BP(mean): --  RR: 26 (27 Jul 2020 13:45) (13 - 26)  SpO2: 94% (27 Jul 2020 13:45) (91% - 100%)   Blood Gas Arterial, Lactate (07.27.20 @ 04:41)    Blood Gas Arterial, Lactate: 0.8 mmoL/L    Last Echo:    Last EKG:    CVP   MAP/CPP/SBP target:   CO:      CI:       Enzymes/Trop:    10. Respiratory:   ABG:ABG - ( 27 Jul 2020 04:41 )  pH, Arterial: 7.33  pH, Blood: x     /  pCO2: 38    /  pO2: 74    / HCO3: 20    / Base Excess: -5.4  /  SaO2: 95                  VBG:    Chest Xray:    Mode: AC/ CMV (Assist Control/ Continuous Mandatory Ventilation)  RR (machine): 26  TV (machine): 450  FiO2: 40  PEEP: 5  ITime: 1  MAP: 11  PIP: 18      Peak Pressure/[place] Pressure:    11.GI:    Prophalaxis:     Bowel mvt:     Abd distension:   LIVER FUNCTIONS - ( 27 Jul 2020 04:30 )  Alb: 2.0 g/dL / Pro: 5.3 g/dL / ALK PHOS: 95 U/L / ALT: 26 U/L / AST: 92 U/L / GGT: x             12.Renal/Fluids/Electrolytes:    07-27    150<H>  |  117<H>  |  62<HH>  ----------------------------<  170<H>  4.0   |  20  |  4.4<HH>    Ca    7.8<L>      27 Jul 2020 04:30  Mg     2.5     07-27    TPro  5.3<L>  /  Alb  2.0<L>  /  TBili  0.6  /  DBili  x   /  AST  92<H>  /  ALT  26  /  AlkPhos  95  07-27      I&O's Detail    26 Jul 2020 07:01  -  27 Jul 2020 07:00  --------------------------------------------------------  IN:    insulin regular Infusion: 54 mL    Peptamen A.F.: 1365 mL    phenylephrine   Infusion: 342.1 mL  Total IN: 1761.1 mL    OUT:    Indwelling Catheter - Urethral: 1060 mL    Rectal Tube: 400 mL  Total OUT: 1460 mL    Total NET: 301.1 mL      27 Jul 2020 07:01  -  27 Jul 2020 14:04  --------------------------------------------------------  IN:    DOPamine Infusion: 12.7 mL    insulin regular Infusion: 47 mL    IV PiggyBack: 250 mL    Peptamen A.F.: 390 mL    phenylephrine   Infusion: 113.2 mL  Total IN: 812.9 mL    OUT:    Indwelling Catheter - Urethral: 150 mL    Rectal Tube: 1200 mL  Total OUT: 1350 mL    Total NET: -537.1 mL          13.ID:   TMax:   Vital Signs Last 24 Hrs  T(C): 38 (27 Jul 2020 12:00), Max: 38.1 (27 Jul 2020 00:00)  T(F): 100.4 (27 Jul 2020 12:00), Max: 100.6 (27 Jul 2020 00:00)  HR: 88 (27 Jul 2020 13:45) (60 - 98)  BP: --  BP(mean): --  RR: 26 (27 Jul 2020 13:45) (13 - 26)  SpO2: 94% (27 Jul 2020 13:45) (91% - 100%)           Lines: Central[] Date inserted: Peripheral[]    14. Hematology:                         9.4    13.65 )-----------( 159      ( 27 Jul 2020 04:30 )             29.4      07-27    150<H>  |  117<H>  |  62<HH>  ----------------------------<  170<H>  4.0   |  20  |  4.4<HH>    Ca    7.8<L>      27 Jul 2020 04:30  Mg     2.5     07-27    TPro  5.3<L>  /  Alb  2.0<L>  /  TBili  0.6  /  DBili  x   /  AST  92<H>  /  ALT  26  /  AlkPhos  95  07-27         DVT Prophylaxis Lovenox[ ] Heparin[ ] Venodynes[ ] SCD's[ ]    15. Impression: 64 year old gentleman with history significant for DM, CAD with PCI x3 in 2017, HTN, DLD, trigeminal neuralgia presents to the ED for aphasia s/p tPA for stroke. Baseline is AAOx3. Initial NIHSS 9. While in the ED, patient was with sudden body posturing and not following commands. Intubated for airway protection. Stat CTH unremarkable. CTA showed right vertebral artery occlusion and distal basilar artery thrombus. CTP with basilar artery near occlusion. Code NI actual was activated and patient was [facility]. S/p basilar artery thrombectomy TICI 3 and on integrillin gtt, now since stopped. 3% started for severe cytotoxic edema and brainstem compression. In 24 hours, sodium level had risen to 170, so 3% was discontinued. I/Os over 24 hours and normal renal function do not support central DI, so administration of free water and D5W until sodium level of 160 is achieved. Today, patient remains brainstem areflexic (as per brain death exam) with inclusion of cold caloric testing with no response, intubated with no sedation on board, with sodium level 150 this AM. Apnea test performed last week and it was reported that "patient took a breath" on his own. Respirations brought down to 10 today and patient did not breath over the vent. Wife aware of very poor prognosis. Will continue to monitor and medical management as per primary team.     16. Suggestions:  -Continue to keep sodium goal of <160   -Continue to keep -150  -Please continue with goals of care conversation with family  -Medical management as per primary team    17. Disposition:  -Continue medical management in ICU Neurocritical Care Progress Note:    1. Brief Presentation: Aphasia    2. 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 Relevant brief History: 64 year old male with pmh of DM, CAD with PCI x3 in 2017, HTN, DLD, presented to  [facility] initially for expressive aphasia and confusion with NIHSS of 9 was s/p tpa at 16:46 hrs . CTA H/N revealed a right vertebral artery occlusion and distal basilar artery thrombus. Patient was sent to [facility] for neurointerventional eval and post TPA care. On initial neuro eval at [place] , patients expressive aphasia persisted . Pt. was on cardine drip to maintain BP below 185. While in ED Rapid response team was activated for sudden unresponsiveness, witnessed posturing. Pt. was intubated for airway protection and his NIHSS worsened to 39 at this time. Stat CTH was negative for bleed, CT perfusion Stat, with basilar artery near occlusion. Code NI actual was activated and patient was transferred to IR. Patient is s/p mechanical thrombectomy with full recanalization of aneurysmal basilar artery; procedure was completed under general anesthesia.     4-Yesterday's Plan:  -Continue to keep sodium goal of <160 for possible brain death exam  -Continue to keep -150  -Please order transcranial doppler as part of brain death exam  -Please continue with goals of care conversation with family  -Medical management as per primary team    5. Last 24 hour updates: Patient remains brainstem areflexic (as per brain death exam today) with inclusion of cold caloric testing with no response, intubated with no sedation on board    6. Medications:   aMIOdarone    Tablet   Oral   aMIOdarone    Tablet 400 milliGRAM(s) Oral every 12 hours  aMIOdarone Infusion 0.5 mG/Min IV Continuous <Continuous>  atorvastatin 80 milliGRAM(s) Oral at bedtime  chlorhexidine 0.12% Liquid 15 milliLiter(s) Oral Mucosa every 12 hours  chlorhexidine 4% Liquid 1 Application(s) Topical <User Schedule>  dextrose 50% Injectable 12.5 Gram(s) IV Push once  dextrose 50% Injectable 25 Gram(s) IV Push once  dextrose 50% Injectable 25 Gram(s) IV Push once  DOPamine Infusion 1 MICROgram(s)/kG/Min IV Continuous <Continuous>  heparin   Injectable 5000 Unit(s) SubCutaneous every 8 hours  insulin regular Infusion 1 Unit(s)/Hr IV Continuous <Continuous>  meropenem  IVPB      meropenem  IVPB 500 milliGRAM(s) IV Intermittent every 12 hours  norepinephrine Infusion 0.05 MICROgram(s)/kG/Min IV Continuous <Continuous>  pantoprazole   Suspension 40 milliGRAM(s) Enteral Tube daily  phenylephrine    Infusion 0.1 MICROgram(s)/kG/Min IV Continuous <Continuous>  sodium bicarbonate 650 milliGRAM(s) Oral every 8 hours  vancomycin    Solution 250 milliGRAM(s) Oral every 6 hours    7. Ancillary Management:   Chest PT[ ]   Head of bed >35 [x ]   Out of bed to chair [ ]   PT/OT/SP Eval [ ]   Spirometry[ ]   DVT prophalaxis[x ]    8.Neuro:   Neurologic Exam:  Mental status: Comatose  Language: Intubated; no sedation on board. Not following commands.  Cranial nerves: no b/l pupillary reflex, no corneal reflex, no occulocephalic reflex, no gag reflex. Cold caloric test done with no response. Vent respirations to 10 with no breathing over the vent noted   Motor: Flaccid, no tremors. No posturing noted  Sensation: No withdrawal or localization to noxious stimuli   Reflex: Babinski mute    GCS:    E1 V1 M1 --> 3    mRS:  0 No symptoms at all  1 No significant disability despite symptoms; able to carry out all usual duties and activities without assistance  2 Slight disability; unable to carry out all previous activities, but able to look after own affairs  3 Moderate disability; requiring some help, but able to walk without assistance  4 Moderately severe disability; unable to walk without assistance and unable to attend to own bodily needs without assistance  5 Severe disability; bedridden, incontinent and requiring constant nursing care and attention  6 Dead    Last CTH: < from: CT Head No Cont (07.17.20 @ 10:27) >  Comparison is made with the CT scans of the head and CTA exams from the prior day.    FINDINGS/  IMPRESSION:    1.  Interval resorption of the IV contrast within the intracranial vasculature.    2.  Interval increase in posterior fossa edema and mass effect consistent with evolving infarct (involving both cerebellar hemispheres and possibly the brainstem) with resultant new effacement of the fourth ventricle and new mild obstructive hydrocephalus.    3.  Evolving infarct also noted within the right temporal lobe.    4.  Increased hyperdensity within the left ambient and quadrigeminal plate cisterns, and new hyperdensity within the right ambient and quadrigeminal plate cisterns, the suprasellar cistern, the right sylvian fissure and scattered sulci suspicious worsening/new subarachnoid hemorrhage.    5.  Redemonstrated enlarged dolichoectatic vertebrobasilar vessels with mass effect upon the brainstem.    < end of copied text >    Last CTA/MRA: < from: CT Angio Neck w/ IV Cont (07.16.20 @ 16:08) >  IMPRESSION:    1.  *Occlusion of the right vertebral artery from about the level of the C2 vertebral body to the vertebrobasilar junction (V3 and V4 segments).  2.  *Thrombus within the distal basilar artery measuring about 6 mm in length and producing about 80% luminal stenosis.    Other extensive atheromatous changes including:  3.  Moderate/severe stenosis of the right subclavian artery origin.   4.  Moderate/severe stenosis of the right vertebral artery origin.   5.  Moderate stenosis of the right ICA petrous segment.  6.  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  7.  Fusiform dilatation of the left vertebral artery up to 7 mm, and the basilar artery up to 1.1 cm, with compression of the indira.    < end of copied text >    Last CTP: < from: CT Perfusion w/ Maps w/ IV Cont (07.16.20 @ 20:08) >  IMPRESSION:    Since most recent examination the previously noted distal basilar clot is less well-defined on this examination though now with diminished contrast enhancement of the distal basilar which likely represents residual thrombus. Contrast enhancement within the right PCA is diminished as compared to prior.    There has been extension of the right vertebral artery thrombus inferiorly now extending to the level of the C7 vertebral body.    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    Multifocal areas of moderate to severe stenosis of the bilateral ICA and MCA branches with unchangedbroad-based 2 mm aneurysms noted of the bilateral M1 segments.    Approximately 301 mL ischemic penumbra noted involving the posterior circulation without core infarction    < end of copied text >    Last MRI: n/a    Last TCD: n/a    Last EEG: < from: EEG (07.17.20 @ 13:00) >  Impression  -  Abnormal due to the presence of: focal slowing as above, generalized slowing as above  -    Clinical Correlation & Recommendations   Consistent with diffuse cerebral electrophysiological dysfunction.  Secondary to none specific cause. Consistent with focal electrophysiological dysfunction.  Secondary to structural/metabolic cause.    < end of copied text >    9. Cardiovascular:   Vital Signs Last 24 Hrs  T(C): 38 (27 Jul 2020 12:00), Max: 38.1 (27 Jul 2020 00:00)  T(F): 100.4 (27 Jul 2020 12:00), Max: 100.6 (27 Jul 2020 00:00)  HR: 88 (27 Jul 2020 13:45) (60 - 98)  BP: --  BP(mean): --  RR: 26 (27 Jul 2020 13:45) (13 - 26)  SpO2: 94% (27 Jul 2020 13:45) (91% - 100%)   Blood Gas Arterial, Lactate (07.27.20 @ 04:41)    Blood Gas Arterial, Lactate: 0.8 mmoL/L    Last Echo: < from: Transthoracic Echocardiogram (07.18.20 @ 08:09) >  Summary:   1. Normal global left ventricular systolic function.   2. LV Ejection Fraction by Fuentes's Method with a biplane EF of 65 %.   3. Mild mitral valve regurgitation.   4. Color flow doppler and intravenous injection ofagitated saline demonstrates the presence of an intact intra atrial septum.    PHYSICIAN INTERPRETATION:  Left Ventricle: The left ventricular internal cavity size is normal. Left ventricular wall thickness is normal. Global LV systolic function was normal. Spectral Doppler shows normal pattern of LV diastolic filling.  Right Ventricle: Normal right ventricular size and function.  Left Atrium: Normal left atrial size. Color flow doppler and intravenous injection of agitated saline demonstrates the presence of an intact intra atrial septum.  Right Atrium: Normal right atrial size.  Pericardium: There is no evidence of pericardial effusion.  Mitral Valve: The mitral valve is normal in structure. Peak transmitral mean gradient equals 1.6 mmHg, calculated mitral valve area by pressure half time equals 4.46 cm² consistent with No evidence of mitral stenosis. Mild mitral valve regurgitation is seen.  Tricuspid Valve: The tricuspid valve is normal in structure. Mild tricuspid regurgitation is visualized.  Aortic Valve: Normal trileaflet aortic valve with normal opening. No evidence of aortic valve regurgitation is seen.  Pulmonic Valve: Structurally normal pulmonic valve, with normal leaflet excursion.  Aorta: The aortic root is normal in size and structure.  Pulmonary Artery: The pulmonary artery is of normal size and origin.  Shunts: Agitated saline contrast was given intravenously to evaluate for intracardiac shunting.    < end of copied text >    Last EKG: < from: 12 Lead ECG (07.18.20 @ 22:44) >  Diagnosis Line Marked sinus bradycardia with 1st degree A-V block  T wave abnormality, consider lateral ischemia  Abnormal ECG    < end of copied text >    CVP   MAP/CPP/SBP target:   CO:      CI:       Enzymes/Trop:  Troponin T, Serum (07.17.20 @ 13:25)    Troponin T, Serum: <0.01 ng/mL    10. Respiratory:   ABG:ABG - ( 27 Jul 2020 04:41 )  pH, Arterial: 7.33  pH, Blood: x     /  pCO2: 38    /  pO2: 74    / HCO3: 20    / Base Excess: -5.4  /  SaO2: 95        VBG: Blood Gas Profile - Venous (07.17.20 @ 20:39)    pH, Venous: 7.40    pCO2, Venous: 35 mmHg    pO2, Venous: 93 mmHg    HCO3, Venous: 22 mmoL/L    Base Excess, Venous: -2.7 mmoL/L    Oxygen Saturation, Venous: 96 %    Chest Xray: < from: Xray Chest 1 View- PORTABLE-Routine (07.27.20 @ 05:44) >  Impression:    Stable left basal pleural effusion and parenchymal opacity. Stable to slightly increased right subhilar/basal hazy opacities.    Support devices as above    < end of copied text >    Mode: AC/ CMV (Assist Control/ Continuous Mandatory Ventilation)  RR (machine): 26  TV (machine): 450  FiO2: 40  PEEP: 5  ITime: 1  MAP: 11  PIP: 18    Peak Pressure/[place] Pressure: n/a    11.GI:  Prophalaxis: pantoprazole       Bowel mvt:     Abd distension:   LIVER FUNCTIONS - ( 27 Jul 2020 04:30 )  Alb: 2.0 g/dL / Pro: 5.3 g/dL / ALK PHOS: 95 U/L / ALT: 26 U/L / AST: 92 U/L / GGT: x           12.Renal/Fluids/Electrolytes:    07-27    150<H>  |  117<H>  |  62<HH>  ----------------------------<  170<H>  4.0   |  20  |  4.4<HH>    Ca    7.8<L>      27 Jul 2020 04:30  Mg     2.5     07-27    TPro  5.3<L>  /  Alb  2.0<L>  /  TBili  0.6  /  DBili  x   /  AST  92<H>  /  ALT  26  /  AlkPhos  95  07-27      I&O's Detail    26 Jul 2020 07:01  -  27 Jul 2020 07:00  --------------------------------------------------------  IN:    insulin regular Infusion: 54 mL    Peptamen A.F.: 1365 mL    phenylephrine   Infusion: 342.1 mL  Total IN: 1761.1 mL    OUT:    Indwelling Catheter - Urethral: 1060 mL    Rectal Tube: 400 mL  Total OUT: 1460 mL    Total NET: 301.1 mL      27 Jul 2020 07:01  -  27 Jul 2020 14:04  --------------------------------------------------------  IN:    DOPamine Infusion: 12.7 mL    insulin regular Infusion: 47 mL    IV PiggyBack: 250 mL    Peptamen A.F.: 390 mL    phenylephrine   Infusion: 113.2 mL  Total IN: 812.9 mL    OUT:    Indwelling Catheter - Urethral: 150 mL    Rectal Tube: 1200 mL  Total OUT: 1350 mL    Total NET: -537.1 mL    13.ID:   Urinalysis (07.23.20 @ 21:50)    Blood, Urine: Moderate    Glucose Qualitative, Urine: Negative    pH Urine: 6.0    Color: Yellow    Urine Appearance: Slightly Turbid    Bilirubin: Negative    Ketone - Urine: Negative    Specific Gravity: 1.014    Protein, Urine: 100 mg/dL    Urobilinogen: <2 mg/dL    Nitrite: Positive    Leukocyte Esterase Concentration: Negative    Urine Microscopic-Add On (NC) (07.23.20 @ 21:50)    Epithelial Cells: 4 /HPF    Bacteria: Negative    Red Blood Cell - Urine: 6 /HPF    White Blood Cell - Urine: 14 /HPF    Hyaline Casts: 5 /LPF    Culture - Blood (07.23.20 @ 21:05)    Gram Stain:   Growth in anaerobic bottle: Gram Negative Rods  Growth in aerobic bottle:  Gram Positive Cocci in Clusters    -  Amikacin: S <=16    -  Ampicillin: R >16 These ampicillin results predict results for amoxicillin    -  Ampicillin/Sulbactam: I 16/8 Enterobacter, Citrobacter, and Serratia may develop resistance during prolonged therapy (3-4 days)    -  Aztreonam: S <=4    -  Cefazolin: R >16 Enterobacter, Citrobacter, and Serratia may develop resistance during prolonged therapy (3-4 days)    -  Cefepime: S <=2    -  Cefoxitin: R >16    -  Ceftriaxone: S <=1 Enterobacter, Citrobacter, and Serratia may develop resistance during prolonged therapy    -  Ciprofloxacin: R >2    -  Ertapenem: S <=0.5    -  Gentamicin: S <=2    -  Imipenem: S <=1    -  Levofloxacin: R >4    -  Meropenem: S <=1    -  Piperacillin/Tazobactam: S <=8    -  Tobramycin: S <=2    -  Trimethoprim/Sulfamethoxazole: S 2/38    -  Multidrug (KPC pos) resistant organism: Nondet    -  Staphylococcus aureus: Nondet    -  Methicillin resistant Staphylococcus aureus (MRSA): Nondet    -  Coagulase negative Staphylococcus: Nondet    -  Enterococcus species: Nondet    -  Vancomycin resistant Enterococcus sp.: Nondet    -  Escherichia coli: Detec    -  Escherichia coli: Nondet    -  Klebsiella oxytoca: Nondet    -  Klebsiella pneumoniae: Nondet    -  Serratia marcescens: Nondet    -  Haemophilus influenzae: Nondet    -  Listeria monocytogenes: Nondet    -  Neisseria meningitidis: Nondet    -  Pseudomonas aeruginosa: Nondet    -  Acinetobacter baumanii: Nondet    -  Enterobacter cloacae complex: Nondet    -  Streptococcus sp. (Not Grp A, B or S pneumoniae): Nondet    -  Streptococcus agalactiae (Group B): Nondet    -  Streptococcus pyogenes (Group A): Nondet    -  Streptococcus pneumoniae: Nondet    -  Candida albicans: Nondet    -  Candida glabrata: Nondet    -  Candida krusei: Nondet    -  Candida parapsilosis: Nondet    -  Candida tropicalis: Nondet    Specimen Source: .Blood Blood    Organism: Blood Culture PCR    Organism: Escherichia coli    Organism: Blood Culture PCR    Culture Results:   Growth in anaerobic bottle: Escherichia coli  Growth in aerobic bottle:  Gram Positive Cocci in Clusters    Lines: Central[] Date inserted: Peripheral[x]    14. Hematology:                         9.4    13.65 )-----------( 159      ( 27 Jul 2020 04:30 )             29.4      07-27    150<H>  |  117<H>  |  62<HH>  ----------------------------<  170<H>  4.0   |  20  |  4.4<HH>    Ca    7.8<L>      27 Jul 2020 04:30  Mg     2.5     07-27    TPro  5.3<L>  /  Alb  2.0<L>  /  TBili  0.6  /  DBili  x   /  AST  92<H>  /  ALT  26  /  AlkPhos  95  07-27     DVT Prophylaxis Lovenox[ ] Heparin[x ] Venodynes[ ] SCD's[ ]    15. Impression: 64 year old gentleman with history significant for DM, CAD with PCI x3 in 2017, HTN, DLD, trigeminal neuralgia presents to the ED for aphasia s/p tPA for stroke. Baseline is AAOx3. Initial NIHSS 9. While in the ED, patient was with sudden body posturing and not following commands. Intubated for airway protection. Stat CTH unremarkable. CTA showed right vertebral artery occlusion and distal basilar artery thrombus. CTP with basilar artery near occlusion. Code NI actual was activated and patient was transferred to [facility] for neurointervention. S/p basilar artery thrombectomy TICI 3 and on integrillin gtt, now since stopped. 3% started for severe cytotoxic edema and brainstem compression. In 24 hours, sodium level had risen to 170, so 3% was discontinued. I/Os over 24 hours and normal renal function do not support central DI, so administration of free water and D5W until sodium level of 160 is achieved. Today, patient remains brainstem areflexic (as per brain death exam) with inclusion of cold caloric testing with no response, intubated with no sedation on board, with sodium level 150 this AM. Apnea test performed last week and it was reported that "patient took a breath" on his own. Respirations brought down to 10 today and patient did not breath over the vent. Wife aware of very poor prognosis. Will continue to monitor and medical management as per primary team.     16. Suggestions:  -Continue to keep sodium goal of <160   -Continue to keep -150  -Please continue with goals of care conversation with family  -Medical management as per primary team    17. Disposition:  -Continue medical management in ICU     Abril Johns NP

## 2020-07-27 NOTE — CONSULT NOTE ADULT - COMMENTS
unable to obtain history secondary to patient's mental status  on vent. Fio2 40%  off sedation. Non responsive to all stimuli  rectal tube with diarrhea  on pressors

## 2020-07-27 NOTE — CONSULT NOTE ADULT - SUBJECTIVE AND OBJECTIVE BOX
ANA CRISTINA LOVING  64y, Male  Allergy: No Known Allergies      All historical available data reviewed.    HPI:  64 year old male pmh of DM, CAD with PCI x3 in 2017, HTN, DLD, trigeminal neuralgia who presents to ED from [facility] s/p TPA for stroke.  History obtained from charts and daughter Vianney [phone] as Pt. is currently aphasic. Per daughter Pt. who is AOx3 at baseline was at home playing with grandchildren and became nausea, had 3 episodes of NBNB vomiting. After laying down for 30 min family found Pt. confused with aphasia and brought Pt. to [facility] ED.  At [facility] stroke code called initial NIHSS 9, CTA showed right vertebral artery occlusion and distal basilar artery thrombus. Other extensive atheromatous changes including moderate/severe subclavian and vertebral stenosis.   Pt. was given TPA at 16:46 and sent to [facility] for neurointerventional eval and post TPA care.  Pt. seen in ED with expressive aphasia although able to move all extremities with weakness of bilateral LE 1/5. Pt. was on cardine drip to maintain BP below 185.   Interval Hx -While in ED RRT called as Pt. posturing and shaking of b/l upper extremities, not following any commands, Pt. was intubated for airway protection. Stat CT done no new hemorrhage CT perfusion Stat, Neuro sx and neurointerventional contacted. NI actual initiated. Pending Neurointerventional recs. (16 Jul 2020 18:12)    ICU  CVA s/p TPA s/p thrombectomy (Basiliar Artery) - unresponsive off sedation, pressors changed to vasopressin and phenylephrine   - repeat CT head:  Interval increase in posterior fossa edema and mass effect consistent with evolving infarct  with resultant new effacement of the fourth ventricle and new mild obstructive hydrocephalus. Increased hypodensity within the left ambient and quadrigeminal plate cisterns, and new hypodensity within the right ambient and quadrigeminal plate cisterns, the suprasellar cistern, the right sylvian fissure and scattered sulci suspicious worsening/new subarachnoid hemorrhage.      FAMILY HISTORY:    PAST MEDICAL & SURGICAL HISTORY:  HTN (hypertension)  Diabetes  No significant past surgical history      VITALS:  T(F): 99.3, Max: 100.6 (07-27-20 @ 00:00)  HR: 74  BP: --  RR: 25Vital Signs Last 24 Hrs  T(C): 37.4 (27 Jul 2020 04:00), Max: 38.1 (27 Jul 2020 00:00)  T(F): 99.3 (27 Jul 2020 04:00), Max: 100.6 (27 Jul 2020 00:00)  HR: 74 (27 Jul 2020 08:15) (60 - 98)  BP: --  BP(mean): --  RR: 25 (27 Jul 2020 08:15) (13 - 26)  SpO2: 95% (27 Jul 2020 08:15) (91% - 99%)    TESTS & MEASUREMENTS:                        9.4    13.65 )-----------( 159      ( 27 Jul 2020 04:30 )             29.4     07-27    150<H>  |  117<H>  |  62<HH>  ----------------------------<  170<H>  4.0   |  20  |  4.4<HH>    Ca    7.8<L>      27 Jul 2020 04:30  Mg     2.5     07-27    TPro  5.3<L>  /  Alb  2.0<L>  /  TBili  0.6  /  DBili  x   /  AST  92<H>  /  ALT  26  /  AlkPhos  95  07-27    LIVER FUNCTIONS - ( 27 Jul 2020 04:30 )  Alb: 2.0 g/dL / Pro: 5.3 g/dL / ALK PHOS: 95 U/L / ALT: 26 U/L / AST: 92 U/L / GGT: x             Culture - Blood (collected 07-23-20 @ 21:05)  Source: .Blood Blood  Gram Stain (07-27-20 @ 01:52):    Growth in anaerobic bottle: Gram Negative Rods    Growth in aerobic bottle:    Gram Positive Cocci in Clusters  Final Report (07-27-20 @ 01:52):    Growth in anaerobic bottle: Escherichia coli    Growth in aerobic bottle:    Gram Positive Cocci in Clusters    "Due to technical problems, Proteus sp. will Not be reported as part of    the BCID panel until further notice"    ***Blood Panel PCR results on this specimen are available    approximately 3 hours after the Gram stain result.***    Gram stain, PCR, and/or culture results may not always    correspond due to difference in methodologies.    ************************************************************    This PCR assay was performed using [employer].    The following targets are tested for: Enterococcus,    vancomycin resistant enterococci, Listeria monocytogenes,    coagulase negative staphylococci, S. aureus,    methicillin resistant S. aureus, Streptococcus agalactiae    (Group B), S. pneumoniae, S. pyogenes (Group A),    Acinetobacter baumannii, Enterobacter cloacae, E. coli,    Klebsiella oxytoca, K. pneumoniae, Proteus sp.,    Serratia marcescens, Haemophilus influenzae,    Neisseria meningitidis, Pseudomonas aeruginosa, Candida    albicans, C. glabrata, C krusei, C parapsilosis,    C. tropicalis and the KPC resistance gene.  Organism: Blood Culture PCR  Escherichia coli  Blood Culture PCR (07-27-20 @ 02:56)  Organism: Blood Culture PCR (07-27-20 @ 02:56)      -  Acinetobacter baumanii: Nondet      -  Candida albicans: Nondet      -  Candida glabrata: Nondet      -  Candida krusei: Nondet      -  Candida parapsilosis: Nondet      -  Candida tropicalis: Nondet      -  Coagulase negative Staphylococcus: Nondet      -  Enterobacter cloacae complex: Nondet      -  Enterococcus species: Nondet      -  Escherichia coli: Nondet      -  Haemophilus influenzae: Nondet      -  Klebsiella oxytoca: Nondet      -  Klebsiella pneumoniae: Nondet      -  Listeria monocytogenes: Nondet      -  Methicillin resistant Staphylococcus aureus (MRSA): Nondet      -  Multidrug (KPC pos) resistant organism: Nondet      -  Neisseria meningitidis: Nondet      -  Pseudomonas aeruginosa: Nondet      -  Serratia marcescens: Nondet      -  Staphylococcus aureus: Nondet      -  Streptococcus agalactiae (Group B): Nondet      -  Streptococcus pneumoniae: Nondet      -  Streptococcus pyogenes (Group A): Nondet      -  Streptococcus sp. (Not Grp A, B or S pneumoniae): Nondet      -  Vancomycin resistant Enterococcus sp.: Nondet      Method Type: PCR  Organism: Escherichia coli (07-27-20 @ 01:52)      -  Amikacin: S <=16      -  Ampicillin: R >16 These ampicillin results predict results for amoxicillin      -  Ampicillin/Sulbactam: I 16/8 Enterobacter, Citrobacter, and Serratia may develop resistance during prolonged therapy (3-4 days)      -  Aztreonam: S <=4      -  Cefazolin: R >16 Enterobacter, Citrobacter, and Serratia may develop resistance during prolonged therapy (3-4 days)      -  Cefepime: S <=2      -  Cefoxitin: R >16      -  Ceftriaxone: S <=1 Enterobacter, Citrobacter, and Serratia may develop resistance during prolonged therapy      -  Ciprofloxacin: R >2      -  Ertapenem: S <=0.5      -  Gentamicin: S <=2      -  Imipenem: S <=1      -  Levofloxacin: R >4      -  Meropenem: S <=1      -  Piperacillin/Tazobactam: S <=8      -  Tobramycin: S <=2      -  Trimethoprim/Sulfamethoxazole: S 2/38      Method Type: MORRIS  Organism: Blood Culture PCR (07-27-20 @ 01:52)      -  Escherichia coli: Detec      Method Type: PCR    Culture - Urine (collected 07-23-20 @ 11:11)  Source: .Urine Catheterized  Preliminary Report (07-25-20 @ 13:27):    >100,000 CFU/ml Gram Negative Rods            RADIOLOGY & ADDITIONAL TESTS:  Personal review of radiological diagnostics performed  Echo and EKG results noted when applicable.     MEDICATIONS:  acetaminophen   Tablet .. 650 milliGRAM(s) Oral every 6 hours PRN  aMIOdarone    Tablet   Oral   aMIOdarone    Tablet 400 milliGRAM(s) Oral every 12 hours  aMIOdarone Infusion 1 mG/Min IV Continuous <Continuous>  aMIOdarone Infusion 0.5 mG/Min IV Continuous <Continuous>  atorvastatin 80 milliGRAM(s) Oral at bedtime  chlorhexidine 0.12% Liquid 15 milliLiter(s) Oral Mucosa every 12 hours  chlorhexidine 4% Liquid 1 Application(s) Topical <User Schedule>  dextrose 40% Gel 15 Gram(s) Oral once PRN  dextrose 50% Injectable 12.5 Gram(s) IV Push once  dextrose 50% Injectable 25 Gram(s) IV Push once  dextrose 50% Injectable 25 Gram(s) IV Push once  glucagon  Injectable 1 milliGRAM(s) IntraMuscular once PRN  heparin   Injectable 5000 Unit(s) SubCutaneous every 8 hours  insulin regular Infusion 1 Unit(s)/Hr IV Continuous <Continuous>  meropenem  IVPB      meropenem  IVPB 500 milliGRAM(s) IV Intermittent every 12 hours  norepinephrine Infusion 0.05 MICROgram(s)/kG/Min IV Continuous <Continuous>  pantoprazole   Suspension 40 milliGRAM(s) Enteral Tube daily  phenylephrine    Infusion 0.1 MICROgram(s)/kG/Min IV Continuous <Continuous>  sodium bicarbonate 650 milliGRAM(s) Oral every 8 hours  vancomycin    Solution 125 milliGRAM(s) Oral every 6 hours  vasopressin Infusion 0.04 Unit(s)/Min IV Continuous <Continuous>      ANTIBIOTICS:  meropenem  IVPB      meropenem  IVPB 500 milliGRAM(s) IV Intermittent every 12 hours  vancomycin    Solution 125 milliGRAM(s) Oral every 6 hours

## 2020-07-27 NOTE — PROGRESS NOTE ADULT - SUBJECTIVE AND OBJECTIVE BOX
Nephrology progress note    Patient was seen and examined, events over the last 24 h noted .  cr stable    Allergies:  No Known Allergies    Hospital Medications:   MEDICATIONS  (STANDING):  aMIOdarone    Tablet   Oral   aMIOdarone    Tablet 400 milliGRAM(s) Oral every 12 hours  aMIOdarone Infusion 1 mG/Min (33.3 mL/Hr) IV Continuous <Continuous>  aMIOdarone Infusion 0.5 mG/Min (16.7 mL/Hr) IV Continuous <Continuous>  atorvastatin 80 milliGRAM(s) Oral at bedtime  chlorhexidine 0.12% Liquid 15 milliLiter(s) Oral Mucosa every 12 hours  chlorhexidine 4% Liquid 1 Application(s) Topical <User Schedule>  dextrose 50% Injectable 12.5 Gram(s) IV Push once  dextrose 50% Injectable 25 Gram(s) IV Push once  dextrose 50% Injectable 25 Gram(s) IV Push once  DOPamine Infusion 1 MICROgram(s)/kG/Min (3.17 mL/Hr) IV Continuous <Continuous>  heparin   Injectable 5000 Unit(s) SubCutaneous every 8 hours  insulin regular Infusion 1 Unit(s)/Hr (1 mL/Hr) IV Continuous <Continuous>  meropenem  IVPB      meropenem  IVPB 500 milliGRAM(s) IV Intermittent every 12 hours  norepinephrine Infusion 0.05 MICROgram(s)/kG/Min (3.96 mL/Hr) IV Continuous <Continuous>  pantoprazole   Suspension 40 milliGRAM(s) Enteral Tube daily  phenylephrine    Infusion 0.1 MICROgram(s)/kG/Min (1.58 mL/Hr) IV Continuous <Continuous>  sodium bicarbonate 650 milliGRAM(s) Oral every 8 hours  vancomycin    Solution 250 milliGRAM(s) Oral every 6 hours  vasopressin Infusion 0.04 Unit(s)/Min (2.4 mL/Hr) IV Continuous <Continuous>        VITALS:  T(F): 100.4 (20 @ 12:00), Max: 100.6 (20 @ 00:00)  HR: 76 (20 @ 12:00)  BP: --  RR: 26 (20 @ 12:00)  SpO2: 97% (20 @ 12:00)  Wt(kg): --     @ 07:01  -   @ 07:00  --------------------------------------------------------  IN: 1591.6 mL / OUT: 1585 mL / NET: 6.6 mL     @ 07: @ 07:00  --------------------------------------------------------  IN: 1761.1 mL / OUT: 1460 mL / NET: 301.1 mL     @ 07: @ 12:42  --------------------------------------------------------  IN: 705.4 mL / OUT: 1350 mL / NET: -644.6 mL          PHYSICAL EXAM:  Gen: intubated, does not respond to verbal stimul  resp: decreased bs at bases  card: regular  abd: distended  ext: +edema in all extremities      LABS:      150<H>  |  117<H>  |  62<HH>  ----------------------------<  170<H>  4.0   |  20  |  4.4<HH>    Ca    7.8<L>      2020 04:30  Mg     2.5         TPro  5.3<L>  /  Alb  2.0<L>  /  TBili  0.6  /  DBili      /  AST  92<H>  /  ALT  26  /  AlkPhos  95                            9.4    13.65 )-----------( 159      ( 2020 04:30 )             29.4       Urine Studies:  Urinalysis Basic - ( 2020 21:50 )    Color: Yellow / Appearance: Slightly Turbid / S.014 / pH:   Gluc:  / Ketone: Negative  / Bili: Negative / Urobili: <2 mg/dL   Blood:  / Protein: 100 mg/dL / Nitrite: Positive   Leuk Esterase: Negative / RBC: 6 /HPF / WBC 14 /HPF   Sq Epi:  / Non Sq Epi: 4 /HPF / Bacteria: Negative      Osmolality, Random Urine: 96 mos/kg ( @ 04:35)  Creatinine, Random Urine: 13 mg/dL ( @ 04:35)    RADIOLOGY & ADDITIONAL STUDIES:

## 2020-07-27 NOTE — PROGRESS NOTE ADULT - ASSESSMENT
ASSESSMENT/PLAN  Acute hypoxemic respiratory failure   Stroke SP TAP And thrombectomy  NAEL worsening  DI   E coli bacteremia  G+ Cocci bacteremia   Sepsis   Septic shock  CDiff +  hypernatremia  continue with current nutrition   check bmp/phos/mg and correct lytes ASSESSMENT/PLAN  Acute hypoxemic respiratory failure   Stroke SP TAP And thrombectomy  NAEL worsening  DI   E coli bacteremia  G+ Cocci bacteremia   Sepsis   Septic shock  CDiff +  hypernatremia    continue with current enteral feeding  follow renal parameters  follow stool outputs  consider adding FloraStor when more stable  check bmp/phos/mg and correct lytes

## 2020-07-27 NOTE — CHART NOTE - NSCHARTNOTEFT_GEN_A_CORE
Apnea test attempted with Attending Dr. Rich. Pt took 2 spontaneous breaths. Apnea test attempted with Attending Dr. Rich. Pt took 2 spontaneous breaths within the first 2 minutes. Apnea test aborted Apnea test attempted with Attending Dr. Rich. Ventilator was removed from ETT and inserted NC with 15L/min O2. Pt took 2 spontaneous breaths within the first 2 minutes. Apnea test aborted

## 2020-07-27 NOTE — PROGRESS NOTE ADULT - SUBJECTIVE AND OBJECTIVE BOX
Patient is a 64y old  Male who presents with a chief complaint of Stroke s/p tpa (27 Jul 2020 14:03)  pt seen and evaluated   remain vented   on OGT feed  on 1 pressor    ICU Vital Signs Last 24 Hrs  T(C): 38 (27 Jul 2020 12:00), Max: 38.1 (27 Jul 2020 00:00)  T(F): 100.4 (27 Jul 2020 12:00), Max: 100.6 (27 Jul 2020 00:00)  HR: 94 (27 Jul 2020 15:00) (60 - 98)  ABP: 92/44 (27 Jul 2020 15:00) (92/44 - 156/62)  ABP(mean): 56 (27 Jul 2020 15:00) (56 - 90)  RR: 26 (27 Jul 2020 15:00) (13 - 26)  SpO2: 91% (27 Jul 2020 15:00) (91% - 100%)      Drug Dosing Weight  Height (cm): 177.8 (17 Jul 2020 00:00)  Weight (kg): 84.5 (16 Jul 2020 20:42)  BMI (kg/m2): 26.7 (17 Jul 2020 00:00)  BSA (m2): 2.03 (17 Jul 2020 00:00)    I&O's Detail    26 Jul 2020 07:01  -  27 Jul 2020 07:00  --------------------------------------------------------  IN:    insulin regular Infusion: 54 mL    Peptamen A.F.: 1365 mL    phenylephrine   Infusion: 342.1 mL  Total IN: 1761.1 mL    OUT:    Indwelling Catheter - Urethral: 1060 mL    Rectal Tube: 400 mL  Total OUT: 1460 mL    Total NET: 301.1 mL      27 Jul 2020 07:01  -  27 Jul 2020 16:02  --------------------------------------------------------  IN:    DOPamine Infusion: 60.2 mL    insulin regular Infusion: 68 mL    IV PiggyBack: 250 mL    Peptamen A.F.: 520 mL    phenylephrine   Infusion: 126 mL  Total IN: 1024.2 mL    OUT:    Indwelling Catheter - Urethral: 245 mL    Rectal Tube: 1200 mL  Total OUT: 1445 mL    Total NET: -420.8 mL     PHYSICAL EXAM:  Constitutional:  remain vented  OGT in place  Gastrointestinal:  soft n/d  rectal tube in place  MEDICATIONS  (STANDING):  aMIOdarone    Tablet   Oral   aMIOdarone    Tablet 400 milliGRAM(s) Oral every 12 hours  aMIOdarone Infusion 0.5 mG/Min (16.7 mL/Hr) IV Continuous <Continuous>  atorvastatin 80 milliGRAM(s) Oral at bedtime  chlorhexidine 0.12% Liquid 15 milliLiter(s) Oral Mucosa every 12 hours  chlorhexidine 4% Liquid 1 Application(s) Topical <User Schedule>  desmopressin Injectable 2 MICROGram(s) IV Push every 12 hours  dextrose 50% Injectable 12.5 Gram(s) IV Push once  dextrose 50% Injectable 25 Gram(s) IV Push once  dextrose 50% Injectable 25 Gram(s) IV Push once  DOPamine Infusion 1 MICROgram(s)/kG/Min (3.17 mL/Hr) IV Continuous <Continuous>  heparin   Injectable 5000 Unit(s) SubCutaneous every 8 hours  insulin regular Infusion 1 Unit(s)/Hr (1 mL/Hr) IV Continuous <Continuous>  meropenem  IVPB      meropenem  IVPB 500 milliGRAM(s) IV Intermittent every 12 hours  pantoprazole   Suspension 40 milliGRAM(s) Enteral Tube daily  phenylephrine    Infusion 0.1 MICROgram(s)/kG/Min (1.58 mL/Hr) IV Continuous <Continuous>  sodium bicarbonate 650 milliGRAM(s) Oral every 8 hours  vancomycin    Solution 250 milliGRAM(s) Oral every 6 hours      Diet, NPO with Tube Feed:   Tube Feeding Modality: Orogastric  Peptamen A.F. Formula  Total Volume for 24 Hours (mL): 1560  Continuous  Starting Tube Feed Rate mL per Hour: 10  Increase Tube Feed Rate by (mL): 10     Every hour  Until Goal Tube Feed Rate (mL per Hour): 65  Tube Feed Duration (in Hours): 24  Tube Feed Start Time: 18:15 (07-24-20 @ 18:13)      LABS  07-27    150<H>  |  117<H>  |  62<HH>  ----------------------------<  170<H>  4.0   |  20  |  4.4<HH>    Ca    7.8<L>      27 Jul 2020 04:30  Mg     2.5     07-27    TPro  5.3<L>  /  Alb  2.0<L>  /  TBili  0.6  /  DBili  x   /  AST  92<H>  /  ALT  26  /  AlkPhos  95  07-27                          9.4    13.65 )-----------( 159      ( 27 Jul 2020 04:30 )             29.4       Clostridium difficile Toxin by PCR (07.26.20 @ 08:36)    Clostridium difficile Toxin by PCR: RESULT INTERPRETATION:    C Diff by PCR Result: Positive  CAPILLARY BLOOD GLUCOSE  POCT Blood Glucose.: 122 mg/dL (27 Jul 2020 15:08)  POCT Blood Glucose.: 130 mg/dL (27 Jul 2020 14:33)  POCT Blood Glucose.: 176 mg/dL (27 Jul 2020 13:33)   RADIOLOGY STUDIES  < from: Xray Chest 1 View- PORTABLE-Routine (07.27.20 @ 05:44) >  Impression:  Stable left basal pleural effusion and parenchymal opacity. Stable to slightly increased right subhilar/basal hazy opacities. Patient is a 64y old  Male who presents with a chief complaint of Stroke s/p tpa (27 Jul 2020 14:03)  pt seen and evaluated   remains vented   on OGT feed  on 1 pressor    ICU Vital Signs Last 24 Hrs  T(C): 38 (27 Jul 2020 12:00), Max: 38.1 (27 Jul 2020 00:00)  T(F): 100.4 (27 Jul 2020 12:00), Max: 100.6 (27 Jul 2020 00:00)  HR: 94 (27 Jul 2020 15:00) (60 - 98)  ABP: 92/44 (27 Jul 2020 15:00) (92/44 - 156/62)  ABP(mean): 56 (27 Jul 2020 15:00) (56 - 90)  RR: 26 (27 Jul 2020 15:00) (13 - 26)  SpO2: 91% (27 Jul 2020 15:00) (91% - 100%)    Drug Dosing Weight  Height (cm): 177.8 (17 Jul 2020 00:00)  Weight (kg): 84.5 (16 Jul 2020 20:42)  BMI (kg/m2): 26.7 (17 Jul 2020 00:00)  BSA (m2): 2.03 (17 Jul 2020 00:00)    I&O's Detail    26 Jul 2020 07:01  -  27 Jul 2020 07:00  --------------------------------------------------------  IN:    insulin regular Infusion: 54 mL    Peptamen A.F.: 1365 mL    phenylephrine   Infusion: 342.1 mL  Total IN: 1761.1 mL    OUT:    Indwelling Catheter - Urethral: 1060 mL    Rectal Tube: 400 mL  Total OUT: 1460 mL    Total NET: 301.1 mL      27 Jul 2020 07:01  -  27 Jul 2020 16:02  --------------------------------------------------------  IN:    DOPamine Infusion: 60.2 mL    insulin regular Infusion: 68 mL    IV PiggyBack: 250 mL    Peptamen A.F.: 520 mL    phenylephrine   Infusion: 126 mL  Total IN: 1024.2 mL    OUT:    Indwelling Catheter - Urethral: 245 mL    Rectal Tube: 1200 mL  Total OUT: 1445 mL    Total NET: -420.8 mL     PHYSICAL EXAM:  Constitutional: remains vented, min responsive  OGT in place  Gastrointestinal: soft n/d  rectal tube in place    MEDICATIONS  (STANDING):  aMIOdarone    Tablet   Oral   aMIOdarone    Tablet 400 milliGRAM(s) Oral every 12 hours  aMIOdarone Infusion 0.5 mG/Min (16.7 mL/Hr) IV Continuous <Continuous>  atorvastatin 80 milliGRAM(s) Oral at bedtime  chlorhexidine 0.12% Liquid 15 milliLiter(s) Oral Mucosa every 12 hours  chlorhexidine 4% Liquid 1 Application(s) Topical <User Schedule>  desmopressin Injectable 2 MICROGram(s) IV Push every 12 hours  DOPamine Infusion 1 MICROgram(s)/kG/Min (3.17 mL/Hr) IV Continuous <Continuous>  heparin   Injectable 5000 Unit(s) SubCutaneous every 8 hours  insulin regular Infusion 1 Unit(s)/Hr (1 mL/Hr) IV Continuous <Continuous>  meropenem  IVPB      meropenem  IVPB 500 milliGRAM(s) IV Intermittent every 12 hours  pantoprazole   Suspension 40 milliGRAM(s) Enteral Tube daily  phenylephrine    Infusion 0.1 MICROgram(s)/kG/Min (1.58 mL/Hr) IV Continuous <Continuous>  sodium bicarbonate 650 milliGRAM(s) Oral every 8 hours  vancomycin    Solution 250 milliGRAM(s) Oral every 6 hours    Diet, NPO with Tube Feed:   Tube Feeding Modality: Orogastric  Peptamen A.F. Formula  Total Volume for 24 Hours (mL): 1560  Continuous  Starting Tube Feed Rate mL per Hour: 10  Increase Tube Feed Rate by (mL): 10     Every hour  Until Goal Tube Feed Rate (mL per Hour): 65  Tube Feed Duration (in Hours): 24  Tube Feed Start Time: 18:15 (07-24-20 @ 18:13)      LABS  07-27    150<H>  |  117<H>  |  62<HH>  ----------------------------<  170<H>  4.0   |  20  |  4.4<HH>    Ca    7.8<L>      27 Jul 2020 04:30  Mg     2.5     07-27    TPro  5.3<L>  /  Alb  2.0<L>  /  TBili  0.6  /  DBili  x   /  AST  92<H>  /  ALT  26  /  AlkPhos  95  07-27                          9.4    13.65 )-----------( 159      ( 27 Jul 2020 04:30 )             29.4       Clostridium difficile Toxin by PCR (07.26.20 @ 08:36)    Clostridium difficile Toxin by PCR: RESULT INTERPRETATION:    C Diff by PCR Result: Positive  CAPILLARY BLOOD GLUCOSE  POCT Blood Glucose.: 122 mg/dL (27 Jul 2020 15:08)  POCT Blood Glucose.: 130 mg/dL (27 Jul 2020 14:33)  POCT Blood Glucose.: 176 mg/dL (27 Jul 2020 13:33)   RADIOLOGY STUDIES  < from: Xray Chest 1 View- PORTABLE-Routine (07.27.20 @ 05:44) >  Impression:  Stable left basal pleural effusion and parenchymal opacity. Stable to slightly increased right subhilar/basal hazy opacities.

## 2020-07-27 NOTE — PROCEDURE NOTE - NSPROCDETAILS_GEN_ALL_CORE
sterile dressing applied/ultrasound guidance/guidewire recovered/lumen(s) aspirated and flushed/sterile technique, catheter placed
ultrasound guidance/location identified, draped/prepped, sterile technique used, needle inserted/introduced/sutured in place/all materials/supplies accounted for at end of procedure/positive blood return obtained via catheter/connected to a pressurized flush line

## 2020-07-27 NOTE — CONSULT NOTE ADULT - ASSESSMENT
64 year old male pmh of DM, CAD with PCI x3 in 2017, HTN, DLD, trigeminal neuralgia who presents to ED from [facility] s/p TPA for stroke.    IMPRESSION;   # CVA s/p TPA s/p thrombectomy (Basiliar Artery) - unresponsive off sedation, on pressors  - repeat CT head:  Interval increase in posterior fossa edema and mass effect consistent with evolving infarct  with resultant new effacement of the fourth ventricle and new mild obstructive hydrocephalus. Increased hypergenicity within the left ambient and quadrigeminal plate cisterns, and new hypodensity within the right ambient and quadrigeminal plate cisterns, the suprasellar cistern, the right sylvian fissure and scattered sulci suspicious worsening/new subarachnoid hemorrhage.  - remained ventilated, off sedation  - no neurologic activity. Apnea test performed (7/22), patient took a breath.  #CD colitis: on po vancomycin  #E coli bacteremia : secondary to  tract with UCx GNRs but no overt pyuria. Could well also be secondary to translocation from the GI tract.    RECOMMENDATIONS;  Rocephin 2 gm iv q24h  Po vancomycin 125 mg q6h  d/c meropenem  ABx are futlile

## 2020-07-27 NOTE — PROGRESS NOTE ADULT - SUBJECTIVE AND OBJECTIVE BOX
Patient is a 64y old  Male who presents with a chief complaint of Stroke s/p tpa (26 Jul 2020 09:35)        Over Night Events:  Remains on MV.  On Rusty 1.4.  Off sedation         ROS:     All ROS are negative except HPI         PHYSICAL EXAM    ICU Vital Signs Last 24 Hrs  T(C): 37.4 (27 Jul 2020 04:00), Max: 38.1 (27 Jul 2020 00:00)  T(F): 99.3 (27 Jul 2020 04:00), Max: 100.6 (27 Jul 2020 00:00)  HR: 74 (27 Jul 2020 08:15) (60 - 98)  BP: --  BP(mean): --  ABP: 114/46 (27 Jul 2020 08:15) (100/48 - 156/62)  ABP(mean): 66 (27 Jul 2020 08:15) (58 - 90)  RR: 25 (27 Jul 2020 08:15) (13 - 26)  SpO2: 95% (27 Jul 2020 08:15) (91% - 99%)      CONSTITUTIONAL:  Well nourished.  NAD    ENT:   Airway patent,   Mouth with normal mucosa.   No thrush    EYES:   Pupils equal,   Round non reactive to light.    CARDIAC:   Normal rate,   Regular rhythm.     edema      Vascular:  Normal systolic impulse  No Carotid bruits    RESPIRATORY:   No wheezing  Bilateral BS  Normal chest expansion  Not tachypneic,  No use of accessory muscles    GASTROINTESTINAL:  Abdomen soft,   Non-tender,   No guarding,   + BS    MUSCULOSKELETAL:   Range of motion is not limited,  No clubbing, cyanosis    NEUROLOGICAL:   No corneal.  No gag.      SKIN:   Skin normal color for race,   Warm and dry  No evidence of rash.    PSYCHIATRIC:   No apparent risk to self or others.    HEMATOLOGICAL:  No cervical  lymphadenopathy.  no inguinal lymphadenopathy      07-26-20 @ 07:01  -  07-27-20 @ 07:00  --------------------------------------------------------  IN:    insulin regular Infusion: 54 mL    Peptamen A.F.: 1365 mL    phenylephrine   Infusion: 342.1 mL  Total IN: 1761.1 mL    OUT:    Indwelling Catheter - Urethral: 1060 mL    Rectal Tube: 400 mL  Total OUT: 1460 mL    Total NET: 301.1 mL      07-27-20 @ 07:01  -  07-27-20 @ 08:29  --------------------------------------------------------  IN:    insulin regular Infusion: 44 mL    Peptamen A.F.: 65 mL    phenylephrine   Infusion: 19 mL  Total IN: 128 mL    OUT:  Total OUT: 0 mL    Total NET: 128 mL          LABS:                            9.4    13.65 )-----------( 159      ( 27 Jul 2020 04:30 )             29.4                                               07-27    150<H>  |  117<H>  |  62<HH>  ----------------------------<  170<H>  4.0   |  20  |  4.4<HH>    27 Jul 2020 04:30    150<H>  |  117<H>  |  62<HH>  ----------------------------<  170<H>  4.0     |  20     |  4.4<HH>  26 Jul 2020 23:00    149<H>  |  118<H>  |  55<H>  ----------------------------<  268<H>  4.1     |  20     |  4.4<HH>    Ca    7.8<L>      27 Jul 2020 04:30  Ca    7.8<L>      26 Jul 2020 23:00  Mg     2.5<H>     27 Jul 2020 04:30  Mg     2.6<H>     26 Jul 2020 04:00    TPro  5.3<L>  /  Alb  2.0<L>  /  TBili  0.6    /  DBili  x      /  AST  92<H>  /  ALT  26     /  AlkPhos  95     27 Jul 2020 04:30  TPro  5.1<L>  /  Alb  2.0<L>  /  TBili  0.6    /  DBili  x      /  AST  71<H>  /  ALT  20     /  AlkPhos  95     26 Jul 2020 04:00      Ca    7.8<L>      27 Jul 2020 04:30  Mg     2.5     07-27    TPro  5.3<L>  /  Alb  2.0<L>  /  TBili  0.6  /  DBili  x   /  AST  92<H>  /  ALT  26  /  AlkPhos  95  07-27                                                                                           LIVER FUNCTIONS - ( 27 Jul 2020 04:30 )  Alb: 2.0 g/dL / Pro: 5.3 g/dL / ALK PHOS: 95 U/L / ALT: 26 U/L / AST: 92 U/L / GGT: x                                                                                               Mode: AC/ CMV (Assist Control/ Continuous Mandatory Ventilation)  RR (machine): 26  TV (machine): 450  FiO2: 40  PEEP: 5  ITime: 1  MAP: 11  PIP: 20                                      ABG - ( 27 Jul 2020 04:41 )  pH, Arterial: 7.33  pH, Blood: x     /  pCO2: 38    /  pO2: 74    / HCO3: 20    / Base Excess: -5.4  /  SaO2: 95    Lac 0.8               MEDICATIONS  (STANDING):  aMIOdarone    Tablet   Oral   aMIOdarone    Tablet 400 milliGRAM(s) Oral every 12 hours  aMIOdarone Infusion 1 mG/Min (33.3 mL/Hr) IV Continuous <Continuous>  aMIOdarone Infusion 0.5 mG/Min (16.7 mL/Hr) IV Continuous <Continuous>  atorvastatin 80 milliGRAM(s) Oral at bedtime  chlorhexidine 0.12% Liquid 15 milliLiter(s) Oral Mucosa every 12 hours  chlorhexidine 4% Liquid 1 Application(s) Topical <User Schedule>  dextrose 50% Injectable 12.5 Gram(s) IV Push once  dextrose 50% Injectable 25 Gram(s) IV Push once  dextrose 50% Injectable 25 Gram(s) IV Push once  heparin   Injectable 5000 Unit(s) SubCutaneous every 8 hours  insulin regular Infusion 1 Unit(s)/Hr (1 mL/Hr) IV Continuous <Continuous>  meropenem  IVPB      meropenem  IVPB 500 milliGRAM(s) IV Intermittent every 12 hours  norepinephrine Infusion 0.05 MICROgram(s)/kG/Min (3.96 mL/Hr) IV Continuous <Continuous>  pantoprazole   Suspension 40 milliGRAM(s) Enteral Tube daily  phenylephrine    Infusion 0.1 MICROgram(s)/kG/Min (1.58 mL/Hr) IV Continuous <Continuous>  sodium bicarbonate 650 milliGRAM(s) Oral every 8 hours  vancomycin    Solution 125 milliGRAM(s) Oral every 6 hours  vasopressin Infusion 0.04 Unit(s)/Min (2.4 mL/Hr) IV Continuous <Continuous>    MEDICATIONS  (PRN):  acetaminophen   Tablet .. 650 milliGRAM(s) Oral every 6 hours PRN Temp greater or equal to 38C (100.4F)  dextrose 40% Gel 15 Gram(s) Oral once PRN Blood Glucose LESS THAN 70 milliGRAM(s)/deciliter  glucagon  Injectable 1 milliGRAM(s) IntraMuscular once PRN Glucose LESS THAN 70 milligrams/deciliter      New X-rays reviewed:                                                                                  ECHO    CXR interpreted by me:  ET OG OK>  LLL atelectasis

## 2020-07-27 NOTE — PROGRESS NOTE ADULT - SUBJECTIVE AND OBJECTIVE BOX
Hospital Day:  11d    Chief Complaint: Patient is a 64y old  Male who presents with a chief complaint of Stroke s/p tpa (2020 16:02)    24 hour events: No acute overnight events. Patient seen resting unresponsive in bed this AM.  - New IJ line placed, femoral line d/brittany.     Past Medical Hx:   HTN (hypertension)  Diabetes    Past Sx:  No significant past surgical history    Allergies:  No Known Allergies    Current Meds:   Standing Meds:  aMIOdarone    Tablet   Oral   aMIOdarone    Tablet 400 milliGRAM(s) Oral every 12 hours  aMIOdarone Infusion 0.5 mG/Min (16.7 mL/Hr) IV Continuous <Continuous>  atorvastatin 80 milliGRAM(s) Oral at bedtime  chlorhexidine 0.12% Liquid 15 milliLiter(s) Oral Mucosa every 12 hours  chlorhexidine 4% Liquid 1 Application(s) Topical <User Schedule>  desmopressin Injectable 2 MICROGram(s) IV Push every 12 hours  dextrose 50% Injectable 12.5 Gram(s) IV Push once  dextrose 50% Injectable 25 Gram(s) IV Push once  dextrose 50% Injectable 25 Gram(s) IV Push once  DOPamine Infusion 1 MICROgram(s)/kG/Min (3.17 mL/Hr) IV Continuous <Continuous>  heparin   Injectable 5000 Unit(s) SubCutaneous every 8 hours  insulin regular Infusion 1 Unit(s)/Hr (1 mL/Hr) IV Continuous <Continuous>  meropenem  IVPB      meropenem  IVPB 500 milliGRAM(s) IV Intermittent every 12 hours  pantoprazole   Suspension 40 milliGRAM(s) Enteral Tube daily  phenylephrine    Infusion 0.1 MICROgram(s)/kG/Min (1.58 mL/Hr) IV Continuous <Continuous>  sodium bicarbonate 650 milliGRAM(s) Oral every 8 hours  vancomycin    Solution 250 milliGRAM(s) Oral every 6 hours    PRN Meds:  acetaminophen   Tablet .. 650 milliGRAM(s) Oral every 6 hours PRN Temp greater or equal to 38C (100.4F)  dextrose 40% Gel 15 Gram(s) Oral once PRN Blood Glucose LESS THAN 70 milliGRAM(s)/deciliter  glucagon  Injectable 1 milliGRAM(s) IntraMuscular once PRN Glucose LESS THAN 70 milligrams/deciliter    HOME MEDICATIONS:  ALPRAZolam 0.5 mg oral tablet, extended release: 1 tab(s) orally once a day (at bedtime)  amLODIPine 5 mg oral tablet: 1 tab(s) orally once a day  clopidogrel 75 mg oral tablet: 1 tab(s) orally once a day  losartan 50 mg oral tablet: 1 tab(s) orally once a day  metFORMIN 500 mg oral tablet, extended release: 1 tab(s) orally once a day  omeprazole 20 mg oral delayed release capsule: 1 cap(s) orally once a day  OXcarbazepine 150 mg oral tablet: 1 tab(s) orally once a day  OXcarbazepine 300 mg oral tablet: 1 tab(s) orally once a day (at bedtime)  trihexyphenidyl: 1 milligram(s) orally 2 times a day      Vital Signs:   T(F): 100.4 (20 @ 16:00), Max: 100.6 (20 @ 00:00)  HR: 98 (20 @ 17:15) (60 - 104)  BP: --  RR: 25 (20 @ 17:15) (6 - 26)  SpO2: 94% (20 @ 17:15) (91% - 100%)      20 @ 07:01  -  20 @ 07:00  --------------------------------------------------------  IN: 1761.1 mL / OUT: 1460 mL / NET: 301.1 mL    20 @ 07:  -  20 @ 18:03  --------------------------------------------------------  IN: 1271 mL / OUT: 1665 mL / NET: -394 mL    Physical Exam:   GENERAL: ill appearing unresponsive male in NAD  HEENT: NCAT  CHEST/LUNG: audible breath sounds b/l   HEART: Regular rate and rhythm; s1 s2 appreciated  ABDOMEN: Soft, Nontender, Nondistended; Bowel sounds present  EXTREMITIES: +UE and LE edema  NERVOUS SYSTEM: no brainstem reflexes present.  LINES/CATHETERS: +TLC in R IJ, +A-line     Labs:                         9.4    13.65 )-----------( 159      ( 2020 04:30 )             29.4       2020 16:50    147    |  116    |  69     ----------------------------<  228    4.2     |  19     |  4.7      Ca    7.7        2020 16:50  Mg     2.5       2020 04:30    TPro  5.3    /  Alb  2.0    /  TBili  0.6    /  DBili  x      /  AST  92     /  ALT  26     /  AlkPhos  95     2020 04:30    Urinalysis Basic - ( 2020 21:50 )    Color: Yellow / Appearance: Slightly Turbid / S.014 / pH: x  Gluc: x / Ketone: Negative  / Bili: Negative / Urobili: <2 mg/dL   Blood: x / Protein: 100 mg/dL / Nitrite: Positive   Leuk Esterase: Negative / RBC: 6 /HPF / WBC 14 /HPF   Sq Epi: x / Non Sq Epi: 4 /HPF / Bacteria: Negative    Culture - Blood (collected 20 @ 21:05)  Source: .Blood Blood  Gram Stain (20 @ 01:52):    Growth in anaerobic bottle: Gram Negative Rods    Growth in aerobic bottle:    Gram Positive Cocci in Clusters  Final Report (20 @ 01:52):    Growth in anaerobic bottle: Escherichia coli    Growth in aerobic bottle:    Gram Positive Cocci in Clusters    "Due to technical problems, Proteus sp. will Not be reported as part of    the BCID panel until further notice"    ***Blood Panel PCR results on this specimen are available    approximately 3 hours after the Gram stain result.***    Gram stain, PCR, and/or culture results may not always    correspond due to difference in methodologies.    ************************************************************    This PCR assay was performed using [employer]    The following targets are tested for: Enterococcus,    vancomycin resistant enterococci, Listeria monocytogenes,    coagulase negative staphylococci, S. aureus,    methicillin resistant S. aureus, Streptococcus agalactiae    (Group B), S. pneumoniae, S. pyogenes (Group A),    Acinetobacter baumannii, Enterobacter cloacae, E. coli,    Klebsiella oxytoca, K. pneumoniae, Proteus sp.,    Serratia marcescens, Haemophilus influenzae,    Neisseria meningitidis, Pseudomonas aeruginosa, Candida    albicans, C. glabrata, C krusei, C parapsilosis,    C. tropicalis and the KPC resistance gene.  Organism: Blood Culture PCR  Escherichia coli  Blood Culture PCR (20 @ 02:56)  Organism: Blood Culture PCR (20 @ 02:56)      -  Acinetobacter baumanii: Nondet      -  Candida albicans: Nondet      -  Candida glabrata: Nondet      -  Candida krusei: Nondet      -  Candida parapsilosis: Nondet      -  Candida tropicalis: Nondet      -  Coagulase negative Staphylococcus: Nondet      -  Enterobacter cloacae complex: Nondet      -  Enterococcus species: Nondet      -  Escherichia coli: Nondet      -  Haemophilus influenzae: Nondet      -  Klebsiella oxytoca: Nondet      -  Klebsiella pneumoniae: Nondet      -  Listeria monocytogenes: Nondet      -  Methicillin resistant Staphylococcus aureus (MRSA): Nondet      -  Multidrug (KPC pos) resistant organism: Nondet      -  Neisseria meningitidis: Nondet      -  Pseudomonas aeruginosa: Nondet      -  Serratia marcescens: Nondet      -  Staphylococcus aureus: Nondet      -  Streptococcus agalactiae (Group B): Nondet      -  Streptococcus pneumoniae: Nondet      -  Streptococcus pyogenes (Group A): Nondet      -  Streptococcus sp. (Not Grp A, B or S pneumoniae): Nondet      -  Vancomycin resistant Enterococcus sp.: Nondet      Method Type: PCR  Organism: Escherichia coli (20 @ 01:52)      -  Amikacin: S <=16      -  Ampicillin: R >16 These ampicillin results predict results for amoxicillin      -  Ampicillin/Sulbactam: I 16/8 Enterobacter, Citrobacter, and Serratia may develop resistance during prolonged therapy (3-4 days)      -  Aztreonam: S <=4      -  Cefazolin: R >16 Enterobacter, Citrobacter, and Serratia may develop resistance during prolonged therapy (3-4 days)      -  Cefepime: S <=2      -  Cefoxitin: R >16      -  Ceftriaxone: S <=1 Enterobacter, Citrobacter, and Serratia may develop resistance during prolonged therapy      -  Ciprofloxacin: R >2      -  Ertapenem: S <=0.5      -  Gentamicin: S <=2      -  Imipenem: S <=1      -  Levofloxacin: R >4      -  Meropenem: S <=1      -  Piperacillin/Tazobactam: S <=8      -  Tobramycin: S <=2      -  Trimethoprim/Sulfamethoxazole: S       Method Type: MORRIS  Organism: Blood Culture PCR (20 @ 01:52)      -  Escherichia coli: Detec      Method Type: PCR    Radiology:   < from: Xray Chest 1 View- PORTABLE-Urgent (20 @ 15:50) >  Impression:    Improving opacifications.    Support devices as described.    < end of copied text >  Assessment and Plan:   64 year old male pmh of DM, CAD with PCI x3 in 2017, HTN, DLD, trigeminal neuralgia who presents to ED from [facility] s/p TPA for stroke.    # CVA s/p TPA s/p thrombectomy (Basiliar Artery) - unresponsive off sedation, pressors changed to vasopressin and phenylephrine   - repeat CT head:  Interval increase in posterior fossa edema and mass effect consistent with evolving infarct  with resultant new effacement of the fourth ventricle and new mild obstructive hydrocephalus. Increased hyperdensity within the left ambient and quadrigeminal plate cisterns, and new hyperdensity within the right ambient and quadrigeminal plate cisterns, the suprasellar cistern, the right sylvian fissure and scattered sulci suspicious worsening/new subarachnoid hemorrhage.  - s/p 23% NS, 50g mannitol, 3% hypertonic saline drip   - remained ventilated, off sedation  - taper off pressors if possible   - integrillin completed  - patient being switched to dopamine for pressor support   - no notable neurologic activity. Apnea test performed (), patient took a breath.  - apnea test repeated , patient took 2 breaths.   - continue with Subq heparin for DVT ppx, cleared by neurology.   - Transcranial doppler?   - continue with vergara catheter for strict I/O   - TLC changed to R IJ, confirmed placement with cxr   - Vianney Nye (daughter, HCP) would like a call before any MRI, apnea test, or brain death protocol is done again. Her number is [phone].  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 Diff.  - C. Diff PCR positive  - increased vancomycin 250mg PO     # Gram positive cocci in clusters in the blood   - Follow up bacterial sensitivities   - start vancomycin IV     # GNR bacteremia likely secondary to UTI   - patient remains febrile   - UA positive for Nitrites   - vergara changed   - BCx positive for E. Coli. Follow up sensitivities.  - continue with meropenem renally dosed.     # Worsening NAEL   - baseline Cr ~1   - Cr currently 4.4  - Kidney bladder US unremarkable   - sodium bicarb 650mg PO BID  - Follow up nephrology recs     # Hypokalemia   - K was 3.4 repleted with 20mEq x 2 ()   - Follow up BMP     # DM   - HbA1c 7.5%   - insulin gtt   - q1h FS     # DLD   - continue with atorvastatin 80mg      # Activity: Bed rest   # Diet: NPO with tube feeds  # DVT ppx: Subq heparin   # GI ppx: Protonix  # Code Status: FULL CODE    # DISPO: Acute

## 2020-07-27 NOTE — PROGRESS NOTE ADULT - ASSESSMENT
IMPRESSION:    Acute hypoxemic respiratory failure   Stroke SP TAP And thrombectomy  NAEL worsening  DI   E coli bacteremia  G+ Cocci bacteremia   Sepsis   Septic shock  CDiff     PLAN:    CNS: FU with Neurology. Keep off sedation.  FU MS     HEENT:  Oral care    PULMONARY:  HOB @ 45 degrees.  Vent changes:  None     CARDIOVASCULAR: I=O , taper Rusty.  Add Vasopressin.        GI: GI prophylaxis.  Feeding OG tube    RENAL:  F/u repeat lytes.  PO BICARB, repeat CMP    INFECTIOUS DISEASE: repeat cultures.  Add Vanc IV.  Vanc .  DC TLC     HEMATOLOGICAL:  DVT prophylaxis.     ENDOCRINE:  Follow up FS.  Insulin protocol if needed.    CODE STATUS: FULL CODE    DISPOSITION: Pt requires continued monitoring in the MICU    Overall very Poor prognosis

## 2020-07-27 NOTE — PROGRESS NOTE ADULT - ASSESSMENT
64M, PMH HTN, CAD, DM, admitted for stroke s/p TPA c/b intracranial hemorrhage, now w/o brainstem function, c/b AHRF on mech vent, E.coli bacteremia/+cdiff, on pressors and non-oliguric NAEL    #NAEL on CKD, p/w creatinine 1.9, unknown baseline, ATN iso CVA/shock  - creatinine stable today, non-oliguric  - UA +RBC/WBC/prot, nitrite+, urine cx GNR, ?pyelo, also with GNR bacteremia, on Meropenem, dose for eGFR<10, appreciate ID recs  - DARSHAN noted, non-echogenic kidneys, no HN  - monitor off IVF/diuretics   - phos 3.4, no binders. Ca corrected wnl  - increase free water flushes for hypernatremia dt free water losses  - NAGMA iso renal failure/diarrhea. cont sodium bicarb 650mg BID  - hgb noted. ?iron stores  - no acute indication for RRT, would not improve prognosis  - will follow            - non-oliguric    intubated, febrile, on pressor  resp failure, no brainstem function, E.coli bacteremia, +cdiff

## 2020-07-27 NOTE — PROCEDURE NOTE - NSINFORMCONSENT_GEN_A_CORE
Benefits, risks, and possible complications of procedure explained to patient/caregiver who verbalized understanding and gave verbal consent.
This was an emergent procedure./Patient BP undetectable on BP cuff

## 2020-07-28 NOTE — PROGRESS NOTE ADULT - ASSESSMENT
64M, PMH HTN, CAD, DM, admitted for stroke s/p TPA c/b intracranial hemorrhage, now w/o brainstem function, c/b AHRF on mech vent, E.coli bacteremia/+cdiff, on pressors and non-oliguric NAEL    #NAEL on CKD, p/w creatinine 1.9, unknown baseline, ATN iso CVA/shock  - creatinine stable  - non oliguric   - bacteremia followed by ID , on gokul   - DARSHAN noted, non-echogenic kidneys, no HN  - phos 3.4, no binders. Ca corrected wnl  - continue sodium bicarbonate po   - follow sodium closele, on DDAVP   - no acute indication for RRT, would not improve prognosis  - will follow

## 2020-07-28 NOTE — PROGRESS NOTE ADULT - ASSESSMENT
Assessment and Plan:   64 year old male pmh of DM, CAD with PCI x3 in 2017, HTN, DLD, trigeminal neuralgia who presents to ED from [facility] s/p TPA for stroke.    # CVA s/p TPA s/p thrombectomy (Basiliar Artery) - unresponsive off sedation, pressors changed to vasopressin and phenylephrine   - repeat CT head:  Interval increase in posterior fossa edema and mass effect consistent with evolving infarct  with resultant new effacement of the fourth ventricle and new mild obstructive hydrocephalus. Increased hyperdensity within the left ambient and quadrigeminal plate cisterns, and new hyperdensity within the right ambient and quadrigeminal plate cisterns, the suprasellar cistern, the right sylvian fissure and scattered sulci suspicious worsening/new subarachnoid hemorrhage.  - s/p 23% NS, 50g mannitol, 3% hypertonic saline drip   - remained ventilated, off sedation  - taper off pressors if possible   - integrillin completed  - patient being switched to dopamine for pressor support   - no notable neurologic activity. Apnea test performed (7/22), patient took a breath.  - apnea test repeated 7/27, patient took 2 breaths.   - continue with Subq heparin for DVT ppx, cleared by neurology.   - Transcranial doppler?   - continue with vergara catheter for strict I/O   - TLC changed to R IJ, confirmed placement with cxr   - Vianney Nye (daughter, HCP) would like a call before any MRI, apnea test, or brain death protocol is done again. Her number is [phone].  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 Diff.  - C. Diff PCR positive  - increased vancomycin 250mg PO     # Distended abdomen - r/o megacolon   - abdomen more firm than AM  - STAT xray of the abdomen performed showing gaseous distention of the ascending colon and stomach.  - Follow up urgent CT abdomen with PO contrast  - Surgery cst placed.     # Gram positive cocci in clusters in the blood   - Follow up bacterial sensitivities   - Follow up AM vancomycin level - dose according to level      # GNR bacteremia likely secondary to UTI   - patient remains febrile   - UA positive for Nitrites   - vergara changed 7/24  - BCx positive for E. Coli. Follow up sensitivities.  - continue with meropenem renally dosed.     # Worsening NAEL - stable   - baseline Cr ~1   - Cr currently 4.4  - Kidney bladder US unremarkable   - sodium bicarb 650mg PO BID  - Follow up nephrology recs     # Hypokalemia   - K was 3.4 repleted with 20mEq x 2 (7/26)   - Follow up BMP     # DM   - HbA1c 7.5%   - insulin gtt   - q1h FS     # DLD   - continue with atorvastatin 80mg      # Activity: Bed rest   # Diet: NPO with tube feeds  # DVT ppx: Subq heparin   # GI ppx: Protonix  # Code Status: FULL CODE    # DISPO: Acute Assessment and Plan:   64 year old male pmh of DM, CAD with PCI x3 in 2017, HTN, DLD, trigeminal neuralgia who presents to ED from [facility] s/p TPA for stroke.    # CVA s/p TPA s/p thrombectomy (Basiliar Artery) - unresponsive off sedation, pressors changed to vasopressin and phenylephrine   - repeat CT head:  Interval increase in posterior fossa edema and mass effect consistent with evolving infarct  with resultant new effacement of the fourth ventricle and new mild obstructive hydrocephalus. Increased hyperdensity within the left ambient and quadrigeminal plate cisterns, and new hyperdensity within the right ambient and quadrigeminal plate cisterns, the suprasellar cistern, the right sylvian fissure and scattered sulci suspicious worsening/new subarachnoid hemorrhage.  - s/p 23% NS, 50g mannitol, 3% hypertonic saline drip   - remained ventilated, off sedation  - taper off pressors if possible   - integrillin completed  - patient being switched to dopamine for pressor support   - no notable neurologic activity. Apnea test performed (7/22), patient took a breath.  - apnea test repeated (7/27), patient took 2 breaths.   - continue with Subq heparin for DVT ppx, cleared by neurology.   - Transcranial doppler?   - continue with vergara catheter for strict I/O   - TLC changed to R IJ, confirmed placement with cxr   - Vianney Nye (daughter, HCP) would like a call before any MRI, apnea test, or brain death protocol is done again. Her number is [phone].  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 Diff.  - C. Diff PCR positive  - increased vancomycin 250mg PO     # Distended abdomen - r/o megacolon   - abdomen more firm than AM  - STAT xray of the abdomen performed showing gaseous distention of the ascending colon and stomach.  - Follow up urgent CT abdomen with PO contrast  - Surgery cst placed.     # Gram positive cocci in clusters in the blood   - Follow up bacterial sensitivities   - Follow up AM vancomycin level - renally dose according to level      # GNR bacteremia likely secondary to UTI   - patient remains febrile   - UA positive for Nitrites   - BCx positive for E. Coli. Follow up sensitivities.  - continue with meropenem renally dosed.     # Worsening NAEL - stable   - baseline Cr ~1   - Cr currently 4.1  - Kidney bladder US unremarkable   - sodium bicarb 650mg PO BID  - Follow up nephrology recs     # Hypokalemia - resolved  - K was 3.4 repleted with 20mEq x 2 (7/26)   - Follow up BMP     # DM   - HbA1c 7.5%   - insulin gtt   - q1h FS     # DLD   - continue with atorvastatin 80mg      # Activity: Bed rest   # Diet: NPO with tube feeds  # DVT ppx: Subq heparin   # GI ppx: Protonix  # Code Status: FULL CODE    # DISPO: Acute

## 2020-07-28 NOTE — PROGRESS NOTE ADULT - SUBJECTIVE AND OBJECTIVE BOX
Patient is a 64y old  Male who presents with a chief complaint of Stroke s/p tpa (27 Jul 2020 18:03)    Over Night Events: Remains on MV. on Dopamine 14. Insulin 4u/hr    PHYSICAL EXAM    ICU Vital Signs Last 24 Hrs  T(C): 37 (28 Jul 2020 08:00), Max: 38 (27 Jul 2020 12:00)  T(F): 98.6 (28 Jul 2020 08:00), Max: 100.4 (27 Jul 2020 12:00)  HR: 82 (28 Jul 2020 08:00) (68 - 104)  ABP: 112/42 (28 Jul 2020 08:00) (92/44 - 146/54)  ABP(mean): 60 (28 Jul 2020 08:00) (56 - 82)  RR: 25 (28 Jul 2020 08:00) (2 - 26)  SpO2: 96% (28 Jul 2020 08:00) (91% - 100%)    CONSTITUTIONAL:  Well nourished.  NAD    ENT:   Airway patent,   Mouth with normal mucosa.   No thrush    EYES:   Pupils equal,   Round non reactive to light.    CARDIAC:   Normal rate,   Regular rhythm.     edema    Vascular:  Normal systolic impulse  No Carotid bruits    RESPIRATORY:   No wheezing  Bilateral BS  Normal chest expansion  Not tachypneic,  No use of accessory muscles    GASTROINTESTINAL:  Abdomen soft,   Non-tender,   No guarding,   + BS    MUSCULOSKELETAL:   Range of motion is not limited,  No clubbing, cyanosis    NEUROLOGICAL:   No corneal.  No gag.      SKIN:   Skin normal color for race,   Warm and dry  No evidence of rash.    PSYCHIATRIC:   No apparent risk to self or others.    HEMATOLOGICAL:  No cervical  lymphadenopathy.  no inguinal lymphadenopathy      07-27-20 @ 07:01  -  07-28-20 @ 07:00  --------------------------------------------------------  IN:    DOPamine Infusion: 598.1 mL    insulin regular Infusion: 133 mL    IV PiggyBack: 350 mL    Peptamen A.F.: 1560 mL    phenylephrine   Infusion: 128.8 mL  Total IN: 2769.9 mL    OUT:    Indwelling Catheter - Urethral: 1735 mL    Rectal Tube: 1250 mL  Total OUT: 2985 mL    Total NET: -215.1 mL      07-28-20 @ 07:01  -  07-28-20 @ 08:11  --------------------------------------------------------  IN:    insulin regular Infusion: 4 mL    Peptamen A.F.: 65 mL  Total IN: 69 mL    OUT:    Indwelling Catheter - Urethral: 75 mL  Total OUT: 75 mL    Total NET: -6 mL    LABS:                            9.2    16.78 )-----------( 204      ( 28 Jul 2020 04:30 )             28.5                                               07-28    147<H>  |  116<H>  |  69<HH>  ----------------------------<  164<H>  4.0   |  18  |  4.6<HH>    Ca    8.0<L>      28 Jul 2020 04:30  Mg     2.5     07-28    TPro  5.3<L>  /  Alb  2.0<L>  /  TBili  0.6  /  DBili  x   /  AST  92<H>  /  ALT  26  /  AlkPhos  95  07-27                                              LIVER FUNCTIONS - ( 27 Jul 2020 04:30 )  Alb: 2.0 g/dL / Pro: 5.3 g/dL / ALK PHOS: 95 U/L / ALT: 26 U/L / AST: 92 U/L / GGT: x                                                                                            Mode: AC/ CMV (Assist Control/ Continuous Mandatory Ventilation)  RR (machine): 26  TV (machine): 450  FiO2: 40  PEEP: 5  ITime: 1  MAP: 11  PIP: 19                                      ABG - ( 28 Jul 2020 04:33 )  pH, Arterial: 7.29  pH, Blood: x     /  pCO2: 39    /  pO2: 85    / HCO3: 19    / Base Excess: -7.0  /  SaO2: 96    LA 0.6              MEDICATIONS  (STANDING):  aMIOdarone    Tablet   Oral   aMIOdarone    Tablet 400 milliGRAM(s) Oral every 12 hours  aMIOdarone Infusion 0.5 mG/Min (16.7 mL/Hr) IV Continuous <Continuous>  atorvastatin 80 milliGRAM(s) Oral at bedtime  chlorhexidine 0.12% Liquid 15 milliLiter(s) Oral Mucosa every 12 hours  chlorhexidine 4% Liquid 1 Application(s) Topical <User Schedule>  desmopressin Injectable 2 MICROGram(s) IV Push every 12 hours  dextrose 50% Injectable 12.5 Gram(s) IV Push once  dextrose 50% Injectable 25 Gram(s) IV Push once  dextrose 50% Injectable 25 Gram(s) IV Push once  DOPamine Infusion 1 MICROgram(s)/kG/Min (3.17 mL/Hr) IV Continuous <Continuous>  heparin   Injectable 5000 Unit(s) SubCutaneous every 8 hours  insulin regular Infusion 1 Unit(s)/Hr (1 mL/Hr) IV Continuous <Continuous>  meropenem  IVPB      meropenem  IVPB 500 milliGRAM(s) IV Intermittent every 12 hours  pantoprazole   Suspension 40 milliGRAM(s) Enteral Tube daily  sodium bicarbonate 650 milliGRAM(s) Oral every 8 hours  vancomycin    Solution 250 milliGRAM(s) Oral every 6 hours    MEDICATIONS  (PRN):  acetaminophen   Tablet .. 650 milliGRAM(s) Oral every 6 hours PRN Temp greater or equal to 38C (100.4F)  dextrose 40% Gel 15 Gram(s) Oral once PRN Blood Glucose LESS THAN 70 milliGRAM(s)/deciliter  glucagon  Injectable 1 milliGRAM(s) IntraMuscular once PRN Glucose LESS THAN 70 milligrams/deciliter      New X-rays reviewed:                                                                                  ECHO    CXR interpreted by me: Patient is a 64y old  Male who presents with a chief complaint of Stroke s/p tpa (27 Jul 2020 18:03)    Over Night Events: Remains on MV. on Dopamine 14. Insulin 4u/hr    PHYSICAL EXAM    ICU Vital Signs Last 24 Hrs  T(C): 37 (28 Jul 2020 08:00), Max: 38 (27 Jul 2020 12:00)  T(F): 98.6 (28 Jul 2020 08:00), Max: 100.4 (27 Jul 2020 12:00)  HR: 82 (28 Jul 2020 08:00) (68 - 104)  ABP: 112/42 (28 Jul 2020 08:00) (92/44 - 146/54)  ABP(mean): 60 (28 Jul 2020 08:00) (56 - 82)  RR: 25 (28 Jul 2020 08:00) (2 - 26)  SpO2: 96% (28 Jul 2020 08:00) (91% - 100%)    CONSTITUTIONAL:  Well nourished.  NAD    ENT:   Airway patent,   Mouth with normal mucosa.   No thrush    EYES:   Pupils equal,   Round non reactive to light.    CARDIAC:   Normal rate,   Regular rhythm.    edema    Vascular:  Normal systolic impulse  No Carotid bruits    RESPIRATORY:   No wheezing  Bilateral BS  Normal chest expansion  Not tachypneic,  No use of accessory muscles    GASTROINTESTINAL:  Abdomen soft,   Non-tender,   No guarding,   + BS    MUSCULOSKELETAL:   Range of motion is not limited,  No clubbing, cyanosis    NEUROLOGICAL:   No corneal.  No gag.      SKIN:   Skin normal color for race,   Warm and dry  No evidence of rash.    PSYCHIATRIC:   No apparent risk to self or others.    HEMATOLOGICAL:  No cervical  lymphadenopathy.  no inguinal lymphadenopathy      07-27-20 @ 07:01  -  07-28-20 @ 07:00  --------------------------------------------------------  IN:    DOPamine Infusion: 598.1 mL    insulin regular Infusion: 133 mL    IV PiggyBack: 350 mL    Peptamen A.F.: 1560 mL    phenylephrine   Infusion: 128.8 mL  Total IN: 2769.9 mL    OUT:    Indwelling Catheter - Urethral: 1735 mL    Rectal Tube: 1250 mL  Total OUT: 2985 mL    Total NET: -215.1 mL      07-28-20 @ 07:01  -  07-28-20 @ 08:11  --------------------------------------------------------  IN:    insulin regular Infusion: 4 mL    Peptamen A.F.: 65 mL  Total IN: 69 mL    OUT:    Indwelling Catheter - Urethral: 75 mL  Total OUT: 75 mL    Total NET: -6 mL    LABS:                            9.2    16.78 )-----------( 204      ( 28 Jul 2020 04:30 )             28.5                                               07-28    147<H>  |  116<H>  |  69<HH>  ----------------------------<  164<H>  4.0   |  18  |  4.6<HH>    Ca    8.0<L>      28 Jul 2020 04:30  Mg     2.5     07-28    TPro  5.3<L>  /  Alb  2.0<L>  /  TBili  0.6  /  DBili  x   /  AST  92<H>  /  ALT  26  /  AlkPhos  95  07-27                                              LIVER FUNCTIONS - ( 27 Jul 2020 04:30 )  Alb: 2.0 g/dL / Pro: 5.3 g/dL / ALK PHOS: 95 U/L / ALT: 26 U/L / AST: 92 U/L / GGT: x                                                                                            Mode: AC/ CMV (Assist Control/ Continuous Mandatory Ventilation)  RR (machine): 26  TV (machine): 450  FiO2: 40  PEEP: 5  ITime: 1  MAP: 11  PIP: 19                                      ABG - ( 28 Jul 2020 04:33 )  pH, Arterial: 7.29  pH, Blood: x     /  pCO2: 39    /  pO2: 85    / HCO3: 19    / Base Excess: -7.0  /  SaO2: 96    LA 0.6              MEDICATIONS  (STANDING):  aMIOdarone    Tablet   Oral   aMIOdarone    Tablet 400 milliGRAM(s) Oral every 12 hours  aMIOdarone Infusion 0.5 mG/Min (16.7 mL/Hr) IV Continuous <Continuous>  atorvastatin 80 milliGRAM(s) Oral at bedtime  chlorhexidine 0.12% Liquid 15 milliLiter(s) Oral Mucosa every 12 hours  chlorhexidine 4% Liquid 1 Application(s) Topical <User Schedule>  desmopressin Injectable 2 MICROGram(s) IV Push every 12 hours  dextrose 50% Injectable 12.5 Gram(s) IV Push once  dextrose 50% Injectable 25 Gram(s) IV Push once  dextrose 50% Injectable 25 Gram(s) IV Push once  DOPamine Infusion 1 MICROgram(s)/kG/Min (3.17 mL/Hr) IV Continuous <Continuous>  heparin   Injectable 5000 Unit(s) SubCutaneous every 8 hours  insulin regular Infusion 1 Unit(s)/Hr (1 mL/Hr) IV Continuous <Continuous>  meropenem  IVPB      meropenem  IVPB 500 milliGRAM(s) IV Intermittent every 12 hours  pantoprazole   Suspension 40 milliGRAM(s) Enteral Tube daily  sodium bicarbonate 650 milliGRAM(s) Oral every 8 hours  vancomycin    Solution 250 milliGRAM(s) Oral every 6 hours    MEDICATIONS  (PRN):  acetaminophen   Tablet .. 650 milliGRAM(s) Oral every 6 hours PRN Temp greater or equal to 38C (100.4F)  dextrose 40% Gel 15 Gram(s) Oral once PRN Blood Glucose LESS THAN 70 milliGRAM(s)/deciliter  glucagon  Injectable 1 milliGRAM(s) IntraMuscular once PRN Glucose LESS THAN 70 milligrams/deciliter      New X-rays reviewed:                                                                                  ECHO    CXR interpreted by me:

## 2020-07-28 NOTE — CONSULT NOTE ADULT - ASSESSMENT
64M admitted to MICU, as per primary team evaluation patient with very poor prognosis (apnea test performed today negative for brain death), surgical consult placed to r/o toxic megacolon    Plan:  Discussed patients overall prognosis and course with family, family states at this time they would like to persue all measures  NG tube placed  CT with PO contrast recommended    will continue to follow

## 2020-07-28 NOTE — PROGRESS NOTE ADULT - ASSESSMENT
IMPRESSION:    Acute hypoxemic respiratory failure   Stroke SP TAP And thrombectomy  NAEL worsening  DI   E coli bacteremia  G+ Cocci bacteremia   Sepsis   Septic shock  CDiff     PLAN:    CNS: FU with Neurology. Keep off sedation.  FU MS     HEENT:  Oral care    PULMONARY:  HOB @ 45 degrees.  Vent changes:  None     CARDIOVASCULAR: I=O. Wean off dopamine     GI: GI prophylaxis.  Feeding OG tube    RENAL:  F/u repeat lytes.  PO BICARB, repeat CMP    INFECTIOUS DISEASE: repeat cultures.  Add Vanc IV.  Vanc .    HEMATOLOGICAL:  DVT prophylaxis.     ENDOCRINE:  Follow up FS.  Insulin protocol if needed.    CODE STATUS: FULL CODE    DISPOSITION: Pt requires continued monitoring in the MICU    Overall very Poor prognosis

## 2020-07-28 NOTE — PROGRESS NOTE ADULT - SUBJECTIVE AND OBJECTIVE BOX
seen and examined  intubated /ventilated   on dopamine drip         PAST HISTORY  --------------------------------------------------------------------------------  No significant changes to PMH, PSH, FHx, SHx, unless otherwise noted    ALLERGIES & MEDICATIONS  --------------------------------------------------------------------------------  Allergies    No Known Allergies    Intolerances      Standing Inpatient Medications  aMIOdarone    Tablet   Oral   aMIOdarone    Tablet 400 milliGRAM(s) Oral every 12 hours  aMIOdarone Infusion 0.5 mG/Min IV Continuous <Continuous>  atorvastatin 80 milliGRAM(s) Oral at bedtime  chlorhexidine 0.12% Liquid 15 milliLiter(s) Oral Mucosa every 12 hours  chlorhexidine 4% Liquid 1 Application(s) Topical <User Schedule>  desmopressin Injectable 2 MICROGram(s) IV Push every 12 hours  dextrose 50% Injectable 12.5 Gram(s) IV Push once  dextrose 50% Injectable 25 Gram(s) IV Push once  dextrose 50% Injectable 25 Gram(s) IV Push once  DOPamine Infusion 1 MICROgram(s)/kG/Min IV Continuous <Continuous>  heparin   Injectable 5000 Unit(s) SubCutaneous every 8 hours  insulin regular Infusion 1 Unit(s)/Hr IV Continuous <Continuous>  meropenem  IVPB      meropenem  IVPB 500 milliGRAM(s) IV Intermittent every 12 hours  pantoprazole   Suspension 40 milliGRAM(s) Enteral Tube daily  sodium bicarbonate 650 milliGRAM(s) Oral every 8 hours  vancomycin    Solution 250 milliGRAM(s) Oral every 6 hours    PRN Inpatient Medications  acetaminophen   Tablet .. 650 milliGRAM(s) Oral every 6 hours PRN  dextrose 40% Gel 15 Gram(s) Oral once PRN  glucagon  Injectable 1 milliGRAM(s) IntraMuscular once PRN      VITALS/PHYSICAL EXAM  --------------------------------------------------------------------------------  T(C): 37 (07-28-20 @ 08:00), Max: 38 (07-27-20 @ 12:00)  HR: 86 (07-28-20 @ 08:30) (68 - 104)  BP: --  RR: 26 (07-28-20 @ 08:30) (2 - 26)  SpO2: 96% (07-28-20 @ 08:30) (91% - 100%)  Wt(kg): --        07-27-20 @ 07:01  -  07-28-20 @ 07:00  --------------------------------------------------------  IN: 2769.9 mL / OUT: 2985 mL / NET: -215.1 mL    07-28-20 @ 07:01  -  07-28-20 @ 09:04  --------------------------------------------------------  IN: 69 mL / OUT: 75 mL / NET: -6 mL      Physical Exam:  	Gen: intubated/ventilated   	Pulm:  B/L kim at bases   	CV:S1S2; no rub  	Abd: +distended    LABS/STUDIES  --------------------------------------------------------------------------------              9.2    16.78 >-----------<  204      [07-28-20 @ 04:30]              28.5     147  |  116  |  69  ----------------------------<  164      [07-28-20 @ 04:30]  4.0   |  18  |  4.6        Ca     8.0     [07-28-20 @ 04:30]      Mg     2.5     [07-28-20 @ 04:30]    TPro  5.3  /  Alb  2.0  /  TBili  0.6  /  DBili  x   /  AST  92  /  ALT  26  /  AlkPhos  95  [07-27-20 @ 04:30]          Creatinine Trend:  SCr 4.6 [07-28 @ 04:30]  SCr 4.7 [07-27 @ 21:30]  SCr 4.7 [07-27 @ 16:50]  SCr 4.4 [07-27 @ 04:30]  SCr 4.4 [07-26 @ 23:00]    Urinalysis - [07-23-20 @ 21:50]      Color Yellow / Appearance Slightly Turbid / SG 1.014 / pH 6.0      Gluc Negative / Ketone Negative  / Bili Negative / Urobili <2 mg/dL       Blood Moderate / Protein 100 mg/dL / Leuk Est Negative / Nitrite Positive      RBC 6 / WBC 14 / Hyaline 5 / Gran  / Sq Epi  / Non Sq Epi 4 / Bacteria Negative      Lipid: chol 146, , HDL 50, LDL 85      [07-16-20 @ 15:44]

## 2020-07-28 NOTE — PROGRESS NOTE ADULT - SUBJECTIVE AND OBJECTIVE BOX
Hospital Day:  12d    Chief Complaint: Patient is a 64y old  Male who presents with a chief complaint of Stroke s/p tpa (28 Jul 2020 09:04)    24 hour events: No acute overnight events. Patient seen resting, intubated and unresponsive, this AM.   - This evening the abdomen was noticed by the daughter to be firmer than previous exams. STAT KUB was ordered. Surgery consult placed and CT abdomen with PO contrast was ordered. Patient having NAEL.     Past Medical Hx:   HTN (hypertension)  Diabetes    Past Sx:  No significant past surgical history    Allergies:  No Known Allergies    Current Meds:   Standing Meds:  aMIOdarone    Tablet   Oral   aMIOdarone    Tablet 400 milliGRAM(s) Oral every 12 hours  aMIOdarone Infusion 0.5 mG/Min (16.7 mL/Hr) IV Continuous <Continuous>  atorvastatin 80 milliGRAM(s) Oral at bedtime  cefTRIAXone   IVPB 2000 milliGRAM(s) IV Intermittent every 24 hours  chlorhexidine 0.12% Liquid 15 milliLiter(s) Oral Mucosa every 12 hours  chlorhexidine 4% Liquid 1 Application(s) Topical <User Schedule>  desmopressin Injectable 2 MICROGram(s) IV Push every 12 hours  dextrose 50% Injectable 12.5 Gram(s) IV Push once  dextrose 50% Injectable 25 Gram(s) IV Push once  dextrose 50% Injectable 25 Gram(s) IV Push once  DOPamine Infusion 1 MICROgram(s)/kG/Min (3.17 mL/Hr) IV Continuous <Continuous>  heparin   Injectable 5000 Unit(s) SubCutaneous every 8 hours  insulin regular Infusion 1 Unit(s)/Hr (1 mL/Hr) IV Continuous <Continuous>  iohexol 300 mG (iodine)/mL Oral Solution 30 milliLiter(s) Oral once  pantoprazole   Suspension 40 milliGRAM(s) Enteral Tube daily  sodium bicarbonate 650 milliGRAM(s) Oral every 8 hours  vancomycin    Solution 250 milliGRAM(s) Oral every 6 hours    PRN Meds:  acetaminophen   Tablet .. 650 milliGRAM(s) Oral every 6 hours PRN Temp greater or equal to 38C (100.4F)  dextrose 40% Gel 15 Gram(s) Oral once PRN Blood Glucose LESS THAN 70 milliGRAM(s)/deciliter  glucagon  Injectable 1 milliGRAM(s) IntraMuscular once PRN Glucose LESS THAN 70 milligrams/deciliter    HOME MEDICATIONS:  ALPRAZolam 0.5 mg oral tablet, extended release: 1 tab(s) orally once a day (at bedtime)  amLODIPine 5 mg oral tablet: 1 tab(s) orally once a day  clopidogrel 75 mg oral tablet: 1 tab(s) orally once a day  losartan 50 mg oral tablet: 1 tab(s) orally once a day  metFORMIN 500 mg oral tablet, extended release: 1 tab(s) orally once a day  omeprazole 20 mg oral delayed release capsule: 1 cap(s) orally once a day  OXcarbazepine 150 mg oral tablet: 1 tab(s) orally once a day  OXcarbazepine 300 mg oral tablet: 1 tab(s) orally once a day (at bedtime)  trihexyphenidyl: 1 milligram(s) orally 2 times a day      Vital Signs:   T(F): 100.4 (07-28-20 @ 18:00), Max: 100.7 (07-28-20 @ 12:00)  HR: 86 (07-28-20 @ 18:15) (76 - 94)  BP: --  RR: 25 (07-28-20 @ 18:15) (25 - 32)  SpO2: 95% (07-28-20 @ 18:15) (94% - 98%)      07-27-20 @ 07:01  -  07-28-20 @ 07:00  --------------------------------------------------------  IN: 2769.9 mL / OUT: 2985 mL / NET: -215.1 mL    07-28-20 @ 07:01  -  07-28-20 @ 19:21  --------------------------------------------------------  IN: 1368.6 mL / OUT: 660 mL / NET: 708.6 mL    Physical Exam:   GENERAL: unresponsive male in NAD   HEENT: NCAT, +ET tube, +R IJ  CHEST/LUNG: audible breath sounds b/l   HEART: Regular rate and rhythm; s1 s2 appreciated  ABDOMEN: distended abdomen that has become more firm since the AM exam  EXTREMITIES: UE and LE edema   NERVOUS SYSTEM:  unresponsive, no apparent brainstem reflexes     Labs:                         9.2    16.78 )-----------( 204      ( 28 Jul 2020 04:30 )             28.5       28 Jul 2020 04:30    147    |  116    |  69     ----------------------------<  164    4.0     |  18     |  4.6      Ca    8.0        28 Jul 2020 04:30  Mg     2.5       28 Jul 2020 04:30    TPro  5.3    /  Alb  2.0    /  TBili  0.6    /  DBili  x      /  AST  92     /  ALT  26     /  AlkPhos  95     27 Jul 2020 04:30    Culture - Blood (collected 07-27-20 @ 04:17)  Source: .Blood None  Preliminary Report (07-28-20 @ 14:00):    No growth to date.    Culture - Blood (collected 07-23-20 @ 21:05)  Source: .Blood Blood  Gram Stain (07-27-20 @ 01:52):    Growth in anaerobic bottle: Gram Negative Rods    Growth in aerobic bottle:    Gram Positive Cocci in Clusters  Final Report (07-28-20 @ 11:37):    Growth in anaerobic bottle: Escherichia coli    Growth in aerobic bottle: Coag Negative Staphylococcus Unable to Identify    further    Coag Negative Staphylococcus    Single set isolate, possible contaminant. Contact    Microbiology if susceptibility testing clinically    indicated.    "Due to technical problems, Proteus sp. will Not be reported as part of    the BCID panel until further notice"    ***Blood Panel PCR results on this specimen are available    approximately 3 hours after the Gram stain result.***    Gram stain, PCR, and/or culture results may not always    correspond due to difference in methodologies.    ************************************************************    This PCR assay was performed using [employer].    The following targets are tested for: Enterococcus,    vancomycin resistant enterococci, Listeria monocytogenes,    coagulase negative staphylococci, S. aureus,    methicillin resistant S. aureus, Streptococcus agalactiae    (Group B), S. pneumoniae, S. pyogenes (Group A),    Acinetobacter baumannii, Enterobacter cloacae, E. coli,    Klebsiella oxytoca, K. pneumoniae, Proteus sp.,    Serratia marcescens, Haemophilus influenzae,    Neisseria meningitidis, Pseudomonas aeruginosa, Candida    albicans, C. glabrata, C krusei, C parapsilosis,    C. tropicalis and the KPC resistance gene.  Organism: Blood Culture PCR  Escherichia coli  Blood Culture PCR (07-27-20 @ 02:56)  Organism: Blood Culture PCR (07-27-20 @ 02:56)      -  Acinetobacter baumanii: Nondet      -  Candida albicans: Nondet      -  Candida glabrata: Nondet      -  Candida krusei: Nondet      -  Candida parapsilosis: Nondet      -  Candida tropicalis: Nondet      -  Coagulase negative Staphylococcus: Nondet      -  Enterobacter cloacae complex: Nondet      -  Enterococcus species: Nondet      -  Escherichia coli: Nondet      -  Haemophilus influenzae: Nondet      -  Klebsiella oxytoca: Nondet      -  Klebsiella pneumoniae: Nondet      -  Listeria monocytogenes: Nondet      -  Methicillin resistant Staphylococcus aureus (MRSA): Nondet      -  Multidrug (KPC pos) resistant organism: Nondet      -  Neisseria meningitidis: Nondet      -  Pseudomonas aeruginosa: Nondet      -  Serratia marcescens: Nondet      -  Staphylococcus aureus: Nondet      -  Streptococcus agalactiae (Group B): Nondet      -  Streptococcus pneumoniae: Nondet      -  Streptococcus pyogenes (Group A): Nondet      -  Streptococcus sp. (Not Grp A, B or S pneumoniae): Nondet      -  Vancomycin resistant Enterococcus sp.: Nondet      Method Type: PCR  Organism: Escherichia coli (07-27-20 @ 01:52)      -  Amikacin: S <=16      -  Ampicillin: R >16 These ampicillin results predict results for amoxicillin      -  Ampicillin/Sulbactam: I 16/8 Enterobacter, Citrobacter, and Serratia may develop resistance during prolonged therapy (3-4 days)      -  Aztreonam: S <=4      -  Cefazolin: R >16 Enterobacter, Citrobacter, and Serratia may develop resistance during prolonged therapy (3-4 days)      -  Cefepime: S <=2      -  Cefoxitin: R >16      -  Ceftriaxone: S <=1 Enterobacter, Citrobacter, and Serratia may develop resistance during prolonged therapy      -  Ciprofloxacin: R >2      -  Ertapenem: S <=0.5      -  Gentamicin: S <=2      -  Imipenem: S <=1      -  Levofloxacin: R >4      -  Meropenem: S <=1      -  Piperacillin/Tazobactam: S <=8      -  Tobramycin: S <=2      -  Trimethoprim/Sulfamethoxazole: S 2/38      Method Type: MORRIS  Organism: Blood Culture PCR (07-27-20 @ 01:52)      -  Escherichia coli: Detec      Method Type: PCR    Radiology:   < from: Xray Abdomen 1 View PORTABLE -Urgent (07.28.20 @ 17:56) >  Impression:    Gaseous distention of the ascending colon and stomach.    < end of copied text >    Assessment and Plan:   Hospital Day:  12d     Chief Complaint: Patient is a 64y old  Male who presents with a chief complaint of Stroke s/p tpa (28 Jul 2020 09:04)      24 hour events:    Subjective:    Past Medical Hx:   HTN (hypertension)  Diabetes    Past Sx:  No significant past surgical history    Allergies:  No Known Allergies    Current Meds:   Standing Meds:  aMIOdarone    Tablet   Oral   aMIOdarone    Tablet 400 milliGRAM(s) Oral every 12 hours  aMIOdarone Infusion 0.5 mG/Min (16.7 mL/Hr) IV Continuous <Continuous>  atorvastatin 80 milliGRAM(s) Oral at bedtime  cefTRIAXone   IVPB 2000 milliGRAM(s) IV Intermittent every 24 hours  chlorhexidine 0.12% Liquid 15 milliLiter(s) Oral Mucosa every 12 hours  chlorhexidine 4% Liquid 1 Application(s) Topical <User Schedule>  desmopressin Injectable 2 MICROGram(s) IV Push every 12 hours  dextrose 50% Injectable 12.5 Gram(s) IV Push once  dextrose 50% Injectable 25 Gram(s) IV Push once  dextrose 50% Injectable 25 Gram(s) IV Push once  DOPamine Infusion 1 MICROgram(s)/kG/Min (3.17 mL/Hr) IV Continuous <Continuous>  heparin   Injectable 5000 Unit(s) SubCutaneous every 8 hours  insulin regular Infusion 1 Unit(s)/Hr (1 mL/Hr) IV Continuous <Continuous>  iohexol 300 mG (iodine)/mL Oral Solution 30 milliLiter(s) Oral once  pantoprazole   Suspension 40 milliGRAM(s) Enteral Tube daily  sodium bicarbonate 650 milliGRAM(s) Oral every 8 hours  vancomycin    Solution 250 milliGRAM(s) Oral every 6 hours    PRN Meds:  acetaminophen   Tablet .. 650 milliGRAM(s) Oral every 6 hours PRN Temp greater or equal to 38C (100.4F)  dextrose 40% Gel 15 Gram(s) Oral once PRN Blood Glucose LESS THAN 70 milliGRAM(s)/deciliter  glucagon  Injectable 1 milliGRAM(s) IntraMuscular once PRN Glucose LESS THAN 70 milligrams/deciliter    HOME MEDICATIONS:  ALPRAZolam 0.5 mg oral tablet, extended release: 1 tab(s) orally once a day (at bedtime)  amLODIPine 5 mg oral tablet: 1 tab(s) orally once a day  clopidogrel 75 mg oral tablet: 1 tab(s) orally once a day  losartan 50 mg oral tablet: 1 tab(s) orally once a day  metFORMIN 500 mg oral tablet, extended release: 1 tab(s) orally once a day  omeprazole 20 mg oral delayed release capsule: 1 cap(s) orally once a day  OXcarbazepine 150 mg oral tablet: 1 tab(s) orally once a day  OXcarbazepine 300 mg oral tablet: 1 tab(s) orally once a day (at bedtime)  trihexyphenidyl: 1 milligram(s) orally 2 times a day      Vital Signs:   T(F): 100.4 (07-28-20 @ 18:00), Max: 100.7 (07-28-20 @ 12:00)  HR: 86 (07-28-20 @ 18:15) (76 - 94)  BP: --  RR: 25 (07-28-20 @ 18:15) (25 - 32)  SpO2: 95% (07-28-20 @ 18:15) (94% - 98%)      07-27-20 @ 07:01  -  07-28-20 @ 07:00  --------------------------------------------------------  IN: 2769.9 mL / OUT: 2985 mL / NET: -215.1 mL    07-28-20 @ 07:01  -  07-28-20 @ 19:27  --------------------------------------------------------  IN: 1368.6 mL / OUT: 660 mL / NET: 708.6 mL        Physical Exam:   GENERAL: NAD  HEENT: NCAT  CHEST/LUNG: CTAB  HEART: Regular rate and rhythm; s1 s2 appreciated, No murmurs, rubs, or gallops  ABDOMEN: Soft, Nontender, Nondistended; Bowel sounds present  EXTREMITIES: No LE edema b/l  SKIN: no rashes, no new lesions  NERVOUS SYSTEM:  Alert & Oriented X3  LINES/CATHETERS:        Labs:                         9.2    16.78 )-----------( 204      ( 28 Jul 2020 04:30 )             28.5       28 Jul 2020 04:30    147    |  116    |  69     ----------------------------<  164    4.0     |  18     |  4.6      Ca    8.0        28 Jul 2020 04:30  Mg     2.5       28 Jul 2020 04:30    TPro  5.3    /  Alb  2.0    /  TBili  0.6    /  DBili  x      /  AST  92     /  ALT  26     /  AlkPhos  95     27 Jul 2020 04:30    Culture - Blood (collected 07-27-20 @ 04:17)  Source: .Blood None  Preliminary Report (07-28-20 @ 14:00):    No growth to date.    Culture - Blood (collected 07-23-20 @ 21:05)  Source: .Blood Blood  Gram Stain (07-27-20 @ 01:52):    Growth in anaerobic bottle: Gram Negative Rods    Growth in aerobic bottle:    Gram Positive Cocci in Clusters  Final Report (07-28-20 @ 11:37):    Growth in anaerobic bottle: Escherichia coli    Growth in aerobic bottle: Coag Negative Staphylococcus Unable to Identify    further    Coag Negative Staphylococcus    Single set isolate, possible contaminant. Contact    Microbiology if susceptibility testing clinically    indicated.    "Due to technical problems, Proteus sp. will Not be reported as part of    the BCID panel until further notice"    ***Blood Panel PCR results on this specimen are available    approximately 3 hours after the Gram stain result.***    Gram stain, PCR, and/or culture results may not always    correspond due to difference in methodologies.    ************************************************************    This PCR assay was performed using [employer].    The following targets are tested for: Enterococcus,    vancomycin resistant enterococci, Listeria monocytogenes,    coagulase negative staphylococci, S. aureus,    methicillin resistant S. aureus, Streptococcus agalactiae    (Group B), S. pneumoniae, S. pyogenes (Group A),    Acinetobacter baumannii, Enterobacter cloacae, E. coli,    Klebsiella oxytoca, K. pneumoniae, Proteus sp.,    Serratia marcescens, Haemophilus influenzae,    Neisseria meningitidis, Pseudomonas aeruginosa, Candida    albicans, C. glabrata, C krusei, C parapsilosis,    C. tropicalis and the KPC resistance gene.  Organism: Blood Culture PCR  Escherichia coli  Blood Culture PCR (07-27-20 @ 02:56)  Organism: Blood Culture PCR (07-27-20 @ 02:56)      -  Acinetobacter baumanii: Nondet      -  Candida albicans: Nondet      -  Candida glabrata: Nondet      -  Candida krusei: Nondet      -  Candida parapsilosis: Nondet      -  Candida tropicalis: Nondet      -  Coagulase negative Staphylococcus: Nondet      -  Enterobacter cloacae complex: Nondet      -  Enterococcus species: Nondet      -  Escherichia coli: Nondet      -  Haemophilus influenzae: Nondet      -  Klebsiella oxytoca: Nondet      -  Klebsiella pneumoniae: Nondet      -  Listeria monocytogenes: Nondet      -  Methicillin resistant Staphylococcus aureus (MRSA): Nondet      -  Multidrug (KPC pos) resistant organism: Nondet      -  Neisseria meningitidis: Nondet      -  Pseudomonas aeruginosa: Nondet      -  Serratia marcescens: Nondet      -  Staphylococcus aureus: Nondet      -  Streptococcus agalactiae (Group B): Nondet      -  Streptococcus pneumoniae: Nondet      -  Streptococcus pyogenes (Group A): Nondet      -  Streptococcus sp. (Not Grp A, B or S pneumoniae): Nondet      -  Vancomycin resistant Enterococcus sp.: Nondet      Method Type: PCR  Organism: Escherichia coli (07-27-20 @ 01:52)      -  Amikacin: S <=16      -  Ampicillin: R >16 These ampicillin results predict results for amoxicillin      -  Ampicillin/Sulbactam: I 16/8 Enterobacter, Citrobacter, and Serratia may develop resistance during prolonged therapy (3-4 days)      -  Aztreonam: S <=4      -  Cefazolin: R >16 Enterobacter, Citrobacter, and Serratia may develop resistance during prolonged therapy (3-4 days)      -  Cefepime: S <=2      -  Cefoxitin: R >16      -  Ceftriaxone: S <=1 Enterobacter, Citrobacter, and Serratia may develop resistance during prolonged therapy      -  Ciprofloxacin: R >2      -  Ertapenem: S <=0.5      -  Gentamicin: S <=2      -  Imipenem: S <=1      -  Levofloxacin: R >4      -  Meropenem: S <=1      -  Piperacillin/Tazobactam: S <=8      -  Tobramycin: S <=2      -  Trimethoprim/Sulfamethoxazole: S 2/38      Method Type: MORRIS  Organism: Blood Culture PCR (07-27-20 @ 01:52)      -  Escherichia coli: Detec      Method Type: PCR    Radiology:   < from: Xray Abdomen 1 View PORTABLE -Urgent (07.28.20 @ 17:56) >  Impression:    Gaseous distention of the ascending colon and stomach.    < end of copied text > Hospital Day:  12d    Chief Complaint: Patient is a 64y old  Male who presents with a chief complaint of Stroke s/p tpa (28 Jul 2020 09:04)    24 hour events: No acute overnight events. Patient seen resting, intubated and unresponsive, this AM.   - This evening the abdomen was noticed by the daughter to be firmer than previous exams. STAT KUB was ordered. Surgery consult placed and CT abdomen with PO contrast was ordered, without IV .     Past Medical Hx:   HTN (hypertension)  Diabetes    Past Sx:  No significant past surgical history    Allergies:  No Known Allergies    Current Meds:   Standing Meds:  aMIOdarone    Tablet   Oral   aMIOdarone    Tablet 400 milliGRAM(s) Oral every 12 hours  aMIOdarone Infusion 0.5 mG/Min (16.7 mL/Hr) IV Continuous <Continuous>  atorvastatin 80 milliGRAM(s) Oral at bedtime  cefTRIAXone   IVPB 2000 milliGRAM(s) IV Intermittent every 24 hours  chlorhexidine 0.12% Liquid 15 milliLiter(s) Oral Mucosa every 12 hours  chlorhexidine 4% Liquid 1 Application(s) Topical <User Schedule>  desmopressin Injectable 2 MICROGram(s) IV Push every 12 hours  dextrose 50% Injectable 12.5 Gram(s) IV Push once  dextrose 50% Injectable 25 Gram(s) IV Push once  dextrose 50% Injectable 25 Gram(s) IV Push once  DOPamine Infusion 1 MICROgram(s)/kG/Min (3.17 mL/Hr) IV Continuous <Continuous>  heparin   Injectable 5000 Unit(s) SubCutaneous every 8 hours  insulin regular Infusion 1 Unit(s)/Hr (1 mL/Hr) IV Continuous <Continuous>  iohexol 300 mG (iodine)/mL Oral Solution 30 milliLiter(s) Oral once  pantoprazole   Suspension 40 milliGRAM(s) Enteral Tube daily  sodium bicarbonate 650 milliGRAM(s) Oral every 8 hours  vancomycin    Solution 250 milliGRAM(s) Oral every 6 hours    PRN Meds:  acetaminophen   Tablet .. 650 milliGRAM(s) Oral every 6 hours PRN Temp greater or equal to 38C (100.4F)  dextrose 40% Gel 15 Gram(s) Oral once PRN Blood Glucose LESS THAN 70 milliGRAM(s)/deciliter  glucagon  Injectable 1 milliGRAM(s) IntraMuscular once PRN Glucose LESS THAN 70 milligrams/deciliter    HOME MEDICATIONS:  ALPRAZolam 0.5 mg oral tablet, extended release: 1 tab(s) orally once a day (at bedtime)  amLODIPine 5 mg oral tablet: 1 tab(s) orally once a day  clopidogrel 75 mg oral tablet: 1 tab(s) orally once a day  losartan 50 mg oral tablet: 1 tab(s) orally once a day  metFORMIN 500 mg oral tablet, extended release: 1 tab(s) orally once a day  omeprazole 20 mg oral delayed release capsule: 1 cap(s) orally once a day  OXcarbazepine 150 mg oral tablet: 1 tab(s) orally once a day  OXcarbazepine 300 mg oral tablet: 1 tab(s) orally once a day (at bedtime)  trihexyphenidyl: 1 milligram(s) orally 2 times a day      Vital Signs:   T(F): 100.4 (07-28-20 @ 18:00), Max: 100.7 (07-28-20 @ 12:00)  HR: 86 (07-28-20 @ 18:15) (76 - 94)  BP: --  RR: 25 (07-28-20 @ 18:15) (25 - 32)  SpO2: 95% (07-28-20 @ 18:15) (94% - 98%)      07-27-20 @ 07:01  -  07-28-20 @ 07:00  --------------------------------------------------------  IN: 2769.9 mL / OUT: 2985 mL / NET: -215.1 mL    07-28-20 @ 07:01  -  07-28-20 @ 19:21  --------------------------------------------------------  IN: 1368.6 mL / OUT: 660 mL / NET: 708.6 mL    Physical Exam:   GENERAL: unresponsive male in NAD   HEENT: NCAT, +ET tube, +R IJ  CHEST/LUNG: audible breath sounds b/l   HEART: Regular rate and rhythm; s1 s2 appreciated  ABDOMEN: distended abdomen that has become more firm since the AM exam  EXTREMITIES: UE and LE edema   NERVOUS SYSTEM:  unresponsive, no apparent brainstem reflexes     Labs:                         9.2    16.78 )-----------( 204      ( 28 Jul 2020 04:30 )             28.5       28 Jul 2020 04:30    147    |  116    |  69     ----------------------------<  164    4.0     |  18     |  4.6      Ca    8.0        28 Jul 2020 04:30  Mg     2.5       28 Jul 2020 04:30    TPro  5.3    /  Alb  2.0    /  TBili  0.6    /  DBili  x      /  AST  92     /  ALT  26     /  AlkPhos  95     27 Jul 2020 04:30    Culture - Blood (collected 07-27-20 @ 04:17)  Source: .Blood None  Preliminary Report (07-28-20 @ 14:00):    No growth to date.    Culture - Blood (collected 07-23-20 @ 21:05)  Source: .Blood Blood  Gram Stain (07-27-20 @ 01:52):    Growth in anaerobic bottle: Gram Negative Rods    Growth in aerobic bottle:    Gram Positive Cocci in Clusters  Final Report (07-28-20 @ 11:37):    Growth in anaerobic bottle: Escherichia coli    Growth in aerobic bottle: Coag Negative Staphylococcus Unable to Identify    further    Coag Negative Staphylococcus    Single set isolate, possible contaminant. Contact    Microbiology if susceptibility testing clinically    indicated.    "Due to technical problems, Proteus sp. will Not be reported as part of    the BCID panel until further notice"    ***Blood Panel PCR results on this specimen are available    approximately 3 hours after the Gram stain result.***    Gram stain, PCR, and/or culture results may not always    correspond due to difference in methodologies.    ************************************************************    This PCR assay was performed using [employer].    The following targets are tested for: Enterococcus,    vancomycin resistant enterococci, Listeria monocytogenes,    coagulase negative staphylococci, S. aureus,    methicillin resistant S. aureus, Streptococcus agalactiae    (Group B), S. pneumoniae, S. pyogenes (Group A),    Acinetobacter baumannii, Enterobacter cloacae, E. coli,    Klebsiella oxytoca, K. pneumoniae, Proteus sp.,    Serratia marcescens, Haemophilus influenzae,    Neisseria meningitidis, Pseudomonas aeruginosa, Candida    albicans, C. glabrata, C krusei, C parapsilosis,    C. tropicalis and the KPC resistance gene.  Organism: Blood Culture PCR  Escherichia coli  Blood Culture PCR (07-27-20 @ 02:56)  Organism: Blood Culture PCR (07-27-20 @ 02:56)      -  Acinetobacter baumanii: Nondet      -  Candida albicans: Nondet      -  Candida glabrata: Nondet      -  Candida krusei: Nondet      -  Candida parapsilosis: Nondet      -  Candida tropicalis: Nondet      -  Coagulase negative Staphylococcus: Nondet      -  Enterobacter cloacae complex: Nondet      -  Enterococcus species: Nondet      -  Escherichia coli: Nondet      -  Haemophilus influenzae: Nondet      -  Klebsiella oxytoca: Nondet      -  Klebsiella pneumoniae: Nondet      -  Listeria monocytogenes: Nondet      -  Methicillin resistant Staphylococcus aureus (MRSA): Nondet      -  Multidrug (KPC pos) resistant organism: Nondet      -  Neisseria meningitidis: Nondet      -  Pseudomonas aeruginosa: Nondet      -  Serratia marcescens: Nondet      -  Staphylococcus aureus: Nondet      -  Streptococcus agalactiae (Group B): Nondet      -  Streptococcus pneumoniae: Nondet      -  Streptococcus pyogenes (Group A): Nondet      -  Streptococcus sp. (Not Grp A, B or S pneumoniae): Nondet      -  Vancomycin resistant Enterococcus sp.: Nondet      Method Type: PCR  Organism: Escherichia coli (07-27-20 @ 01:52)      -  Amikacin: S <=16      -  Ampicillin: R >16 These ampicillin results predict results for amoxicillin      -  Ampicillin/Sulbactam: I 16/8 Enterobacter, Citrobacter, and Serratia may develop resistance during prolonged therapy (3-4 days)      -  Aztreonam: S <=4      -  Cefazolin: R >16 Enterobacter, Citrobacter, and Serratia may develop resistance during prolonged therapy (3-4 days)      -  Cefepime: S <=2      -  Cefoxitin: R >16      -  Ceftriaxone: S <=1 Enterobacter, Citrobacter, and Serratia may develop resistance during prolonged therapy      -  Ciprofloxacin: R >2      -  Ertapenem: S <=0.5      -  Gentamicin: S <=2      -  Imipenem: S <=1      -  Levofloxacin: R >4      -  Meropenem: S <=1      -  Piperacillin/Tazobactam: S <=8      -  Tobramycin: S <=2      -  Trimethoprim/Sulfamethoxazole: S 2/38      Method Type: MORRIS  Organism: Blood Culture PCR (07-27-20 @ 01:52)      -  Escherichia coli: Detec      Method Type: PCR    Radiology:   < from: Xray Abdomen 1 View PORTABLE -Urgent (07.28.20 @ 17:56) >  Impression:    Gaseous distention of the ascending colon and stomach.    < end of copied text >

## 2020-07-28 NOTE — PROGRESS NOTE ADULT - SUBJECTIVE AND OBJECTIVE BOX
ANA CRISTINA LOVING  64y, Male    All available historical data reviewed    OVERNIGHT EVENTS:  low grade fevers  remains non responsive to all stimuli off sedation  on pressors  no urine output  less diarrhea  Fio2 40%  right femoral catheter removed      ROS:  unable to obtain history secondary to patient's mental status     VITALS:  T(F): 98.6, Max: 100.4 (07-27-20 @ 12:00)  HR: 86  BP: --  RR: 26Vital Signs Last 24 Hrs  T(C): 37 (28 Jul 2020 08:00), Max: 38 (27 Jul 2020 12:00)  T(F): 98.6 (28 Jul 2020 08:00), Max: 100.4 (27 Jul 2020 12:00)  HR: 86 (28 Jul 2020 08:30) (68 - 104)  BP: --  BP(mean): --  RR: 26 (28 Jul 2020 08:30) (2 - 26)  SpO2: 96% (28 Jul 2020 08:30) (91% - 100%)    TESTS & MEASUREMENTS:                        9.2    16.78 )-----------( 204      ( 28 Jul 2020 04:30 )             28.5     07-28    147<H>  |  116<H>  |  69<HH>  ----------------------------<  164<H>  4.0   |  18  |  4.6<HH>    Ca    8.0<L>      28 Jul 2020 04:30  Mg     2.5     07-28    TPro  5.3<L>  /  Alb  2.0<L>  /  TBili  0.6  /  DBili  x   /  AST  92<H>  /  ALT  26  /  AlkPhos  95  07-27    LIVER FUNCTIONS - ( 27 Jul 2020 04:30 )  Alb: 2.0 g/dL / Pro: 5.3 g/dL / ALK PHOS: 95 U/L / ALT: 26 U/L / AST: 92 U/L / GGT: x             Culture - Blood (collected 07-23-20 @ 21:05)  Source: .Blood Blood  Gram Stain (07-27-20 @ 01:52):    Growth in anaerobic bottle: Gram Negative Rods    Growth in aerobic bottle:    Gram Positive Cocci in Clusters  Final Report (07-27-20 @ 01:52):    Growth in anaerobic bottle: Escherichia coli    Growth in aerobic bottle:    Gram Positive Cocci in Clusters    "Due to technical problems, Proteus sp. will Not be reported as part of    the BCID panel until further notice"    ***Blood Panel PCR results on this specimen are available    approximately 3 hours after the Gram stain result.***    Gram stain, PCR, and/or culture results may not always    correspond due to difference in methodologies.    ************************************************************    This PCR assay was performed using [employer].    The following targets are tested for: Enterococcus,    vancomycin resistant enterococci, Listeria monocytogenes,    coagulase negative staphylococci, S. aureus,    methicillin resistant S. aureus, Streptococcus agalactiae    (Group B), S. pneumoniae, S. pyogenes (Group A),    Acinetobacter baumannii, Enterobacter cloacae, E. coli,    Klebsiella oxytoca, K. pneumoniae, Proteus sp.,    Serratia marcescens, Haemophilus influenzae,    Neisseria meningitidis, Pseudomonas aeruginosa, Candida    albicans, C. glabrata, C krusei, C parapsilosis,    C. tropicalis and the KPC resistance gene.  Organism: Blood Culture PCR  Escherichia coli  Blood Culture PCR (07-27-20 @ 02:56)  Organism: Blood Culture PCR (07-27-20 @ 02:56)      -  Acinetobacter baumanii: Nondet      -  Candida albicans: Nondet      -  Candida glabrata: Nondet      -  Candida krusei: Nondet      -  Candida parapsilosis: Nondet      -  Candida tropicalis: Nondet      -  Coagulase negative Staphylococcus: Nondet      -  Enterobacter cloacae complex: Nondet      -  Enterococcus species: Nondet      -  Escherichia coli: Nondet      -  Haemophilus influenzae: Nondet      -  Klebsiella oxytoca: Nondet      -  Klebsiella pneumoniae: Nondet      -  Listeria monocytogenes: Nondet      -  Methicillin resistant Staphylococcus aureus (MRSA): Nondet      -  Multidrug (KPC pos) resistant organism: Nondet      -  Neisseria meningitidis: Nondet      -  Pseudomonas aeruginosa: Nondet      -  Serratia marcescens: Nondet      -  Staphylococcus aureus: Nondet      -  Streptococcus agalactiae (Group B): Nondet      -  Streptococcus pneumoniae: Nondet      -  Streptococcus pyogenes (Group A): Nondet      -  Streptococcus sp. (Not Grp A, B or S pneumoniae): Nondet      -  Vancomycin resistant Enterococcus sp.: Nondet      Method Type: PCR  Organism: Escherichia coli (07-27-20 @ 01:52)      -  Amikacin: S <=16      -  Ampicillin: R >16 These ampicillin results predict results for amoxicillin      -  Ampicillin/Sulbactam: I 16/8 Enterobacter, Citrobacter, and Serratia may develop resistance during prolonged therapy (3-4 days)      -  Aztreonam: S <=4      -  Cefazolin: R >16 Enterobacter, Citrobacter, and Serratia may develop resistance during prolonged therapy (3-4 days)      -  Cefepime: S <=2      -  Cefoxitin: R >16      -  Ceftriaxone: S <=1 Enterobacter, Citrobacter, and Serratia may develop resistance during prolonged therapy      -  Ciprofloxacin: R >2      -  Ertapenem: S <=0.5      -  Gentamicin: S <=2      -  Imipenem: S <=1      -  Levofloxacin: R >4      -  Meropenem: S <=1      -  Piperacillin/Tazobactam: S <=8      -  Tobramycin: S <=2      -  Trimethoprim/Sulfamethoxazole: S 2/38      Method Type: MORRIS  Organism: Blood Culture PCR (07-27-20 @ 01:52)      -  Escherichia coli: Detec      Method Type: PCR    Culture - Urine (collected 07-23-20 @ 11:11)  Source: .Urine Catheterized  Final Report (07-27-20 @ 14:36):    >100,000 CFU/ml Escherichia coli  Organism: Escherichia coli (07-27-20 @ 14:36)  Organism: Escherichia coli (07-27-20 @ 14:36)      -  Amikacin: S <=16      -  Amoxicillin/Clavulanic Acid: R >16/8      -  Ampicillin: R >16 These ampicillin results predict results for amoxicillin      -  Ampicillin/Sulbactam: I 16/8 Enterobacter, Citrobacter, and Serratia may develop resistance during prolonged therapy (3-4 days)      -  Aztreonam: S <=4      -  Cefazolin: R >16 ([id number]) For uncomplicated UTI with K. pneumoniae, E. coli, or P. mirablis: MORRIS <=16 is sensitive and MORRIS >=32 is resistant. This also predicts results for oral agents cefaclor, cefdinir, cefpodoxime, cefprozil, cefuroxime axetil, cephalexin and locarbef for uncomplicated UTI. Note that some isolates may be susceptible to these agents while testing resistant to cefazolin.      -  Cefepime: S <=2      -  Cefoxitin: R >16      -  Ceftriaxone: S <=1 Enterobacter, Citrobacter, and Serratia may develop resistance during prolonged therapy      -  Ciprofloxacin: R >2      -  Ertapenem: S <=0.5      -  Gentamicin: S <=2      -  Levofloxacin: R >4      -  Meropenem: S <=1      -  Nitrofurantoin: S <=32 Should not be used to treat pyelonephritis      -  Piperacillin/Tazobactam: S <=8      -  Tigecycline: S <=2      -  Tobramycin: S <=2      -  Trimethoprim/Sulfamethoxazole: S 2/38      Method Type: MORRIS            RADIOLOGY & ADDITIONAL TESTS:  Personal review of radiological diagnostics performed  Echo and EKG results noted when applicable.     MEDICATIONS:  acetaminophen   Tablet .. 650 milliGRAM(s) Oral every 6 hours PRN  aMIOdarone    Tablet   Oral   aMIOdarone    Tablet 400 milliGRAM(s) Oral every 12 hours  aMIOdarone Infusion 0.5 mG/Min IV Continuous <Continuous>  atorvastatin 80 milliGRAM(s) Oral at bedtime  chlorhexidine 0.12% Liquid 15 milliLiter(s) Oral Mucosa every 12 hours  chlorhexidine 4% Liquid 1 Application(s) Topical <User Schedule>  desmopressin Injectable 2 MICROGram(s) IV Push every 12 hours  dextrose 40% Gel 15 Gram(s) Oral once PRN  dextrose 50% Injectable 12.5 Gram(s) IV Push once  dextrose 50% Injectable 25 Gram(s) IV Push once  dextrose 50% Injectable 25 Gram(s) IV Push once  DOPamine Infusion 1 MICROgram(s)/kG/Min IV Continuous <Continuous>  glucagon  Injectable 1 milliGRAM(s) IntraMuscular once PRN  heparin   Injectable 5000 Unit(s) SubCutaneous every 8 hours  insulin regular Infusion 1 Unit(s)/Hr IV Continuous <Continuous>  meropenem  IVPB      meropenem  IVPB 500 milliGRAM(s) IV Intermittent every 12 hours  pantoprazole   Suspension 40 milliGRAM(s) Enteral Tube daily  sodium bicarbonate 650 milliGRAM(s) Oral every 8 hours  vancomycin    Solution 250 milliGRAM(s) Oral every 6 hours      ANTIBIOTICS:  meropenem  IVPB      meropenem  IVPB 500 milliGRAM(s) IV Intermittent every 12 hours  vancomycin    Solution 250 milliGRAM(s) Oral every 6 hours

## 2020-07-28 NOTE — CONSULT NOTE ADULT - SUBJECTIVE AND OBJECTIVE BOX
ANA CRISTINA LOVING [id number]  64y Male    HPI:  64 year old male pmh of DM, CAD with PCI x3 in 2017, HTN, DLD, trigeminal neuralgia who presents to ED from [facility] s/p TPA for stroke.  History obtained from charts and daughter Vianney [phone] as Pt. is currently aphasic. Per daughter Pt. who is AOx3 at baseline was at home playing with grandchildren and became nausea, had 3 episodes of NBNB vomiting. After laying down for 30 min family found Pt. confused with aphasia and brought Pt. to [facility] ED.  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 Other extensive atheromatous changes including moderate/severe subclavian and vertebral stenosis.   Pt. was given TPA at 16:46 and sent to [facility] for neurointerventional eval and post TPA care.  Pt. seen in ED with expressive aphasia although able to move all extremities with weakness of bilateral LE 1/5. Pt. was on cardine drip to maintain BP below 185.   Interval Hx -While in ED RRT called as Pt. posturing and shaking of b/l upper extremities, not following any commands, Pt. was intubated for airway protection. Stat CT done no new hemmorage, CT perfusion Stat, Neuro sx and neurointerventional contacted. NI actual initiated. Pending Neurointerventional recs. (16 Jul 2020 18:12)      Surgical consult placed on 7/28 for increased distension, patient with elevated WBC and known c-dif, primary team concenred for toxic megacolon    PAST MEDICAL & SURGICAL HISTORY:  HTN (hypertension)  Diabetes  No significant past surgical history        MEDICATIONS  (STANDING):  aMIOdarone    Tablet   Oral   aMIOdarone    Tablet 400 milliGRAM(s) Oral every 12 hours  aMIOdarone Infusion 0.5 mG/Min (16.7 mL/Hr) IV Continuous <Continuous>  atorvastatin 80 milliGRAM(s) Oral at bedtime  cefTRIAXone   IVPB 2000 milliGRAM(s) IV Intermittent every 24 hours  chlorhexidine 0.12% Liquid 15 milliLiter(s) Oral Mucosa every 12 hours  chlorhexidine 4% Liquid 1 Application(s) Topical <User Schedule>  desmopressin Injectable 2 MICROGram(s) IV Push every 12 hours  dextrose 50% Injectable 12.5 Gram(s) IV Push once  dextrose 50% Injectable 25 Gram(s) IV Push once  dextrose 50% Injectable 25 Gram(s) IV Push once  DOPamine Infusion 1 MICROgram(s)/kG/Min (3.17 mL/Hr) IV Continuous <Continuous>  heparin   Injectable 5000 Unit(s) SubCutaneous every 8 hours  insulin regular Infusion 1 Unit(s)/Hr (1 mL/Hr) IV Continuous <Continuous>  pantoprazole   Suspension 40 milliGRAM(s) Enteral Tube daily  sodium bicarbonate 650 milliGRAM(s) Oral every 8 hours  vancomycin    Solution 250 milliGRAM(s) Oral every 6 hours    MEDICATIONS  (PRN):  acetaminophen   Tablet .. 650 milliGRAM(s) Oral every 6 hours PRN Temp greater or equal to 38C (100.4F)  dextrose 40% Gel 15 Gram(s) Oral once PRN Blood Glucose LESS THAN 70 milliGRAM(s)/deciliter  glucagon  Injectable 1 milliGRAM(s) IntraMuscular once PRN Glucose LESS THAN 70 milligrams/deciliter      Allergies    No Known Allergies    Intolerances        REVIEW OF SYSTEMS    [ ] A ten-point review of systems was otherwise negative except as noted.  [X] Due to altered mental status/intubation, subjective information were not able to be obtained from the patient. History was obtained, to the extent possible, from review of the chart and collateral sources of information.      Vital Signs Last 24 Hrs  T(C): 38.2 (28 Jul 2020 20:00), Max: 38.2 (28 Jul 2020 12:00)  T(F): 100.7 (28 Jul 2020 20:00), Max: 100.7 (28 Jul 2020 12:00)  HR: 82 (28 Jul 2020 22:30) (76 - 90)  BP: --  BP(mean): --  RR: 25 (28 Jul 2020 22:30) (25 - 32)  SpO2: 94% (28 Jul 2020 22:30) (94% - 98%)    PHYSICAL EXAM:  GENERAL: intubated, sedated, unresponsive  CHEST/LUNG: Clear to auscultation bilaterally  HEART: Regular rate and rhythm  ABDOMEN: firm, distended;         LABS:  Labs:  CAPILLARY BLOOD GLUCOSE      POCT Blood Glucose.: 169 mg/dL (28 Jul 2020 22:01)  POCT Blood Glucose.: 192 mg/dL (28 Jul 2020 20:49)  POCT Blood Glucose.: 199 mg/dL (28 Jul 2020 19:50)  POCT Blood Glucose.: 200 mg/dL (28 Jul 2020 19:06)  POCT Blood Glucose.: 161 mg/dL (28 Jul 2020 18:03)  POCT Blood Glucose.: 142 mg/dL (28 Jul 2020 16:27)  POCT Blood Glucose.: 143 mg/dL (28 Jul 2020 15:11)  POCT Blood Glucose.: 107 mg/dL (28 Jul 2020 14:13)  POCT Blood Glucose.: 116 mg/dL (28 Jul 2020 13:05)  POCT Blood Glucose.: 141 mg/dL (28 Jul 2020 12:15)  POCT Blood Glucose.: 179 mg/dL (28 Jul 2020 11:07)  POCT Blood Glucose.: 225 mg/dL (28 Jul 2020 10:08)  POCT Blood Glucose.: 233 mg/dL (28 Jul 2020 09:05)  POCT Blood Glucose.: 233 mg/dL (28 Jul 2020 09:02)  POCT Blood Glucose.: 163 mg/dL (28 Jul 2020 07:59)  POCT Blood Glucose.: 181 mg/dL (28 Jul 2020 06:38)  POCT Blood Glucose.: 178 mg/dL (28 Jul 2020 05:27)  POCT Blood Glucose.: 120 mg/dL (28 Jul 2020 03:20)  POCT Blood Glucose.: 129 mg/dL (28 Jul 2020 02:04)  POCT Blood Glucose.: 168 mg/dL (28 Jul 2020 01:04)  POCT Blood Glucose.: 158 mg/dL (28 Jul 2020 00:10)  POCT Blood Glucose.: 175 mg/dL (27 Jul 2020 23:15)                          9.2    15.68 )-----------( 218      ( 28 Jul 2020 18:22 )             28.3       Auto Neutrophil %: 78.6 % (07-28-20 @ 18:22)    07-28    148<H>  |  119<H>  |  69<HH>  ----------------------------<  170<H>  4.0   |  16<L>  |  4.1<HH>      Calcium, Total Serum: 7.0 mg/dL (07-28-20 @ 18:22)      LFTs:             4.8  | 0.3  | 75       ------------------[78      ( 28 Jul 2020 18:22 )  1.8  | x    | 23          Lipase:x      Amylase:x         Lactate, Blood: 0.9 mmol/L (07-28-20 @ 20:10)  Lactate, Blood: 0.9 mmol/L (07-28-20 @ 18:47)  Blood Gas Arterial, Lactate: 0.6 mmoL/L (07-28-20 @ 04:33)  Blood Gas Arterial, Lactate: 0.8 mmoL/L (07-27-20 @ 04:41)  Blood Gas Arterial, Lactate: 0.7 mmoL/L (07-26-20 @ 09:28)  Blood Gas Arterial, Lactate: 0.8 mmoL/L (07-26-20 @ 03:54)    ABG - ( 28 Jul 2020 04:33 )  pH: 7.29  /  pCO2: 39    /  pO2: 85    / HCO3: 19    / Base Excess: -7.0  /  SaO2: 96              ABG - ( 27 Jul 2020 04:41 )  pH: 7.33  /  pCO2: 38    /  pO2: 74    / HCO3: 20    / Base Excess: -5.4  /  SaO2: 95              ABG - ( 26 Jul 2020 09:28 )  pH: 7.36  /  pCO2: 39    /  pO2: 80    / HCO3: 22    / Base Excess: -3.5  /  SaO2: 96                Coags:     14.70  ----< 1.28    ( 28 Jul 2020 20:10 )     35.8                    Culture - Blood (collected 27 Jul 2020 04:17)  Source: .Blood None  Preliminary Report (28 Jul 2020 14:00):    No growth to date.          RADIOLOGY & ADDITIONAL STUDIES:  < from: Xray Abdomen 1 View PORTABLE -Urgent (07.28.20 @ 17:56) >  Gaseous distention of the ascending colon and stomach.    < end of copied text >

## 2020-07-28 NOTE — PROGRESS NOTE ADULT - ASSESSMENT
· Assessment		  64 year old male pmh of DM, CAD with PCI x3 in 2017, HTN, DLD, trigeminal neuralgia who presents to ED from [facility] s/p TPA for stroke.    IMPRESSION;   # CVA s/p TPA s/p thrombectomy (Basiliar Artery) - unresponsive off sedation, on pressors  - repeat CT head:  Interval increase in posterior fossa edema and mass effect consistent with evolving infarct  with resultant new effacement of the fourth ventricle and new mild obstructive hydrocephalus. Increased hypergenicity within the left ambient and quadrigeminal plate cisterns, and new hypodensity within the right ambient and quadrigeminal plate cisterns, the suprasellar cistern, the right sylvian fissure and scattered sulci suspicious worsening/new subarachnoid hemorrhage.  - remained ventilated, off sedation  - no neurologic activity. Apnea test performed (7/22), patient took a breath.  #CD colitis: on po vancomycin. Diarrhea has decreased  #E coli bacteremia : secondary to  tract with UCx E coli but no overt pyuria. Could well also be secondary to translocation from the GI tract.    RECOMMENDATIONS;  Rocephin 2 gm iv q24h  Po vancomycin 125 mg q6h  d/c meropenem  ABx are futlile

## 2020-07-29 NOTE — PROGRESS NOTE ADULT - SUBJECTIVE AND OBJECTIVE BOX
ANA CRISTINA LOVING  64y, Male    All available historical data reviewed    OVERNIGHT EVENTS:  low grade fevers  non responsive to all stimuli off sedation  on pressors  rectal tube with minimal stool  Fio2 40%    ROS:  unable to obtain history secondary to patient's mental status and/or sedation     VITALS:  T(F): 100.9, Max: 100.9 (07-29-20 @ 14:00)  HR: 82  BP: --  RR: 25Vital Signs Last 24 Hrs  T(C): 38.3 (29 Jul 2020 14:00), Max: 38.3 (29 Jul 2020 14:00)  T(F): 100.9 (29 Jul 2020 14:00), Max: 100.9 (29 Jul 2020 14:00)  HR: 82 (29 Jul 2020 14:45) (80 - 88)  BP: --  BP(mean): --  RR: 25 (29 Jul 2020 14:45) (25 - 28)  SpO2: 96% (29 Jul 2020 14:45) (92% - 98%)    TESTS & MEASUREMENTS:                        8.8    16.21 )-----------( 279      ( 29 Jul 2020 04:20 )             27.7     07-29    145  |  113<H>  |  76<HH>  ----------------------------<  157<H>  4.5   |  17  |  4.6<HH>    Ca    8.2<L>      29 Jul 2020 04:20  Mg     2.3     07-29    TPro  5.5<L>  /  Alb  2.0<L>  /  TBili  0.4  /  DBili  x   /  AST  86<H>  /  ALT  27  /  AlkPhos  86  07-29    LIVER FUNCTIONS - ( 29 Jul 2020 04:20 )  Alb: 2.0 g/dL / Pro: 5.5 g/dL / ALK PHOS: 86 U/L / ALT: 27 U/L / AST: 86 U/L / GGT: x             Culture - Blood (collected 07-27-20 @ 14:53)  Source: .Blood Blood-Venous  Preliminary Report (07-28-20 @ 23:00):    No growth to date.    Culture - Blood (collected 07-27-20 @ 04:17)  Source: .Blood None  Preliminary Report (07-28-20 @ 14:00):    No growth to date.    Culture - Blood (collected 07-23-20 @ 21:05)  Source: .Blood Blood  Gram Stain (07-27-20 @ 01:52):    Growth in anaerobic bottle: Gram Negative Rods    Growth in aerobic bottle:    Gram Positive Cocci in Clusters  Final Report (07-28-20 @ 11:37):    Growth in anaerobic bottle: Escherichia coli    Growth in aerobic bottle: Coag Negative Staphylococcus Unable to Identify    further    Coag Negative Staphylococcus    Single set isolate, possible contaminant. Contact    Microbiology if susceptibility testing clinically    indicated.    "Due to technical problems, Proteus sp. will Not be reported as part of    the BCID panel until further notice"    ***Blood Panel PCR results on this specimen are available    approximately 3 hours after the Gram stain result.***    Gram stain, PCR, and/or culture results may not always    correspond due to difference in methodologies.    ************************************************************    This PCR assay was performed using [employer].    The following targets are tested for: Enterococcus,    vancomycin resistant enterococci, Listeria monocytogenes,    coagulase negative staphylococci, S. aureus,    methicillin resistant S. aureus, Streptococcus agalactiae    (Group B), S. pneumoniae, S. pyogenes (Group A),    Acinetobacter baumannii, Enterobacter cloacae, E. coli,    Klebsiella oxytoca, K. pneumoniae, Proteus sp.,    Serratia marcescens, Haemophilus influenzae,    Neisseria meningitidis, Pseudomonas aeruginosa, Candida    albicans, C. glabrata, C krusei, C parapsilosis,    C. tropicalis and the KPC resistance gene.  Organism: Blood Culture PCR  Escherichia coli  Blood Culture PCR (07-27-20 @ 02:56)  Organism: Blood Culture PCR (07-27-20 @ 02:56)      -  Acinetobacter baumanii: Nondet      -  Candida albicans: Nondet      -  Candida glabrata: Nondet      -  Candida krusei: Nondet      -  Candida parapsilosis: Nondet      -  Candida tropicalis: Nondet      -  Coagulase negative Staphylococcus: Nondet      -  Enterobacter cloacae complex: Nondet      -  Enterococcus species: Nondet      -  Escherichia coli: Nondet      -  Haemophilus influenzae: Nondet      -  Klebsiella oxytoca: Nondet      -  Klebsiella pneumoniae: Nondet      -  Listeria monocytogenes: Nondet      -  Methicillin resistant Staphylococcus aureus (MRSA): Nondet      -  Multidrug (KPC pos) resistant organism: Nondet      -  Neisseria meningitidis: Nondet      -  Pseudomonas aeruginosa: Nondet      -  Serratia marcescens: Nondet      -  Staphylococcus aureus: Nondet      -  Streptococcus agalactiae (Group B): Nondet      -  Streptococcus pneumoniae: Nondet      -  Streptococcus pyogenes (Group A): Nondet      -  Streptococcus sp. (Not Grp A, B or S pneumoniae): Nondet      -  Vancomycin resistant Enterococcus sp.: Nondet      Method Type: PCR  Organism: Escherichia coli (07-27-20 @ 01:52)      -  Amikacin: S <=16      -  Ampicillin: R >16 These ampicillin results predict results for amoxicillin      -  Ampicillin/Sulbactam: I 16/8 Enterobacter, Citrobacter, and Serratia may develop resistance during prolonged therapy (3-4 days)      -  Aztreonam: S <=4      -  Cefazolin: R >16 Enterobacter, Citrobacter, and Serratia may develop resistance during prolonged therapy (3-4 days)      -  Cefepime: S <=2      -  Cefoxitin: R >16      -  Ceftriaxone: S <=1 Enterobacter, Citrobacter, and Serratia may develop resistance during prolonged therapy      -  Ciprofloxacin: R >2      -  Ertapenem: S <=0.5      -  Gentamicin: S <=2      -  Imipenem: S <=1      -  Levofloxacin: R >4      -  Meropenem: S <=1      -  Piperacillin/Tazobactam: S <=8      -  Tobramycin: S <=2      -  Trimethoprim/Sulfamethoxazole: S 2/38      Method Type: MORRIS  Organism: Blood Culture PCR (07-27-20 @ 01:52)      -  Escherichia coli: Detec      Method Type: PCR    Culture - Urine (collected 07-23-20 @ 11:11)  Source: .Urine Catheterized  Final Report (07-27-20 @ 14:36):    >100,000 CFU/ml Escherichia coli  Organism: Escherichia coli (07-27-20 @ 14:36)  Organism: Escherichia coli (07-27-20 @ 14:36)      -  Amikacin: S <=16      -  Amoxicillin/Clavulanic Acid: R >16/8      -  Ampicillin: R >16 These ampicillin results predict results for amoxicillin      -  Ampicillin/Sulbactam: I 16/8 Enterobacter, Citrobacter, and Serratia may develop resistance during prolonged therapy (3-4 days)      -  Aztreonam: S <=4      -  Cefazolin: R >16 ([id number]) For uncomplicated UTI with K. pneumoniae, E. coli, or P. mirablis: MORRIS <=16 is sensitive and MORRIS >=32 is resistant. This also predicts results for oral agents cefaclor, cefdinir, cefpodoxime, cefprozil, cefuroxime axetil, cephalexin and locarbef for uncomplicated UTI. Note that some isolates may be susceptible to these agents while testing resistant to cefazolin.      -  Cefepime: S <=2      -  Cefoxitin: R >16      -  Ceftriaxone: S <=1 Enterobacter, Citrobacter, and Serratia may develop resistance during prolonged therapy      -  Ciprofloxacin: R >2      -  Ertapenem: S <=0.5      -  Gentamicin: S <=2      -  Levofloxacin: R >4      -  Meropenem: S <=1      -  Nitrofurantoin: S <=32 Should not be used to treat pyelonephritis      -  Piperacillin/Tazobactam: S <=8      -  Tigecycline: S <=2      -  Tobramycin: S <=2      -  Trimethoprim/Sulfamethoxazole: S 2/38      Method Type: MORRIS            RADIOLOGY & ADDITIONAL TESTS:  Personal review of radiological diagnostics performed  Echo and EKG results noted when applicable.     MEDICATIONS:  acetaminophen   Tablet .. 650 milliGRAM(s) Oral every 6 hours PRN  cefTRIAXone   IVPB 2000 milliGRAM(s) IV Intermittent every 24 hours  chlorhexidine 0.12% Liquid 15 milliLiter(s) Oral Mucosa every 12 hours  chlorhexidine 4% Liquid 1 Application(s) Topical <User Schedule>  desmopressin Injectable 2 MICROGram(s) IV Push every 12 hours  dextrose 40% Gel 15 Gram(s) Oral once PRN  dextrose 5% 1000 milliLiter(s) IV Continuous <Continuous>  dextrose 50% Injectable 12.5 Gram(s) IV Push once  dextrose 50% Injectable 25 Gram(s) IV Push once  dextrose 50% Injectable 25 Gram(s) IV Push once  DOPamine Infusion 1 MICROgram(s)/kG/Min IV Continuous <Continuous>  glucagon  Injectable 1 milliGRAM(s) IntraMuscular once PRN  heparin   Injectable 5000 Unit(s) SubCutaneous every 8 hours  insulin regular Infusion 1 Unit(s)/Hr IV Continuous <Continuous>  pantoprazole   Suspension 40 milliGRAM(s) Enteral Tube daily  vancomycin    Solution 250 milliGRAM(s) Oral every 6 hours  vancomycin  IVPB 1250 milliGRAM(s) IV Intermittent every 48 hours      ANTIBIOTICS:  cefTRIAXone   IVPB 2000 milliGRAM(s) IV Intermittent every 24 hours  vancomycin    Solution 250 milliGRAM(s) Oral every 6 hours  vancomycin  IVPB 1250 milliGRAM(s) IV Intermittent every 48 hours

## 2020-07-29 NOTE — PROGRESS NOTE ADULT - SUBJECTIVE AND OBJECTIVE BOX
Patient is a 64y old  Male who presents with a chief complaint of Stroke s/p tpa (29 Jul 2020 15:07)  pt seen and evaluated  remain vented   NPO      ICU Vital Signs Last 24 Hrs  T(C): 38.2 (29 Jul 2020 16:00), Max: 38.3 (29 Jul 2020 14:00)  T(F): 100.8 (29 Jul 2020 16:00), Max: 100.9 (29 Jul 2020 14:00)  HR: 80 (29 Jul 2020 15:45) (80 - 88)  ABP: 138/48 (29 Jul 2020 15:45) (94/46 - 172/54)  ABP(mean): 68 (29 Jul 2020 15:45) (54 - 82)  RR: 25 (29 Jul 2020 15:45) (25 - 28)  SpO2: 96% (29 Jul 2020 15:45) (92% - 98%)      Drug Dosing Weight  Height (cm): 177.8 (17 Jul 2020 00:00)  Weight (kg): 84.5 (16 Jul 2020 20:42)  BMI (kg/m2): 26.7 (17 Jul 2020 00:00)  BSA (m2): 2.03 (17 Jul 2020 00:00)    I&O's Detail    28 Jul 2020 07:01  -  29 Jul 2020 07:00  --------------------------------------------------------  IN:    DOPamine Infusion: 1292.1 mL    insulin regular Infusion: 72 mL    Peptamen A.F.: 650 mL  Total IN: 2014.1 mL    OUT:    Indwelling Catheter - Urethral: 1205 mL    Nasoenteral Tube: 600 mL    Rectal Tube: 50 mL  Total OUT: 1855 mL    Total NET: 159.1 mL      29 Jul 2020 07:01  -  29 Jul 2020 16:02  --------------------------------------------------------  IN:    dextrose 5%: 700 mL    DOPamine Infusion: 417.4 mL    insulin regular Infusion: 16 mL    IV PiggyBack: 250 mL  Total IN: 1383.4 mL    OUT:    Indwelling Catheter - Urethral: 520 mL    Nasoenteral Tube: 100 mL  Total OUT: 620 mL    Total NET: 763.4 mL     PHYSICAL EXAM:  Constitutional:  remain vented  ngt to suction  Gastrointestinal:  mildly distended    MEDICATIONS  (STANDING):  cefTRIAXone   IVPB 2000 milliGRAM(s) IV Intermittent every 24 hours  chlorhexidine 0.12% Liquid 15 milliLiter(s) Oral Mucosa every 12 hours  chlorhexidine 4% Liquid 1 Application(s) Topical <User Schedule>  desmopressin Injectable 2 MICROGram(s) IV Push every 12 hours  dextrose 5% 1000 milliLiter(s) (75 mL/Hr) IV Continuous <Continuous>  dextrose 50% Injectable 12.5 Gram(s) IV Push once  dextrose 50% Injectable 25 Gram(s) IV Push once  dextrose 50% Injectable 25 Gram(s) IV Push once  DOPamine Infusion 1 MICROgram(s)/kG/Min (3.17 mL/Hr) IV Continuous <Continuous>  heparin   Injectable 5000 Unit(s) SubCutaneous every 8 hours  insulin regular Infusion 1 Unit(s)/Hr (1 mL/Hr) IV Continuous <Continuous>  pantoprazole   Suspension 40 milliGRAM(s) Enteral Tube daily  vancomycin    Solution 250 milliGRAM(s) Oral every 6 hours  vancomycin  IVPB 1250 milliGRAM(s) IV Intermittent every 48 hours      Diet, NPO:   Except Medications (07-28-20 @ 18:22)      LABS  07-29    145  |  113<H>  |  76<HH>  ----------------------------<  157<H>  4.5   |  17  |  4.6<HH>    Ca    8.2<L>      29 Jul 2020 04:20  Mg     2.3     07-29    TPro  5.5<L>  /  Alb  2.0<L>  /  TBili  0.4  /  DBili  x   /  AST  86<H>  /  ALT  27  /  AlkPhos  86  07-29                          8.2    13.40 )-----------( 302      ( 29 Jul 2020 15:45 )             25.2     CAPILLARY BLOOD GLUCOSE  POCT Blood Glucose.: 179 mg/dL (29 Jul 2020 15:30)  POCT Blood Glucose.: 185 mg/dL (29 Jul 2020 13:49)  POCT Blood Glucose.: 187 mg/dL (29 Jul 2020 11:42)   RADIOLOGY STUDIES  < from: Xray Chest 1 View- PORTABLE-Routine (07.29.20 @ 05:26) >  Impression:  Stable bibasilar opacities/left pleural effusion and other scattered opacities

## 2020-07-29 NOTE — PROGRESS NOTE ADULT - SUBJECTIVE AND OBJECTIVE BOX
GENERAL SURGERY PROGRESS NOTE     ANA CRISTINA LOVING  [address] [place] [address] day :11d  POD:  Procedure:   Surgical Attending: Irvin Ruffin  Overnight events: No acute events overnight. Less distended than yesterday. Nonresponsive to physical exam. On minimal ventilator settings.     T(F): 99.9 (20 @ 04:00), Max: 100.7 (20 @ 12:00)  HR: 80 (20 @ 07:00) (80 - 90)  BP: --  ABP: 124/48 (20 @ 07:00) (76/44 - 148/58)  ABP(mean): 68 (20 @ :00) (54 - 82)  RR: 25 (20 @ :) (25 - 32)  SpO2: 94% (20 @ 07:00) (92% - 98%)      20 @ 07:01  -  20 @ 07:00  --------------------------------------------------------  IN:    DOPamine Infusion: 1292.1 mL    insulin regular Infusion: 72 mL    Peptamen A.F.: 650 mL  Total IN: 2014.1 mL    OUT:    Indwelling Catheter - Urethral: 1205 mL    Nasoenteral Tube: 600 mL    Rectal Tube: 50 mL  Total OUT: 1855 mL    Total NET: 159.1 mL        DIET/FLUIDS: sodium bicarbonate 650 milliGRAM(s) Oral every 8 hours    N20 @ 07:  -  20 @ 07:00  --------------------------------------------------------  OUT: 600 mL                                                                                 DRAINS:     BM:   20 @ 07:01  -  20 @ 07:00  --------------------------------------------------------  OUT: 50 mL      EMESIS:     URINE:   20 @ 07:01  -  20 @ 07:00  --------------------------------------------------------  OUT: 1205 mL     Indwelling Urethral Catheter:     Connect To:  Straight Drainage/Gravity    Indication:  Urine Output Monitoring in Critically Ill (20 @ 06:58)    GI proph:  pantoprazole   Suspension 40 milliGRAM(s) Enteral Tube daily    AC/ proph: heparin   Injectable 5000 Unit(s) SubCutaneous every 8 hours    ABx: cefTRIAXone   IVPB 2000 milliGRAM(s) IV Intermittent every 24 hours  vancomycin    Solution 250 milliGRAM(s) Oral every 6 hours      GEN: NAD, well appearing  HEENT: NC/AT  CHEST: Symmetric chest wall expansion. Regular heart rate  ABD: S/D, non-tender,    LABS  Labs:  CAPILLARY BLOOD GLUCOSE      POCT Blood Glucose.: 140 mg/dL (2020 06:32)  POCT Blood Glucose.: 147 mg/dL (2020 05:51)  POCT Blood Glucose.: 148 mg/dL (2020 04:08)  POCT Blood Glucose.: 132 mg/dL (2020 03:03)  POCT Blood Glucose.: 120 mg/dL (2020 02:01)  POCT Blood Glucose.: 109 mg/dL (2020 01:20)  POCT Blood Glucose.: 124 mg/dL (2020 23:56)  POCT Blood Glucose.: 169 mg/dL (2020 22:01)  POCT Blood Glucose.: 192 mg/dL (2020 20:49)  POCT Blood Glucose.: 199 mg/dL (2020 19:50)  POCT Blood Glucose.: 200 mg/dL (2020 19:06)  POCT Blood Glucose.: 161 mg/dL (2020 18:03)  POCT Blood Glucose.: 142 mg/dL (2020 16:27)  POCT Blood Glucose.: 143 mg/dL (2020 15:11)  POCT Blood Glucose.: 107 mg/dL (2020 14:13)  POCT Blood Glucose.: 116 mg/dL (2020 13:05)  POCT Blood Glucose.: 141 mg/dL (2020 12:15)  POCT Blood Glucose.: 179 mg/dL (2020 11:07)  POCT Blood Glucose.: 225 mg/dL (2020 10:08)  POCT Blood Glucose.: 233 mg/dL (2020 09:05)  POCT Blood Glucose.: 233 mg/dL (2020 09:02)  POCT Blood Glucose.: 163 mg/dL (2020 07:59)                          8.8    16.21 )-----------( 279      ( 2020 04:20 )             27.7       Auto Neutrophil %: 78.6 % (20 @ 18:22)        145  |  113<H>  |  76<HH>  ----------------------------<  157<H>  4.5   |  17  |  4.6<HH>      Calcium, Total Serum: 8.2 mg/dL (20 @ 04:20)      LFTs:             5.5  | 0.4  | 86       ------------------[86      ( 2020 04:20 )  2.0  | x    | 27          Lipase:x      Amylase:x         Lactate, Blood: 0.7 mmol/L (20 @ 04:20)  Blood Gas Arterial, Lactate: 0.6 mmoL/L (20 @ 03:24)  Lactate, Blood: 0.9 mmol/L (20 @ 20:10)  Lactate, Blood: 0.9 mmol/L (20 @ 18:47)  Blood Gas Arterial, Lactate: 0.6 mmoL/L (20 @ 04:33)  Blood Gas Arterial, Lactate: 0.8 mmoL/L (20 @ 04:41)  Blood Gas Arterial, Lactate: 0.7 mmoL/L (20 @ 09:28)    ABG - ( 2020 03:24 )  pH: 7.30  /  pCO2: 36    /  pO2: 82    / HCO3: 18    / Base Excess: -7.9  /  SaO2: 96              ABG - ( 2020 04:33 )  pH: 7.29  /  pCO2: 39    /  pO2: 85    / HCO3: 19    / Base Excess: -7.0  /  SaO2: 96              ABG - ( 2020 04:41 )  pH: 7.33  /  pCO2: 38    /  pO2: 74    / HCO3: 20    / Base Excess: -5.4  /  SaO2: 95                Coags:     14.70  ----< 1.28    ( 2020 20:10 )     35.8           Culture - Blood (collected 2020 14:53)  Source: .Blood Blood-Venous  Preliminary Report (2020 23:00):    No growth to date.    Culture - Blood (collected 2020 04:17)  Source: .Blood None  Preliminary Report (2020 14:00):    No growth to date.          RADIOLOGY & ADDITIONAL TESTS:   < from: CT Abdomen and Pelvis w/ Oral Cont (20 @ 23:23) >    Liquid stool within the colon without evidence of obstruction or colonic wall thickening. No intraperitoneal free air.    Colon measures up to 6.5 cm at thececum.    Bilateral small pleural effusions with associated opacities    < end of copied text > GENERAL SURGERY PROGRESS NOTE     ANA CRISTINA LOVING  [address] [place] [address] day :11d  POD:  Procedure:   Surgical Attending: Irvin Ruffin  Overnight events: No acute events overnight. Less distended than yesterday. Nonresponsive to physical exam. On minimal ventilator settings.     T(F): 99.9 (20 @ 04:00), Max: 100.7 (20 @ 12:00)  HR: 80 (20 @ 07:00) (80 - 90)  BP: --  ABP: 124/48 (20 @ 07:00) (76/44 - 148/58)  ABP(mean): 68 (20 @ :00) (54 - 82)  RR: 25 (20 @ :) (25 - 32)  SpO2: 94% (20 @ 07:00) (92% - 98%)      20 @ 07:01  -  20 @ 07:00  --------------------------------------------------------  IN:    DOPamine Infusion: 1292.1 mL    insulin regular Infusion: 72 mL    Peptamen A.F.: 650 mL  Total IN: 2014.1 mL    OUT:    Indwelling Catheter - Urethral: 1205 mL    Nasoenteral Tube: 600 mL    Rectal Tube: 50 mL  Total OUT: 1855 mL    Total NET: 159.1 mL        DIET/FLUIDS: sodium bicarbonate 650 milliGRAM(s) Oral every 8 hours    N20 @ 07:  -  20 @ 07:00  --------------------------------------------------------  OUT: 600 mL                                                                                 DRAINS:     BM:   20 @ 07:01  -  20 @ 07:00  --------------------------------------------------------  OUT: 50 mL      EMESIS:     URINE:   20 @ 07:01  -  20 @ 07:00  --------------------------------------------------------  OUT: 1205 mL     Indwelling Urethral Catheter:     Connect To:  Straight Drainage/Gravity    Indication:  Urine Output Monitoring in Critically Ill (20 @ 06:58)    GI proph:  pantoprazole   Suspension 40 milliGRAM(s) Enteral Tube daily    AC/ proph: heparin   Injectable 5000 Unit(s) SubCutaneous every 8 hours    ABx: cefTRIAXone   IVPB 2000 milliGRAM(s) IV Intermittent every 24 hours  vancomycin    Solution 250 milliGRAM(s) Oral every 6 hours      GEN: NAD, nonresponsive to stimuli  HEENT: intubated  CHEST: Symmetric chest wall expansion.  ABD: S/D, non-tender, distended    LABS  Labs:  CAPILLARY BLOOD GLUCOSE      POCT Blood Glucose.: 140 mg/dL (2020 06:32)  POCT Blood Glucose.: 147 mg/dL (2020 05:51)  POCT Blood Glucose.: 148 mg/dL (2020 04:08)  POCT Blood Glucose.: 132 mg/dL (2020 03:03)  POCT Blood Glucose.: 120 mg/dL (2020 02:01)  POCT Blood Glucose.: 109 mg/dL (2020 01:20)  POCT Blood Glucose.: 124 mg/dL (2020 23:56)  POCT Blood Glucose.: 169 mg/dL (2020 22:01)  POCT Blood Glucose.: 192 mg/dL (2020 20:49)  POCT Blood Glucose.: 199 mg/dL (2020 19:50)  POCT Blood Glucose.: 200 mg/dL (2020 19:06)  POCT Blood Glucose.: 161 mg/dL (2020 18:03)  POCT Blood Glucose.: 142 mg/dL (2020 16:27)  POCT Blood Glucose.: 143 mg/dL (2020 15:11)  POCT Blood Glucose.: 107 mg/dL (2020 14:13)  POCT Blood Glucose.: 116 mg/dL (2020 13:05)  POCT Blood Glucose.: 141 mg/dL (2020 12:15)  POCT Blood Glucose.: 179 mg/dL (2020 11:07)  POCT Blood Glucose.: 225 mg/dL (2020 10:08)  POCT Blood Glucose.: 233 mg/dL (2020 09:05)  POCT Blood Glucose.: 233 mg/dL (2020 09:02)  POCT Blood Glucose.: 163 mg/dL (2020 07:59)                          8.8    16.21 )-----------( 279      ( 2020 04:20 )             27.7       Auto Neutrophil %: 78.6 % (20 @ 18:22)        145  |  113<H>  |  76<HH>  ----------------------------<  157<H>  4.5   |  17  |  4.6<HH>      Calcium, Total Serum: 8.2 mg/dL (20 @ 04:20)      LFTs:             5.5  | 0.4  | 86       ------------------[86      ( 2020 04:20 )  2.0  | x    | 27          Lipase:x      Amylase:x         Lactate, Blood: 0.7 mmol/L (20 @ 04:20)  Blood Gas Arterial, Lactate: 0.6 mmoL/L (20 @ 03:24)  Lactate, Blood: 0.9 mmol/L (20 @ 20:10)  Lactate, Blood: 0.9 mmol/L (20 @ 18:47)  Blood Gas Arterial, Lactate: 0.6 mmoL/L (20 @ 04:33)  Blood Gas Arterial, Lactate: 0.8 mmoL/L (20 @ 04:41)  Blood Gas Arterial, Lactate: 0.7 mmoL/L (20 @ 09:28)    ABG - ( 2020 03:24 )  pH: 7.30  /  pCO2: 36    /  pO2: 82    / HCO3: 18    / Base Excess: -7.9  /  SaO2: 96              ABG - ( 2020 04:33 )  pH: 7.29  /  pCO2: 39    /  pO2: 85    / HCO3: 19    / Base Excess: -7.0  /  SaO2: 96              ABG - ( 2020 04:41 )  pH: 7.33  /  pCO2: 38    /  pO2: 74    / HCO3: 20    / Base Excess: -5.4  /  SaO2: 95                Coags:     14.70  ----< 1.28    ( 2020 20:10 )     35.8           Culture - Blood (collected 2020 14:53)  Source: .Blood Blood-Venous  Preliminary Report (2020 23:00):    No growth to date.    Culture - Blood (collected 2020 04:17)  Source: .Blood None  Preliminary Report (2020 14:00):    No growth to date.          RADIOLOGY & ADDITIONAL TESTS:   < from: CT Abdomen and Pelvis w/ Oral Cont (20 @ 23:23) >    Liquid stool within the colon without evidence of obstruction or colonic wall thickening. No intraperitoneal free air.    Colon measures up to 6.5 cm at thececum.    Bilateral small pleural effusions with associated opacities    < end of copied text >

## 2020-07-29 NOTE — PROGRESS NOTE ADULT - ASSESSMENT
64M, PMH HTN, CAD, DM, admitted for stroke s/p TPA c/b intracranial hemorrhage, now w/o brainstem function, c/b AHRF on mech vent, E.coli bacteremia/+cdiff, on pressors and non-oliguric NAEL    #NAEL on CKD, p/w creatinine 1.9, unknown baseline, ATN iso CVA/shock  - creatinine stable, non oliguric   - bacteremia followed by ID, on Ceftriaxone  - DARSHAN noted, non-echogenic kidneys, no HN  - trend phos, not on binders. Ca corrected wnl  - continue sodium bicarbonate PO 1300mg TID  - follow sodium closely, on DDAVP   - no acute indication for RRT, would not improve prognosis  - will follow

## 2020-07-29 NOTE — PROGRESS NOTE ADULT - ASSESSMENT
ASSESSMENT/PLAN  Acute hypoxemic respiratory failure   Stroke SP TAP And thrombectomy  NAEL worsening  DI   E coli bacteremia  G+ Cocci bacteremia   Sepsis   Septic shock  CDiff +  hypernatremia    as per surgery no  surgical intervention   will f/u  check bmp/phos/mg and correct lytes

## 2020-07-29 NOTE — PROGRESS NOTE ADULT - ASSESSMENT
· Assessment		  64 year old male pmh of DM, CAD with PCI x3 in 2017, HTN, DLD, trigeminal neuralgia who presents to ED from [facility] s/p TPA for stroke.    IMPRESSION;   # CVA s/p TPA s/p thrombectomy (Basiliar Artery) - unresponsive off sedation, on pressors  - repeat CT head:  Interval increase in posterior fossa edema and mass effect consistent with evolving infarct  with resultant new effacement of the fourth ventricle and new mild obstructive hydrocephalus. Increased hypergenicity within the left ambient and quadrigeminal plate cisterns, and new hypodensity within the right ambient and quadrigeminal plate cisterns, the suprasellar cistern, the right sylvian fissure and scattered sulci suspicious worsening/new subarachnoid hemorrhage.  - remained ventilated, off sedation  - no neurologic activity. Apnea test performed (7/22), patient took a breath.  #CD colitis: on po vancomycin. Diarrhea has decreased  #E coli bacteremia : secondary to  tract with UCx E coli but no overt pyuria. Could well also be secondary to translocation from the GI tract.  BCx 7/27 NGTD    RECOMMENDATIONS;  Rocephin 2 gm iv q24h  Po vancomycin 125 mg q6h

## 2020-07-29 NOTE — PROGRESS NOTE ADULT - SUBJECTIVE AND OBJECTIVE BOX
Hospital Day:  13d    Chief Complaint: Patient is a 64y old  Male who presents with a chief complaint of Stroke s/p tpa (28 Jul 2020 22:35)    24 hour events:    Past Medical Hx:   HTN (hypertension)  Diabetes    Past Sx:  No significant past surgical history    Allergies:  No Known Allergies    Current Meds:   Standing Meds:  aMIOdarone    Tablet   Oral   aMIOdarone    Tablet 400 milliGRAM(s) Oral every 12 hours  aMIOdarone Infusion 0.5 mG/Min (16.7 mL/Hr) IV Continuous <Continuous>  atorvastatin 80 milliGRAM(s) Oral at bedtime  cefTRIAXone   IVPB 2000 milliGRAM(s) IV Intermittent every 24 hours  chlorhexidine 0.12% Liquid 15 milliLiter(s) Oral Mucosa every 12 hours  chlorhexidine 4% Liquid 1 Application(s) Topical <User Schedule>  desmopressin Injectable 2 MICROGram(s) IV Push every 12 hours  dextrose 50% Injectable 12.5 Gram(s) IV Push once  dextrose 50% Injectable 25 Gram(s) IV Push once  dextrose 50% Injectable 25 Gram(s) IV Push once  DOPamine Infusion 1 MICROgram(s)/kG/Min (3.17 mL/Hr) IV Continuous <Continuous>  heparin   Injectable 5000 Unit(s) SubCutaneous every 8 hours  insulin regular Infusion 1 Unit(s)/Hr (1 mL/Hr) IV Continuous <Continuous>  pantoprazole   Suspension 40 milliGRAM(s) Enteral Tube daily  sodium bicarbonate 650 milliGRAM(s) Oral every 8 hours  vancomycin    Solution 250 milliGRAM(s) Oral every 6 hours    PRN Meds:  acetaminophen   Tablet .. 650 milliGRAM(s) Oral every 6 hours PRN Temp greater or equal to 38C (100.4F)  dextrose 40% Gel 15 Gram(s) Oral once PRN Blood Glucose LESS THAN 70 milliGRAM(s)/deciliter  glucagon  Injectable 1 milliGRAM(s) IntraMuscular once PRN Glucose LESS THAN 70 milligrams/deciliter    HOME MEDICATIONS:  ALPRAZolam 0.5 mg oral tablet, extended release: 1 tab(s) orally once a day (at bedtime)  amLODIPine 5 mg oral tablet: 1 tab(s) orally once a day  clopidogrel 75 mg oral tablet: 1 tab(s) orally once a day  losartan 50 mg oral tablet: 1 tab(s) orally once a day  metFORMIN 500 mg oral tablet, extended release: 1 tab(s) orally once a day  omeprazole 20 mg oral delayed release capsule: 1 cap(s) orally once a day  OXcarbazepine 150 mg oral tablet: 1 tab(s) orally once a day  OXcarbazepine 300 mg oral tablet: 1 tab(s) orally once a day (at bedtime)  trihexyphenidyl: 1 milligram(s) orally 2 times a day      Vital Signs:   T(F): 99.9 (07-29-20 @ 04:00), Max: 100.7 (07-28-20 @ 12:00)  HR: 82 (07-29-20 @ 05:15) (78 - 90)  BP: --  RR: 25 (07-29-20 @ 05:15) (25 - 32)  SpO2: 95% (07-29-20 @ 05:15) (92% - 98%)      07-27-20 @ 07:01  -  07-28-20 @ 07:00  --------------------------------------------------------  IN: 2769.9 mL / OUT: 2985 mL / NET: -215.1 mL    07-28-20 @ 07:01  -  07-29-20 @ 05:57  --------------------------------------------------------  IN: 1902.5 mL / OUT: 1085 mL / NET: 817.5 mL        Physical Exam:   GENERAL: NAD  HEENT: NCAT  CHEST/LUNG: CTAB  HEART: Regular rate and rhythm; s1 s2 appreciated, No murmurs, rubs, or gallops  ABDOMEN: Soft, Nontender, Nondistended; Bowel sounds present  EXTREMITIES: No LE edema b/l  SKIN: no rashes, no new lesions  NERVOUS SYSTEM:  Alert & Oriented X3  LINES/CATHETERS:        Labs:                         8.8    16.21 )-----------( 279      ( 29 Jul 2020 04:20 )             27.7     Neutophil% 78.6, Lymphocyte% 11.6, Monocyte% 8.0, Bands% -- 07-28-20 @ 18:22    29 Jul 2020 04:20    145    |  113    |  76     ----------------------------<  157    4.5     |  17     |  4.6      Ca    8.2        29 Jul 2020 04:20  Mg     2.3       29 Jul 2020 04:20    TPro  5.5    /  Alb  2.0    /  TBili  0.4    /  DBili  x      /  AST  86     /  ALT  27     /  AlkPhos  86     29 Jul 2020 04:20       pTT    35.8             ----< 1.28 INR  (07-28-20 @ 20:10)    14.70        PT      Culture - Blood (collected 07-27-20 @ 14:53)  Source: .Blood Blood-Venous  Preliminary Report (07-28-20 @ 23:00):    No growth to date.    Culture - Blood (collected 07-27-20 @ 04:17)  Source: .Blood None  Preliminary Report (07-28-20 @ 14:00):    No growth to date.    Culture - Blood (collected 07-23-20 @ 21:05)  Source: .Blood Blood  Gram Stain (07-27-20 @ 01:52):    Growth in anaerobic bottle: Gram Negative Rods    Growth in aerobic bottle:    Gram Positive Cocci in Clusters  Final Report (07-28-20 @ 11:37):    Growth in anaerobic bottle: Escherichia coli    Growth in aerobic bottle: Coag Negative Staphylococcus Unable to Identify    further    Coag Negative Staphylococcus    Single set isolate, possible contaminant. Contact    Microbiology if susceptibility testing clinically    indicated.    "Due to technical problems, Proteus sp. will Not be reported as part of    the BCID panel until further notice"    ***Blood Panel PCR results on this specimen are available    approximately 3 hours after the Gram stain result.***    Gram stain, PCR, and/or culture results may not always    correspond due to difference in methodologies.    ************************************************************    This PCR assay was performed using [employer].    The following targets are tested for: Enterococcus,    vancomycin resistant enterococci, Listeria monocytogenes,    coagulase negative staphylococci, S. aureus,    methicillin resistant S. aureus, Streptococcus agalactiae    (Group B), S. pneumoniae, S. pyogenes (Group A),    Acinetobacter baumannii, Enterobacter cloacae, E. coli,    Klebsiella oxytoca, K. pneumoniae, Proteus sp.,    Serratia marcescens, Haemophilus influenzae,    Neisseria meningitidis, Pseudomonas aeruginosa, Candida    albicans, C. glabrata, C krusei, C parapsilosis,    C. tropicalis and the KPC resistance gene.  Organism: Blood Culture PCR  Escherichia coli  Blood Culture PCR (07-27-20 @ 02:56)  Organism: Blood Culture PCR (07-27-20 @ 02:56)      -  Acinetobacter baumanii: Nondet      -  Candida albicans: Nondet      -  Candida glabrata: Nondet      -  Candida krusei: Nondet      -  Candida parapsilosis: Nondet      -  Candida tropicalis: Nondet      -  Coagulase negative Staphylococcus: Nondet      -  Enterobacter cloacae complex: Nondet      -  Enterococcus species: Nondet      -  Escherichia coli: Nondet      -  Haemophilus influenzae: Nondet      -  Klebsiella oxytoca: Nondet      -  Klebsiella pneumoniae: Nondet      -  Listeria monocytogenes: Nondet      -  Methicillin resistant Staphylococcus aureus (MRSA): Nondet      -  Multidrug (KPC pos) resistant organism: Nondet      -  Neisseria meningitidis: Nondet      -  Pseudomonas aeruginosa: Nondet      -  Serratia marcescens: Nondet      -  Staphylococcus aureus: Nondet      -  Streptococcus agalactiae (Group B): Nondet      -  Streptococcus pneumoniae: Nondet      -  Streptococcus pyogenes (Group A): Nondet      -  Streptococcus sp. (Not Grp A, B or S pneumoniae): Nondet      -  Vancomycin resistant Enterococcus sp.: Nondet      Method Type: PCR  Organism: Escherichia coli (07-27-20 @ 01:52)      -  Amikacin: S <=16      -  Ampicillin: R >16 These ampicillin results predict results for amoxicillin      -  Ampicillin/Sulbactam: I 16/8 Enterobacter, Citrobacter, and Serratia may develop resistance during prolonged therapy (3-4 days)      -  Aztreonam: S <=4      -  Cefazolin: R >16 Enterobacter, Citrobacter, and Serratia may develop resistance during prolonged therapy (3-4 days)      -  Cefepime: S <=2      -  Cefoxitin: R >16      -  Ceftriaxone: S <=1 Enterobacter, Citrobacter, and Serratia may develop resistance during prolonged therapy      -  Ciprofloxacin: R >2      -  Ertapenem: S <=0.5      -  Gentamicin: S <=2      -  Imipenem: S <=1      -  Levofloxacin: R >4      -  Meropenem: S <=1      -  Piperacillin/Tazobactam: S <=8      -  Tobramycin: S <=2      -  Trimethoprim/Sulfamethoxazole: S 2/38      Method Type: MORRIS  Organism: Blood Culture PCR (07-27-20 @ 01:52)      -  Escherichia coli: Detec      Method Type: PCR    Radiology:   < from: CT Abdomen and Pelvis w/ Oral Cont (07.28.20 @ 23:23) >  IMPRESSION:    Liquid stool within the colon without evidence of obstruction or colonic wall thickening. No intraperitoneal free air.    < end of copied text >    Assessment and Plan:   64 year old male pmh of DM, CAD with PCI x3 in 2017, HTN, DLD, trigeminal neuralgia who presents to ED from [facility] s/p TPA for stroke.    # CVA s/p TPA s/p thrombectomy (Basiliar Artery) - unresponsive off sedation, pressors changed to vasopressin and phenylephrine   - repeat CT head:  Interval increase in posterior fossa edema and mass effect consistent with evolving infarct  with resultant new effacement of the fourth ventricle and new mild obstructive hydrocephalus. Increased hyperdensity within the left ambient and quadrigeminal plate cisterns, and new hyperdensity within the right ambient and quadrigeminal plate cisterns, the suprasellar cistern, the right sylvian fissure and scattered sulci suspicious worsening/new subarachnoid hemorrhage.  - s/p 23% NS, 50g mannitol, 3% hypertonic saline drip   - remained ventilated, off sedation  - taper off pressors if possible   - integrillin completed  - patient being switched to dopamine for pressor support   - no notable neurologic activity. Apnea test performed (7/22), patient took a breath.  - apnea test repeated (7/27), patient took 2 breaths.   - continue with Subq heparin for DVT ppx, cleared by neurology.   - Transcranial doppler?   - continue with vergara catheter for strict I/O   - TLC changed to R IJ, confirmed placement with cxr   - Vianney Nye (daughter, HCP) would like a call before any MRI, apnea test, or brain death protocol is done again. Her number is [phone].  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 Diff.  - C. Diff PCR positive  - increased vancomycin 250mg PO     # Distended abdomen - r/o megacolon   - abdomen more firm than AM  - STAT xray of the abdomen performed showing gaseous distention of the ascending colon and stomach.  - Follow up urgent CT abdomen with PO contrast  - Surgery cst placed.     # Gram positive cocci in clusters in the blood   - Follow up bacterial sensitivities   - Follow up AM vancomycin level - renally dose according to level      # GNR bacteremia likely secondary to UTI   - patient remains febrile   - UA positive for Nitrites   - BCx positive for E. Coli. Follow up sensitivities.  - continue with meropenem renally dosed.     # Worsening NAEL - stable   - baseline Cr ~1   - Cr currently 4.1  - Kidney bladder US unremarkable   - sodium bicarb 650mg PO BID  - Follow up nephrology recs     # Hypokalemia - resolved  - K was 3.4 repleted with 20mEq x 2 (7/26)   - Follow up BMP     # DM   - HbA1c 7.5%   - insulin gtt   - q1h FS     # DLD   - continue with atorvastatin 80mg      # Activity: Bed rest   # Diet: NPO with tube feeds  # DVT ppx: Subq heparin   # GI ppx: Protonix  # Code Status: FULL CODE    # DISPO: Acute Hospital Day:  13d    Chief Complaint: Patient is a 64y old  Male who presents with a chief complaint of Stroke s/p tpa (28 Jul 2020 22:35)    24 hour events: Patient received CT of the abdomen overnight and was placed on low continuous NGT suction.   - No other acute overnight events. Patient seen this AM, unresponsive and ventilated, in bed.    Past Medical Hx:   HTN (hypertension)  Diabetes    Past Sx:  No significant past surgical history    Allergies:  No Known Allergies    Current Meds:   Standing Meds:  aMIOdarone    Tablet   Oral   aMIOdarone    Tablet 400 milliGRAM(s) Oral every 12 hours  aMIOdarone Infusion 0.5 mG/Min (16.7 mL/Hr) IV Continuous <Continuous>  atorvastatin 80 milliGRAM(s) Oral at bedtime  cefTRIAXone   IVPB 2000 milliGRAM(s) IV Intermittent every 24 hours  chlorhexidine 0.12% Liquid 15 milliLiter(s) Oral Mucosa every 12 hours  chlorhexidine 4% Liquid 1 Application(s) Topical <User Schedule>  desmopressin Injectable 2 MICROGram(s) IV Push every 12 hours  dextrose 50% Injectable 12.5 Gram(s) IV Push once  dextrose 50% Injectable 25 Gram(s) IV Push once  dextrose 50% Injectable 25 Gram(s) IV Push once  DOPamine Infusion 1 MICROgram(s)/kG/Min (3.17 mL/Hr) IV Continuous <Continuous>  heparin   Injectable 5000 Unit(s) SubCutaneous every 8 hours  insulin regular Infusion 1 Unit(s)/Hr (1 mL/Hr) IV Continuous <Continuous>  pantoprazole   Suspension 40 milliGRAM(s) Enteral Tube daily  sodium bicarbonate 650 milliGRAM(s) Oral every 8 hours  vancomycin    Solution 250 milliGRAM(s) Oral every 6 hours    PRN Meds:  acetaminophen   Tablet .. 650 milliGRAM(s) Oral every 6 hours PRN Temp greater or equal to 38C (100.4F)  dextrose 40% Gel 15 Gram(s) Oral once PRN Blood Glucose LESS THAN 70 milliGRAM(s)/deciliter  glucagon  Injectable 1 milliGRAM(s) IntraMuscular once PRN Glucose LESS THAN 70 milligrams/deciliter    HOME MEDICATIONS:  ALPRAZolam 0.5 mg oral tablet, extended release: 1 tab(s) orally once a day (at bedtime)  amLODIPine 5 mg oral tablet: 1 tab(s) orally once a day  clopidogrel 75 mg oral tablet: 1 tab(s) orally once a day  losartan 50 mg oral tablet: 1 tab(s) orally once a day  metFORMIN 500 mg oral tablet, extended release: 1 tab(s) orally once a day  omeprazole 20 mg oral delayed release capsule: 1 cap(s) orally once a day  OXcarbazepine 150 mg oral tablet: 1 tab(s) orally once a day  OXcarbazepine 300 mg oral tablet: 1 tab(s) orally once a day (at bedtime)  trihexyphenidyl: 1 milligram(s) orally 2 times a day      Vital Signs:   T(F): 99.9 (07-29-20 @ 04:00), Max: 100.7 (07-28-20 @ 12:00)  HR: 82 (07-29-20 @ 05:15) (78 - 90)  BP: --  RR: 25 (07-29-20 @ 05:15) (25 - 32)  SpO2: 95% (07-29-20 @ 05:15) (92% - 98%)      07-27-20 @ 07:01  -  07-28-20 @ 07:00  --------------------------------------------------------  IN: 2769.9 mL / OUT: 2985 mL / NET: -215.1 mL    07-28-20 @ 07:01  -  07-29-20 @ 05:57  --------------------------------------------------------  IN: 1902.5 mL / OUT: 1085 mL / NET: 817.5 mL    Physical Exam:   GENERAL: unresponsive male in NAD   HEENT: NCAT, +ET tube, +R IJ  CHEST/LUNG: audible breath sounds b/l   HEART: Regular rate and rhythm; s1 s2 appreciated  ABDOMEN: improved soft, mildly distended abdomen   EXTREMITIES: UE and LE edema   NERVOUS SYSTEM:  unresponsive, no apparent brainstem reflexes     Labs:                         8.8    16.21 )-----------( 279      ( 29 Jul 2020 04:20 )             27.7     Neutophil% 78.6, Lymphocyte% 11.6, Monocyte% 8.0, Bands% -- 07-28-20 @ 18:22    29 Jul 2020 04:20    145    |  113    |  76     ----------------------------<  157    4.5     |  17     |  4.6      Ca    8.2        29 Jul 2020 04:20  Mg     2.3       29 Jul 2020 04:20    TPro  5.5    /  Alb  2.0    /  TBili  0.4    /  DBili  x      /  AST  86     /  ALT  27     /  AlkPhos  86     29 Jul 2020 04:20       pTT    35.8             ----< 1.28 INR  (07-28-20 @ 20:10)    14.70        PT      Culture - Blood (collected 07-27-20 @ 14:53)  Source: .Blood Blood-Venous  Preliminary Report (07-28-20 @ 23:00):    No growth to date.    Culture - Blood (collected 07-27-20 @ 04:17)  Source: .Blood None  Preliminary Report (07-28-20 @ 14:00):    No growth to date.    Culture - Blood (collected 07-23-20 @ 21:05)  Source: .Blood Blood  Gram Stain (07-27-20 @ 01:52):    Growth in anaerobic bottle: Gram Negative Rods    Growth in aerobic bottle:    Gram Positive Cocci in Clusters  Final Report (07-28-20 @ 11:37):    Growth in anaerobic bottle: Escherichia coli    Growth in aerobic bottle: Coag Negative Staphylococcus Unable to Identify    further    Coag Negative Staphylococcus    Single set isolate, possible contaminant. Contact    Microbiology if susceptibility testing clinically    indicated.    "Due to technical problems, Proteus sp. will Not be reported as part of    the BCID panel until further notice"    ***Blood Panel PCR results on this specimen are available    approximately 3 hours after the Gram stain result.***    Gram stain, PCR, and/or culture results may not always    correspond due to difference in methodologies.    ************************************************************    This PCR assay was performed using [employer].    The following targets are tested for: Enterococcus,    vancomycin resistant enterococci, Listeria monocytogenes,    coagulase negative staphylococci, S. aureus,    methicillin resistant S. aureus, Streptococcus agalactiae    (Group B), S. pneumoniae, S. pyogenes (Group A),    Acinetobacter baumannii, Enterobacter cloacae, E. coli,    Klebsiella oxytoca, K. pneumoniae, Proteus sp.,    Serratia marcescens, Haemophilus influenzae,    Neisseria meningitidis, Pseudomonas aeruginosa, Candida    albicans, C. glabrata, C krusei, C parapsilosis,    C. tropicalis and the KPC resistance gene.  Organism: Blood Culture PCR  Escherichia coli  Blood Culture PCR (07-27-20 @ 02:56)  Organism: Blood Culture PCR (07-27-20 @ 02:56)      -  Acinetobacter baumanii: Nondet      -  Candida albicans: Nondet      -  Candida glabrata: Nondet      -  Candida krusei: Nondet      -  Candida parapsilosis: Nondet      -  Candida tropicalis: Nondet      -  Coagulase negative Staphylococcus: Nondet      -  Enterobacter cloacae complex: Nondet      -  Enterococcus species: Nondet      -  Escherichia coli: Nondet      -  Haemophilus influenzae: Nondet      -  Klebsiella oxytoca: Nondet      -  Klebsiella pneumoniae: Nondet      -  Listeria monocytogenes: Nondet      -  Methicillin resistant Staphylococcus aureus (MRSA): Nondet      -  Multidrug (KPC pos) resistant organism: Nondet      -  Neisseria meningitidis: Nondet      -  Pseudomonas aeruginosa: Nondet      -  Serratia marcescens: Nondet      -  Staphylococcus aureus: Nondet      -  Streptococcus agalactiae (Group B): Nondet      -  Streptococcus pneumoniae: Nondet      -  Streptococcus pyogenes (Group A): Nondet      -  Streptococcus sp. (Not Grp A, B or S pneumoniae): Nondet      -  Vancomycin resistant Enterococcus sp.: Nondet      Method Type: PCR  Organism: Escherichia coli (07-27-20 @ 01:52)      -  Amikacin: S <=16      -  Ampicillin: R >16 These ampicillin results predict results for amoxicillin      -  Ampicillin/Sulbactam: I 16/8 Enterobacter, Citrobacter, and Serratia may develop resistance during prolonged therapy (3-4 days)      -  Aztreonam: S <=4      -  Cefazolin: R >16 Enterobacter, Citrobacter, and Serratia may develop resistance during prolonged therapy (3-4 days)      -  Cefepime: S <=2      -  Cefoxitin: R >16      -  Ceftriaxone: S <=1 Enterobacter, Citrobacter, and Serratia may develop resistance during prolonged therapy      -  Ciprofloxacin: R >2      -  Ertapenem: S <=0.5      -  Gentamicin: S <=2      -  Imipenem: S <=1      -  Levofloxacin: R >4      -  Meropenem: S <=1      -  Piperacillin/Tazobactam: S <=8      -  Tobramycin: S <=2      -  Trimethoprim/Sulfamethoxazole: S 2/38      Method Type: MORRIS  Organism: Blood Culture PCR (07-27-20 @ 01:52)      -  Escherichia coli: Detec      Method Type: PCR    Radiology:   < from: CT Abdomen and Pelvis w/ Oral Cont (07.28.20 @ 23:23) >  IMPRESSION:    Liquid stool within the colon without evidence of obstruction or colonic wall thickening. No intraperitoneal free air.    Colon measures up to 6.5 cm at thececum.    Bilateral small pleural effusions with associated opacities      < end of copied text >    Assessment and Plan:   64 year old male pmh of DM, CAD with PCI x3 in 2017, HTN, DLD, trigeminal neuralgia who presents to ED from [facility] s/p TPA for stroke.    # CVA s/p TPA s/p thrombectomy (Basiliar Artery) - unresponsive off sedation, pressors changed to vasopressin and phenylephrine   - repeat CT head:  Interval increase in posterior fossa edema and mass effect consistent with evolving infarct  with resultant new effacement of the fourth ventricle and new mild obstructive hydrocephalus. Increased hyperdensity within the left ambient and quadrigeminal plate cisterns, and new hyperdensity within the right ambient and quadrigeminal plate cisterns, the suprasellar cistern, the right sylvian fissure and scattered sulci suspicious worsening/new subarachnoid hemorrhage.  - s/p 23% NS, 50g mannitol, 3% hypertonic saline drip   - remained ventilated, off sedation  - taper off pressors if possible   - integrillin completed  - continue with dopamine for pressor support   - no notable neurologic activity. Apnea test performed (7/22), patient took a breath.  - apnea test repeated (7/27), patient took 2 breaths.   - continue with Subq heparin for DVT ppx, cleared by neurology.   - continue with vergara catheter for strict I/O   - TLC changed to R IJ, confirmed placement with cxr   - Vianney Nye (daughter, HCP) would like a call before any MRI, apnea test, or brain death protocol is done again. Her number is [phone].  - Patient was having spontaneous movements of LE, Follow up EEG.   - Talk to family about further plans for possible trach and PEG.     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 Diff.  - C. Diff PCR positive  - continue with vancomycin 250mg PO     # Distended abdomen - improved  - abdomen more firm than AM  - xray of the abdomen performed showing gaseous distention of the ascending colon and stomach.  - CT Abdomen shows liquid stool within the colon without evidence of obstruction or colon thickening, no intraperitoneal free air.  - Patient placed on low continuous suction.   - d/c amio PO and atorvastatin PO - if patient requires it, restart on amio gtt.   - Surgery following    # Gram positive cocci in clusters in the blood   - Follow up bacterial sensitivities   - continue vanc 1250 q48h  - repeat trough before next dose   - No growth to date on repeat cultures, coag negative staff possible contaminant?     # GNR bacteremia likely secondary to UTI   - patient remains febrile   - UA positive for Nitrites   - BCx positive for E. Coli.   - continue with rocephin 2g IV q24h    # Worsening NAEL - stable   - baseline Cr ~1   - Cr currently 4.6  - Kidney bladder US unremarkable   - d/c PO sodium bicarb   - Start D5 with 150 mEq of bicarb   - Follow up nephrology recs     # Hypokalemia - resolved  - K was 3.4 repleted with 20mEq x 2 (7/26)   - Follow up BMP     # DM   - HbA1c 7.5%   - insulin gtt   - q1h FS     # DLD   - d/c atorvastatin while NPO    # Activity: Bed rest   # Diet: NPO  # DVT ppx: Subq heparin   # GI ppx: Protonix  # Code Status: FULL CODE    # DISPO: Acute

## 2020-07-29 NOTE — PROGRESS NOTE ADULT - SUBJECTIVE AND OBJECTIVE BOX
Patient is a 64y old  Male who presents with a chief complaint of Stroke s/p tpa (29 Jul 2020 07:53)        Over Night Events:  On MV.  On Dopamine 17 mcg.  Off sedation.  SP CTA         ROS:     All ROS are negative except HPI         PHYSICAL EXAM    ICU Vital Signs Last 24 Hrs  T(C): 37.9 (29 Jul 2020 08:00), Max: 38.2 (28 Jul 2020 12:00)  T(F): 100.2 (29 Jul 2020 08:00), Max: 100.7 (28 Jul 2020 12:00)  HR: 80 (29 Jul 2020 08:00) (80 - 90)  BP: --  BP(mean): --  ABP: 132/50 (29 Jul 2020 08:00) (76/44 - 148/58)  ABP(mean): 70 (29 Jul 2020 08:00) (54 - 82)  RR: 25 (29 Jul 2020 08:00) (25 - 32)  SpO2: 96% (29 Jul 2020 08:00) (92% - 98%)      CONSTITUTIONAL:  Well nourished.  NAD    ENT:   Airway patent,   Mouth with normal mucosa.   No thrush    EYES:   Pupils equal,   Round non reactive to light.    CARDIAC:   Normal rate,   Regular rhythm.     edema      Vascular:  Normal systolic impulse  No Carotid bruits    RESPIRATORY:   No wheezing  Bilateral BS  Normal chest expansion  Not tachypneic,  No use of accessory muscles    GASTROINTESTINAL:  Abdomen soft,   Non-tender,   No guarding,   + BS    MUSCULOSKELETAL:   Range of motion is not limited,  No clubbing, cyanosis    NEUROLOGICAL:   no gag.  No corneal.     SKIN:   Skin normal color for race,   Warm and dry  No evidence of rash.    PSYCHIATRIC:   No apparent risk to self or others.    HEMATOLOGICAL:  No cervical  lymphadenopathy.  no inguinal lymphadenopathy      07-28-20 @ 07:01  -  07-29-20 @ 07:00  --------------------------------------------------------  IN:    DOPamine Infusion: 1292.1 mL    insulin regular Infusion: 72 mL    Peptamen A.F.: 650 mL  Total IN: 2014.1 mL    OUT:    Indwelling Catheter - Urethral: 1205 mL    Nasoenteral Tube: 600 mL    Rectal Tube: 50 mL  Total OUT: 1855 mL    Total NET: 159.1 mL          LABS:                            8.8    16.21 )-----------( 279      ( 29 Jul 2020 04:20 )             27.7                                               07-29    145  |  113<H>  |  76<HH>  ----------------------------<  157<H>  4.5   |  17  |  4.6<HH>    29 Jul 2020 04:20    145    |  113<H>  |  76<HH>  ----------------------------<  157<H>  4.5     |  17     |  4.6<HH>  28 Jul 2020 18:22    148<H>  |  119<H>  |  69<HH>  ----------------------------<  170<H>  4.0     |  16<L>  |  4.1<HH>    Ca    8.2<L>      29 Jul 2020 04:20  Ca    7.0<L>      28 Jul 2020 18:22  Mg     2.3       29 Jul 2020 04:20  Mg     2.5<H>     28 Jul 2020 04:30    TPro  5.5<L>  /  Alb  2.0<L>  /  TBili  0.4    /  DBili  x      /  AST  86<H>  /  ALT  27     /  AlkPhos  86     29 Jul 2020 04:20  TPro  4.8<L>  /  Alb  1.8<L>  /  TBili  0.3    /  DBili  x      /  AST  75<H>  /  ALT  23     /  AlkPhos  78     28 Jul 2020 18:22      Ca    8.2<L>      29 Jul 2020 04:20  Mg     2.3     07-29    TPro  5.5<L>  /  Alb  2.0<L>  /  TBili  0.4  /  DBili  x   /  AST  86<H>  /  ALT  27  /  AlkPhos  86  07-29      PT/INR - ( 28 Jul 2020 20:10 )   PT: 14.70 sec;   INR: 1.28 ratio         PTT - ( 28 Jul 2020 20:10 )  PTT:35.8 sec                                                                                     LIVER FUNCTIONS - ( 29 Jul 2020 04:20 )  Alb: 2.0 g/dL / Pro: 5.5 g/dL / ALK PHOS: 86 U/L / ALT: 27 U/L / AST: 86 U/L / GGT: x                                                  Culture - Blood (collected 27 Jul 2020 14:53)  Source: .Blood Blood-Venous  Preliminary Report (28 Jul 2020 23:00):    No growth to date.    Culture - Blood (collected 27 Jul 2020 04:17)  Source: .Blood None  Preliminary Report (28 Jul 2020 14:00):    No growth to date.                                                   Mode: AC/ CMV (Assist Control/ Continuous Mandatory Ventilation)  RR (machine): 26  TV (machine): 450  FiO2: 40  PEEP: 5  ITime: 1  MAP: 11  PIP: 18                                      ABG - ( 29 Jul 2020 03:24 )  pH, Arterial: 7.30  pH, Blood: x     /  pCO2: 36    /  pO2: 82    / HCO3: 18    / Base Excess: -7.9  /  SaO2: 96    Lac 0.6               MEDICATIONS  (STANDING):  aMIOdarone    Tablet   Oral   aMIOdarone    Tablet 400 milliGRAM(s) Oral every 12 hours  aMIOdarone Infusion 0.5 mG/Min (16.7 mL/Hr) IV Continuous <Continuous>  atorvastatin 80 milliGRAM(s) Oral at bedtime  cefTRIAXone   IVPB 2000 milliGRAM(s) IV Intermittent every 24 hours  chlorhexidine 0.12% Liquid 15 milliLiter(s) Oral Mucosa every 12 hours  chlorhexidine 4% Liquid 1 Application(s) Topical <User Schedule>  desmopressin Injectable 2 MICROGram(s) IV Push every 12 hours  dextrose 50% Injectable 12.5 Gram(s) IV Push once  dextrose 50% Injectable 25 Gram(s) IV Push once  dextrose 50% Injectable 25 Gram(s) IV Push once  DOPamine Infusion 1 MICROgram(s)/kG/Min (3.17 mL/Hr) IV Continuous <Continuous>  heparin   Injectable 5000 Unit(s) SubCutaneous every 8 hours  insulin regular Infusion 1 Unit(s)/Hr (1 mL/Hr) IV Continuous <Continuous>  pantoprazole   Suspension 40 milliGRAM(s) Enteral Tube daily  sodium bicarbonate 650 milliGRAM(s) Oral every 8 hours  vancomycin    Solution 250 milliGRAM(s) Oral every 6 hours    MEDICATIONS  (PRN):  acetaminophen   Tablet .. 650 milliGRAM(s) Oral every 6 hours PRN Temp greater or equal to 38C (100.4F)  dextrose 40% Gel 15 Gram(s) Oral once PRN Blood Glucose LESS THAN 70 milliGRAM(s)/deciliter  glucagon  Injectable 1 milliGRAM(s) IntraMuscular once PRN Glucose LESS THAN 70 milligrams/deciliter      New X-rays reviewed:                                                                                  ECHO    CXR interpreted by me:  et og ok>  Bibasilar atelectasis

## 2020-07-29 NOTE — PROGRESS NOTE ADULT - SUBJECTIVE AND OBJECTIVE BOX
Nephrology Progress Note    ANA CRISTINA LOVING  MRN-[id number]  64y  Male    S:  Patient is seen and examined, events over the last 24h noted.    O:  Allergies:  No Known Allergies    Hospital Medications:   MaleMEDICATIONS  (STANDING):  cefTRIAXone   IVPB 2000 milliGRAM(s) IV Intermittent every 24 hours  chlorhexidine 0.12% Liquid 15 milliLiter(s) Oral Mucosa every 12 hours  chlorhexidine 4% Liquid 1 Application(s) Topical <User Schedule>  desmopressin Injectable 2 MICROGram(s) IV Push every 12 hours  dextrose 5% 1000 milliLiter(s) (75 mL/Hr) IV Continuous <Continuous>  dextrose 50% Injectable 12.5 Gram(s) IV Push once  dextrose 50% Injectable 25 Gram(s) IV Push once  dextrose 50% Injectable 25 Gram(s) IV Push once  DOPamine Infusion 1 MICROgram(s)/kG/Min (3.17 mL/Hr) IV Continuous <Continuous>  heparin   Injectable 5000 Unit(s) SubCutaneous every 8 hours  insulin regular Infusion 1 Unit(s)/Hr (1 mL/Hr) IV Continuous <Continuous>  pantoprazole   Suspension 40 milliGRAM(s) Enteral Tube daily  vancomycin    Solution 250 milliGRAM(s) Oral every 6 hours  vancomycin  IVPB 1250 milliGRAM(s) IV Intermittent every 48 hours    MEDICATIONS  (PRN):  acetaminophen   Tablet .. 650 milliGRAM(s) Oral every 6 hours PRN Temp greater or equal to 38C (100.4F)  dextrose 40% Gel 15 Gram(s) Oral once PRN Blood Glucose LESS THAN 70 milliGRAM(s)/deciliter  glucagon  Injectable 1 milliGRAM(s) IntraMuscular once PRN Glucose LESS THAN 70 milligrams/deciliter    Home Medications:  Home Medications:  ALPRAZolam 0.5 mg oral tablet, extended release: 1 tab(s) orally once a day (at bedtime) (2020 19:14)  amLODIPine 5 mg oral tablet: 1 tab(s) orally once a day (2020 19:14)  clopidogrel 75 mg oral tablet: 1 tab(s) orally once a day (2020 19:11)  losartan 50 mg oral tablet: 1 tab(s) orally once a day (2020 19:13)  metFORMIN 500 mg oral tablet, extended release: 1 tab(s) orally once a day (2020 19:13)  omeprazole 20 mg oral delayed release capsule: 1 cap(s) orally once a day (2020 19:14)  OXcarbazepine 150 mg oral tablet: 1 tab(s) orally once a day (2020 19:15)  OXcarbazepine 300 mg oral tablet: 1 tab(s) orally once a day (at bedtime) (2020 19:12)  trihexyphenidyl: 1 milligram(s) orally 2 times a day (2020 19:13)      VITALS:  Daily     Daily Weight in k.3 (2020 06:00)  T(F): 100.2 (20 @ 08:00), Max: 100.7 (20 @ 12:00)  HR: 82 (20 @ 09:00)  BP: --  RR: 25 (20 @ 09:00)  SpO2: 96% (20 @ 09:00)  Wt(kg): --  I&O's Detail    2020 07:  -  2020 07:00  --------------------------------------------------------  IN:    DOPamine Infusion: 1292.1 mL    insulin regular Infusion: 72 mL    Peptamen A.F.: 650 mL  Total IN: 2014.1 mL    OUT:    Indwelling Catheter - Urethral: 1205 mL    Nasoenteral Tube: 600 mL    Rectal Tube: 50 mL  Total OUT: 1855 mL    Total NET: 159.1 mL      2020 07:  -  2020 09:45  --------------------------------------------------------  IN:    DOPamine Infusion: 104.5 mL    insulin regular Infusion: 1 mL  Total IN: 105.5 mL    OUT:    Indwelling Catheter - Urethral: 90 mL  Total OUT: 90 mL    Total NET: 15.5 mL        I&O's Summary    2020 07:  -  2020 07:00  --------------------------------------------------------  IN: 2014.1 mL / OUT: 1855 mL / NET: 159.1 mL    2020 07:01  -  2020 09:45  --------------------------------------------------------  IN: 105.5 mL / OUT: 90 mL / NET: 15.5 mL          PHYSICAL EXAM:  Gen: NAD  Resp: CTA  Card: regular, no rub  Abd: soft  Extremities: no edema      LABS:  ABG - ( 2020 03:24 )  pH, Arterial: 7.30  pH, Blood: x     /  pCO2: 36    /  pO2: 82    / HCO3: 18    / Base Excess: -7.9  /  SaO2: 96                    145  |  113<H>  |  76<HH>  ----------------------------<  157<H>  4.5   |  17  |  4.6<HH>    Ca    8.2<L>      2020 04:20  Mg     2.3         TPro  5.5<L>  /  Alb  2.0<L>  /  TBili  0.4  /  DBili      /  AST  86<H>  /  ALT  27  /  AlkPhos  86      Phosphorus Level, Serum: 3.4 mg/dL (20 @ 04:30)  Phosphorus Level, Serum: 4.5 mg/dL (20 @ 04:50)  Phosphorus Level, Serum: 3.1 mg/dL (20 @ 04:45)                          8.8    16.21 )-----------( 279      ( 2020 04:20 )             27.7       CBC Full  -  ( 2020 04:20 )  WBC Count : 16.21 K/uL  RBC Count : 3.08 M/uL  Hemoglobin : 8.8 g/dL  Hematocrit : 27.7 %  Platelet Count - Automated : 279 K/uL  Mean Cell Volume : 89.9 fL      Urine Studies:  Urinalysis Basic - ( 2020 21:50 )    Color: Yellow / Appearance: Slightly Turbid / S.014 / pH:   Gluc:  / Ketone: Negative  / Bili: Negative / Urobili: <2 mg/dL   Blood:  / Protein: 100 mg/dL / Nitrite: Positive   Leuk Esterase: Negative / RBC: 6 /HPF / WBC 14 /HPF   Sq Epi:  / Non Sq Epi: 4 /HPF / Bacteria: Negative          Creatinine trend:  Creatinine, Serum: 4.6 mg/dL (20 @ 04:20)  Creatinine, Serum: 4.1 mg/dL (20 @ 18:22)  Creatinine, Serum: 4.6 mg/dL (20 @ 04:30)  Creatinine, Serum: 4.7 mg/dL (20 @ 21:30)  Creatinine, Serum: 4.7 mg/dL (20 @ 16:50)  Creatinine, Serum: 4.4 mg/dL (20 @ 04:30)  Creatinine, Serum: 4.4 mg/dL (20 @ 23:00)  Creatinine, Serum: 4.1 mg/dL (20 @ 19:45)  Creatinine, Serum: 4.1 mg/dL (20 @ 15:59)  Creatinine, Serum: 3.9 mg/dL (20 @ 04:00)  Creatinine, Serum: 3.7 mg/dL (20 @ 00:15)  Creatinine, Serum: 3.8 mg/dL (20 @ 20:10)  Creatinine, Serum: 3.6 mg/dL (20 @ 16:06)  Creatinine, Serum: 3.2 mg/dL (20 @ 10:40)  Creatinine, Serum: 3.0 mg/dL (20 @ 04:30)  Creatinine, Serum: 2.8 mg/dL (20 @ 23:10)  Creatinine, Serum: 2.8 mg/dL (20 @ 20:30)  Creatinine, Serum: 2.7 mg/dL (20 @ 16:27)  Creatinine, Serum: 2.5 mg/dL (20 @ 13:40)  Creatinine, Serum: 2.5 mg/dL (20 @ 09:00) Nephrology Progress Note    ANA CRISTINA LOVING  MRN-[id number]  64y  Male    S:  Patient is seen and examined, events over the last 24h noted.    O:  Allergies:  No Known Allergies    Hospital Medications:   MaleMEDICATIONS  (STANDING):  cefTRIAXone   IVPB 2000 milliGRAM(s) IV Intermittent every 24 hours  chlorhexidine 0.12% Liquid 15 milliLiter(s) Oral Mucosa every 12 hours  chlorhexidine 4% Liquid 1 Application(s) Topical <User Schedule>  desmopressin Injectable 2 MICROGram(s) IV Push every 12 hours  dextrose 5% 1000 milliLiter(s) (75 mL/Hr) IV Continuous <Continuous>  dextrose 50% Injectable 12.5 Gram(s) IV Push once  dextrose 50% Injectable 25 Gram(s) IV Push once  dextrose 50% Injectable 25 Gram(s) IV Push once  DOPamine Infusion 1 MICROgram(s)/kG/Min (3.17 mL/Hr) IV Continuous <Continuous>  heparin   Injectable 5000 Unit(s) SubCutaneous every 8 hours  insulin regular Infusion 1 Unit(s)/Hr (1 mL/Hr) IV Continuous <Continuous>  pantoprazole   Suspension 40 milliGRAM(s) Enteral Tube daily  vancomycin    Solution 250 milliGRAM(s) Oral every 6 hours  vancomycin  IVPB 1250 milliGRAM(s) IV Intermittent every 48 hours    MEDICATIONS  (PRN):  acetaminophen   Tablet .. 650 milliGRAM(s) Oral every 6 hours PRN Temp greater or equal to 38C (100.4F)  dextrose 40% Gel 15 Gram(s) Oral once PRN Blood Glucose LESS THAN 70 milliGRAM(s)/deciliter  glucagon  Injectable 1 milliGRAM(s) IntraMuscular once PRN Glucose LESS THAN 70 milligrams/deciliter    Home Medications:  Home Medications:  ALPRAZolam 0.5 mg oral tablet, extended release: 1 tab(s) orally once a day (at bedtime) (2020 19:14)  amLODIPine 5 mg oral tablet: 1 tab(s) orally once a day (2020 19:14)  clopidogrel 75 mg oral tablet: 1 tab(s) orally once a day (2020 19:11)  losartan 50 mg oral tablet: 1 tab(s) orally once a day (2020 19:13)  metFORMIN 500 mg oral tablet, extended release: 1 tab(s) orally once a day (2020 19:13)  omeprazole 20 mg oral delayed release capsule: 1 cap(s) orally once a day (2020 19:14)  OXcarbazepine 150 mg oral tablet: 1 tab(s) orally once a day (2020 19:15)  OXcarbazepine 300 mg oral tablet: 1 tab(s) orally once a day (at bedtime) (2020 19:12)  trihexyphenidyl: 1 milligram(s) orally 2 times a day (2020 19:13)      VITALS:  Daily     Daily Weight in k.3 (2020 06:00)  T(F): 100.2 (20 @ 08:00), Max: 100.7 (20 @ 12:00)  HR: 82 (20 @ 09:00)  BP: --  RR: 25 (20 @ 09:00)  SpO2: 96% (20 @ 09:00)  Wt(kg): --  I&O's Detail    2020 07:  -  2020 07:00  --------------------------------------------------------  IN:    DOPamine Infusion: 1292.1 mL    insulin regular Infusion: 72 mL    Peptamen A.F.: 650 mL  Total IN: 2014.1 mL    OUT:    Indwelling Catheter - Urethral: 1205 mL    Nasoenteral Tube: 600 mL    Rectal Tube: 50 mL  Total OUT: 1855 mL    Total NET: 159.1 mL      2020 07:  -  2020 09:45  --------------------------------------------------------  IN:    DOPamine Infusion: 104.5 mL    insulin regular Infusion: 1 mL  Total IN: 105.5 mL    OUT:    Indwelling Catheter - Urethral: 90 mL  Total OUT: 90 mL    Total NET: 15.5 mL        I&O's Summary    2020 07:  -  2020 07:00  --------------------------------------------------------  IN: 2014.1 mL / OUT: 1855 mL / NET: 159.1 mL    2020 07:01  -  2020 09:45  --------------------------------------------------------  IN: 105.5 mL / OUT: 90 mL / NET: 15.5 mL          PHYSICAL EXAM:  Gen: intubated  Resp: decreased breath sounds at the bases  Card: regular  Abd: distended  Extremities: dependent pitting edema      LABS:  ABG - ( 2020 03:24 )  pH, Arterial: 7.30  pH, Blood: x     /  pCO2: 36    /  pO2: 82    / HCO3: 18    / Base Excess: -7.9  /  SaO2: 96              145  |  113<H>  |  76<HH>  ----------------------------<  157<H>  4.5   |  17  |  4.6<HH>    Ca    8.2<L>      2020 04:20  Mg     2.3         TPro  5.5<L>  /  Alb  2.0<L>  /  TBili  0.4  /  DBili      /  AST  86<H>  /  ALT  27  /  AlkPhos  86      Phosphorus Level, Serum: 3.4 mg/dL (20 @ 04:30)  Phosphorus Level, Serum: 4.5 mg/dL (20 @ 04:50)  Phosphorus Level, Serum: 3.1 mg/dL (20 @ 04:45)                          8.8    16.21 )-----------( 279      ( 2020 04:20 )             27.7       CBC Full  -  ( 2020 04:20 )  WBC Count : 16.21 K/uL  RBC Count : 3.08 M/uL  Hemoglobin : 8.8 g/dL  Hematocrit : 27.7 %  Platelet Count - Automated : 279 K/uL  Mean Cell Volume : 89.9 fL    Urine Studies:  Urinalysis Basic - ( 2020 21:50 )    Color: Yellow / Appearance: Slightly Turbid / S.014 / pH:   Gluc:  / Ketone: Negative  / Bili: Negative / Urobili: <2 mg/dL   Blood:  / Protein: 100 mg/dL / Nitrite: Positive   Leuk Esterase: Negative / RBC: 6 /HPF / WBC 14 /HPF   Sq Epi:  / Non Sq Epi: 4 /HPF / Bacteria: Negative    Creatinine trend:  Creatinine, Serum: 4.6 mg/dL (20 @ 04:20)  Creatinine, Serum: 4.1 mg/dL (20 @ 18:22)  Creatinine, Serum: 4.6 mg/dL (20 @ 04:30)  Creatinine, Serum: 4.7 mg/dL (20 @ 21:30)  Creatinine, Serum: 4.7 mg/dL (20 @ 16:50)  Creatinine, Serum: 4.4 mg/dL (20 @ 04:30)  Creatinine, Serum: 4.4 mg/dL (20 @ 23:00)  Creatinine, Serum: 4.1 mg/dL (20 @ 19:45)  Creatinine, Serum: 4.1 mg/dL (20 @ 15:59)    < from: Xray Chest 1 View- PORTABLE-Routine (20 @ 05:26) >  mpression:    Stable bibasilar opacities/left pleural effusion and other scattered opacities        < end of copied text >

## 2020-07-29 NOTE — PROGRESS NOTE ADULT - ASSESSMENT
A/P:  ANA CRISTINA LOVING is a 64yMale HD#14 for stroke. Surgery consulted for concerns of toxic megacolon. CT scan on 7/28 at 11:30 pm showed no evidence of obstruction or free air and liquid stool in the colon, toxic megacolon unlikely. Patient is a poor surgical candidate.    Plan:   - no surgical management at this time  - continue medical management  - f/u vitals  - f/u labs & replete if necessary  - DVT PPX  - GI PPX A/P:  ANA CRISTINA LOVING is a 64yMale HD#14 for stroke. Surgery consulted for concerns of toxic megacolon. CT scan on 7/28 at 11:30 pm showed no evidence of obstruction or free air and liquid stool in the colon, toxic megacolon unlikely. Patient is a poor surgical candidate.    Plan:   - no surgical management at this time  - continue medical management  - please reconsult as needed.

## 2020-07-29 NOTE — PROGRESS NOTE ADULT - ASSESSMENT
IMPRESSION:    Acute hypoxemic respiratory failure   Stroke SP TAP And thrombectomy  NAEL  DI   E coli bacteremia  G+ Cocci bacteremia   Sepsis  Septic shock  CDiff     PLAN:    CNS: FU with Neurology. Keep off sedation.  FU MS.  EEG    HEENT:  Oral care    PULMONARY:  HOB @ 45 degrees.  Vent changes:  None     CARDIOVASCULAR: I=O. Wean off dopamine. NaHCO3 drip at 100 cc per hour and reassess.  FU PH Q8     GI: GI prophylaxis.  OG ot suction     RENAL:  F/u repeat lytes.     INFECTIOUS DISEASE: repeat cultures.  Continue ABX.  FU repeat cultures.  HOld Amiodarone oral.  IV if needed     HEMATOLOGICAL:  DVT prophylaxis.     ENDOCRINE:  Follow up FS.  Insulin protocol if needed.    CODE STATUS: FULL CODE    DISPOSITION: Pt requires continued monitoring in the MICU    Overall very Poor prognosis    DW Daughter.  Family wishes trach and PEG

## 2020-07-30 NOTE — PROGRESS NOTE ADULT - SUBJECTIVE AND OBJECTIVE BOX
ANA CRISTINA LOVING  64y, Male    All available historical data reviewed    OVERNIGHT EVENTS:  low grade fevers  off sedation remains non responsive to all stimuli  fio2 40%  on dopamine  rectal tube with minimal output    ROS:  unable to obtain history secondary to patient's mental status and/or sedation     VITALS:  T(F): 99.7, Max: 100.9 (07-29-20 @ 14:00)  HR: 80  BP: --  RR: 25Vital Signs Last 24 Hrs  T(C): 37.6 (30 Jul 2020 08:00), Max: 38.3 (29 Jul 2020 14:00)  T(F): 99.7 (30 Jul 2020 08:00), Max: 100.9 (29 Jul 2020 14:00)  HR: 80 (30 Jul 2020 08:30) (78 - 86)  BP: --  BP(mean): --  RR: 25 (30 Jul 2020 08:30) (25 - 28)  SpO2: 93% (30 Jul 2020 08:30) (93% - 97%)    TESTS & MEASUREMENTS:                        8.1    12.27 )-----------( 373      ( 30 Jul 2020 04:20 )             24.1     07-30    140  |  105  |  80<HH>  ----------------------------<  198<H>  3.7   |  21  |  4.3<HH>    Ca    7.9<L>      30 Jul 2020 04:20  Mg     2.2     07-30    TPro  5.1<L>  /  Alb  1.8<L>  /  TBili  0.5  /  DBili  x   /  AST  78<H>  /  ALT  24  /  AlkPhos  70  07-30    LIVER FUNCTIONS - ( 30 Jul 2020 04:20 )  Alb: 1.8 g/dL / Pro: 5.1 g/dL / ALK PHOS: 70 U/L / ALT: 24 U/L / AST: 78 U/L / GGT: x             Culture - Blood (collected 07-27-20 @ 14:53)  Source: .Blood Blood-Venous  Preliminary Report (07-28-20 @ 23:00):    No growth to date.    Culture - Blood (collected 07-27-20 @ 04:17)  Source: .Blood None  Preliminary Report (07-28-20 @ 14:00):    No growth to date.    Culture - Blood (collected 07-23-20 @ 21:05)  Source: .Blood Blood  Gram Stain (07-27-20 @ 01:52):    Growth in anaerobic bottle: Gram Negative Rods    Growth in aerobic bottle:    Gram Positive Cocci in Clusters  Final Report (07-28-20 @ 11:37):    Growth in anaerobic bottle: Escherichia coli    Growth in aerobic bottle: Coag Negative Staphylococcus Unable to Identify    further    Coag Negative Staphylococcus    Single set isolate, possible contaminant. Contact    Microbiology if susceptibility testing clinically    indicated.    "Due to technical problems, Proteus sp. will Not be reported as part of    the BCID panel until further notice"    ***Blood Panel PCR results on this specimen are available    approximately 3 hours after the Gram stain result.***    Gram stain, PCR, and/or culture results may not always    correspond due to difference in methodologies.    ************************************************************    This PCR assay was performed using [employer].    The following targets are tested for: Enterococcus,    vancomycin resistant enterococci, Listeria monocytogenes,    coagulase negative staphylococci, S. aureus,    methicillin resistant S. aureus, Streptococcus agalactiae    (Group B), S. pneumoniae, S. pyogenes (Group A),    Acinetobacter baumannii, Enterobacter cloacae, E. coli,    Klebsiella oxytoca, K. pneumoniae, Proteus sp.,    Serratia marcescens, Haemophilus influenzae,    Neisseria meningitidis, Pseudomonas aeruginosa, Candida    albicans, C. glabrata, C krusei, C parapsilosis,    C. tropicalis and the KPC resistance gene.  Organism: Blood Culture PCR  Escherichia coli  Blood Culture PCR (07-27-20 @ 02:56)  Organism: Blood Culture PCR (07-27-20 @ 02:56)      -  Acinetobacter baumanii: Nondet      -  Candida albicans: Nondet      -  Candida glabrata: Nondet      -  Candida krusei: Nondet      -  Candida parapsilosis: Nondet      -  Candida tropicalis: Nondet      -  Coagulase negative Staphylococcus: Nondet      -  Enterobacter cloacae complex: Nondet      -  Enterococcus species: Nondet      -  Escherichia coli: Nondet      -  Haemophilus influenzae: Nondet      -  Klebsiella oxytoca: Nondet      -  Klebsiella pneumoniae: Nondet      -  Listeria monocytogenes: Nondet      -  Methicillin resistant Staphylococcus aureus (MRSA): Nondet      -  Multidrug (KPC pos) resistant organism: Nondet      -  Neisseria meningitidis: Nondet      -  Pseudomonas aeruginosa: Nondet      -  Serratia marcescens: Nondet      -  Staphylococcus aureus: Nondet      -  Streptococcus agalactiae (Group B): Nondet      -  Streptococcus pneumoniae: Nondet      -  Streptococcus pyogenes (Group A): Nondet      -  Streptococcus sp. (Not Grp A, B or S pneumoniae): Nondet      -  Vancomycin resistant Enterococcus sp.: Nondet      Method Type: PCR  Organism: Escherichia coli (07-27-20 @ 01:52)      -  Amikacin: S <=16      -  Ampicillin: R >16 These ampicillin results predict results for amoxicillin      -  Ampicillin/Sulbactam: I 16/8 Enterobacter, Citrobacter, and Serratia may develop resistance during prolonged therapy (3-4 days)      -  Aztreonam: S <=4      -  Cefazolin: R >16 Enterobacter, Citrobacter, and Serratia may develop resistance during prolonged therapy (3-4 days)      -  Cefepime: S <=2      -  Cefoxitin: R >16      -  Ceftriaxone: S <=1 Enterobacter, Citrobacter, and Serratia may develop resistance during prolonged therapy      -  Ciprofloxacin: R >2      -  Ertapenem: S <=0.5      -  Gentamicin: S <=2      -  Imipenem: S <=1      -  Levofloxacin: R >4      -  Meropenem: S <=1      -  Piperacillin/Tazobactam: S <=8      -  Tobramycin: S <=2      -  Trimethoprim/Sulfamethoxazole: S 2/38      Method Type: MORRIS  Organism: Blood Culture PCR (07-27-20 @ 01:52)      -  Escherichia coli: Detec      Method Type: PCR    Culture - Urine (collected 07-23-20 @ 11:11)  Source: .Urine Catheterized  Final Report (07-27-20 @ 14:36):    >100,000 CFU/ml Escherichia coli  Organism: Escherichia coli (07-27-20 @ 14:36)  Organism: Escherichia coli (07-27-20 @ 14:36)      -  Amikacin: S <=16      -  Amoxicillin/Clavulanic Acid: R >16/8      -  Ampicillin: R >16 These ampicillin results predict results for amoxicillin      -  Ampicillin/Sulbactam: I 16/8 Enterobacter, Citrobacter, and Serratia may develop resistance during prolonged therapy (3-4 days)      -  Aztreonam: S <=4      -  Cefazolin: R >16 ([id number]) For uncomplicated UTI with K. pneumoniae, E. coli, or P. mirablis: MORRIS <=16 is sensitive and MORRIS >=32 is resistant. This also predicts results for oral agents cefaclor, cefdinir, cefpodoxime, cefprozil, cefuroxime axetil, cephalexin and locarbef for uncomplicated UTI. Note that some isolates may be susceptible to these agents while testing resistant to cefazolin.      -  Cefepime: S <=2      -  Cefoxitin: R >16      -  Ceftriaxone: S <=1 Enterobacter, Citrobacter, and Serratia may develop resistance during prolonged therapy      -  Ciprofloxacin: R >2      -  Ertapenem: S <=0.5      -  Gentamicin: S <=2      -  Levofloxacin: R >4      -  Meropenem: S <=1      -  Nitrofurantoin: S <=32 Should not be used to treat pyelonephritis      -  Piperacillin/Tazobactam: S <=8      -  Tigecycline: S <=2      -  Tobramycin: S <=2      -  Trimethoprim/Sulfamethoxazole: S 2/38      Method Type: MORRIS            RADIOLOGY & ADDITIONAL TESTS:  Personal review of radiological diagnostics performed  Echo and EKG results noted when applicable.     MEDICATIONS:  acetaminophen   Tablet .. 650 milliGRAM(s) Oral every 6 hours PRN  cefTRIAXone   IVPB 2000 milliGRAM(s) IV Intermittent every 24 hours  chlorhexidine 0.12% Liquid 15 milliLiter(s) Oral Mucosa every 12 hours  chlorhexidine 4% Liquid 1 Application(s) Topical <User Schedule>  dextrose 40% Gel 15 Gram(s) Oral once PRN  dextrose 50% Injectable 12.5 Gram(s) IV Push once  dextrose 50% Injectable 25 Gram(s) IV Push once  dextrose 50% Injectable 25 Gram(s) IV Push once  DOPamine Infusion 1 MICROgram(s)/kG/Min IV Continuous <Continuous>  glucagon  Injectable 1 milliGRAM(s) IntraMuscular once PRN  heparin   Injectable 5000 Unit(s) SubCutaneous every 8 hours  insulin regular Infusion 1 Unit(s)/Hr IV Continuous <Continuous>  pantoprazole   Suspension 40 milliGRAM(s) Enteral Tube daily  sodium chloride 3%. 500 milliLiter(s) IV Continuous <Continuous>  vancomycin    Solution 250 milliGRAM(s) Oral every 6 hours      ANTIBIOTICS:  cefTRIAXone   IVPB 2000 milliGRAM(s) IV Intermittent every 24 hours  vancomycin    Solution 250 milliGRAM(s) Oral every 6 hours

## 2020-07-30 NOTE — PROGRESS NOTE ADULT - ASSESSMENT
· Assessment		  64 year old male pmh of DM, CAD with PCI x3 in 2017, HTN, DLD, trigeminal neuralgia who presents to ED from [facility] s/p TPA for stroke.    IMPRESSION;   # CVA s/p TPA s/p thrombectomy (Basiliar Artery) - unresponsive off sedation, on pressors  - repeat CT head:  Interval increase in posterior fossa edema and mass effect consistent with evolving infarct  with resultant new effacement of the fourth ventricle and new mild obstructive hydrocephalus. Increased hypergenicity within the left ambient and quadrigeminal plate cisterns, and new hypodensity within the right ambient and quadrigeminal plate cisterns, the suprasellar cistern, the right sylvian fissure and scattered sulci suspicious worsening/new subarachnoid hemorrhage.  - remained ventilated, off sedation  - no neurologic activity.   #CD colitis: on po vancomycin. Diarrhea has decreased  #E coli bacteremia : secondary to  tract with UCx E coli but no overt pyuria. Could well also be secondary to translocation from the GI tract.  BCx 7/27 NGTD  CT Abdomen 7/28:  Liquid stool within the colon without evidence of obstruction or colonic wall thickening. No intraperitoneal free air. Bilateral small pleural effusions with associated opacities    RECOMMENDATIONS;  Rocephin 2 gm iv q24h  Po vancomycin 125 mg q6h

## 2020-07-30 NOTE — PROGRESS NOTE ADULT - SUBJECTIVE AND OBJECTIVE BOX
Hospital Day:  14d    Chief Complaint: Patient is a 64y old  Male who presents with a chief complaint of Stroke s/p tpa (29 Jul 2020 16:02)    24 hour events:     Past Medical Hx:   HTN (hypertension)  Diabetes    Past Sx:  No significant past surgical history    Allergies:  No Known Allergies    Current Meds:   Standing Meds:  cefTRIAXone   IVPB 2000 milliGRAM(s) IV Intermittent every 24 hours  chlorhexidine 0.12% Liquid 15 milliLiter(s) Oral Mucosa every 12 hours  chlorhexidine 4% Liquid 1 Application(s) Topical <User Schedule>  dextrose 5% 1000 milliLiter(s) (75 mL/Hr) IV Continuous <Continuous>  dextrose 50% Injectable 12.5 Gram(s) IV Push once  dextrose 50% Injectable 25 Gram(s) IV Push once  dextrose 50% Injectable 25 Gram(s) IV Push once  DOPamine Infusion 1 MICROgram(s)/kG/Min (3.17 mL/Hr) IV Continuous <Continuous>  heparin   Injectable 5000 Unit(s) SubCutaneous every 8 hours  insulin regular Infusion 1 Unit(s)/Hr (1 mL/Hr) IV Continuous <Continuous>  pantoprazole   Suspension 40 milliGRAM(s) Enteral Tube daily  vancomycin    Solution 250 milliGRAM(s) Oral every 6 hours  vancomycin  IVPB 1250 milliGRAM(s) IV Intermittent every 48 hours    PRN Meds:  acetaminophen   Tablet .. 650 milliGRAM(s) Oral every 6 hours PRN Temp greater or equal to 38C (100.4F)  dextrose 40% Gel 15 Gram(s) Oral once PRN Blood Glucose LESS THAN 70 milliGRAM(s)/deciliter  glucagon  Injectable 1 milliGRAM(s) IntraMuscular once PRN Glucose LESS THAN 70 milligrams/deciliter    HOME MEDICATIONS:  ALPRAZolam 0.5 mg oral tablet, extended release: 1 tab(s) orally once a day (at bedtime)  amLODIPine 5 mg oral tablet: 1 tab(s) orally once a day  clopidogrel 75 mg oral tablet: 1 tab(s) orally once a day  losartan 50 mg oral tablet: 1 tab(s) orally once a day  metFORMIN 500 mg oral tablet, extended release: 1 tab(s) orally once a day  omeprazole 20 mg oral delayed release capsule: 1 cap(s) orally once a day  OXcarbazepine 150 mg oral tablet: 1 tab(s) orally once a day  OXcarbazepine 300 mg oral tablet: 1 tab(s) orally once a day (at bedtime)  trihexyphenidyl: 1 milligram(s) orally 2 times a day      Vital Signs:   T(F): 100.6 (07-30-20 @ 04:00), Max: 100.9 (07-29-20 @ 14:00)  HR: 82 (07-30-20 @ 06:15) (78 - 86)  BP: --  RR: 25 (07-30-20 @ 06:15) (25 - 28)  SpO2: 96% (07-30-20 @ 06:15) (93% - 97%)      07-28-20 @ 07:01  -  07-29-20 @ 07:00  --------------------------------------------------------  IN: 2014.1 mL / OUT: 1855 mL / NET: 159.1 mL    07-29-20 @ 07:01  -  07-30-20 @ 06:24  --------------------------------------------------------  IN: 2709 mL / OUT: 2455 mL / NET: 254 mL    Physical Exam:   GENERAL: NAD  HEENT: NCAT  CHEST/LUNG: CTAB  HEART: Regular rate and rhythm; s1 s2 appreciated, No murmurs, rubs, or gallops  ABDOMEN: Soft, Nontender, Nondistended; Bowel sounds present  EXTREMITIES: No LE edema b/l  SKIN: no rashes, no new lesions  NERVOUS SYSTEM:  Alert & Oriented X3  LINES/CATHETERS:    Labs:                         8.1    12.27 )-----------( 373      ( 30 Jul 2020 04:20 )             24.1       30 Jul 2020 04:20    140    |  105    |  80     ----------------------------<  198    3.7     |  21     |  4.3      Ca    7.9        30 Jul 2020 04:20  Mg     2.2       30 Jul 2020 04:20    TPro  5.1    /  Alb  1.8    /  TBili  0.5    /  DBili  x      /  AST  78     /  ALT  24     /  AlkPhos  70     30 Jul 2020 04:20    Culture - Blood (collected 07-27-20 @ 14:53)  Source: .Blood Blood-Venous  Preliminary Report (07-28-20 @ 23:00):    No growth to date.    Culture - Blood (collected 07-27-20 @ 04:17)  Source: .Blood None  Preliminary Report (07-28-20 @ 14:00):    No growth to date.    Culture - Blood (collected 07-23-20 @ 21:05)  Source: .Blood Blood  Gram Stain (07-27-20 @ 01:52):    Growth in anaerobic bottle: Gram Negative Rods    Growth in aerobic bottle:    Gram Positive Cocci in Clusters  Final Report (07-28-20 @ 11:37):    Growth in anaerobic bottle: Escherichia coli    Growth in aerobic bottle: Coag Negative Staphylococcus Unable to Identify    further    Coag Negative Staphylococcus    Single set isolate, possible contaminant. Contact    Microbiology if susceptibility testing clinically    indicated.    "Due to technical problems, Proteus sp. will Not be reported as part of    the BCID panel until further notice"    ***Blood Panel PCR results on this specimen are available    approximately 3 hours after the Gram stain result.***    Gram stain, PCR, and/or culture results may not always    correspond due to difference in methodologies.    ************************************************************    This PCR assay was performed using [employer].    The following targets are tested for: Enterococcus,    vancomycin resistant enterococci, Listeria monocytogenes,    coagulase negative staphylococci, S. aureus,    methicillin resistant S. aureus, Streptococcus agalactiae    (Group B), S. pneumoniae, S. pyogenes (Group A),    Acinetobacter baumannii, Enterobacter cloacae, E. coli,    Klebsiella oxytoca, K. pneumoniae, Proteus sp.,    Serratia marcescens, Haemophilus influenzae,    Neisseria meningitidis, Pseudomonas aeruginosa, Candida    albicans, C. glabrata, C krusei, C parapsilosis,    C. tropicalis and the KPC resistance gene.  Organism: Blood Culture PCR  Escherichia coli  Blood Culture PCR (07-27-20 @ 02:56)  Organism: Blood Culture PCR (07-27-20 @ 02:56)      -  Acinetobacter baumanii: Nondet      -  Candida albicans: Nondet      -  Candida glabrata: Nondet      -  Candida krusei: Nondet      -  Candida parapsilosis: Nondet      -  Candida tropicalis: Nondet      -  Coagulase negative Staphylococcus: Nondet      -  Enterobacter cloacae complex: Nondet      -  Enterococcus species: Nondet      -  Escherichia coli: Nondet      -  Haemophilus influenzae: Nondet      -  Klebsiella oxytoca: Nondet      -  Klebsiella pneumoniae: Nondet      -  Listeria monocytogenes: Nondet      -  Methicillin resistant Staphylococcus aureus (MRSA): Nondet      -  Multidrug (KPC pos) resistant organism: Nondet      -  Neisseria meningitidis: Nondet      -  Pseudomonas aeruginosa: Nondet      -  Serratia marcescens: Nondet      -  Staphylococcus aureus: Nondet      -  Streptococcus agalactiae (Group B): Nondet      -  Streptococcus pneumoniae: Nondet      -  Streptococcus pyogenes (Group A): Nondet      -  Streptococcus sp. (Not Grp A, B or S pneumoniae): Nondet      -  Vancomycin resistant Enterococcus sp.: Nondet      Method Type: PCR  Organism: Escherichia coli (07-27-20 @ 01:52)      -  Amikacin: S <=16      -  Ampicillin: R >16 These ampicillin results predict results for amoxicillin      -  Ampicillin/Sulbactam: I 16/8 Enterobacter, Citrobacter, and Serratia may develop resistance during prolonged therapy (3-4 days)      -  Aztreonam: S <=4      -  Cefazolin: R >16 Enterobacter, Citrobacter, and Serratia may develop resistance during prolonged therapy (3-4 days)      -  Cefepime: S <=2      -  Cefoxitin: R >16      -  Ceftriaxone: S <=1 Enterobacter, Citrobacter, and Serratia may develop resistance during prolonged therapy      -  Ciprofloxacin: R >2      -  Ertapenem: S <=0.5      -  Gentamicin: S <=2      -  Imipenem: S <=1      -  Levofloxacin: R >4      -  Meropenem: S <=1      -  Piperacillin/Tazobactam: S <=8      -  Tobramycin: S <=2      -  Trimethoprim/Sulfamethoxazole: S 2/38      Method Type: MORRIS  Organism: Blood Culture PCR (07-27-20 @ 01:52)      -  Escherichia coli: Detec      Method Type: PCR    Radiology:     Assessment and Plan:   64 year old male pmh of DM, CAD with PCI x3 in 2017, HTN, DLD, trigeminal neuralgia who presents to ED from [facility] s/p TPA for stroke.    # CVA s/p TPA s/p thrombectomy (Basiliar Artery) - unresponsive off sedation, pressors changed to vasopressin and phenylephrine   - repeat CT head:  Interval increase in posterior fossa edema and mass effect consistent with evolving infarct  with resultant new effacement of the fourth ventricle and new mild obstructive hydrocephalus. Increased hyperdensity within the left ambient and quadrigeminal plate cisterns, and new hyperdensity within the right ambient and quadrigeminal plate cisterns, the suprasellar cistern, the right sylvian fissure and scattered sulci suspicious worsening/new subarachnoid hemorrhage.  - s/p 23% NS, 50g mannitol, 3% hypertonic saline drip   - remained ventilated, off sedation  - taper off pressors if possible   - integrillin completed  - continue with dopamine for pressor support   - no notable neurologic activity. Apnea test performed (7/22), patient took a breath.  - apnea test repeated (7/27), patient took 2 breaths.   - continue with Subq heparin for DVT ppx, cleared by neurology.   - continue with vergara catheter for strict I/O   - TLC changed to R IJ, confirmed placement with cxr   - Vianney Nye (daughter, HCP) would like a call before any MRI, apnea test, or brain death protocol is done again. Her number is [phone].  - Patient was having spontaneous movements of LE, Follow up EEG.   - Talk to family about further plans for possible trach and PEG.     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 Diff.  - C. Diff PCR positive  - continue with vancomycin 250mg PO     # Distended abdomen - improved  - abdomen more firm than AM  - xray of the abdomen performed showing gaseous distention of the ascending colon and stomach.  - CT Abdomen shows liquid stool within the colon without evidence of obstruction or colon thickening, no intraperitoneal free air.  - Patient placed on low continuous suction.   - d/c amio PO and atorvastatin PO - if patient requires it, restart on amio gtt.   - Surgery following    # Gram positive cocci in clusters in the blood   - Follow up bacterial sensitivities   - continue vanc 1250 q48h  - repeat trough before next dose   - No growth to date on repeat cultures, coag negative staff possible contaminant?     # GNR bacteremia likely secondary to UTI   - patient remains febrile   - UA positive for Nitrites   - BCx positive for E. Coli.   - continue with rocephin 2g IV q24h    # Worsening NAEL - stable   - baseline Cr ~1   - Cr currently 4.6  - Kidney bladder US unremarkable   - d/c PO sodium bicarb   - Start D5 with 150 mEq of bicarb   - Follow up nephrology recs     # Hypokalemia - resolved  - K was 3.4 repleted with 20mEq x 2 (7/26)   - Follow up BMP     # DM   - HbA1c 7.5%   - insulin gtt   - q1h FS     # DLD   - d/c atorvastatin while NPO    # Activity: Bed rest   # Diet: NPO  # DVT ppx: Subq heparin   # GI ppx: Protonix  # Code Status: FULL CODE    # DISPO: Acute Hospital Day:  14d    Chief Complaint: Patient is a 64y old  Male who presents with a chief complaint of Stroke s/p tpa (29 Jul 2020 16:02)    24 hour events: No acute overnight events. Patient seen this AM, intubated and unresponsive.    Past Medical Hx:   HTN (hypertension)  Diabetes    Past Sx:  No significant past surgical history    Allergies:  No Known Allergies    Current Meds:   Standing Meds:  cefTRIAXone   IVPB 2000 milliGRAM(s) IV Intermittent every 24 hours  chlorhexidine 0.12% Liquid 15 milliLiter(s) Oral Mucosa every 12 hours  chlorhexidine 4% Liquid 1 Application(s) Topical <User Schedule>  dextrose 5% 1000 milliLiter(s) (75 mL/Hr) IV Continuous <Continuous>  dextrose 50% Injectable 12.5 Gram(s) IV Push once  dextrose 50% Injectable 25 Gram(s) IV Push once  dextrose 50% Injectable 25 Gram(s) IV Push once  DOPamine Infusion 1 MICROgram(s)/kG/Min (3.17 mL/Hr) IV Continuous <Continuous>  heparin   Injectable 5000 Unit(s) SubCutaneous every 8 hours  insulin regular Infusion 1 Unit(s)/Hr (1 mL/Hr) IV Continuous <Continuous>  pantoprazole   Suspension 40 milliGRAM(s) Enteral Tube daily  vancomycin    Solution 250 milliGRAM(s) Oral every 6 hours  vancomycin  IVPB 1250 milliGRAM(s) IV Intermittent every 48 hours    PRN Meds:  acetaminophen   Tablet .. 650 milliGRAM(s) Oral every 6 hours PRN Temp greater or equal to 38C (100.4F)  dextrose 40% Gel 15 Gram(s) Oral once PRN Blood Glucose LESS THAN 70 milliGRAM(s)/deciliter  glucagon  Injectable 1 milliGRAM(s) IntraMuscular once PRN Glucose LESS THAN 70 milligrams/deciliter    HOME MEDICATIONS:  ALPRAZolam 0.5 mg oral tablet, extended release: 1 tab(s) orally once a day (at bedtime)  amLODIPine 5 mg oral tablet: 1 tab(s) orally once a day  clopidogrel 75 mg oral tablet: 1 tab(s) orally once a day  losartan 50 mg oral tablet: 1 tab(s) orally once a day  metFORMIN 500 mg oral tablet, extended release: 1 tab(s) orally once a day  omeprazole 20 mg oral delayed release capsule: 1 cap(s) orally once a day  OXcarbazepine 150 mg oral tablet: 1 tab(s) orally once a day  OXcarbazepine 300 mg oral tablet: 1 tab(s) orally once a day (at bedtime)  trihexyphenidyl: 1 milligram(s) orally 2 times a day      Vital Signs:   T(F): 100.6 (07-30-20 @ 04:00), Max: 100.9 (07-29-20 @ 14:00)  HR: 82 (07-30-20 @ 06:15) (78 - 86)  BP: --  RR: 25 (07-30-20 @ 06:15) (25 - 28)  SpO2: 96% (07-30-20 @ 06:15) (93% - 97%)      07-28-20 @ 07:01  -  07-29-20 @ 07:00  --------------------------------------------------------  IN: 2014.1 mL / OUT: 1855 mL / NET: 159.1 mL    07-29-20 @ 07:01  -  07-30-20 @ 06:24  --------------------------------------------------------  IN: 2709 mL / OUT: 2455 mL / NET: 254 mL    Physical Exam:   GENERAL: unresponsive male in NAD   HEENT: NCAT, +ET tube, +R IJ  CHEST/LUNG: audible breath sounds b/l   HEART: Regular rate and rhythm; s1 s2 appreciated  ABDOMEN: improved soft, mildly distended abdomen   EXTREMITIES: UE and LE edema   NERVOUS SYSTEM:  unresponsive, no apparent brainstem reflexes     Labs:                         8.1    12.27 )-----------( 373      ( 30 Jul 2020 04:20 )             24.1       30 Jul 2020 04:20    140    |  105    |  80     ----------------------------<  198    3.7     |  21     |  4.3      Ca    7.9        30 Jul 2020 04:20  Mg     2.2       30 Jul 2020 04:20    TPro  5.1    /  Alb  1.8    /  TBili  0.5    /  DBili  x      /  AST  78     /  ALT  24     /  AlkPhos  70     30 Jul 2020 04:20    Culture - Blood (collected 07-27-20 @ 14:53)  Source: .Blood Blood-Venous  Preliminary Report (07-28-20 @ 23:00):    No growth to date.    Culture - Blood (collected 07-27-20 @ 04:17)  Source: .Blood None  Preliminary Report (07-28-20 @ 14:00):    No growth to date.    Culture - Blood (collected 07-23-20 @ 21:05)  Source: .Blood Blood  Gram Stain (07-27-20 @ 01:52):    Growth in anaerobic bottle: Gram Negative Rods    Growth in aerobic bottle:    Gram Positive Cocci in Clusters  Final Report (07-28-20 @ 11:37):    Growth in anaerobic bottle: Escherichia coli    Growth in aerobic bottle: Coag Negative Staphylococcus Unable to Identify    further    Coag Negative Staphylococcus    Single set isolate, possible contaminant. Contact    Microbiology if susceptibility testing clinically    indicated.    "Due to technical problems, Proteus sp. will Not be reported as part of    the BCID panel until further notice"    ***Blood Panel PCR results on this specimen are available    approximately 3 hours after the Gram stain result.***    Gram stain, PCR, and/or culture results may not always    correspond due to difference in methodologies.    ************************************************************    This PCR assay was performed using [employer].    The following targets are tested for: Enterococcus,    vancomycin resistant enterococci, Listeria monocytogenes,    coagulase negative staphylococci, S. aureus,    methicillin resistant S. aureus, Streptococcus agalactiae    (Group B), S. pneumoniae, S. pyogenes (Group A),    Acinetobacter baumannii, Enterobacter cloacae, E. coli,    Klebsiella oxytoca, K. pneumoniae, Proteus sp.,    Serratia marcescens, Haemophilus influenzae,    Neisseria meningitidis, Pseudomonas aeruginosa, Candida    albicans, C. glabrata, C krusei, C parapsilosis,    C. tropicalis and the KPC resistance gene.  Organism: Blood Culture PCR  Escherichia coli  Blood Culture PCR (07-27-20 @ 02:56)  Organism: Blood Culture PCR (07-27-20 @ 02:56)      -  Acinetobacter baumanii: Nondet      -  Candida albicans: Nondet      -  Candida glabrata: Nondet      -  Candida krusei: Nondet      -  Candida parapsilosis: Nondet      -  Candida tropicalis: Nondet      -  Coagulase negative Staphylococcus: Nondet      -  Enterobacter cloacae complex: Nondet      -  Enterococcus species: Nondet      -  Escherichia coli: Nondet      -  Haemophilus influenzae: Nondet      -  Klebsiella oxytoca: Nondet      -  Klebsiella pneumoniae: Nondet      -  Listeria monocytogenes: Nondet      -  Methicillin resistant Staphylococcus aureus (MRSA): Nondet      -  Multidrug (KPC pos) resistant organism: Nondet      -  Neisseria meningitidis: Nondet      -  Pseudomonas aeruginosa: Nondet      -  Serratia marcescens: Nondet      -  Staphylococcus aureus: Nondet      -  Streptococcus agalactiae (Group B): Nondet      -  Streptococcus pneumoniae: Nondet      -  Streptococcus pyogenes (Group A): Nondet      -  Streptococcus sp. (Not Grp A, B or S pneumoniae): Nondet      -  Vancomycin resistant Enterococcus sp.: Nondet      Method Type: PCR  Organism: Escherichia coli (07-27-20 @ 01:52)      -  Amikacin: S <=16      -  Ampicillin: R >16 These ampicillin results predict results for amoxicillin      -  Ampicillin/Sulbactam: I 16/8 Enterobacter, Citrobacter, and Serratia may develop resistance during prolonged therapy (3-4 days)      -  Aztreonam: S <=4      -  Cefazolin: R >16 Enterobacter, Citrobacter, and Serratia may develop resistance during prolonged therapy (3-4 days)      -  Cefepime: S <=2      -  Cefoxitin: R >16      -  Ceftriaxone: S <=1 Enterobacter, Citrobacter, and Serratia may develop resistance during prolonged therapy      -  Ciprofloxacin: R >2      -  Ertapenem: S <=0.5      -  Gentamicin: S <=2      -  Imipenem: S <=1      -  Levofloxacin: R >4      -  Meropenem: S <=1      -  Piperacillin/Tazobactam: S <=8      -  Tobramycin: S <=2      -  Trimethoprim/Sulfamethoxazole: S 2/38      Method Type: MORRIS  Organism: Blood Culture PCR (07-27-20 @ 01:52)      -  Escherichia coli: Detec      Method Type: PCR    Radiology:   < from: EEG (07.29.20 @ 12:25) >  Impression  -  Abnormal due to the presence of: diffuse low voltage activity    < end of copied text >    < from: Xray Chest 1 View- PORTABLE-Routine (07.30.20 @ 04:35) >  Impression:    Stable bilateral opacities and effusions.    Tubes and lines positioned appropriately.  < end of copied text >    Assessment and Plan:   64 year old male pmh of DM, CAD with PCI x3 in 2017, HTN, DLD, trigeminal neuralgia who presents to ED from [facility] s/p TPA for stroke.    # CVA s/p TPA s/p thrombectomy (Basiliar Artery) - unresponsive off sedation, pressors changed to vasopressin and phenylephrine   - repeat CT head:  Interval increase in posterior fossa edema and mass effect consistent with evolving infarct  with resultant new effacement of the fourth ventricle and new mild obstructive hydrocephalus. Increased hyperdensity within the left ambient and quadrigeminal plate cisterns, and new hyperdensity within the right ambient and quadrigeminal plate cisterns, the suprasellar cistern, the right sylvian fissure and scattered sulci suspicious worsening/new subarachnoid hemorrhage.  - s/p 23% NS, 50g mannitol, 3% hypertonic saline drip   - remained ventilated, off sedation  - taper off pressors if possible   - integrillin completed  - continue with dopamine for pressor support, wean as tolerated.   - no notable neurologic activity. Apnea test performed (7/22), patient took a breath.  - apnea test repeated (7/27), patient took 2 breaths.   - continue with Subq heparin for DVT ppx, cleared by neurology.   - continue with vergara catheter for strict I/O   - TLC changed to R IJ, confirmed placement with cxr   - Vianney Nye (daughter, HCP) would like a call before any MRI, apnea test, or brain death protocol is done again. Her number is [phone].  - Patient was having spontaneous movements of LE, EEG negative for seizures.   - Family agreeable for trach and PEG, Follow up with surgery.    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 Diff. - diarrhea improving   - C. Diff PCR positive  - continue with vancomycin 250mg PO     # Distended abdomen - improved, stable  - xray of the abdomen performed showing gaseous distention of the ascending colon and stomach.  - CT Abdomen shows liquid stool within the colon without evidence of obstruction or colon thickening, no intraperitoneal free air.  - Patient remains low continuous suction, with NGT suction having output.  - d/c amio PO and atorvastatin PO - if patient requires it, restart on amio gtt.  - Patient remains NPO.  - Surgery following    # Gram positive cocci in clusters in the blood? - probable contaminant.   - coag negative staff possible contaminant?  - d/c vanc  - No growth to date on repeat cultures, follow up bcx,       # GNR bacteremia likely secondary to UTI   - patient remains febrile   - UA positive for Nitrites   - BCx positive for E. Coli.   - continue with rocephin 2g IV q24h as per ID    # Worsening NAEL - stable   - baseline Cr ~1   - Cr currently 4.3  - Kidney bladder US unremarkable   - d/c bicarb gtt   - Follow up nephrology recs     # Hypokalemia - resolved  - K was 3.4 repleted with 20mEq x 2 (7/26)   - Follow up BMP     # DM   - HbA1c 7.5%   - insulin gtt   - q1h FS     # DLD   - d/c atorvastatin while NPO    # Activity: Bed rest   # Diet: NPO  # DVT ppx: Subq heparin   # GI ppx: Protonix  # Code Status: FULL CODE    # DISPO: Possible trach and PEG by surgery

## 2020-07-30 NOTE — PROGRESS NOTE ADULT - SUBJECTIVE AND OBJECTIVE BOX
Nephrology progress note    Patient was seen and examined, events over the last 24 h noted .  Cr slightly improved     Ct head noted " Interval increase in posterior fossa edema and mass effect consistent with evolving infarct  with resultant new effacement of the fourth ventricle and new mild obstructive hydrocephalus. Increased hyperdensity within the left ambient and quadrigeminal plate cisterns, and new hyperdensity within the right ambient and quadrigeminal plate cisterns, the suprasellar cistern, the right sylvian fissure and scattered sulci suspicious worsening/new subarachnoid hemorrhage."    Allergies:  No Known Allergies    Hospital Medications:   MEDICATIONS  (STANDING):  cefTRIAXone   IVPB 2000 milliGRAM(s) IV Intermittent every 24 hours  chlorhexidine 0.12% Liquid 15 milliLiter(s) Oral Mucosa every 12 hours  chlorhexidine 4% Liquid 1 Application(s) Topical <User Schedule>  dextrose 50% Injectable 12.5 Gram(s) IV Push once  dextrose 50% Injectable 25 Gram(s) IV Push once  dextrose 50% Injectable 25 Gram(s) IV Push once  DOPamine Infusion 1 MICROgram(s)/kG/Min (3.17 mL/Hr) IV Continuous <Continuous>  heparin   Injectable 5000 Unit(s) SubCutaneous every 8 hours  insulin regular Infusion 1 Unit(s)/Hr (1 mL/Hr) IV Continuous <Continuous>  pantoprazole   Suspension 40 milliGRAM(s) Enteral Tube daily  sodium chloride 3%. 500 milliLiter(s) (25 mL/Hr) IV Continuous <Continuous>  vancomycin    Solution 250 milliGRAM(s) Oral every 6 hours        VITALS:  T(F): 99.7 (20 @ 08:00), Max: 100.9 (20 @ 14:00)  HR: 82 (20 @ 11:00)  BP: --  RR: 25 (20 @ 11:00)  SpO2: 95% (20 @ 11:00)  Wt(kg): --     @ 07:  -   @ 07:00  --------------------------------------------------------  IN: 2014.1 mL / OUT: 1855 mL / NET: 159.1 mL    07-29 @ 07: @ 07:00  --------------------------------------------------------  IN: 3054.6 mL / OUT: 2455 mL / NET: 599.6 mL     @ : @ 12:05  --------------------------------------------------------  IN: 274.8 mL / OUT: 175 mL / NET: 99.8 mL          PHYSICAL EXAM:  Gen: intubated  Resp: decreased breath sounds at the bases  Card: regular  Abd: distended  Extremities: dependent pitting edema      LABS:      140  |  105  |  80<HH>  ----------------------------<  198<H>  3.7   |  21  |  4.3<HH>    Ca    7.9<L>      2020 04:20  Mg     2.2         TPro  5.1<L>  /  Alb  1.8<L>  /  TBili  0.5  /  DBili      /  AST  78<H>  /  ALT  24  /  AlkPhos  70                            8.1    12.27 )-----------( 373      ( 2020 04:20 )             24.1       Urine Studies:  Urinalysis Basic - ( 2020 21:50 )    Color: Yellow / Appearance: Slightly Turbid / S.014 / pH:   Gluc:  / Ketone: Negative  / Bili: Negative / Urobili: <2 mg/dL   Blood:  / Protein: 100 mg/dL / Nitrite: Positive   Leuk Esterase: Negative / RBC: 6 /HPF / WBC 14 /HPF   Sq Epi:  / Non Sq Epi: 4 /HPF / Bacteria: Negative        RADIOLOGY & ADDITIONAL STUDIES:

## 2020-07-30 NOTE — CONSULT NOTE ADULT - SUBJECTIVE AND OBJECTIVE BOX
ANA CRISTINA LOVING [id number]  64y Male    HPI:  64 year old male pmh of DM, CAD with PCI x3 in 2017, HTN, DLD, trigeminal neuralgia who presents to ED from [facility] s/p TPA for stroke.  History obtained from charts and daughter Vianney [phone] as Pt. is currently aphasic. Per daughter Pt. who is AOx3 at baseline was at home playing with grandchildren and became nausea, had 3 episodes of NBNB vomiting. After laying down for 30 min family found Pt. confused with aphasia and brought Pt. to [facility] ED.  At [facility] stroke code called initial NIHSS 9, CTA showed right vertebral artery occlusion and distal basilar artery thrombus. Other extensive atheromatous changes including moderate/severe subclavian and vertebral stenosis.   Pt. was given TPA at 16:46 and sent to [facility] for neurointerventional eval and post TPA care.  Pt. seen in ED with expressive aphasia although able to move all extremities with weakness of bilateral LE 1/5. Pt. was on cardine drip to maintain BP below 185.   Interval Hx -While in ED RRT called as Pt. posturing and shaking of b/l upper extremities, not following any commands, Pt. was intubated for airway protection. Stat CT done no new hemmorage, CT perfusion Stat, Neuro sx and neurointerventional contacted. NI actual initiated. Pending Neurointerventional recs. (16 Jul 2020 18:12)    Interval history: the patient's neurological status has declined in the interim, with absent gag, corneals, pupils with only intermittent b/l LE twitches. He has remained intubated on minimal vent settings,  required continuous dopamine infusion d/t hemodynamic instability, without improvement in his status.  Per neurology, patient remains brainstem areflexic (as per brain death exam) with inclusion of cold caloric testing with no response, intubated with no sedation on board. Apnea test performed last week and it was reported that "patient took a breath" on his own. Respirations brought down to 10 with patient not overbreathing vent. Surgery consulted for trach/PEG.    PAST MEDICAL & SURGICAL HISTORY:  HTN (hypertension)  Diabetes  No significant past surgical history        MEDICATIONS  (STANDING):  cefTRIAXone   IVPB 2000 milliGRAM(s) IV Intermittent every 24 hours  chlorhexidine 0.12% Liquid 15 milliLiter(s) Oral Mucosa every 12 hours  chlorhexidine 4% Liquid 1 Application(s) Topical <User Schedule>  dextrose 50% Injectable 12.5 Gram(s) IV Push once  dextrose 50% Injectable 25 Gram(s) IV Push once  dextrose 50% Injectable 25 Gram(s) IV Push once  DOPamine Infusion 1 MICROgram(s)/kG/Min (3.17 mL/Hr) IV Continuous <Continuous>  heparin   Injectable 5000 Unit(s) SubCutaneous every 8 hours  insulin regular Infusion 1 Unit(s)/Hr (1 mL/Hr) IV Continuous <Continuous>  pantoprazole   Suspension 40 milliGRAM(s) Enteral Tube daily  sodium chloride 3%. 500 milliLiter(s) (20 mL/Hr) IV Continuous <Continuous>  vancomycin    Solution 250 milliGRAM(s) Oral every 6 hours    MEDICATIONS  (PRN):  acetaminophen   Tablet .. 650 milliGRAM(s) Oral every 6 hours PRN Temp greater or equal to 38C (100.4F)  dextrose 40% Gel 15 Gram(s) Oral once PRN Blood Glucose LESS THAN 70 milliGRAM(s)/deciliter  glucagon  Injectable 1 milliGRAM(s) IntraMuscular once PRN Glucose LESS THAN 70 milligrams/deciliter      Allergies    No Known Allergies    Intolerances        REVIEW OF SYSTEMS    [ ] A ten-point review of systems was otherwise negative except as noted.  [x ] Due to altered mental status/intubation, subjective information were not able to be obtained from the patient. History was obtained, to the extent possible, from review of the chart and collateral sources of information.      Vital Signs Last 24 Hrs  T(C): 37.2 (30 Jul 2020 20:00), Max: 38.1 (30 Jul 2020 04:00)  T(F): 98.9 (30 Jul 2020 20:00), Max: 100.6 (30 Jul 2020 04:00)  HR: 82 (30 Jul 2020 20:00) (78 - 86)  BP: --  BP(mean): --  RR: 25 (30 Jul 2020 20:00) (25 - 28)  SpO2: 96% (30 Jul 2020 20:00) (93% - 97%)    PHYSICAL EXAM:  GENERAL: intubated, not on sedation  CHEST/LUNG: Clear to auscultation bilaterally  HEART: Regular rate and rhythm  ABDOMEN: Soft, Nontender, Nondistended;         LABS:  Labs:  CAPILLARY BLOOD GLUCOSE      POCT Blood Glucose.: 132 mg/dL (30 Jul 2020 19:58)  POCT Blood Glucose.: 127 mg/dL (30 Jul 2020 17:34)  POCT Blood Glucose.: 147 mg/dL (30 Jul 2020 14:28)  POCT Blood Glucose.: 140 mg/dL (30 Jul 2020 12:44)  POCT Blood Glucose.: 118 mg/dL (30 Jul 2020 11:36)  POCT Blood Glucose.: 106 mg/dL (30 Jul 2020 10:35)  POCT Blood Glucose.: 137 mg/dL (30 Jul 2020 08:17)  POCT Blood Glucose.: 152 mg/dL (30 Jul 2020 06:53)  POCT Blood Glucose.: 177 mg/dL (30 Jul 2020 05:16)  POCT Blood Glucose.: 196 mg/dL (30 Jul 2020 04:17)  POCT Blood Glucose.: 201 mg/dL (30 Jul 2020 03:07)  POCT Blood Glucose.: 199 mg/dL (30 Jul 2020 02:26)  POCT Blood Glucose.: 138 mg/dL (29 Jul 2020 22:50)  POCT Blood Glucose.: 133 mg/dL (29 Jul 2020 22:18)  POCT Blood Glucose.: 139 mg/dL (29 Jul 2020 21:34)                          8.1    12.27 )-----------( 373      ( 30 Jul 2020 04:20 )             24.1         07-30    145  |  110  |  81<HH>  ----------------------------<  137<H>  3.7   |  22  |  4.1<HH>      Calcium, Total Serum: 8.0 mg/dL (07-30-20 @ 18:08)      LFTs:             5.1  | 0.5  | 78       ------------------[70      ( 30 Jul 2020 04:20 )  1.8  | x    | 24          Blood Gas Arterial, Lactate: 0.9 mmoL/L (07-30-20 @ 13:35)  Blood Gas Arterial, Lactate: 0.9 mmoL/L (07-30-20 @ 04:30)  Blood Gas Arterial, Lactate: 0.6 mmoL/L (07-29-20 @ 22:28)  Blood Gas Arterial, Lactate: 0.8 mmoL/L (07-29-20 @ 13:48)  Lactate, Blood: 0.7 mmol/L (07-29-20 @ 04:20)  Blood Gas Arterial, Lactate: 0.6 mmoL/L (07-29-20 @ 03:24)  Lactate, Blood: 0.9 mmol/L (07-28-20 @ 20:10)  Lactate, Blood: 0.9 mmol/L (07-28-20 @ 18:47)  Blood Gas Arterial, Lactate: 0.6 mmoL/L (07-28-20 @ 04:33)    ABG - ( 30 Jul 2020 13:35 )  pH: 7.42  /  pCO2: 36    /  pO2: 72    / HCO3: 24    / Base Excess: -0.6  /  SaO2: 94              ABG - ( 30 Jul 2020 04:30 )  pH: 7.42  /  pCO2: 37    /  pO2: 76    / HCO3: 24    / Base Excess: -0.5  /  SaO2: 95              ABG - ( 29 Jul 2020 22:28 )  pH: 7.40  /  pCO2: 36    /  pO2: 79    / HCO3: 22    / Base Excess: -1.6  /  SaO2: 95          RADIOLOGY & ADDITIONAL STUDIES:  < from: Xray Chest 1 View- PORTABLE-Routine (07.30.20 @ 04:35) >  Impression:    Stable bilateral opacities and effusions.    Tubes and lines positioned appropriately.    < end of copied text >

## 2020-07-30 NOTE — PROGRESS NOTE ADULT - ASSESSMENT
IMPRESSION:    Acute hypoxemic respiratory failure   Stroke SP TAP And thrombectomy  NAEL  DI   E coli bacteremia  G+ Cocci bacteremia Coag neg staph   Sepsis  Septic shock  CDiff     PLAN:    CNS: FU with Neurology. Keep off sedation.  FU MS.    HEENT:  Oral care    PULMONARY:  HOB @ 45 degrees.  Vent changes:  PEEP 7.5.  Surgery for Trach and PEG     CARDIOVASCULAR: I=O. Wean off dopamine. Change IVF to 3% NACL 25 cc per hour  and reassess.      GI: GI prophylaxis.  OG ot suction     RENAL:  F/u repeat lytes 4 PM     INFECTIOUS DISEASE: FU repeat cultures.  Continue ABX, DC VAnc IV.     HEMATOLOGICAL:  DVT prophylaxis.     ENDOCRINE:  Follow up FS.  Insulin protocol if needed.    CODE STATUS: FULL CODE    DISPOSITION: Pt requires continued monitoring in the MICU    Overall very Poor prognosis    DW Daughter.  Family wishes trach and PEG

## 2020-07-30 NOTE — CONSULT NOTE ADULT - ASSESSMENT
64 year old male pmh of DM, CAD with PCI x3 in 2017, HTN, DLD, trigeminal neuralgia admitted s/p stroke currently hospitalized in MICU w/ consult for trach/PEG      Plan:  - please obtain preop labs tonight prior to tomorrow  - plan for possible bedside trach/PEG tomorrow  - family to be consented  - trauma team will follow

## 2020-07-30 NOTE — PROGRESS NOTE ADULT - SUBJECTIVE AND OBJECTIVE BOX
Patient is a 64y old  Male who presents with a chief complaint of Stroke s/p tpa (30 Jul 2020 06:24)        Over Night Events:  Remains on MV.  On Dopamine 10 mcg.  Off sedation.  Diarrhea improved.  EEG no seizure         ROS:     All ROS are negative except HPI         PHYSICAL EXAM    ICU Vital Signs Last 24 Hrs  T(C): 38.1 (30 Jul 2020 04:00), Max: 38.3 (29 Jul 2020 14:00)  T(F): 100.6 (30 Jul 2020 04:00), Max: 100.9 (29 Jul 2020 14:00)  HR: 82 (30 Jul 2020 07:00) (78 - 86)  BP: --  BP(mean): --  ABP: 110/40 (30 Jul 2020 07:00) (96/44 - 172/54)  ABP(mean): 58 (30 Jul 2020 07:00) (56 - 98)  RR: 25 (30 Jul 2020 07:00) (25 - 28)  SpO2: 96% (30 Jul 2020 07:00) (94% - 97%)      CONSTITUTIONAL:  Well nourished.  NAD    ENT:   Airway patent,   Mouth with normal mucosa.   No thrush    EYES:   Pupils equal,   Round no reactive to light.    CARDIAC:   Normal rate,   Regular rhythm.     edema      Vascular:  Normal systolic impulse  No Carotid bruits    RESPIRATORY:   No wheezing  Bilateral BS  Normal chest expansion  Not tachypneic,  No use of accessory muscles    GASTROINTESTINAL:  Abdomen soft,   Non-tender,   No guarding,   + BS    MUSCULOSKELETAL:   Range of motion is not limited,  No clubbing, cyanosis    NEUROLOGICAL:   No gag no corneal     SKIN:   Skin normal color for race,   Warm and dry  No evidence of rash.    PSYCHIATRIC:   No apparent risk to self or others.    HEMATOLOGICAL:  No cervical  lymphadenopathy.  no inguinal lymphadenopathy      07-29-20 @ 07:01  -  07-30-20 @ 07:00  --------------------------------------------------------  IN:    dextrose 5%: 750 mL    dextrose 5%: 900 mL    DOPamine Infusion: 417.4 mL    DOPamine Infusion: 538.4 mL    Enteral Tube Flush: 120 mL    insulin regular Infusion: 41 mL    IV PiggyBack: 250 mL  Total IN: 3016.8 mL    OUT:    Indwelling Catheter - Urethral: 1655 mL    Nasoenteral Tube: 800 mL  Total OUT: 2455 mL    Total NET: 561.8 mL          LABS:                            8.1    12.27 )-----------( 373      ( 30 Jul 2020 04:20 )             24.1                                               07-30    140  |  105  |  80<HH>  ----------------------------<  198<H>  3.7   |  21  |  4.3<HH>    30 Jul 2020 04:20    140    |  105    |  80<HH>  ----------------------------<  198<H>  3.7     |  21     |  4.3<HH>  29 Jul 2020 21:40    144    |  108    |  80<HH>  ----------------------------<  141<H>  3.9     |  20     |  4.5<HH>    Ca    7.9<L>      30 Jul 2020 04:20  Ca    7.9<L>      29 Jul 2020 21:40  Mg     2.2       30 Jul 2020 04:20  Mg     2.3       29 Jul 2020 04:20    TPro  5.1<L>  /  Alb  1.8<L>  /  TBili  0.5    /  DBili  x      /  AST  78<H>  /  ALT  24     /  AlkPhos  70     30 Jul 2020 04:20  TPro  5.0<L>  /  Alb  1.8<L>  /  TBili  0.4    /  DBili  x      /  AST  75<H>  /  ALT  25     /  AlkPhos  75     29 Jul 2020 15:45      Ca    7.9<L>      30 Jul 2020 04:20  Mg     2.2     07-30    TPro  5.1<L>  /  Alb  1.8<L>  /  TBili  0.5  /  DBili  x   /  AST  78<H>  /  ALT  24  /  AlkPhos  70  07-30      PT/INR - ( 28 Jul 2020 20:10 )   PT: 14.70 sec;   INR: 1.28 ratio         PTT - ( 28 Jul 2020 20:10 )  PTT:35.8 sec                                                                                     LIVER FUNCTIONS - ( 30 Jul 2020 04:20 )  Alb: 1.8 g/dL / Pro: 5.1 g/dL / ALK PHOS: 70 U/L / ALT: 24 U/L / AST: 78 U/L / GGT: x                                                  Culture - Blood (collected 27 Jul 2020 14:53)  Source: .Blood Blood-Venous  Preliminary Report (28 Jul 2020 23:00):    No growth to date.                                                   Mode: AC/ CMV (Assist Control/ Continuous Mandatory Ventilation)  RR (machine): 26  TV (machine): 450  FiO2: 40  PEEP: 5  ITime: 1  MAP: 11  PIP: 20                                      ABG - ( 30 Jul 2020 04:30 )  pH, Arterial: 7.42  pH, Blood: x     /  pCO2: 37    /  pO2: 76    / HCO3: 24    / Base Excess: -0.5  /  SaO2: 95    Lac 0.9               MEDICATIONS  (STANDING):  cefTRIAXone   IVPB 2000 milliGRAM(s) IV Intermittent every 24 hours  chlorhexidine 0.12% Liquid 15 milliLiter(s) Oral Mucosa every 12 hours  chlorhexidine 4% Liquid 1 Application(s) Topical <User Schedule>  dextrose 5% 1000 milliLiter(s) (75 mL/Hr) IV Continuous <Continuous>  dextrose 50% Injectable 12.5 Gram(s) IV Push once  dextrose 50% Injectable 25 Gram(s) IV Push once  dextrose 50% Injectable 25 Gram(s) IV Push once  DOPamine Infusion 1 MICROgram(s)/kG/Min (3.17 mL/Hr) IV Continuous <Continuous>  heparin   Injectable 5000 Unit(s) SubCutaneous every 8 hours  insulin regular Infusion 1 Unit(s)/Hr (1 mL/Hr) IV Continuous <Continuous>  pantoprazole   Suspension 40 milliGRAM(s) Enteral Tube daily  vancomycin    Solution 250 milliGRAM(s) Oral every 6 hours  vancomycin  IVPB 1250 milliGRAM(s) IV Intermittent every 48 hours    MEDICATIONS  (PRN):  acetaminophen   Tablet .. 650 milliGRAM(s) Oral every 6 hours PRN Temp greater or equal to 38C (100.4F)  dextrose 40% Gel 15 Gram(s) Oral once PRN Blood Glucose LESS THAN 70 milliGRAM(s)/deciliter  glucagon  Injectable 1 milliGRAM(s) IntraMuscular once PRN Glucose LESS THAN 70 milligrams/deciliter      New X-rays reviewed:                                                                                  ECHO    CXR interpreted by me:  ET OG TLC OK>  Bibasilar infiltrates

## 2020-07-30 NOTE — PROGRESS NOTE ADULT - ASSESSMENT
64M, PMH HTN, CAD, DM, admitted for stroke s/p TPA c/b intracranial hemorrhage, now w/o brainstem function, c/b AHRF on mech vent, E.coli bacteremia/+cdiff, on pressors and non-oliguric NAEL    planned for possible trach/PEG    #NAEL on CKD, p/w creatinine 1.9, unknown baseline, ATN iso CVA/shock  - creatinine stable, non oliguric   - started on 3% saline  for worsening intracellular edema  - s/p 23% NS, 50g mannitol, 3% hypertonic saline drip   - bacteremia followed by ID, on Ceftriaxone  - DARSHAN noted, non-echogenic kidneys, no Hydro  - trend phos, not on binders. Ca corrected wnl  - continue sodium bicarbonate PO 1300mg TID  - follow sodium closely, on DDAVP   - no acute indication for RRT, would not improve prognosis  - will follow

## 2020-07-31 NOTE — PROGRESS NOTE ADULT - ASSESSMENT
IMPRESSION:    Acute hypoxemic respiratory failure   Stroke SP TAP And thrombectomy  NAEL  DI   E coli bacteremia  G+ Cocci bacteremia Coag neg staph   Sepsis  Septic shock  CDiff     PLAN:    CNS: FU with Neurology. Keep off sedation.  FU MS.    HEENT:  Oral care    PULMONARY:  HOB @ 45 degrees.  Vent changes:  None.  FU with Surgery for Trach and PEG     CARDIOVASCULAR: I=O. Wean off dopamine. Change IVF to 3% NACL 25 cc per hour  and reassess.      GI: GI prophylaxis.  trickle feeding     RENAL:  F/u repeat lytes 4 PM     INFECTIOUS DISEASE: FU repeat cultures.  Continue ABX, DC VAnc IV.     HEMATOLOGICAL:  DVT prophylaxis.     ENDOCRINE:  Follow up FS.  Insulin protocol if needed.    CODE STATUS: FULL CODE    DISPOSITION: Pt requires continued monitoring in the MICU    Overall very Poor prognosis

## 2020-07-31 NOTE — PROGRESS NOTE ADULT - SUBJECTIVE AND OBJECTIVE BOX
Patient is a 64y old  Male who presents with a chief complaint of Stroke s/p tpa (31 Jul 2020 06:15)        Over Night Events:  On MV.  On Dopamine.  Off sedation.  Less diarrhea.          ROS:     All ROS are negative except HPI         PHYSICAL EXAM    ICU Vital Signs Last 24 Hrs  T(C): 39 (31 Jul 2020 04:00), Max: 39 (31 Jul 2020 04:00)  T(F): 102.2 (31 Jul 2020 04:00), Max: 102.2 (31 Jul 2020 04:00)  HR: 84 (31 Jul 2020 06:30) (78 - 86)  BP: --  BP(mean): --  ABP: 108/42 (31 Jul 2020 06:30) (90/40 - 142/56)  ABP(mean): 58 (31 Jul 2020 06:30) (54 - 80)  RR: 25 (31 Jul 2020 06:30) (25 - 26)  SpO2: 93% (31 Jul 2020 06:30) (93% - 96%)      CONSTITUTIONAL:  Well nourished.  NAD    ENT:   Airway patent,   Mouth with normal mucosa.   No thrush    EYES:   Pupils equal,   Round non reactive to light.    CARDIAC:   Normal rate,   Regular rhythm.    No edema      Vascular:  Normal systolic impulse  No Carotid bruits    RESPIRATORY:   No wheezing  Bilateral BS  Normal chest expansion  Not tachypneic,  No use of accessory muscles    GASTROINTESTINAL:  Abdomen soft,   Non-tender,   No guarding,   + BS    MUSCULOSKELETAL:   Range of motion is not limited,  No clubbing, cyanosis    NEUROLOGICAL:   some muscle movement to tactile   No gag no corneal    SKIN:   Skin normal color for race,   Warm and dry  No evidence of rash.    PSYCHIATRIC:   No apparent risk to self or others.    HEMATOLOGICAL:  No cervical  lymphadenopathy.  no inguinal lymphadenopathy      07-30-20 @ 07:01  -  07-31-20 @ 07:00  --------------------------------------------------------  IN:    dextrose 5%: 75 mL    DOPamine Infusion: 916.6 mL    Enteral Tube Flush: 200 mL    insulin regular Infusion: 14 mL    IV PiggyBack: 100 mL    sodium chloride 3%: 275 mL    sodium chloride 3%.: 220 mL  Total IN: 1800.6 mL    OUT:    Indwelling Catheter - Urethral: 1875 mL    Nasoenteral Tube: 400 mL    Rectal Tube: 100 mL  Total OUT: 2375 mL    Total NET: -574.4 mL          LABS:                            8.3    9.01  )-----------( 493      ( 31 Jul 2020 04:35 )             25.8                                               07-31    147<H>  |  111<H>  |  76<HH>  ----------------------------<  142<H>  4.2   |  22  |  3.9<H>    31 Jul 2020 04:35    147<H>  |  111<H>  |  76<HH>  ----------------------------<  142<H>  4.2     |  22     |  3.9<H>  31 Jul 2020 01:45    147<H>  |  113<H>  |  78<HH>  ----------------------------<  131<H>  4.3     |  23     |  4.0<H>    Ca    8.2<L>      31 Jul 2020 04:35  Ca    7.8<L>      31 Jul 2020 01:45  Mg     2.7<H>     31 Jul 2020 04:35  Mg     2.2       30 Jul 2020 18:08    TPro  5.1<L>  /  Alb  1.8<L>  /  TBili  0.5    /  DBili  x      /  AST  78<H>  /  ALT  24     /  AlkPhos  70     30 Jul 2020 04:20  TPro  5.0<L>  /  Alb  1.8<L>  /  TBili  0.4    /  DBili  x      /  AST  75<H>  /  ALT  25     /  AlkPhos  75     29 Jul 2020 15:45      Ca    8.2<L>      31 Jul 2020 04:35  Mg     2.7     07-31    TPro  5.1<L>  /  Alb  1.8<L>  /  TBili  0.5  /  DBili  x   /  AST  78<H>  /  ALT  24  /  AlkPhos  70  07-30                                                                                           LIVER FUNCTIONS - ( 30 Jul 2020 04:20 )  Alb: 1.8 g/dL / Pro: 5.1 g/dL / ALK PHOS: 70 U/L / ALT: 24 U/L / AST: 78 U/L / GGT: x                                                                                               Mode: AC/ CMV (Assist Control/ Continuous Mandatory Ventilation)  RR (machine): 26  TV (machine): 450  FiO2: 40  PEEP: 7.5  ITime: 1  MAP: 13  PIP: 20                                      ABG - ( 31 Jul 2020 04:22 )  pH, Arterial: 7.41  pH, Blood: x     /  pCO2: 37    /  pO2: 74    / HCO3: 23    / Base Excess: -1.1  /  SaO2: 95    Lac 0.7              MEDICATIONS  (STANDING):  cefTRIAXone   IVPB 2000 milliGRAM(s) IV Intermittent every 24 hours  chlorhexidine 0.12% Liquid 15 milliLiter(s) Oral Mucosa every 12 hours  chlorhexidine 4% Liquid 1 Application(s) Topical <User Schedule>  dextrose 50% Injectable 12.5 Gram(s) IV Push once  dextrose 50% Injectable 25 Gram(s) IV Push once  dextrose 50% Injectable 25 Gram(s) IV Push once  DOPamine Infusion 1 MICROgram(s)/kG/Min (3.17 mL/Hr) IV Continuous <Continuous>  heparin   Injectable 5000 Unit(s) SubCutaneous every 8 hours  insulin regular Infusion 1 Unit(s)/Hr (1 mL/Hr) IV Continuous <Continuous>  pantoprazole   Suspension 40 milliGRAM(s) Enteral Tube daily  sodium chloride 3%. 500 milliLiter(s) (10 mL/Hr) IV Continuous <Continuous>  vancomycin    Solution 250 milliGRAM(s) Oral every 6 hours    MEDICATIONS  (PRN):  acetaminophen   Tablet .. 650 milliGRAM(s) Oral every 6 hours PRN Temp greater or equal to 38C (100.4F)  dextrose 40% Gel 15 Gram(s) Oral once PRN Blood Glucose LESS THAN 70 milliGRAM(s)/deciliter  glucagon  Injectable 1 milliGRAM(s) IntraMuscular once PRN Glucose LESS THAN 70 milligrams/deciliter      New X-rays reviewed:                                                                                  ECHO    CXR interpreted by me:  ET OG OK>  Improving bilateral infiltrates

## 2020-07-31 NOTE — PROGRESS NOTE ADULT - ASSESSMENT
ASSESSMENT/PLAN  Acute hypoxemic respiratory failure   Stroke SP TAP And thrombectomy  NAEL  DI   E coli bacteremia  G+ Cocci bacteremia Coag neg staph   Sepsis  Septic shock  CDiff   when ok to resume tube feed continue with peptamen af up to a goal of Peptamen AF at 65 ml/h to provide 1872 k, 119 gm protein, with lower carb content, better omega6:3 ratio  check bmp/phos/mg and correct lytes

## 2020-07-31 NOTE — ADVANCED PRACTICE NURSE CONSULT - RECOMMEDATIONS
1. Mucosal membrane pressure injury mid upper lip- Apply dry gauze to cushion area, offload pressure from ET hodge & absorb any exudate. Change dressing daily & prn for any strikethrough drainage or soling. If possible, rotate ET tube from side-to-side to offload pressure to upper lip area.  2. MASD b/l buttock-Continue applying Jerfy Protect moisture barrier cream daily and prn after each incontinent episode.    -Assess skin/wound qshift, report changes to primary provider.     Additional recs:   -Continue turning/positioning patient from side-to-side q2h while in bed, or in accordance w/ pt's plan of care. Continue utilizing pillows to assist w/ turning/positioning.   -Continue to offload heels from bed surface with offloading boots to BLEs.   -Continue utilizing one underpad underneath patient to contain incontinence episodes; change pad when saturated/soiled.   -When/if vergara d/c'ed, utilize condom catheter (if patient candidate) to contain any urinary incontinence.   -Continue utilizing fecal management system/rectal tube/DigniShield (if patient candidate; MD order required) to contain loose fecal incontinence.   -Continue nutrition consult for optimal wound healing.     Plan of Care: Primary RN Jason at bedside & made aware of above. Spoke w/ covering/primary MD Estella in regards to above. No further needs/recs from [facility] service at this time. Staff RN to perform routine skin/wound assessment and manage wound care. Questions or concerns or if wound worsens and reconsult needed, please contact [facility], [phone].

## 2020-07-31 NOTE — PROGRESS NOTE ADULT - ENMT COMMENTS
no corneals, no gag, pupils fixed, dilated, non responsive
pupils fixed dilated, non responsive
no gag

## 2020-07-31 NOTE — PROGRESS NOTE ADULT - SUBJECTIVE AND OBJECTIVE BOX
GENERAL SURGERY PROGRESS NOTE     ANA CRISTINA LOVING  64y  Male  Hospital day :15d     OVERNIGHT EVENTS: no acute events overnight     T(F): 100.5 (20 @ 12:00), Max: 102.2 (20 @ 04:00)  HR: 84 (20 @ 11:15) (78 - 89)  BP: 83/47 (20 @ 11:00) (77/44 - 84/48)  ABP: 94/42 (20 @ 11:15) (64/56 - 134/48)  ABP(mean): 56 (20 @ 11:15) (44 - 72)  RR: 25 (20 @ 11:15) (25 - 32)  SpO2: 91% (20 @ 11:15) (89% - 98%)    DIET/FLUIDS: sodium chloride 3%. 500 milliLiter(s) IV Continuous <Continuous>    N20 @ 07:  -  20 @ 07:00  --------------------------------------------------------  OUT: 400 mL                                                                                 DRAINS:     BM:   20 @ 07:  -  20 @ 07:00  --------------------------------------------------------  OUT: 100 mL      EMESIS:     URINE:   20 @ 07:  -  20 @ 07:00  --------------------------------------------------------  OUT: 1875 mL     Indwelling Urethral Catheter:     Connect To:  Straight Drainage/Gravity    Indication:  Urine Output Monitoring in Critically Ill (20 @ 13:15)    GI proph:  pantoprazole   Suspension 40 milliGRAM(s) Enteral Tube daily    AC/ proph: heparin   Injectable 5000 Unit(s) SubCutaneous every 8 hours    ABx: cefTRIAXone   IVPB 2000 milliGRAM(s) IV Intermittent every 24 hours  vancomycin    Solution 250 milliGRAM(s) Oral every 6 hours    LABS  Labs:  CAPILLARY BLOOD GLUCOSE      POCT Blood Glucose.: 137 mg/dL (2020 12:37)  POCT Blood Glucose.: 133 mg/dL (2020 10:31)  POCT Blood Glucose.: 147 mg/dL (2020 08:18)  POCT Blood Glucose.: 135 mg/dL (2020 04:18)  POCT Blood Glucose.: 128 mg/dL (2020 00:21)  POCT Blood Glucose.: 146 mg/dL (2020 22:19)  POCT Blood Glucose.: 132 mg/dL (2020 19:58)  POCT Blood Glucose.: 127 mg/dL (2020 17:34)  POCT Blood Glucose.: 147 mg/dL (2020 14:28)                          8.3    9.01  )-----------( 493      ( 2020 04:35 )             25.8             147<H>  |  111<H>  |  76<HH>  ----------------------------<  142<H>  4.2   |  22  |  3.9<H>      Calcium, Total Serum: 8.2 mg/dL (20 @ 04:35)      LFTs:             5.1  | 0.5  | 78       ------------------[70      ( 2020 04:20 )  1.8  | x    | 24          Lipase:x      Amylase:x         Blood Gas Arterial, Lactate: 0.7 mmoL/L (20 @ 04:22)  Blood Gas Arterial, Lactate: 0.9 mmoL/L (20 @ 13:35)  Blood Gas Arterial, Lactate: 0.9 mmoL/L (20 @ 04:30)  Blood Gas Arterial, Lactate: 0.6 mmoL/L (20 @ 22:28)  Blood Gas Arterial, Lactate: 0.8 mmoL/L (07-29-20 @ 13:48)  Lactate, Blood: 0.7 mmol/L (20 @ 04:20)  Blood Gas Arterial, Lactate: 0.6 mmoL/L (20 @ 03:24)  Lactate, Blood: 0.9 mmol/L (20 @ 20:10)  Lactate, Blood: 0.9 mmol/L (20 @ 18:47)    ABG - ( 2020 04:22 )  pH: 7.41  /  pCO2: 37    /  pO2: 74    / HCO3: 23    / Base Excess: -1.1  /  SaO2: 95              ABG - ( 2020 13:35 )  pH: 7.42  /  pCO2: 36    /  pO2: 72    / HCO3: 24    / Base Excess: -0.6  /  SaO2: 94              ABG - ( 2020 04:30 )  pH: 7.42  /  pCO2: 37    /  pO2: 76    / HCO3: 24    / Base Excess: -0.5  /  SaO2: 95                Coags:     15.10  ----< 1.31    ( 2020 11:23 )     36.3                        RADIOLOGY & ADDITIONAL TESTS:      A/P

## 2020-07-31 NOTE — PROGRESS NOTE ADULT - SUBJECTIVE AND OBJECTIVE BOX
Hospital Day:  15d    Chief Complaint: Patient is a 64y old  Male who presents with a chief complaint of Stroke s/p tpa (30 Jul 2020 21:20)    24 hour events: No acute overnight events.     Past Medical Hx:   HTN (hypertension)  Diabetes    Past Sx:  No significant past surgical history    Allergies:  No Known Allergies    Current Meds:   Standing Meds:  cefTRIAXone   IVPB 2000 milliGRAM(s) IV Intermittent every 24 hours  chlorhexidine 0.12% Liquid 15 milliLiter(s) Oral Mucosa every 12 hours  chlorhexidine 4% Liquid 1 Application(s) Topical <User Schedule>  dextrose 50% Injectable 12.5 Gram(s) IV Push once  dextrose 50% Injectable 25 Gram(s) IV Push once  dextrose 50% Injectable 25 Gram(s) IV Push once  DOPamine Infusion 1 MICROgram(s)/kG/Min (3.17 mL/Hr) IV Continuous <Continuous>  heparin   Injectable 5000 Unit(s) SubCutaneous every 8 hours  insulin regular Infusion 1 Unit(s)/Hr (1 mL/Hr) IV Continuous <Continuous>  pantoprazole   Suspension 40 milliGRAM(s) Enteral Tube daily  sodium chloride 3%. 500 milliLiter(s) (10 mL/Hr) IV Continuous <Continuous>  vancomycin    Solution 250 milliGRAM(s) Oral every 6 hours    PRN Meds:  acetaminophen   Tablet .. 650 milliGRAM(s) Oral every 6 hours PRN Temp greater or equal to 38C (100.4F)  dextrose 40% Gel 15 Gram(s) Oral once PRN Blood Glucose LESS THAN 70 milliGRAM(s)/deciliter  glucagon  Injectable 1 milliGRAM(s) IntraMuscular once PRN Glucose LESS THAN 70 milligrams/deciliter    HOME MEDICATIONS:  ALPRAZolam 0.5 mg oral tablet, extended release: 1 tab(s) orally once a day (at bedtime)  amLODIPine 5 mg oral tablet: 1 tab(s) orally once a day  clopidogrel 75 mg oral tablet: 1 tab(s) orally once a day  losartan 50 mg oral tablet: 1 tab(s) orally once a day  metFORMIN 500 mg oral tablet, extended release: 1 tab(s) orally once a day  omeprazole 20 mg oral delayed release capsule: 1 cap(s) orally once a day  OXcarbazepine 150 mg oral tablet: 1 tab(s) orally once a day  OXcarbazepine 300 mg oral tablet: 1 tab(s) orally once a day (at bedtime)  trihexyphenidyl: 1 milligram(s) orally 2 times a day      Vital Signs:   T(F): 102.2 (07-31-20 @ 04:00), Max: 102.2 (07-31-20 @ 04:00)  HR: 86 (07-31-20 @ 05:30) (78 - 86)  BP: --  RR: 26 (07-31-20 @ 05:30) (25 - 26)  SpO2: 95% (07-31-20 @ 05:30) (93% - 97%)      07-29-20 @ 07:01  -  07-30-20 @ 07:00  --------------------------------------------------------  IN: 3054.6 mL / OUT: 2455 mL / NET: 599.6 mL    07-30-20 @ 07:01  -  07-31-20 @ 06:15  --------------------------------------------------------  IN: 1676.8 mL / OUT: 1775 mL / NET: -98.2 mL    Physical Exam:   GENERAL: NAD  HEENT: NCAT  CHEST/LUNG: CTAB  HEART: Regular rate and rhythm; s1 s2 appreciated, No murmurs, rubs, or gallops  ABDOMEN: Soft, Nontender, Nondistended; Bowel sounds present  EXTREMITIES: No LE edema b/l  SKIN: no rashes, no new lesions  NERVOUS SYSTEM:  Alert & Oriented X3  LINES/CATHETERS:    Labs:                         8.3    9.01  )-----------( 493      ( 31 Jul 2020 04:35 )             25.8       31 Jul 2020 04:35    147    |  111    |  76     ----------------------------<  142    4.2     |  22     |  3.9      Ca    8.2        31 Jul 2020 04:35  Mg     2.7       31 Jul 2020 04:35    TPro  5.1    /  Alb  1.8    /  TBili  0.5    /  DBili  x      /  AST  78     /  ALT  24     /  AlkPhos  70     30 Jul 2020 04:20    Culture - Blood (collected 07-27-20 @ 14:53)  Source: .Blood Blood-Venous  Preliminary Report (07-28-20 @ 23:00):    No growth to date.    Culture - Blood (collected 07-27-20 @ 04:17)  Source: .Blood None  Preliminary Report (07-28-20 @ 14:00):    No growth to date.    Radiology:   < from: EEG (07.29.20 @ 12:25) >  Impression  -  Abnormal due to the presence of: diffuse low voltage activity    < end of copied text >      Assessment and Plan:   64 year old male pmh of DM, CAD with PCI x3 in 2017, HTN, DLD, trigeminal neuralgia who presents to ED from [facility] s/p TPA for stroke.    # CVA s/p TPA s/p thrombectomy (Basiliar Artery) - unresponsive off sedation, pressors changed to vasopressin and phenylephrine   - repeat CT head:  Interval increase in posterior fossa edema and mass effect consistent with evolving infarct  with resultant new effacement of the fourth ventricle and new mild obstructive hydrocephalus. Increased hyperdensity within the left ambient and quadrigeminal plate cisterns, and new hyperdensity within the right ambient and quadrigeminal plate cisterns, the suprasellar cistern, the right sylvian fissure and scattered sulci suspicious worsening/new subarachnoid hemorrhage.  - s/p 23% NS, 50g mannitol, 3% hypertonic saline drip   - remained ventilated, off sedation  - taper off pressors if possible   - integrillin completed  - continue with dopamine for pressor support, wean as tolerated.   - no notable neurologic activity. Apnea test performed (7/22), patient took a breath.  - apnea test repeated (7/27), patient took 2 breaths.   - continue with Subq heparin for DVT ppx, cleared by neurology.   - continue with vergara catheter for strict I/O   - TLC changed to R IJ, confirmed placement with cxr   - Vianney Nye (daughter, HCP) would like a call before any MRI, apnea test, or brain death protocol is done again. Her number is [phone].  - Patient was having spontaneous movements of LE, EEG negative for seizures.   - Family agreeable for trach and PEG, Follow up with surgery.    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 Diff. - diarrhea improving   - C. Diff PCR positive  - continue with vancomycin 250mg PO     # Distended abdomen - improved, stable  - xray of the abdomen performed showing gaseous distention of the ascending colon and stomach.  - CT Abdomen shows liquid stool within the colon without evidence of obstruction or colon thickening, no intraperitoneal free air.  - Patient remains low continuous suction, with NGT suction having output.  - d/c amio PO and atorvastatin PO - if patient requires it, restart on amio gtt.  - Patient remains NPO.  - Surgery following    # Gram positive cocci in clusters in the blood? - probable contaminant.   - coag negative staff possible contaminant?  - d/c vanc  - No growth to date on repeat cultures, follow up bcx,       # GNR bacteremia likely secondary to UTI   - patient remains febrile   - UA positive for Nitrites   - BCx positive for E. Coli.   - continue with rocephin 2g IV q24h as per ID    # Worsening NAEL - stable   - baseline Cr ~1   - Cr currently 4.3  - Kidney bladder US unremarkable   - d/c bicarb gtt   - Follow up nephrology recs     # Hypokalemia - resolved  - K was 3.4 repleted with 20mEq x 2 (7/26)   - Follow up BMP     # DM   - HbA1c 7.5%   - insulin gtt   - q1h FS     # DLD   - d/c atorvastatin while NPO    # Activity: Bed rest   # Diet: NPO  # DVT ppx: Subq heparin   # GI ppx: Protonix  # Code Status: FULL CODE    # DISPO: Possible trach and PEG by surgery Hospital Day:  15d    Chief Complaint: Patient is a 64y old  Male who presents with a chief complaint of Stroke s/p tpa (30 Jul 2020 21:20)    24 hour events: No acute overnight events. Patient seen this AM, still intubated and unresponsive off sedation.     Past Medical Hx:   HTN (hypertension)  Diabetes    Past Sx:  No significant past surgical history    Allergies:  No Known Allergies    Current Meds:   Standing Meds:  cefTRIAXone   IVPB 2000 milliGRAM(s) IV Intermittent every 24 hours  chlorhexidine 0.12% Liquid 15 milliLiter(s) Oral Mucosa every 12 hours  chlorhexidine 4% Liquid 1 Application(s) Topical <User Schedule>  dextrose 50% Injectable 12.5 Gram(s) IV Push once  dextrose 50% Injectable 25 Gram(s) IV Push once  dextrose 50% Injectable 25 Gram(s) IV Push once  DOPamine Infusion 1 MICROgram(s)/kG/Min (3.17 mL/Hr) IV Continuous <Continuous>  heparin   Injectable 5000 Unit(s) SubCutaneous every 8 hours  insulin regular Infusion 1 Unit(s)/Hr (1 mL/Hr) IV Continuous <Continuous>  pantoprazole   Suspension 40 milliGRAM(s) Enteral Tube daily  sodium chloride 3%. 500 milliLiter(s) (10 mL/Hr) IV Continuous <Continuous>  vancomycin    Solution 250 milliGRAM(s) Oral every 6 hours    PRN Meds:  acetaminophen   Tablet .. 650 milliGRAM(s) Oral every 6 hours PRN Temp greater or equal to 38C (100.4F)  dextrose 40% Gel 15 Gram(s) Oral once PRN Blood Glucose LESS THAN 70 milliGRAM(s)/deciliter  glucagon  Injectable 1 milliGRAM(s) IntraMuscular once PRN Glucose LESS THAN 70 milligrams/deciliter    HOME MEDICATIONS:  ALPRAZolam 0.5 mg oral tablet, extended release: 1 tab(s) orally once a day (at bedtime)  amLODIPine 5 mg oral tablet: 1 tab(s) orally once a day  clopidogrel 75 mg oral tablet: 1 tab(s) orally once a day  losartan 50 mg oral tablet: 1 tab(s) orally once a day  metFORMIN 500 mg oral tablet, extended release: 1 tab(s) orally once a day  omeprazole 20 mg oral delayed release capsule: 1 cap(s) orally once a day  OXcarbazepine 150 mg oral tablet: 1 tab(s) orally once a day  OXcarbazepine 300 mg oral tablet: 1 tab(s) orally once a day (at bedtime)  trihexyphenidyl: 1 milligram(s) orally 2 times a day      Vital Signs:   T(F): 102.2 (07-31-20 @ 04:00), Max: 102.2 (07-31-20 @ 04:00)  HR: 86 (07-31-20 @ 05:30) (78 - 86)  BP: --  RR: 26 (07-31-20 @ 05:30) (25 - 26)  SpO2: 95% (07-31-20 @ 05:30) (93% - 97%)      07-29-20 @ 07:01  -  07-30-20 @ 07:00  --------------------------------------------------------  IN: 3054.6 mL / OUT: 2455 mL / NET: 599.6 mL    07-30-20 @ 07:01  -  07-31-20 @ 06:15  --------------------------------------------------------  IN: 1676.8 mL / OUT: 1775 mL / NET: -98.2 mL    Physical Exam:   GENERAL: unresponsive male in NAD   HEENT: NCAT, +ET tube, +R IJ  CHEST/LUNG: audible breath sounds b/l   HEART: Regular rate and rhythm; s1 s2 appreciated  ABDOMEN: soft, mildly distended abdomen   EXTREMITIES: UE and LE edema   NERVOUS SYSTEM: some spontaneous nonpurposeful movements to tactile sensation, no apparent brainstem reflexes    Labs:                         8.3    9.01  )-----------( 493      ( 31 Jul 2020 04:35 )             25.8       31 Jul 2020 04:35    147    |  111    |  76     ----------------------------<  142    4.2     |  22     |  3.9      Ca    8.2        31 Jul 2020 04:35  Mg     2.7       31 Jul 2020 04:35    TPro  5.1    /  Alb  1.8    /  TBili  0.5    /  DBili  x      /  AST  78     /  ALT  24     /  AlkPhos  70     30 Jul 2020 04:20    Culture - Blood (collected 07-27-20 @ 14:53)  Source: .Blood Blood-Venous  Preliminary Report (07-28-20 @ 23:00):    No growth to date.    Culture - Blood (collected 07-27-20 @ 04:17)  Source: .Blood None  Preliminary Report (07-28-20 @ 14:00):    No growth to date.    Radiology:   < from: EEG (07.29.20 @ 12:25) >  Impression  -  Abnormal due to the presence of: diffuse low voltage activity    < end of copied text >    Assessment and Plan:   64 year old male pmh of DM, CAD with PCI x3 in 2017, HTN, DLD, trigeminal neuralgia who presents to ED from [facility] s/p TPA for stroke.    # CVA s/p TPA s/p thrombectomy (Basiliar Artery) - unresponsive off sedation, pressors changed to vasopressin and phenylephrine   - repeat CT head:  Interval increase in posterior fossa edema and mass effect consistent with evolving infarct  with resultant new effacement of the fourth ventricle and new mild obstructive hydrocephalus. Increased hyperdensity within the left ambient and quadrigeminal plate cisterns, and new hyperdensity within the right ambient and quadrigeminal plate cisterns, the suprasellar cistern, the right sylvian fissure and scattered sulci suspicious worsening/new subarachnoid hemorrhage.  - s/p 23% NS, 50g mannitol, 3% hypertonic saline drip   - remained ventilated, off sedation  - taper off pressors if possible   - integrillin completed  - continue with dopamine for pressor support, wean as tolerated.   - no notable neurologic activity. Apnea test performed (7/22), patient took a breath.  - apnea test repeated (7/27), patient took 2 breaths.   - continue with Subq heparin for DVT ppx, cleared by neurology.   - continue with vergara catheter for strict I/O   - TLC changed to R IJ, confirmed placement with cxr   - Patient continues having spontaneous movements of LE, in response to and without tactile sensation, EEG negative for seizures.   - Family agreeable for trach and PEG, Follow up with surgery.  - Vianney Nye (daughter, HCP) would like a call before any MRI, apnea test, or brain death protocol is done again. Her number is [phone].    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 Diff. - diarrhea improving   - C. Diff PCR positive  - continue with vancomycin 250mg PO     # Distended abdomen - improved, stable  - xray of the abdomen performed showing gaseous distention of the ascending colon and stomach.  - CT Abdomen shows liquid stool within the colon without evidence of obstruction or colon thickening, no intraperitoneal free air.  - Patient remains low continuous suction, with NGT suction having output.  - d/c amio PO and atorvastatin PO - if patient requires it, restart on amio gtt.  - Patient remains NPO.  - Surgery following    # Gram positive cocci in clusters in the blood? - probable contaminant.   - coag negative staff possible contaminant?  - d/c vanc  - No growth to date on repeat cultures, follow up bcx,       # GNR bacteremia likely secondary to UTI   - patient remains febrile   - UA positive for Nitrites   - BCx positive for E. Coli.   - continue with rocephin 2g IV q24h as per ID    # Worsening NAEL - stable   - baseline Cr ~1   - Cr currently 4.3  - Kidney bladder US unremarkable   - d/c bicarb gtt   - Follow up nephrology recs     # Hypokalemia - resolved  - K was 3.4 repleted with 20mEq x 2 (7/26)   - Follow up BMP     # DM   - HbA1c 7.5%   - insulin gtt   - q1h FS     # DLD   - d/c atorvastatin while NPO    # Activity: Bed rest   # Diet: NPO  # DVT ppx: Subq heparin   # GI ppx: Protonix  # Code Status: FULL CODE    # DISPO: Possible trach and PEG by surgery Hospital Day:  15d    Chief Complaint: Patient is a 64y old  Male who presents with a chief complaint of Stroke s/p tpa (30 Jul 2020 21:20)    24 hour events: No acute overnight events. Patient seen this AM, still intubated and unresponsive off sedation.     Past Medical Hx:   HTN (hypertension)  Diabetes    Past Sx:  No significant past surgical history    Allergies:  No Known Allergies    Current Meds:   Standing Meds:  cefTRIAXone   IVPB 2000 milliGRAM(s) IV Intermittent every 24 hours  chlorhexidine 0.12% Liquid 15 milliLiter(s) Oral Mucosa every 12 hours  chlorhexidine 4% Liquid 1 Application(s) Topical <User Schedule>  dextrose 50% Injectable 12.5 Gram(s) IV Push once  dextrose 50% Injectable 25 Gram(s) IV Push once  dextrose 50% Injectable 25 Gram(s) IV Push once  DOPamine Infusion 1 MICROgram(s)/kG/Min (3.17 mL/Hr) IV Continuous <Continuous>  heparin   Injectable 5000 Unit(s) SubCutaneous every 8 hours  insulin regular Infusion 1 Unit(s)/Hr (1 mL/Hr) IV Continuous <Continuous>  pantoprazole   Suspension 40 milliGRAM(s) Enteral Tube daily  sodium chloride 3%. 500 milliLiter(s) (10 mL/Hr) IV Continuous <Continuous>  vancomycin    Solution 250 milliGRAM(s) Oral every 6 hours    PRN Meds:  acetaminophen   Tablet .. 650 milliGRAM(s) Oral every 6 hours PRN Temp greater or equal to 38C (100.4F)  dextrose 40% Gel 15 Gram(s) Oral once PRN Blood Glucose LESS THAN 70 milliGRAM(s)/deciliter  glucagon  Injectable 1 milliGRAM(s) IntraMuscular once PRN Glucose LESS THAN 70 milligrams/deciliter    HOME MEDICATIONS:  ALPRAZolam 0.5 mg oral tablet, extended release: 1 tab(s) orally once a day (at bedtime)  amLODIPine 5 mg oral tablet: 1 tab(s) orally once a day  clopidogrel 75 mg oral tablet: 1 tab(s) orally once a day  losartan 50 mg oral tablet: 1 tab(s) orally once a day  metFORMIN 500 mg oral tablet, extended release: 1 tab(s) orally once a day  omeprazole 20 mg oral delayed release capsule: 1 cap(s) orally once a day  OXcarbazepine 150 mg oral tablet: 1 tab(s) orally once a day  OXcarbazepine 300 mg oral tablet: 1 tab(s) orally once a day (at bedtime)  trihexyphenidyl: 1 milligram(s) orally 2 times a day      Vital Signs:   T(F): 102.2 (07-31-20 @ 04:00), Max: 102.2 (07-31-20 @ 04:00)  HR: 86 (07-31-20 @ 05:30) (78 - 86)  BP: --  RR: 26 (07-31-20 @ 05:30) (25 - 26)  SpO2: 95% (07-31-20 @ 05:30) (93% - 97%)      07-29-20 @ 07:01  -  07-30-20 @ 07:00  --------------------------------------------------------  IN: 3054.6 mL / OUT: 2455 mL / NET: 599.6 mL    07-30-20 @ 07:01  -  07-31-20 @ 06:15  --------------------------------------------------------  IN: 1676.8 mL / OUT: 1775 mL / NET: -98.2 mL    Physical Exam:   GENERAL: unresponsive male in NAD   HEENT: NCAT, +ET tube, +R IJ  CHEST/LUNG: audible breath sounds b/l   HEART: Regular rate and rhythm; s1 s2 appreciated  ABDOMEN: soft, mildly distended abdomen   EXTREMITIES: UE and LE edema   NERVOUS SYSTEM: some spontaneous nonpurposeful movements to tactile sensation, no apparent brainstem reflexes    Labs:                         8.3    9.01  )-----------( 493      ( 31 Jul 2020 04:35 )             25.8       31 Jul 2020 04:35    147    |  111    |  76     ----------------------------<  142    4.2     |  22     |  3.9      Ca    8.2        31 Jul 2020 04:35  Mg     2.7       31 Jul 2020 04:35    TPro  5.1    /  Alb  1.8    /  TBili  0.5    /  DBili  x      /  AST  78     /  ALT  24     /  AlkPhos  70     30 Jul 2020 04:20    Culture - Blood (collected 07-27-20 @ 14:53)  Source: .Blood Blood-Venous  Preliminary Report (07-28-20 @ 23:00):    No growth to date.    Culture - Blood (collected 07-27-20 @ 04:17)  Source: .Blood None  Preliminary Report (07-28-20 @ 14:00):    No growth to date.    Radiology:   < from: EEG (07.29.20 @ 12:25) >  Impression  -  Abnormal due to the presence of: diffuse low voltage activity    < end of copied text >    Assessment and Plan:   64 year old male pmh of DM, CAD with PCI x3 in 2017, HTN, DLD, trigeminal neuralgia who presents to ED from [facility] s/p TPA for stroke.    # CVA s/p TPA s/p thrombectomy (Basiliar Artery) - unresponsive off sedation, pressors changed to vasopressin and phenylephrine   - repeat CT head:  Interval increase in posterior fossa edema and mass effect consistent with evolving infarct  with resultant new effacement of the fourth ventricle and new mild obstructive hydrocephalus. Increased hyperdensity within the left ambient and quadrigeminal plate cisterns, and new hyperdensity within the right ambient and quadrigeminal plate cisterns, the suprasellar cistern, the right sylvian fissure and scattered sulci suspicious worsening/new subarachnoid hemorrhage.  - s/p 23% NS, 50g mannitol, 3% hypertonic saline drip   - remained ventilated, off sedation  - taper off pressors if possible   - integrillin completed  - continue with dopamine for pressor support, wean as tolerated.   - no notable neurologic activity. Apnea test performed (7/22), patient took a breath.  - apnea test repeated (7/27), patient took 2 breaths.   - continue with Subq heparin for DVT ppx, cleared by neurology.   - continue with vergara catheter for strict I/O   - TLC changed to R IJ, confirmed placement with cxr   - Patient continues having spontaneous movements of LE, in response to and without tactile sensation, EEG negative for seizures.   - Family agreeable for trach and PEG, Follow up with surgery.  - Start trickle feeds depending on when patient is going for trach and peg by surgery.   - Vianney Nye (daughter, HCP) would like a call before any MRI, apnea test, or brain death protocol is done again. Her number is [phone].    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 Diff. - diarrhea improving   - C. Diff PCR positive  - continue with vancomycin 250mg PO     # Distended abdomen - improved, stable  - xray of the abdomen performed showing gaseous distention of the ascending colon and stomach.  - CT Abdomen shows liquid stool within the colon without evidence of obstruction or colon thickening, no intraperitoneal free air.  - Patient remains low continuous suction, with NGT suction having output.  - d/c amio PO and atorvastatin PO - if patient requires it, restart on amio gtt.  - Patient remains NPO.  - Surgery following    # Gram positive cocci in clusters in the blood? - probable contaminant.   - coag negative staff possible contaminant?  - d/c vanc  - No growth to date on repeat cultures, follow up bcx,       # GNR bacteremia likely secondary to UTI   - patient remains febrile   - UA positive for Nitrites   - BCx positive for E. Coli.   - continue with rocephin 2g IV q24h as per ID    # Worsening NAEL - stable   - baseline Cr ~1   - Cr currently 4.3  - Kidney bladder US unremarkable   - d/c bicarb gtt   - Follow up nephrology recs     # Hypokalemia - resolved  - K was 3.4 repleted with 20mEq x 2 (7/26)   - Follow up BMP     # DM   - HbA1c 7.5%   - insulin gtt   - q1h FS     # DLD   - d/c atorvastatin while NPO    # Activity: Bed rest   # Diet: NPO with possible trickle feeds depending on when patient is approved for trach and peg  # DVT ppx: Subq heparin   # GI ppx: Protonix  # Code Status: FULL CODE    # DISPO: Possible trach and PEG by surgery

## 2020-07-31 NOTE — ADVANCED PRACTICE NURSE CONSULT - ASSESSMENT
64 year old male pmh of DM, CAD with PCI x3 in 2017, HTN, DLD, trigeminal neuralgia who presents to ED from [facility] s/p TPA for stroke.  History obtained from charts and daughter Vianney [phone] as Pt. is currently aphasic. Per daughter Pt. who is AOx3 at baseline was at home playing with grandchildren and became nausea, had 3 episodes of NBNB vomiting. After laying down for 30 min family found Pt. confused with aphasia and brought Pt. to [facility] ED.  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 Other extensive atheromatous changes including moderate/severe subclavian and vertebral stenosis. Pt. was given TPA at 16:46 and sent to [facility] for neurointerventional eval and post TPA care. Pt. seen in ED with expressive aphasia although able to move all extremities with weakness of bilateral LE 1/5. Pt. was on cardine drip to maintain BP below 185. Interval Hx -While in ED RRT called as Pt. posturing and shaking of b/l upper extremities, not following any commands, Pt. was intubated for airway protection. Stat CT done no new hemmorage, CT perfusion Stat, Neuro sx and neurointerventional contacted. NI actual initiated.    Patient currently in ICU, being managed for Acute hypoxemic respiratory failure;vStroke SP TAP And thrombectomy; NAEL; DI; E coli bacteremia; G+ Cocci bacteremia Coag neg staph; Sepsis; Septic shock; CDiff     Received patient in ICU,  laying supine in bed, turned to left side (pillow under right side), HOB elevated 30 degrees, offloading boots to BLEs in place. Pt intubated, nonresponsive, drips infusing, primary RN Jason at bedside, made aware of purpose of WOCN visit, agreeable to consult.     Type of wound: Mucosal membrane pressure injury -device r/t to ET tube hodge   Location: mid upper lip    Wound measurements: 1.5cm x 1cm x 0.2cm, true depth indeterminable at this time   Tunneling/Undermining: none   Wound bed: presenting as purple opening tissue   Wound edges: attached, flush, irregular   Periwound: intact   Wound exudate: none   Wound odor: none   Induration, erythema, warmth: none   Wound pain: no signs present     With assistance from RN, turned patient to side for sacral skin assessment.     Moisture associated skin damage (MASD) to b/l buttock, skin macerated.     Patient bedbound, immobile, + vergara, + fecal management system/Dignishield, occasional leakage around tube site per RN report, pt receiving TF via OGT.

## 2020-07-31 NOTE — PROGRESS NOTE ADULT - ASSESSMENT
Assessment:  64y Male consulted for trach and PEG.    Plan:  - plan for trach and PEG 8/1  - NPO after midnight  - pre op labs for tonight (8pm) including CBC, BMP, Type and Screen, Coag Assessment:  64y Male consulted for trach and PEG.    Plan:  - plan for trach and PEG 8/1  - NPO after midnight

## 2020-07-31 NOTE — PROGRESS NOTE ADULT - SUBJECTIVE AND OBJECTIVE BOX
Nephrology Progress Note    ANA CRISTINA LOVING  MRN-[id number]  64y  Male    S:  Patient is seen and examined, events over the last 24h noted.    O:  Allergies:  No Known Allergies    Hospital Medications:   MaleMEDICATIONS  (STANDING):  cefTRIAXone   IVPB 2000 milliGRAM(s) IV Intermittent every 24 hours  chlorhexidine 0.12% Liquid 15 milliLiter(s) Oral Mucosa every 12 hours  chlorhexidine 4% Liquid 1 Application(s) Topical <User Schedule>  dextrose 50% Injectable 12.5 Gram(s) IV Push once  dextrose 50% Injectable 25 Gram(s) IV Push once  dextrose 50% Injectable 25 Gram(s) IV Push once  DOPamine Infusion 1 MICROgram(s)/kG/Min (3.17 mL/Hr) IV Continuous <Continuous>  heparin   Injectable 5000 Unit(s) SubCutaneous every 8 hours  insulin regular Infusion 1 Unit(s)/Hr (1 mL/Hr) IV Continuous <Continuous>  pantoprazole   Suspension 40 milliGRAM(s) Enteral Tube daily  sodium chloride 0.9% Bolus 500 milliLiter(s) IV Bolus once  sodium chloride 3%. 500 milliLiter(s) (25 mL/Hr) IV Continuous <Continuous>  vancomycin    Solution 250 milliGRAM(s) Oral every 6 hours    MEDICATIONS  (PRN):  acetaminophen   Tablet .. 650 milliGRAM(s) Oral every 6 hours PRN Temp greater or equal to 38C (100.4F)  dextrose 40% Gel 15 Gram(s) Oral once PRN Blood Glucose LESS THAN 70 milliGRAM(s)/deciliter  glucagon  Injectable 1 milliGRAM(s) IntraMuscular once PRN Glucose LESS THAN 70 milligrams/deciliter    Home Medications:  Home Medications:  ALPRAZolam 0.5 mg oral tablet, extended release: 1 tab(s) orally once a day (at bedtime) (2020 19:14)  amLODIPine 5 mg oral tablet: 1 tab(s) orally once a day (2020 19:14)  clopidogrel 75 mg oral tablet: 1 tab(s) orally once a day (2020 19:11)  losartan 50 mg oral tablet: 1 tab(s) orally once a day (2020 19:13)  metFORMIN 500 mg oral tablet, extended release: 1 tab(s) orally once a day (2020 19:13)  omeprazole 20 mg oral delayed release capsule: 1 cap(s) orally once a day (2020 19:14)  OXcarbazepine 150 mg oral tablet: 1 tab(s) orally once a day (2020 19:15)  OXcarbazepine 300 mg oral tablet: 1 tab(s) orally once a day (at bedtime) (2020 19:12)  trihexyphenidyl: 1 milligram(s) orally 2 times a day (2020 19:13)      VITALS:  Daily     Daily Weight in k.6 (2020 05:30)  T(F): 100.8 (20 @ 08:00), Max: 102.2 (20 @ 04:00)  HR: 86 (20 @ 09:00)  BP: 84/48 (20 @ 09:00)  RR: 25 (20 @ 09:00)  SpO2: 91% (20 @ 09:00)  Wt(kg): --  I&O's Detail    2020 07:  -  2020 07:00  --------------------------------------------------------  IN:    dextrose 5%: 75 mL    DOPamine Infusion: 970.4 mL    Enteral Tube Flush: 200 mL    insulin regular Infusion: 14 mL    IV PiggyBack: 100 mL    sodium chloride 3%: 275 mL    sodium chloride 3%: 220 mL  Total IN: 1854.4 mL    OUT:    Indwelling Catheter - Urethral: 1875 mL    Nasoenteral Tube: 400 mL    Rectal Tube: 100 mL  Total OUT: 2375 mL    Total NET: -520.6 mL      2020 07:01  -  2020 10:00  --------------------------------------------------------  IN:    DOPamine Infusion: 95 mL    Sodium Chloride 0.9% IV Bolus: 500 mL    sodium chloride 3%: 10 mL    sodium chloride 3%.: 25 mL  Total IN: 630 mL    OUT:    Indwelling Catheter - Urethral: 125 mL  Total OUT: 125 mL    Total NET: 505 mL        I&O's Summary    2020 07:  -  2020 07:00  --------------------------------------------------------  IN: 1854.4 mL / OUT: 2375 mL / NET: -520.6 mL    2020 07:01  -  2020 10:00  --------------------------------------------------------  IN: 630 mL / OUT: 125 mL / NET: 505 mL          PHYSICAL EXAM:  Gen: NAD  Resp:   Card: S1/S2  Abd:   Ext:      LABS:  ABG - ( 2020 04:22 )  pH, Arterial: 7.41  pH, Blood: x     /  pCO2: 37    /  pO2: 74    / HCO3: 23    / Base Excess: -1.1  /  SaO2: 95                    147<H>  |  111<H>  |  76<HH>  ----------------------------<  142<H>  4.2   |  22  |  3.9<H>    Ca    8.2<L>      2020 04:35  Mg     2.7         TPro  5.1<L>  /  Alb  1.8<L>  /  TBili  0.5  /  DBili      /  AST  78<H>  /  ALT  24  /  AlkPhos  70  30    Phosphorus Level, Serum: 3.4 mg/dL (20 @ 04:30)  Phosphorus Level, Serum: 4.5 mg/dL (20 @ 04:50)  Phosphorus Level, Serum: 3.1 mg/dL (20 @ 04:45)                          8.3    9.01  )-----------( 493      ( 2020 04:35 )             25.8       CBC Full  -  ( 2020 04:35 )  WBC Count : 9.01 K/uL  RBC Count : 2.88 M/uL  Hemoglobin : 8.3 g/dL  Hematocrit : 25.8 %  Platelet Count - Automated : 493 K/uL  Mean Cell Volume : 89.6 fL      Urine Studies:        Creatinine trend:  Creatinine, Serum: 3.9 mg/dL (20 @ 04:35)  Creatinine, Serum: 4.0 mg/dL (20 @ 01:45)  Creatinine, Serum: 4.1 mg/dL (20 @ 18:08)  Creatinine, Serum: 4.3 mg/dL (20 @ 04:20)  Creatinine, Serum: 4.5 mg/dL (20 @ 21:40)  Creatinine, Serum: 4.7 mg/dL (20 @ 15:45)  Creatinine, Serum: 4.6 mg/dL (20 @ 04:20)  Creatinine, Serum: 4.1 mg/dL (20 @ 18:22)  Creatinine, Serum: 4.6 mg/dL (20 @ 04:30)  Creatinine, Serum: 4.7 mg/dL (20 @ 21:30)  Creatinine, Serum: 4.7 mg/dL (20 @ 16:50)  Creatinine, Serum: 4.4 mg/dL (20 @ 04:30)  Creatinine, Serum: 4.4 mg/dL (20 @ 23:00)  Creatinine, Serum: 4.1 mg/dL (20 @ 19:45)  Creatinine, Serum: 4.1 mg/dL (20 @ 15:59)  Creatinine, Serum: 3.9 mg/dL (20 @ 04:00)  Creatinine, Serum: 3.7 mg/dL (20 @ 00:15)  Creatinine, Serum: 3.8 mg/dL (20 @ 20:10)  Creatinine, Serum: 3.6 mg/dL (20 @ 16:06)  Creatinine, Serum: 3.2 mg/dL (20 @ 10:40) Nephrology Progress Note    ANA CRISTINA LOVING  MRN-[id number]  64y  Male    S:  Patient is seen and examined, events over the last 24h noted.    O:  Allergies:  No Known Allergies    Hospital Medications:   MaleMEDICATIONS  (STANDING):  cefTRIAXone   IVPB 2000 milliGRAM(s) IV Intermittent every 24 hours  chlorhexidine 0.12% Liquid 15 milliLiter(s) Oral Mucosa every 12 hours  chlorhexidine 4% Liquid 1 Application(s) Topical <User Schedule>  dextrose 50% Injectable 12.5 Gram(s) IV Push once  dextrose 50% Injectable 25 Gram(s) IV Push once  dextrose 50% Injectable 25 Gram(s) IV Push once  DOPamine Infusion 1 MICROgram(s)/kG/Min (3.17 mL/Hr) IV Continuous <Continuous>  heparin   Injectable 5000 Unit(s) SubCutaneous every 8 hours  insulin regular Infusion 1 Unit(s)/Hr (1 mL/Hr) IV Continuous <Continuous>  pantoprazole   Suspension 40 milliGRAM(s) Enteral Tube daily  sodium chloride 0.9% Bolus 500 milliLiter(s) IV Bolus once  sodium chloride 3%. 500 milliLiter(s) (25 mL/Hr) IV Continuous <Continuous>  vancomycin    Solution 250 milliGRAM(s) Oral every 6 hours    MEDICATIONS  (PRN):  acetaminophen   Tablet .. 650 milliGRAM(s) Oral every 6 hours PRN Temp greater or equal to 38C (100.4F)  dextrose 40% Gel 15 Gram(s) Oral once PRN Blood Glucose LESS THAN 70 milliGRAM(s)/deciliter  glucagon  Injectable 1 milliGRAM(s) IntraMuscular once PRN Glucose LESS THAN 70 milligrams/deciliter    Home Medications:  Home Medications:  ALPRAZolam 0.5 mg oral tablet, extended release: 1 tab(s) orally once a day (at bedtime) (2020 19:14)  amLODIPine 5 mg oral tablet: 1 tab(s) orally once a day (2020 19:14)  clopidogrel 75 mg oral tablet: 1 tab(s) orally once a day (2020 19:11)  losartan 50 mg oral tablet: 1 tab(s) orally once a day (2020 19:13)  metFORMIN 500 mg oral tablet, extended release: 1 tab(s) orally once a day (2020 19:13)  omeprazole 20 mg oral delayed release capsule: 1 cap(s) orally once a day (2020 19:14)  OXcarbazepine 150 mg oral tablet: 1 tab(s) orally once a day (2020 19:15)  OXcarbazepine 300 mg oral tablet: 1 tab(s) orally once a day (at bedtime) (2020 19:12)  trihexyphenidyl: 1 milligram(s) orally 2 times a day (2020 19:13)      VITALS:  Daily     Daily Weight in k.6 (2020 05:30)  T(F): 100.8 (20 @ 08:00), Max: 102.2 (20 @ 04:00)  HR: 86 (20 @ 09:00)  BP: 84/48 (20 @ 09:00)  RR: 25 (20 @ 09:00)  SpO2: 91% (20 @ 09:00)  Wt(kg): --  I&O's Detail    2020 07:  -  2020 07:00  --------------------------------------------------------  IN:    dextrose 5%: 75 mL    DOPamine Infusion: 970.4 mL    Enteral Tube Flush: 200 mL    insulin regular Infusion: 14 mL    IV PiggyBack: 100 mL    sodium chloride 3%: 275 mL    sodium chloride 3%: 220 mL  Total IN: 1854.4 mL    OUT:    Indwelling Catheter - Urethral: 1875 mL    Nasoenteral Tube: 400 mL    Rectal Tube: 100 mL  Total OUT: 2375 mL    Total NET: -520.6 mL      2020 07:01  -  2020 10:00  --------------------------------------------------------  IN:    DOPamine Infusion: 95 mL    Sodium Chloride 0.9% IV Bolus: 500 mL    sodium chloride 3%: 10 mL    sodium chloride 3%.: 25 mL  Total IN: 630 mL    OUT:    Indwelling Catheter - Urethral: 125 mL  Total OUT: 125 mL    Total NET: 505 mL        I&O's Summary    2020 07:  -  2020 07:00  --------------------------------------------------------  IN: 1854.4 mL / OUT: 2375 mL / NET: -520.6 mL    2020 07:01  -  2020 10:00  --------------------------------------------------------  IN: 630 mL / OUT: 125 mL / NET: 505 mL          PHYSICAL EXAM:  Gen: intubated  Resp: decreased breath sounds at the bases  Card: S1/S2  Abd: distended    LABS:  ABG - ( 2020 04:22 )  pH, Arterial: 7.41  pH, Blood: x     /  pCO2: 37    /  pO2: 74    / HCO3: 23    / Base Excess: -1.1  /  SaO2: 95                    147<H>  |  111<H>  |  76<HH>  ----------------------------<  142<H>  4.2   |  22  |  3.9<H>    Ca    8.2<L>      2020 04:35  Mg     2.7         TPro  5.1<L>  /  Alb  1.8<L>  /  TBili  0.5  /  DBili      /  AST  78<H>  /  ALT  24  /  AlkPhos  70      Phosphorus Level, Serum: 3.4 mg/dL (20 @ 04:30)  Phosphorus Level, Serum: 4.5 mg/dL (20 @ 04:50)  Phosphorus Level, Serum: 3.1 mg/dL (20 @ 04:45)                          8.3    9.01  )-----------( 493      ( 2020 04:35 )             25.8       CBC Full  -  ( 2020 04:35 )  WBC Count : 9.01 K/uL  RBC Count : 2.88 M/uL  Hemoglobin : 8.3 g/dL  Hematocrit : 25.8 %  Platelet Count - Automated : 493 K/uL  Mean Cell Volume : 89.6 fL      Urine Studies:        Creatinine trend:  Creatinine, Serum: 3.9 mg/dL (20 @ 04:35)  Creatinine, Serum: 4.0 mg/dL (20 @ 01:45)  Creatinine, Serum: 4.1 mg/dL (20 @ 18:08)  Creatinine, Serum: 4.3 mg/dL (20 @ 04:20)  Creatinine, Serum: 4.5 mg/dL (20 @ 21:40)  Creatinine, Serum: 4.7 mg/dL (20 @ 15:45)  Creatinine, Serum: 4.6 mg/dL (20 @ 04:20)  Creatinine, Serum: 4.1 mg/dL (20 @ 18:22)  Creatinine, Serum: 4.6 mg/dL (20 @ 04:30)  Creatinine, Serum: 4.7 mg/dL (20 @ 21:30)  Creatinine, Serum: 4.7 mg/dL (20 @ 16:50)  Creatinine, Serum: 4.4 mg/dL (20 @ 04:30)  Creatinine, Serum: 4.4 mg/dL (20 @ 23:00)  Creatinine, Serum: 4.1 mg/dL (20 @ 19:45)  Creatinine, Serum: 4.1 mg/dL (20 @ 15:59)  Creatinine, Serum: 3.9 mg/dL (20 @ 04:00)  Creatinine, Serum: 3.7 mg/dL (20 @ 00:15)  Creatinine, Serum: 3.8 mg/dL (20 @ 20:10)  Creatinine, Serum: 3.6 mg/dL (20 @ 16:06)  Creatinine, Serum: 3.2 mg/dL (20 @ 10:40)

## 2020-07-31 NOTE — PROGRESS NOTE ADULT - ASSESSMENT
64M, PMH HTN, CAD, DM, admitted for stroke s/p TPA c/b intracranial hemorrhage, now w/o brainstem function, c/b AHRF on mech vent, E.coli bacteremia/+cdiff, non-oliguric NAEL    #NAEL on CKD, p/w creatinine 1.9, unknown baseline, ATN iso CVA/shock  - creatinine slightly down-trending, non oliguric   - DARSHAN noted, non-echogenic kidneys, no HN  - phos ok off binders, please trend  - continue sodium bicarbonate PO   - febrile overnight, on rocephin/vanc, dose vanc by level, appreciate ID f/u  - on 3% saline at 25cc/hr for intracranial edema, off desmopressin  - no acute indication for RRT, would not improve prognosis  - will follow

## 2020-07-31 NOTE — PROGRESS NOTE ADULT - SUBJECTIVE AND OBJECTIVE BOX
Patient is a 64y old  Male who presents with a chief complaint of Stroke s/p tpa (31 Jul 2020 09:59)  pt seen and evaluated   remain vented  medical f/u noted   tube on hold     ICU Vital Signs Last 24 Hrs  T(C): 38.1 (31 Jul 2020 12:00), Max: 39 (31 Jul 2020 04:00)  T(F): 100.5 (31 Jul 2020 12:00), Max: 102.2 (31 Jul 2020 04:00)  HR: 84 (31 Jul 2020 11:15) (78 - 89)  BP: 83/47 (31 Jul 2020 11:00) (77/44 - 84/48)  BP(mean): 54 (31 Jul 2020 11:00) (49 - 58)  ABP: 94/42 (31 Jul 2020 11:15) (64/56 - 142/56)  ABP(mean): 56 (31 Jul 2020 11:15) (44 - 80)  RR: 25 (31 Jul 2020 11:15) (25 - 32)  SpO2: 91% (31 Jul 2020 11:15) (89% - 98%)      Drug Dosing Weight  Height (cm): 177.8 (17 Jul 2020 00:00)  Weight (kg): 84.5 (16 Jul 2020 20:42)  BMI (kg/m2): 26.7 (17 Jul 2020 00:00)  BSA (m2): 2.03 (17 Jul 2020 00:00)    I&O's Detail    30 Jul 2020 07:01  -  31 Jul 2020 07:00  --------------------------------------------------------  IN:    dextrose 5%: 75 mL    DOPamine Infusion: 970.4 mL    Enteral Tube Flush: 200 mL    insulin regular Infusion: 14 mL    IV PiggyBack: 100 mL    sodium chloride 3%: 275 mL    sodium chloride 3%: 220 mL  Total IN: 1854.4 mL    OUT:    Indwelling Catheter - Urethral: 1875 mL    Nasoenteral Tube: 400 mL    Rectal Tube: 100 mL  Total OUT: 2375 mL    Total NET: -520.6 mL      31 Jul 2020 07:01  -  31 Jul 2020 12:52  --------------------------------------------------------  IN:    DOPamine Infusion: 237.5 mL    Sodium Chloride 0.9% IV Bolus: 500 mL    sodium chloride 3%: 10 mL    sodium chloride 3%.: 100 mL  Total IN: 847.5 mL    OUT:    Indwelling Catheter - Urethral: 325 mL  Total OUT: 325 mL    Total NET: 522.5 mL  PHYSICAL EXAM:  Constitutional:  remain vented  Gastrointestinal:  mildly distended  MEDICATIONS  (STANDING):  cefTRIAXone   IVPB 2000 milliGRAM(s) IV Intermittent every 24 hours  chlorhexidine 0.12% Liquid 15 milliLiter(s) Oral Mucosa every 12 hours  chlorhexidine 4% Liquid 1 Application(s) Topical <User Schedule>  dextrose 50% Injectable 12.5 Gram(s) IV Push once  dextrose 50% Injectable 25 Gram(s) IV Push once  dextrose 50% Injectable 25 Gram(s) IV Push once  DOPamine Infusion 1 MICROgram(s)/kG/Min (3.17 mL/Hr) IV Continuous <Continuous>  heparin   Injectable 5000 Unit(s) SubCutaneous every 8 hours  insulin regular Infusion 1 Unit(s)/Hr (1 mL/Hr) IV Continuous <Continuous>  pantoprazole   Suspension 40 milliGRAM(s) Enteral Tube daily  sodium chloride 3%. 500 milliLiter(s) (25 mL/Hr) IV Continuous <Continuous>  vancomycin    Solution 250 milliGRAM(s) Oral every 6 hours      Diet, NPO with Tube Feed:   Tube Feeding Modality: Orogastric  Peptamen A.F. Formula  Total Volume for 24 Hours (mL): 240  Continuous  Starting Tube Feed Rate mL per Hour: 10  Increase Tube Feed Rate by (mL): 10     Every hour  Until Goal Tube Feed Rate (mL per Hour): 10  Tube Feed Duration (in Hours): 24  Tube Feed Start Time: 11:15 (07-31-20 @ 10:22)      LABS  07-31    147<H>  |  111<H>  |  76<HH>  ----------------------------<  142<H>  4.2   |  22  |  3.9<H>    Ca    8.2<L>      31 Jul 2020 04:35  Mg     2.7     07-31    TPro  5.1<L>  /  Alb  1.8<L>  /  TBili  0.5  /  DBili  x   /  AST  78<H>  /  ALT  24  /  AlkPhos  70  07-30                        8.3    9.01  )-----------( 493      ( 31 Jul 2020 04:35 )             25.8     CAPILLARY BLOOD GLUCOSE  POCT Blood Glucose.: 137 mg/dL (31 Jul 2020 12:37)  POCT Blood Glucose.: 133 mg/dL (31 Jul 2020 10:31)  POCT Blood Glucose.: 147 mg/dL (31 Jul 2020 08:18)  RADIOLOGY STUDIES  < from: Xray Chest 1 View- PORTABLE-Routine (07.31.20 @ 05:53) >  Impression:  Decreased bibasal opacities. No pneumothorax.

## 2020-07-31 NOTE — PROGRESS NOTE ADULT - ASSESSMENT
· Assessment		  64 year old male pmh of DM, CAD with PCI x3 in 2017, HTN, DLD, trigeminal neuralgia who presents to ED from [facility] s/p TPA for stroke.    IMPRESSION;   # CVA s/p TPA s/p thrombectomy (Basiliar Artery) - unresponsive off sedation, on pressors  - repeat CT head:  Interval increase in posterior fossa edema and mass effect consistent with evolving infarct  with resultant new effacement of the fourth ventricle and new mild obstructive hydrocephalus. Increased hypergenicity within the left ambient and quadrigeminal plate cisterns, and new hypodensity within the right ambient and quadrigeminal plate cisterns, the suprasellar cistern, the right sylvian fissure and scattered sulci suspicious worsening/new subarachnoid hemorrhage.  - remained ventilated, off sedation  - no neurologic activity.   -fevers could well be central as I see no new focus of infection  #CD colitis: on po vancomycin. Diarrhea has decreased  #E coli bacteremia : secondary to  tract with UCx E coli but no overt pyuria. Could well also be secondary to translocation from the GI tract.  BCx 7/27 NGTD  CT Abdomen 7/28:  Liquid stool within the colon without evidence of obstruction or colonic wall thickening. No intraperitoneal free air. Bilateral small pleural effusions with associated opacities    RECOMMENDATIONS;  Serum fungitell assay  d/c R IJ catheter  Rocephin 2 gm iv q24h  If continues to have fevers broaden coverage to vancomycin 750 mg iv q48h ( repeat nares for ORSA ) and meropenem 750 mg iv q24h  and hold the Rocephin )  Po vancomycin 125 mg q6h

## 2020-07-31 NOTE — PROGRESS NOTE ADULT - SUBJECTIVE AND OBJECTIVE BOX
ANA CRISTINA LOVING  64y, Male    All available historical data reviewed    OVERNIGHT EVENTS:  fevers  off sedation, remains non responsive to ALL stimuli  on pressors  Fio2 40%  rectal tube with minimal stools  R IJ catheter with no erythema    ROS:  unable to obtain history secondary to patient's mental status and/or sedation     VITALS:  T(F): 100.8, Max: 102.2 (07-31-20 @ 04:00)  HR: 88  BP: 83/49  RR: 25Vital Signs Last 24 Hrs  T(C): 38.2 (31 Jul 2020 08:00), Max: 39 (31 Jul 2020 04:00)  T(F): 100.8 (31 Jul 2020 08:00), Max: 102.2 (31 Jul 2020 04:00)  HR: 88 (31 Jul 2020 08:45) (78 - 89)  BP: 83/49 (31 Jul 2020 08:15) (83/49 - 83/49)  BP(mean): 58 (31 Jul 2020 08:15) (58 - 58)  RR: 25 (31 Jul 2020 08:45) (25 - 32)  SpO2: 90% (31 Jul 2020 08:45) (89% - 98%)    TESTS & MEASUREMENTS:                        8.3    9.01  )-----------( 493      ( 31 Jul 2020 04:35 )             25.8     07-31    147<H>  |  111<H>  |  76<HH>  ----------------------------<  142<H>  4.2   |  22  |  3.9<H>    Ca    8.2<L>      31 Jul 2020 04:35  Mg     2.7     07-31    TPro  5.1<L>  /  Alb  1.8<L>  /  TBili  0.5  /  DBili  x   /  AST  78<H>  /  ALT  24  /  AlkPhos  70  07-30    LIVER FUNCTIONS - ( 30 Jul 2020 04:20 )  Alb: 1.8 g/dL / Pro: 5.1 g/dL / ALK PHOS: 70 U/L / ALT: 24 U/L / AST: 78 U/L / GGT: x             Culture - Blood (collected 07-27-20 @ 14:53)  Source: .Blood Blood-Venous  Preliminary Report (07-28-20 @ 23:00):    No growth to date.    Culture - Blood (collected 07-27-20 @ 04:17)  Source: .Blood None  Preliminary Report (07-28-20 @ 14:00):    No growth to date.            RADIOLOGY & ADDITIONAL TESTS:  Personal review of radiological diagnostics performed  Echo and EKG results noted when applicable.     MEDICATIONS:  acetaminophen   Tablet .. 650 milliGRAM(s) Oral every 6 hours PRN  cefTRIAXone   IVPB 2000 milliGRAM(s) IV Intermittent every 24 hours  chlorhexidine 0.12% Liquid 15 milliLiter(s) Oral Mucosa every 12 hours  chlorhexidine 4% Liquid 1 Application(s) Topical <User Schedule>  dextrose 40% Gel 15 Gram(s) Oral once PRN  dextrose 50% Injectable 12.5 Gram(s) IV Push once  dextrose 50% Injectable 25 Gram(s) IV Push once  dextrose 50% Injectable 25 Gram(s) IV Push once  DOPamine Infusion 1 MICROgram(s)/kG/Min IV Continuous <Continuous>  glucagon  Injectable 1 milliGRAM(s) IntraMuscular once PRN  heparin   Injectable 5000 Unit(s) SubCutaneous every 8 hours  insulin regular Infusion 1 Unit(s)/Hr IV Continuous <Continuous>  pantoprazole   Suspension 40 milliGRAM(s) Enteral Tube daily  sodium chloride 0.9% Bolus 500 milliLiter(s) IV Bolus once  sodium chloride 3%. 500 milliLiter(s) IV Continuous <Continuous>  vancomycin    Solution 250 milliGRAM(s) Oral every 6 hours      ANTIBIOTICS:  cefTRIAXone   IVPB 2000 milliGRAM(s) IV Intermittent every 24 hours  vancomycin    Solution 250 milliGRAM(s) Oral every 6 hours

## 2020-07-31 NOTE — CHART NOTE - NSCHARTNOTEFT_GEN_A_CORE
Patient: ANA CRISTINA LOVING  MRN: [id number]  64y Male  Location: [facility]  A  20 @ 16:02    Vitals:  T(F): 100.5 (20 @ 12:00), Max: 102.2 (20 @ 04:00)  HR: 82 (20 @ 15:45) (78 - 89)  BP: 73/42 (20 @ 15:00) (72/41 - 87/49)  RR: 25 (20 @ 15:45) (25 - 32)  SpO2: 97% (20 @ 15:45) (89% - 98%)    Procedure: Tracheostomy and PEG tube placement     Consent in Chart: [ X ] Yes [  ] No  Diet: NPO after MN  Fluids: sodium chloride 3%. 500 milliLiter(s) (25 mL/Hr) IV Continuous <Continuous>    EK Lead ECG  Ventricular Rate 156 BPM  Atrial Rate 178 BPM  QRS Duration 90 ms  Q-T Interval 272 ms  QTC Calculation(Bezet) 438 ms  R Axis 14 degrees  T Axis 166 degrees  Diagnosis Line Atrial fibrillation with rapid ventricular response  Minimal voltage criteria for LVH, may be normal variant  Marked ST abnormality, possible inferior subendocardial injury  Abnormal ECG    Confirmed by Shakir Jerome ([id number]) on 2020 12:32:24 PM (20 @ 06:01)      CXR:  Xray Chest 1 View- PORTABLE-Routine:   EXAM:  XR CHEST PORTABLE ROUTINE 1V        PROCEDURE DATE:  2020    INTERPRETATION:  Clinical History / Reason for exam: Respiratory failure.  Comparison : Chest radiograph 2020.  Technique/Positioning: Frontal view of the chest obtained.    Findings:  Support devices: ET tube is 5 cm of the shyann. Enteric tube coursing below left hemidiaphragm out of view. Stable right IJ central catheter terminating over the distal superior vena cava.  Cardiac/mediastinum/hilum: Stable cardiomediastinal silhouette.  Lung parenchyma/Pleura: Decreased bibasal opacities. No pneumothorax.  Skeleton/soft tissues: Stable.    Impression:  Decreased bibasal opacities. No pneumothorax.  Support devices as above.    WILLIE INMAN M.D., ATTENDING RADIOLOGIST  This document has been electronically signed. 2020  8:59AM             (20 @ 05:53)    Pre-OP Labs:  CAPILLARY BLOOD GLUCOSE  POCT Blood Glucose.: 137 mg/dL (2020 12:37)  POCT Blood Glucose.: 133 mg/dL (2020 10:31)  POCT Blood Glucose.: 147 mg/dL (2020 08:18)  POCT Blood Glucose.: 135 mg/dL (2020 04:18)  POCT Blood Glucose.: 128 mg/dL (2020 00:21)  POCT Blood Glucose.: 146 mg/dL (2020 22:19)  POCT Blood Glucose.: 132 mg/dL (2020 19:58)  POCT Blood Glucose.: 127 mg/dL (2020 17:34)                          8.3    9.01  )-----------( 493      ( 2020 04:35 )             25.8     31    147<H>  |  111<H>  |  76<HH>  ----------------------------<  142<H>  4.2   |  22  |  3.9<H>    Ca    8.2<L>      2020 04:35  Mg     2.7         TPro  5.1<L>  /  Alb  1.8<L>  /  TBili  0.5  /  DBili  x   /  AST  78<H>  /  ALT  24  /  AlkPhos  70  07-30    PT/INR - ( 2020 11:23 )   PT: 15.10 sec;   INR: 1.31 ratio         PTT - ( 2020 11:23 )  PTT:36.3 sec    Type & Screen: done this morning B POS    Anticoagulation/Antiplatelet: HSQ 5000 units q8h Patient: ANA CRISTINA LOVING  MRN: [id number]  64y Male  Location: [facility]  A  20 @ 16:02    Vitals:  T(F): 100.5 (20 @ 12:00), Max: 102.2 (20 @ 04:00)  HR: 82 (20 @ 15:45) (78 - 89)  BP: 73/42 (20 @ 15:00) (72/41 - 87/49)  RR: 25 (20 @ 15:45) (25 - 32)  SpO2: 97% (20 @ 15:45) (89% - 98%)    Procedure: Tracheostomy and PEG tube placement planned for tomorrow 20    Consent in Chart: [ X ] Yes [  ] No  Diet: NPO after MN  Fluids: sodium chloride 3%. 500 milliLiter(s) (25 mL/Hr) IV Continuous <Continuous>    EK Lead ECG  Ventricular Rate 156 BPM  Atrial Rate 178 BPM  QRS Duration 90 ms  Q-T Interval 272 ms  QTC Calculation(Bezet) 438 ms  R Axis 14 degrees  T Axis 166 degrees  Diagnosis Line Atrial fibrillation with rapid ventricular response  Minimal voltage criteria for LVH, may be normal variant  Marked ST abnormality, possible inferior subendocardial injury  Abnormal ECG    Confirmed by Shakir Jerome ([id number]) on 2020 12:32:24 PM (20 @ 06:01)      CXR:  Xray Chest 1 View- PORTABLE-Routine:   EXAM:  XR CHEST PORTABLE ROUTINE 1V        PROCEDURE DATE:  2020    INTERPRETATION:  Clinical History / Reason for exam: Respiratory failure.  Comparison : Chest radiograph 2020.  Technique/Positioning: Frontal view of the chest obtained.    Findings:  Support devices: ET tube is 5 cm of the shyann. Enteric tube coursing below left hemidiaphragm out of view. Stable right IJ central catheter terminating over the distal superior vena cava.  Cardiac/mediastinum/hilum: Stable cardiomediastinal silhouette.  Lung parenchyma/Pleura: Decreased bibasal opacities. No pneumothorax.  Skeleton/soft tissues: Stable.    Impression:  Decreased bibasal opacities. No pneumothorax.  Support devices as above.    WILLIE INMAN M.D., ATTENDING RADIOLOGIST  This document has been electronically signed. 2020  8:59AM             (07-31-20 @ 05:53)    Pre-OP Labs:  CAPILLARY BLOOD GLUCOSE  POCT Blood Glucose.: 137 mg/dL (2020 12:37)  POCT Blood Glucose.: 133 mg/dL (2020 10:31)  POCT Blood Glucose.: 147 mg/dL (2020 08:18)  POCT Blood Glucose.: 135 mg/dL (2020 04:18)  POCT Blood Glucose.: 128 mg/dL (2020 00:21)  POCT Blood Glucose.: 146 mg/dL (2020 22:19)  POCT Blood Glucose.: 132 mg/dL (2020 19:58)  POCT Blood Glucose.: 127 mg/dL (2020 17:34)                          8.3    9.01  )-----------( 493      ( 2020 04:35 )             25.8         147<H>  |  111<H>  |  76<HH>  ----------------------------<  142<H>  4.2   |  22  |  3.9<H>    Ca    8.2<L>      2020 04:35  Mg     2.7         TPro  5.1<L>  /  Alb  1.8<L>  /  TBili  0.5  /  DBili  x   /  AST  78<H>  /  ALT  24  /  AlkPhos  70  07-30    PT/INR - ( 2020 11:23 )   PT: 15.10 sec;   INR: 1.31 ratio         PTT - ( 2020 11:23 )  PTT:36.3 sec    Type & Screen: done this morning B POS    Anticoagulation/Antiplatelet: HSQ 5000 units q8h

## 2020-08-01 NOTE — PROCEDURAL SAFETY CHECKLIST WITH OR WITHOUT SEDATION - NSPREPROCFT_GEN_ALL_CORE
Emergent L Radial A Line
Peg & trach placement
RIJ MLC placement
Tracheostomy & peg tube placement
R Fem Central Line

## 2020-08-01 NOTE — PROGRESS NOTE ADULT - SUBJECTIVE AND OBJECTIVE BOX
Patient is a 64y old  Male who presents with a chief complaint of Stroke s/p tpa (01 Aug 2020 06:19)        Over Night Events:  on MV.  On Dopamine 15 mcg.  Febrile         ROS:     All ROS are negative except HPI         PHYSICAL EXAM    ICU Vital Signs Last 24 Hrs  T(C): 38.2 (01 Aug 2020 08:00), Max: 39 (01 Aug 2020 04:00)  T(F): 100.8 (01 Aug 2020 08:00), Max: 102.2 (01 Aug 2020 04:00)  HR: 100 (01 Aug 2020 08:00) (82 - 124)  BP: 106/59 (01 Aug 2020 08:00) (68/40 - 109/55)  BP(mean): 73 (01 Aug 2020 08:00) (46 - 109)  ABP: 126/48 (01 Aug 2020 08:00) (62/34 - 188/58)  ABP(mean): 70 (01 Aug 2020 08:00) (42 - 94)  RR: 25 (01 Aug 2020 08:00) (25 - 31)  SpO2: 97% (01 Aug 2020 08:00) (89% - 99%)      CONSTITUTIONAL:  Well nourished.  NAD    ENT:   Airway patent,   Mouth with normal mucosa.   No thrush    EYES:   Pupils equal,   Round non reactive to light.    CARDIAC:   Normal rate,   Regular rhythm.    Edemaedema      Vascular:  Normal systolic impulse  No Carotid bruits    RESPIRATORY:   No wheezing  Bilateral BS  Normal chest expansion  Not tachypneic,  No use of accessory muscles    GASTROINTESTINAL:  Abdomen soft,   Non-tender,   No guarding,   + BS    MUSCULOSKELETAL:   Range of motion is not limited,  No clubbing, cyanosis    NEUROLOGICAL:   Spontaneous movement  No response to pain     SKIN:   Skin normal color for race,   Warm and dry    No evidence of rash.    PSYCHIATRIC:   No apparent risk to self or others.    HEMATOLOGICAL:  No cervical  lymphadenopathy.  no inguinal lymphadenopathy      07-31-20 @ 07:01  -  08-01-20 @ 07:00  --------------------------------------------------------  IN:    DOPamine Infusion: 1140 mL    Enteral Tube Flush: 120 mL    norepinephrine Infusion: 80 mL    norepinephrine Infusion: 82.5 mL    Peptamen A.F.: 90 mL    Sodium Chloride 0.9% IV Bolus: 1000 mL    sodium chloride 3%: 275 mL    sodium chloride 3%: 10 mL    sodium chloride 3%.: 180 mL  Total IN: 2977.5 mL    OUT:    Indwelling Catheter - Urethral: 1580 mL    Rectal Tube: 200 mL  Total OUT: 1780 mL    Total NET: 1197.5 mL      08-01-20 @ 07:01  -  08-01-20 @ 08:29  --------------------------------------------------------  IN:    DOPamine Infusion: 47.5 mL    norepinephrine Infusion: 16 mL    sodium chloride 3%.: 15 mL  Total IN: 78.5 mL    OUT:    Indwelling Catheter - Urethral: 225 mL  Total OUT: 225 mL    Total NET: -146.5 mL          LABS:                            8.3    12.20 )-----------( 569      ( 31 Jul 2020 20:01 )             25.8                                               07-31    149<H>  |  114<H>  |  80<HH>  ----------------------------<  152<H>  4.3   |  20  |  4.0<H>    31 Jul 2020 20:01    149<H>  |  114<H>  |  80<HH>  ----------------------------<  152<H>  4.3     |  20     |  4.0<H>  31 Jul 2020 17:25    149<H>  |  114<H>  |  83<HH>  ----------------------------<  140<H>  4.5     |  20     |  4.0<H>    Ca    8.3<L>      31 Jul 2020 20:01  Ca    8.0<L>      31 Jul 2020 17:25  Mg     2.2       31 Jul 2020 20:01  Mg     2.3       31 Jul 2020 17:25    TPro  5.3<L>  /  Alb  1.8<L>  /  TBili  0.4    /  DBili  x      /  AST  84<H>  /  ALT  24     /  AlkPhos  68     31 Jul 2020 20:01      Ca    8.3<L>      31 Jul 2020 20:01  Mg     2.2     07-31    TPro  5.3<L>  /  Alb  1.8<L>  /  TBili  0.4  /  DBili  x   /  AST  84<H>  /  ALT  24  /  AlkPhos  68  07-31      PT/INR - ( 31 Jul 2020 11:23 )   PT: 15.10 sec;   INR: 1.31 ratio         PTT - ( 31 Jul 2020 11:23 )  PTT:36.3 sec                                                                                     LIVER FUNCTIONS - ( 31 Jul 2020 20:01 )  Alb: 1.8 g/dL / Pro: 5.3 g/dL / ALK PHOS: 68 U/L / ALT: 24 U/L / AST: 84 U/L / GGT: x                                                                                               Mode: AC/ CMV (Assist Control/ Continuous Mandatory Ventilation)  RR (machine): 26  TV (machine): 450  FiO2: 40  PEEP: 7.5  ITime: 1  MAP: 13  PIP: 21                                      ABG - ( 01 Aug 2020 04:00 )  pH, Arterial: 7.38  pH, Blood: x     /  pCO2: 34    /  pO2: 64    / HCO3: 20    / Base Excess: -4.3  /  SaO2: 92                  MEDICATIONS  (STANDING):  cefTRIAXone   IVPB 2000 milliGRAM(s) IV Intermittent every 24 hours  chlorhexidine 0.12% Liquid 15 milliLiter(s) Oral Mucosa every 12 hours  chlorhexidine 4% Liquid 1 Application(s) Topical <User Schedule>  dextrose 50% Injectable 12.5 Gram(s) IV Push once  dextrose 50% Injectable 25 Gram(s) IV Push once  dextrose 50% Injectable 25 Gram(s) IV Push once  DOPamine Infusion 1 MICROgram(s)/kG/Min (3.17 mL/Hr) IV Continuous <Continuous>  heparin   Injectable 5000 Unit(s) SubCutaneous every 8 hours  insulin regular Infusion 1 Unit(s)/Hr (1 mL/Hr) IV Continuous <Continuous>  norepinephrine Infusion 0.01 MICROgram(s)/kG/Min (1.58 mL/Hr) IV Continuous <Continuous>  pantoprazole   Suspension 40 milliGRAM(s) Enteral Tube daily  sodium chloride 3%. 500 milliLiter(s) (15 mL/Hr) IV Continuous <Continuous>  vancomycin    Solution 250 milliGRAM(s) Oral every 6 hours    MEDICATIONS  (PRN):  acetaminophen   Tablet .. 650 milliGRAM(s) Oral every 6 hours PRN Temp greater or equal to 38C (100.4F)  dextrose 40% Gel 15 Gram(s) Oral once PRN Blood Glucose LESS THAN 70 milliGRAM(s)/deciliter  glucagon  Injectable 1 milliGRAM(s) IntraMuscular once PRN Glucose LESS THAN 70 milligrams/deciliter      New X-rays reviewed:                                                                                  ECHO    CXR interpreted by me:  ET OG OK>  Bilateral infiltrates

## 2020-08-01 NOTE — PROCEDURAL SAFETY CHECKLIST WITH OR WITHOUT SEDATION - NSPRESURGSED_GEN_ALL_CORE
n/a/emergent, pt intubated
Present, accurate, and signed
Present, accurate, and signed
n/a
n/a/Emergent, pt intubated

## 2020-08-01 NOTE — PROGRESS NOTE ADULT - SUBJECTIVE AND OBJECTIVE BOX
seen and examined  intubated/ventilated   on 2 pressors         PAST HISTORY  --------------------------------------------------------------------------------  No significant changes to PMH, PSH, FHx, SHx, unless otherwise noted    ALLERGIES & MEDICATIONS  --------------------------------------------------------------------------------  Allergies    No Known Allergies    Intolerances      Standing Inpatient Medications  cefTRIAXone   IVPB 2000 milliGRAM(s) IV Intermittent every 24 hours  chlorhexidine 0.12% Liquid 15 milliLiter(s) Oral Mucosa every 12 hours  chlorhexidine 4% Liquid 1 Application(s) Topical <User Schedule>  dextrose 50% Injectable 12.5 Gram(s) IV Push once  dextrose 50% Injectable 25 Gram(s) IV Push once  dextrose 50% Injectable 25 Gram(s) IV Push once  DOPamine Infusion 1 MICROgram(s)/kG/Min IV Continuous <Continuous>  heparin   Injectable 5000 Unit(s) SubCutaneous every 8 hours  insulin regular Infusion 1 Unit(s)/Hr IV Continuous <Continuous>  norepinephrine Infusion 0.01 MICROgram(s)/kG/Min IV Continuous <Continuous>  pantoprazole   Suspension 40 milliGRAM(s) Enteral Tube daily  sodium chloride 3%. 500 milliLiter(s) IV Continuous <Continuous>  vancomycin    Solution 250 milliGRAM(s) Oral every 6 hours    PRN Inpatient Medications  acetaminophen   Tablet .. 650 milliGRAM(s) Oral every 6 hours PRN  dextrose 40% Gel 15 Gram(s) Oral once PRN  glucagon  Injectable 1 milliGRAM(s) IntraMuscular once PRN        VITALS/PHYSICAL EXAM  --------------------------------------------------------------------------------  T(C): 39 (08-01-20 @ 04:00), Max: 39 (08-01-20 @ 04:00)  HR: 112 (08-01-20 @ 06:00) (82 - 124)  BP: 94/67 (08-01-20 @ 06:00) (68/40 - 109/55)  RR: 25 (08-01-20 @ 06:00) (25 - 32)  SpO2: 97% (08-01-20 @ 06:00) (89% - 99%)  Wt(kg): --        07-30-20 @ 07:01  -  07-31-20 @ 07:00  --------------------------------------------------------  IN: 1854.4 mL / OUT: 2375 mL / NET: -520.6 mL    07-31-20 @ 07:01  -  08-01-20 @ 06:20  --------------------------------------------------------  IN: 2961.5 mL / OUT: 1720 mL / NET: 1241.5 mL      Physical Exam:  	Gen: intubated/ventilated   	Pulm:decrease BS   B/L  	CV: S1S2; no rub  	Abd: +distended      LABS/STUDIES  --------------------------------------------------------------------------------              8.3    12.20 >-----------<  569      [07-31-20 @ 20:01]              25.8     149  |  114  |  80  ----------------------------<  152      [07-31-20 @ 20:01]  4.3   |  20  |  4.0        Ca     8.3     [07-31-20 @ 20:01]      Mg     2.2     [07-31-20 @ 20:01]    TPro  5.3  /  Alb  1.8  /  TBili  0.4  /  DBili  x   /  AST  84  /  ALT  24  /  AlkPhos  68  [07-31-20 @ 20:01]    PT/INR: PT 15.10, INR 1.31       [07-31-20 @ 11:23]  PTT: 36.3       [07-31-20 @ 11:23]      Creatinine Trend:  SCr 4.0 [07-31 @ 20:01]  SCr 4.0 [07-31 @ 17:25]  SCr 3.9 [07-31 @ 04:35]  SCr 4.0 [07-31 @ 01:45]  SCr 4.1 [07-30 @ 18:08]    Urinalysis - [07-23-20 @ 21:50]      Color Yellow / Appearance Slightly Turbid / SG 1.014 / pH 6.0      Gluc Negative / Ketone Negative  / Bili Negative / Urobili <2 mg/dL       Blood Moderate / Protein 100 mg/dL / Leuk Est Negative / Nitrite Positive      RBC 6 / WBC 14 / Hyaline 5 / Gran  / Sq Epi  / Non Sq Epi 4 / Bacteria Negative      Lipid: chol 146, , HDL 50, LDL 85      [07-16-20 @ 15:44]

## 2020-08-01 NOTE — PROGRESS NOTE ADULT - SUBJECTIVE AND OBJECTIVE BOX
Progress Note: General Surgery  Patient: ANA CRISTINA LOVING , 64y (1955)Male   MRN: [id number]  Location: [place] [address] A  Visit: 07-16-20 Inpatient  Date: 08-01-20 @ 03:52    Procedure/Diagnosis: No notable neurologic activity. Going for trach and peg tomorrow    Events/ 24h: Patient febrile overnight, 101.7@0000. Pre-opped.    Vitals: T(F): 101.7 (08-01-20 @ 00:00), Max: 102.2 (07-31-20 @ 04:00)  HR: 116 (08-01-20 @ 03:00)  BP: 106/54 (08-01-20 @ 03:00) (68/40 - 109/55)  RR: 25 (08-01-20 @ 03:00)  SpO2: 99% (08-01-20 @ 03:00)  RR (machine): 26, TV (machine): 450, FiO2: 40, PEEP: 7.5, PIP: 21  In:   07-30-20 @ 07:01  -  07-31-20 @ 07:00  --------------------------------------------------------  IN: 1854.4 mL    07-31-20 @ 07:01  -  08-01-20 @ 03:52  --------------------------------------------------------  IN: 2657.5 mL      Out:   07-30-20 @ 07:01  -  07-31-20 @ 07:00  --------------------------------------------------------  OUT:    Indwelling Catheter - Urethral: 1875 mL    Nasoenteral Tube: 400 mL    Rectal Tube: 100 mL  Total OUT: 2375 mL      07-31-20 @ 07:01  -  08-01-20 @ 03:52  --------------------------------------------------------  OUT:    Indwelling Catheter - Urethral: 1235 mL    Rectal Tube: 200 mL  Total OUT: 1435 mL        Net:   07-30-20 @ 07:01 - 07-31-20 @ 07:00  --------------------------------------------------------  NET: -520.6 mL    07-31-20 @ 07:01  -  08-01-20 @ 03:52  --------------------------------------------------------  NET: 1222.5 mL        Diet: Diet, NPO with Tube Feed:   Tube Feeding Modality: Orogastric  Peptamen A.F. Formula  Total Volume for 24 Hours (mL): 240  Continuous  Starting Tube Feed Rate mL per Hour: 10  Increase Tube Feed Rate by (mL): 10     Every hour  Until Goal Tube Feed Rate (mL per Hour): 10  Tube Feed Duration (in Hours): 24  Tube Feed Start Time: 11:15 (07-31-20 @ 10:22)  Diet, NPO after Midnight:      NPO Start Date: 31-Jul-2020,   NPO Start Time: 23:59 (07-31-20 @ 14:31)    IV Fluids: sodium chloride 3%. 500 milliLiter(s) (15 mL/Hr) IV Continuous <Continuous>      Physical Examination:  General Appearance: Intubated  Neuro: No notable neurologic activity      Medications: [Standing]  cefTRIAXone   IVPB 2000 milliGRAM(s) IV Intermittent every 24 hours  chlorhexidine 0.12% Liquid 15 milliLiter(s) Oral Mucosa every 12 hours  chlorhexidine 4% Liquid 1 Application(s) Topical <User Schedule>  dextrose 50% Injectable 12.5 Gram(s) IV Push once  dextrose 50% Injectable 25 Gram(s) IV Push once  dextrose 50% Injectable 25 Gram(s) IV Push once  DOPamine Infusion 1 MICROgram(s)/kG/Min (3.17 mL/Hr) IV Continuous <Continuous>  heparin   Injectable 5000 Unit(s) SubCutaneous every 8 hours  insulin regular Infusion 1 Unit(s)/Hr (1 mL/Hr) IV Continuous <Continuous>  norepinephrine Infusion 0.01 MICROgram(s)/kG/Min (1.58 mL/Hr) IV Continuous <Continuous>  pantoprazole   Suspension 40 milliGRAM(s) Enteral Tube daily  sodium chloride 3%. 500 milliLiter(s) (15 mL/Hr) IV Continuous <Continuous>  vancomycin    Solution 250 milliGRAM(s) Oral every 6 hours    DVT Prophylaxis: heparin   Injectable 5000 Unit(s) SubCutaneous every 8 hours    GI Prophylaxis: pantoprazole   Suspension 40 milliGRAM(s) Enteral Tube daily    Antibiotics: cefTRIAXone   IVPB 2000 milliGRAM(s) IV Intermittent every 24 hours  vancomycin    Solution 250 milliGRAM(s) Oral every 6 hours    Anticoagulation:   Medications:[PRN]  acetaminophen   Tablet .. 650 milliGRAM(s) Oral every 6 hours PRN  dextrose 40% Gel 15 Gram(s) Oral once PRN  glucagon  Injectable 1 milliGRAM(s) IntraMuscular once PRN      Labs:                        8.3    12.20 )-----------( 569      ( 31 Jul 2020 20:01 )             25.8     07-31    149<H>  |  114<H>  |  80<HH>  ----------------------------<  152<H>  4.3   |  20  |  4.0<H>    Ca    8.3<L>      31 Jul 2020 20:01  Mg     2.2     07-31    TPro  5.3<L>  /  Alb  1.8<L>  /  TBili  0.4  /  DBili  x   /  AST  84<H>  /  ALT  24  /  AlkPhos  68  07-31    LIVER FUNCTIONS - ( 31 Jul 2020 20:01 )  Alb: 1.8 g/dL / Pro: 5.3 g/dL / ALK PHOS: 68 U/L / ALT: 24 U/L / AST: 84 U/L / GGT: x           PT/INR - ( 31 Jul 2020 11:23 )   PT: 15.10 sec;   INR: 1.31 ratio         PTT - ( 31 Jul 2020 11:23 )  PTT:36.3 sec  ABG - ( 31 Jul 2020 13:57 )  pH: 7.38  /  pCO2: 36    /  pO2: 62    / HCO3: 21    / Base Excess: -3.3  /  SaO2: 90        Assessment:  64y Male patient, consult for trach and peg today.  Patient seen & examined at bedside. Febrile at midnight. No neurologic activity.     Plan:  - Continue antibiotics  - Plan for trach and peg today.    Date/Time: 08-01-20 @ 03:52

## 2020-08-01 NOTE — PROCEDURAL SAFETY CHECKLIST WITH OR WITHOUT SEDATION - NSPREANESCONSENT_GEN_ALL_CORE
emergent, pt intubated/n/a
Present, accurate, and signed
Present, accurate, and signed
n/a
n/a/Emergent, pt intubated

## 2020-08-01 NOTE — PROGRESS NOTE ADULT - SUBJECTIVE AND OBJECTIVE BOX
ANA CRISTINA LOVING  64y, Male    All available historical data reviewed    OVERNIGHT EVENTS:  fevers  on pressors  Fio2 50%  non responsive to all stimuli off sedation  rectal tube with diarrhea    ROS:  unable to obtain history secondary to patient's mental status and/or sedation     VITALS:  T(F): 100.8, Max: 102.2 (08-01-20 @ 04:00)  HR: 100  BP: 106/59  RR: 25Vital Signs Last 24 Hrs  T(C): 38.2 (01 Aug 2020 08:00), Max: 39 (01 Aug 2020 04:00)  T(F): 100.8 (01 Aug 2020 08:00), Max: 102.2 (01 Aug 2020 04:00)  HR: 100 (01 Aug 2020 08:30) (82 - 124)  BP: 106/59 (01 Aug 2020 08:00) (68/40 - 109/55)  BP(mean): 73 (01 Aug 2020 08:00) (46 - 109)  RR: 25 (01 Aug 2020 08:30) (25 - 31)  SpO2: 98% (01 Aug 2020 08:30) (89% - 99%)    TESTS & MEASUREMENTS:                        8.3    12.20 )-----------( 569      ( 31 Jul 2020 20:01 )             25.8     07-31    149<H>  |  114<H>  |  80<HH>  ----------------------------<  152<H>  4.3   |  20  |  4.0<H>    Ca    8.3<L>      31 Jul 2020 20:01  Mg     2.2     07-31    TPro  5.3<L>  /  Alb  1.8<L>  /  TBili  0.4  /  DBili  x   /  AST  84<H>  /  ALT  24  /  AlkPhos  68  07-31    LIVER FUNCTIONS - ( 31 Jul 2020 20:01 )  Alb: 1.8 g/dL / Pro: 5.3 g/dL / ALK PHOS: 68 U/L / ALT: 24 U/L / AST: 84 U/L / GGT: x             Culture - Blood (collected 07-27-20 @ 14:53)  Source: .Blood Blood-Venous  Preliminary Report (07-28-20 @ 23:00):    No growth to date.    Culture - Blood (collected 07-27-20 @ 04:17)  Source: .Blood None  Preliminary Report (07-28-20 @ 14:00):    No growth to date.            RADIOLOGY & ADDITIONAL TESTS:  Personal review of radiological diagnostics performed  Echo and EKG results noted when applicable.     MEDICATIONS:  acetaminophen   Tablet .. 650 milliGRAM(s) Oral every 6 hours PRN  aMIOdarone    Tablet 100 milliGRAM(s) Oral two times a day  cefTRIAXone   IVPB 2000 milliGRAM(s) IV Intermittent every 24 hours  chlorhexidine 0.12% Liquid 15 milliLiter(s) Oral Mucosa every 12 hours  chlorhexidine 4% Liquid 1 Application(s) Topical <User Schedule>  dextrose 40% Gel 15 Gram(s) Oral once PRN  dextrose 50% Injectable 12.5 Gram(s) IV Push once  dextrose 50% Injectable 25 Gram(s) IV Push once  dextrose 50% Injectable 25 Gram(s) IV Push once  DOPamine Infusion 1 MICROgram(s)/kG/Min IV Continuous <Continuous>  fentaNYL    Injectable 25 MICROGram(s) IV Push once  glucagon  Injectable 1 milliGRAM(s) IntraMuscular once PRN  heparin   Injectable 5000 Unit(s) SubCutaneous every 8 hours  insulin regular Infusion 1 Unit(s)/Hr IV Continuous <Continuous>  midazolam Injectable 2 milliGRAM(s) IV Push once  norepinephrine Infusion 0.01 MICROgram(s)/kG/Min IV Continuous <Continuous>  pantoprazole   Suspension 40 milliGRAM(s) Enteral Tube daily  sodium chloride 3%. 500 milliLiter(s) IV Continuous <Continuous>  vancomycin    Solution 250 milliGRAM(s) Oral every 6 hours      ANTIBIOTICS:  cefTRIAXone   IVPB 2000 milliGRAM(s) IV Intermittent every 24 hours  vancomycin    Solution 250 milliGRAM(s) Oral every 6 hours

## 2020-08-01 NOTE — CHART NOTE - NSCHARTNOTEFT_GEN_A_CORE
Post Operative Check    Patient is post op from a EGD, with PEG (09901) Bronchoscopy with percutaneous tracheostomy (42091) and is found stable in NAD.     Vitals  T(C): 36.7 (08-01-20 @ 15:00), Max: 39 (08-01-20 @ 04:00)  HR: 80 (08-01-20 @ 15:30) (80 - 124)  BP: 112/61 (08-01-20 @ 13:00) (62/41 - 123/61)  RR: 25 (08-01-20 @ 15:30) (25 - 31)  SpO2: 100% (08-01-20 @ 15:30) (94% - 100%)  Wt(kg): --      07-31 @ 07:01  -  08-01 @ 07:00  --------------------------------------------------------  IN:    DOPamine Infusion: 1140 mL    Enteral Tube Flush: 120 mL    norepinephrine Infusion: 80 mL    norepinephrine Infusion: 82.5 mL    Peptamen A.F.: 90 mL    Sodium Chloride 0.9% IV Bolus: 1000 mL    sodium chloride 3%: 275 mL    sodium chloride 3%: 10 mL    sodium chloride 3%.: 180 mL  Total IN: 2977.5 mL    OUT:    Indwelling Catheter - Urethral: 1580 mL    Rectal Tube: 200 mL  Total OUT: 1780 mL    Total NET: 1197.5 mL      08-01 @ 07:01  -  08-01 @ 15:47  --------------------------------------------------------  IN:    DOPamine Infusion: 323.1 mL    norepinephrine Infusion: 113 mL    sodium chloride 3%.: 120 mL  Total IN: 556.1 mL    OUT:    Indwelling Catheter - Urethral: 950 mL  Total OUT: 950 mL  Total NET: -393.9 mL          Labs                        8.3    12.20 )-----------( 569      ( 31 Jul 2020 20:01 )             25.8       CBC Full  -  ( 31 Jul 2020 20:01 )  WBC Count : 12.20 K/uL  Hemoglobin : 8.3 g/dL  Hematocrit : 25.8 %  Platelet Count - Automated : 569 K/uL  Mean Cell Volume : 90.5 fL  Mean Cell Hemoglobin : 29.1 pg  Mean Cell Hemoglobin Concentration : 32.2 g/dL      Physical Exam  General: NAD AAOx0  Neck: tracheostomy place in, cuffed 8-0 Shiley. No drainage or oozing from the site.   Cards: RRR S1S2  Resp: CTAB  Abdomen: soft non-distended PEG tube placed in LUQ, no erythema or drainage     Patient is a 64y old Male s/p Tracheostomy and PEG tube placement. PEG tube may be used for feeds and medications. Post Operative Check    Patient is post op from a EGD, with PEG (89186) Bronchoscopy with percutaneous tracheostomy (93382) and is found stable in NAD, does not respond to commands, or noxious stimuli, as before.     Vitals  T(C): 36.7 (08-01-20 @ 15:00), Max: 39 (08-01-20 @ 04:00)  HR: 80 (08-01-20 @ 15:30) (80 - 124)  BP: 112/61 (08-01-20 @ 13:00) (62/41 - 123/61)  RR: 25 (08-01-20 @ 15:30) (25 - 31)  SpO2: 100% (08-01-20 @ 15:30) (94% - 100%)  Wt(kg): --      07-31 @ 07:01  -  08-01 @ 07:00  --------------------------------------------------------  IN:    DOPamine Infusion: 1140 mL    Enteral Tube Flush: 120 mL    norepinephrine Infusion: 80 mL    norepinephrine Infusion: 82.5 mL    Peptamen A.F.: 90 mL    Sodium Chloride 0.9% IV Bolus: 1000 mL    sodium chloride 3%: 275 mL    sodium chloride 3%: 10 mL    sodium chloride 3%.: 180 mL  Total IN: 2977.5 mL    OUT:    Indwelling Catheter - Urethral: 1580 mL    Rectal Tube: 200 mL  Total OUT: 1780 mL    Total NET: 1197.5 mL      08-01 @ 07:01  -  08-01 @ 15:47  --------------------------------------------------------  IN:    DOPamine Infusion: 323.1 mL    norepinephrine Infusion: 113 mL    sodium chloride 3%.: 120 mL  Total IN: 556.1 mL    OUT:    Indwelling Catheter - Urethral: 950 mL  Total OUT: 950 mL  Total NET: -393.9 mL          Labs                        8.3    12.20 )-----------( 569      ( 31 Jul 2020 20:01 )             25.8       CBC Full  -  ( 31 Jul 2020 20:01 )  WBC Count : 12.20 K/uL  Hemoglobin : 8.3 g/dL  Hematocrit : 25.8 %  Platelet Count - Automated : 569 K/uL  Mean Cell Volume : 90.5 fL  Mean Cell Hemoglobin : 29.1 pg  Mean Cell Hemoglobin Concentration : 32.2 g/dL      Physical Exam  General: NAD AAOx0  Neck: tracheostomy place in, cuffed 8-0 Shiley. No drainage, bleeding or oozing from the site. No hematoma  Cards: RRR S1S2  Resp: bilateral breath sounds, equal chest rise  Abdomen: soft non-distended. PEG tube placed in LUQ, no erythema or drainage from the PEG tube site. Dressing clean and dry. No surround hematoma.    Patient is a 64y old Male s/p Tracheostomy and PEG tube placement. PEG tube may be used for feeds and medications.

## 2020-08-01 NOTE — PROGRESS NOTE ADULT - ASSESSMENT
· Assessment		  64 year old male pmh of DM, CAD with PCI x3 in 2017, HTN, DLD, trigeminal neuralgia who presents to ED from [facility] s/p TPA for stroke.    IMPRESSION;   # CVA s/p TPA s/p thrombectomy (Basiliar Artery) - unresponsive off sedation, on pressors  - repeat CT head:  Interval increase in posterior fossa edema and mass effect consistent with evolving infarct  with resultant new effacement of the fourth ventricle and new mild obstructive hydrocephalus. Increased hypergenicity within the left ambient and quadrigeminal plate cisterns, and new hypodensity within the right ambient and quadrigeminal plate cisterns, the suprasellar cistern, the right sylvian fissure and scattered sulci suspicious worsening/new subarachnoid hemorrhage.  - remained ventilated, off sedation  - no neurologic activity.   -fevers could well be central as I see no new focus of infection  #CD colitis: on po vancomycin. Diarrhea has decreased  #E coli bacteremia : secondary to  tract with UCx E coli but no overt pyuria. Could well also be secondary to translocation from the GI tract.  BCx 7/27 NGTD  CT Abdomen 7/28:  Liquid stool within the colon without evidence of obstruction or colonic wall thickening. No intraperitoneal free air. Bilateral small pleural effusions with associated opacities    RECOMMENDATIONS;  Serum fungitell assay  d/c R IJ catheter  Rocephin 2 gm iv q24h  If continues to have fevers broaden coverage to vancomycin 750 mg iv q48h ( repeat nares for ORSA ) and meropenem 750 mg iv q24h  and hold the Rocephin )  Po vancomycin 125 mg q6h

## 2020-08-01 NOTE — PROGRESS NOTE ADULT - ASSESSMENT
64M, PMH HTN, CAD, DM, admitted for stroke s/p TPA c/b intracranial hemorrhage, now w/o brainstem function, c/b AHRF on mech vent, E.coli bacteremia/+cdiff, non-oliguric NAEL    #NAEL on CKD, p/w creatinine 1.9, unknown baseline, ATN iso CVA/shock  - creatinine stable   - non oliguric   - DARSHAN noted, non-echogenic kidneys, no HN  -last ph at goal   - continue sodium bicarbonate PO   - febrile on rocephin/vanc po , followed by ID , on 2 pressors   - on 3% saline at 25cc/hr for intracranial edema, off desmopressin  - no acute indication for RRT, would not improve prognosis  -surgical notes appreciated : trach/PEG   - overall prognosis poor   - will follow

## 2020-08-01 NOTE — PROGRESS NOTE ADULT - ASSESSMENT
IMPRESSION:    Acute hypoxemic respiratory failure   Stroke SP TAP And thrombectomy  Pneumonia   NAEL ATN   DI   E coli bacteremia  G+ Cocci bacteremia Coag neg staph   Sepsis  Septic shock  CDiff     PLAN:    CNS: FU with Neurology. Keep off sedation.  FU MS.    HEENT:  Oral care    PULMONARY:  HOB @ 45 degrees.  Vent changes:  None.  FU with Surgery for Trach and PEG.  DTA.      CARDIOVASCULAR: I=O. Wean off dopamine. Start Amiodarone.  Change IVF to 3% NACL 25 cc per hour  and reassess.      GI: GI prophylaxis. Increase trickle  feeding after trach      RENAL:  F/u repeat lytes 4 PM     INFECTIOUS DISEASE: FU repeat cultures.  Gray cultures Rakan and Vanc IV     HEMATOLOGICAL:  DVT prophylaxis.     ENDOCRINE:  Follow up FS.  Insulin protocol if needed.    CODE STATUS: FULL CODE    DISPOSITION: Pt requires continued monitoring in the MICU    Overall very Poor prognosis

## 2020-08-01 NOTE — BRIEF OPERATIVE NOTE - NSICDXBRIEFPROCEDURE_GEN_ALL_CORE_FT
PROCEDURES:  EGD, with PEG 01-Aug-2020 11:24:15  Daniel Blanco  Bronchoscopy with percutaneous tracheostomy 01-Aug-2020 11:23:29  Daniel Blacno

## 2020-08-02 NOTE — PROGRESS NOTE ADULT - SUBJECTIVE AND OBJECTIVE BOX
Nephrology Progress Note    ANA CRISTINA LOVING  [id number]  64y  Male    S:  Patient is seen and examined, events over the last 24h noted.    O:  Allergies:  No Known Allergies    Hospital Medications:   MaleMEDICATIONS  (STANDING):  aMIOdarone    Tablet 100 milliGRAM(s) Oral two times a day  chlorhexidine 0.12% Liquid 15 milliLiter(s) Oral Mucosa every 12 hours  chlorhexidine 4% Liquid 1 Application(s) Topical <User Schedule>  dextrose 50% Injectable 12.5 Gram(s) IV Push once  dextrose 50% Injectable 25 Gram(s) IV Push once  dextrose 50% Injectable 25 Gram(s) IV Push once  DOPamine Infusion 1 MICROgram(s)/kG/Min (3.17 mL/Hr) IV Continuous <Continuous>  heparin   Injectable 5000 Unit(s) SubCutaneous every 8 hours  insulin regular Infusion 1 Unit(s)/Hr (1 mL/Hr) IV Continuous <Continuous>  meropenem  IVPB 500 milliGRAM(s) IV Intermittent every 12 hours  meropenem  IVPB      norepinephrine Infusion 0.01 MICROgram(s)/kG/Min (1.58 mL/Hr) IV Continuous <Continuous>  pantoprazole   Suspension 40 milliGRAM(s) Enteral Tube daily  vancomycin    Solution 250 milliGRAM(s) Oral every 6 hours    MEDICATIONS  (PRN):  acetaminophen   Tablet .. 650 milliGRAM(s) Oral every 6 hours PRN Temp greater or equal to 38C (100.4F)  dextrose 40% Gel 15 Gram(s) Oral once PRN Blood Glucose LESS THAN 70 milliGRAM(s)/deciliter  glucagon  Injectable 1 milliGRAM(s) IntraMuscular once PRN Glucose LESS THAN 70 milligrams/deciliter    Home Medications:  Home Medications:  ALPRAZolam 0.5 mg oral tablet, extended release: 1 tab(s) orally once a day (at bedtime) (2020 19:14)  amLODIPine 5 mg oral tablet: 1 tab(s) orally once a day (2020 19:14)  clopidogrel 75 mg oral tablet: 1 tab(s) orally once a day (2020 19:11)  losartan 50 mg oral tablet: 1 tab(s) orally once a day (2020 19:13)  metFORMIN 500 mg oral tablet, extended release: 1 tab(s) orally once a day (2020 19:13)  omeprazole 20 mg oral delayed release capsule: 1 cap(s) orally once a day (2020 19:14)  OXcarbazepine 150 mg oral tablet: 1 tab(s) orally once a day (2020 19:15)  OXcarbazepine 300 mg oral tablet: 1 tab(s) orally once a day (at bedtime) (2020 19:12)  trihexyphenidyl: 1 milligram(s) orally 2 times a day (2020 19:13)      VITALS:  Daily     Daily Weight in k.1 (02 Aug 2020 00:00)  T(F): 100.6 (20 @ 10:30), Max: 100.6 (20 @ 10:30)  HR: 90 (20 @ 10:30)  BP: 111/46 (20 @ 08:00)  RR: 25 (20 @ 10:30)  SpO2: 100% (20 @ 10:30)  Wt(kg): --  I&O's Detail    01 Aug 2020 07:01  -  02 Aug 2020 07:00  --------------------------------------------------------  IN:    DOPamine Infusion: 614.5 mL    insulin regular Infusion: 4 mL    IV PiggyBack: 800 mL    norepinephrine Infusion: 545.3 mL    Peptamen A.F.: 250 mL    sodium chloride 3%: 360 mL  Total IN: 2573.8 mL    OUT:    Indwelling Catheter - Urethral: 1895 mL    Rectal Tube: 100 mL  Total OUT: 1995 mL    Total NET: 578.8 mL      02 Aug 2020 07:  -  02 Aug 2020 10:41  --------------------------------------------------------  IN:    norepinephrine Infusion: 69.8 mL    Peptamen A.F.: 60 mL  Total IN: 129.8 mL    OUT:    Indwelling Catheter - Urethral: 80 mL  Total OUT: 80 mL    Total NET: 49.8 mL        I&O's Summary    01 Aug 2020 07:  -  02 Aug 2020 07:00  --------------------------------------------------------  IN: 2573.8 mL / OUT: 1995 mL / NET: 578.8 mL    02 Aug 2020 07:  -  02 Aug 2020 10:41  --------------------------------------------------------  IN: 129.8 mL / OUT: 80 mL / NET: 49.8 mL          PHYSICAL EXAM:  Gen: intubated  Resp: decreased bs at the bases  Card: S1/S2  Abd: distended      LABS:  ABG - ( 02 Aug 2020 05:02 )  pH, Arterial: 7.25  pH, Blood: x     /  pCO2: 44    /  pO2: 123   / HCO3: 19    / Base Excess: -7.9  /  SaO2: 98                    152<H>  |  119<H>  |  75<HH>  ----------------------------<  143<H>  4.7   |  17  |  4.2<HH>    Ca    8.5      02 Aug 2020 04:30  Mg     2.3     02    TPro  5.2<L>  /  Alb  1.6<L>  /  TBili  0.3  /  DBili      /  AST  138<H>  /  ALT  35  /  AlkPhos  79  0802    Phosphorus Level, Serum: 3.4 mg/dL (20 @ 04:30)  Phosphorus Level, Serum: 4.5 mg/dL (20 @ 04:50)  Phosphorus Level, Serum: 3.1 mg/dL (20 @ 04:45)                          8.3    21.86 )-----------( 627      ( 02 Aug 2020 04:30 )             26.5       CBC Full  -  ( 02 Aug 2020 04:30 )  WBC Count : 21.86 K/uL  RBC Count : 2.82 M/uL  Hemoglobin : 8.3 g/dL  Hematocrit : 26.5 %  Platelet Count - Automated : 627 K/uL  Mean Cell Volume : 94.0 fL      Urine Studies:        Creatinine trend:  Creatinine, Serum: 4.2 mg/dL (20 @ 04:30)  Creatinine, Serum: 4.0 mg/dL (20 @ 20:01)  Creatinine, Serum: 4.0 mg/dL (20 @ 17:25)  Creatinine, Serum: 3.9 mg/dL (20 @ 04:35)  Creatinine, Serum: 4.0 mg/dL (20 @ 01:45)  Creatinine, Serum: 4.1 mg/dL (20 @ 18:08)  Creatinine, Serum: 4.3 mg/dL (20 @ 04:20)

## 2020-08-02 NOTE — PROGRESS NOTE ADULT - SUBJECTIVE AND OBJECTIVE BOX
Hospital Day:  17d    Chief Complaint: Patient is a 64y old  Male who presents with a chief complaint of Stroke s/p tpa (02 Aug 2020 10:41)    24 hour events: - Patient is s/p trach and peg by surgery.   - No other acute overnight events. Patient seen this morning, still intubated and unresponsive off sedation.     Past Medical Hx:   HTN (hypertension)  Diabetes    Past Sx:  No significant past surgical history    Allergies:  No Known Allergies    Current Meds:   Standing Meds:  aMIOdarone    Tablet 100 milliGRAM(s) Oral two times a day  chlorhexidine 0.12% Liquid 15 milliLiter(s) Oral Mucosa every 12 hours  chlorhexidine 4% Liquid 1 Application(s) Topical <User Schedule>  dextrose 50% Injectable 12.5 Gram(s) IV Push once  dextrose 50% Injectable 25 Gram(s) IV Push once  dextrose 50% Injectable 25 Gram(s) IV Push once  heparin   Injectable 5000 Unit(s) SubCutaneous every 8 hours  insulin regular Infusion 1 Unit(s)/Hr (1 mL/Hr) IV Continuous <Continuous>  meropenem  IVPB 500 milliGRAM(s) IV Intermittent every 12 hours  meropenem  IVPB      norepinephrine Infusion 0.01 MICROgram(s)/kG/Min (1.58 mL/Hr) IV Continuous <Continuous>  pantoprazole   Suspension 40 milliGRAM(s) Enteral Tube daily  vancomycin    Solution 250 milliGRAM(s) Oral every 6 hours    PRN Meds:  acetaminophen   Tablet .. 650 milliGRAM(s) Oral every 6 hours PRN Temp greater or equal to 38C (100.4F)  dextrose 40% Gel 15 Gram(s) Oral once PRN Blood Glucose LESS THAN 70 milliGRAM(s)/deciliter  glucagon  Injectable 1 milliGRAM(s) IntraMuscular once PRN Glucose LESS THAN 70 milligrams/deciliter    HOME MEDICATIONS:  ALPRAZolam 0.5 mg oral tablet, extended release: 1 tab(s) orally once a day (at bedtime)  amLODIPine 5 mg oral tablet: 1 tab(s) orally once a day  clopidogrel 75 mg oral tablet: 1 tab(s) orally once a day  losartan 50 mg oral tablet: 1 tab(s) orally once a day  metFORMIN 500 mg oral tablet, extended release: 1 tab(s) orally once a day  omeprazole 20 mg oral delayed release capsule: 1 cap(s) orally once a day  OXcarbazepine 150 mg oral tablet: 1 tab(s) orally once a day  OXcarbazepine 300 mg oral tablet: 1 tab(s) orally once a day (at bedtime)  trihexyphenidyl: 1 milligram(s) orally 2 times a day      Vital Signs:   T(F): 100.4 (08-02-20 @ 17:00), Max: 100.6 (08-02-20 @ 10:30)  HR: 86 (08-02-20 @ 18:00) (82 - 106)  BP: 109/47 (08-02-20 @ 18:00) (103/46 - 134/52)  RR: 25 (08-02-20 @ 18:00) (23 - 26)  SpO2: 100% (08-02-20 @ 18:00) (97% - 100%)      08-01-20 @ 07:01  -  08-02-20 @ 07:00  --------------------------------------------------------  IN: 2573.8 mL / OUT: 1995 mL / NET: 578.8 mL    08-02-20 @ 07:01  -  08-02-20 @ 18:27  --------------------------------------------------------  IN: 673.7 mL / OUT: 605 mL / NET: 68.7 mL    Physical Exam:   GENERAL: unresponsive male in NAD   HEENT: NCAT, +tracheostomy, +R IJ  CHEST/LUNG: audible breath sounds b/l   HEART: Regular rate and rhythm; s1 s2 appreciated  ABDOMEN: soft, mildly distended abdomen, +PEG tube   EXTREMITIES: UE and LE edema   NERVOUS SYSTEM: some spontaneous nonpurposeful movements to tactile sensation, no apparent brainstem reflexes    Labs:                         8.1    23.32 )-----------( 634      ( 02 Aug 2020 15:30 )             26.4       02 Aug 2020 15:30    156    |  122    |  77     ----------------------------<  184    5.2     |  19     |  4.6      Ca    8.6        02 Aug 2020 15:30  Mg     2.4       02 Aug 2020 15:30    TPro  5.2    /  Alb  1.6    /  TBili  0.3    /  DBili  x      /  AST  138    /  ALT  35     /  AlkPhos  79     02 Aug 2020 04:30    Culture - Blood (collected 07-27-20 @ 14:53)  Source: .Blood Blood-Venous  Final Report (08-01-20 @ 23:01):    No Growth Final    Culture - Blood (collected 07-27-20 @ 04:17)  Source: .Blood None  Final Report (08-01-20 @ 14:01):    No Growth Final    Radiology:   < from: Xray Chest 1 View- PORTABLE-Routine (08.02.20 @ 05:41) >    Impression:    Bilateral opacities, unchanged.    Support tubes and lines as above.    < end of copied text >    Assessment and Plan:   64 year old male pmh of DM, CAD with PCI x3 in 2017, HTN, DLD, trigeminal neuralgia who presents to ED from [facility] s/p TPA for stroke.    # CVA s/p TPA s/p thrombectomy (Basiliar Artery) - unresponsive off sedation, pressors changed to vasopressin and phenylephrine   # s/p Tracheostomy and PEG tube placement (8/1)  - repeat CT head:  Interval increase in posterior fossa edema and mass effect consistent with evolving infarct  with resultant new effacement of the fourth ventricle and new mild obstructive hydrocephalus. Increased hyperdensity within the left ambient and quadrigeminal plate cisterns, and new hyperdensity within the right ambient and quadrigeminal plate cisterns, the suprasellar cistern, the right sylvian fissure and scattered sulci suspicious worsening/new subarachnoid hemorrhage.  - s/p 23% NS, 50g mannitol, 3% hypertonic saline drip   - remained ventilated, off sedation  - taper off pressors if possible   - integrillin completed  - no notable neurologic activity. Apnea test performed (7/22), patient took a breath.  - apnea test repeated (7/27), patient took 2 breaths.   - continue with Subq heparin for DVT ppx, cleared by neurology.   - continue with vergara catheter for strict I/O   - TLC changed to R IJ, confirmed placement with cxr 7/27  - Patient continues having spontaneous movements of LE, in response to and without tactile sensation, EEG negative for seizures.   - Trickle feeds at 30cc/hr - evaluate how well patient tolerating   - Follow up surgery recs   - Vianney Nye (daughter, HCP) would like a call before any MRI, apnea test, or brain death protocol is done again. She would also like to be informed of any changed in status regardless of time. Her number is [phone].    # Likely Central DI likely secondary to CVA   - sodium was >150  - d/c 3% at 25 mL/hr   - started 1/2NS at 50cc/hr   - Monitor BMP     # Diarrhea secondary to C. Diff. - diarrhea improving   - C. Diff PCR positive  - continue with vancomycin 250mg PO     # Distended abdomen - improved, stable s/p PEG   - xray of the abdomen performed showing gaseous distention of the ascending colon and stomach.  - CT Abdomen shows liquid stool within the colon without evidence of obstruction or colon thickening, no intraperitoneal free air.  - Patient on trickle feeds   - Surgery following    # Gram positive cocci in clusters in the blood? - probable contaminant.   - coag negative staff possible contaminant?  - d/c  - No growth to date on repeat cultures, follow up bcx,       # GNR bacteremia likely secondary to UTI   - patient remains febrile   - UA positive for Nitrites   - BCx positive for E. Coli.   - continue with rocephin 2g IV q24h as per ID    # Worsening NAEL - stable   - baseline Cr ~1   - Cr 4.2 - stable  - Kidney bladder US unremarkable   - d/c bicarb gtt   - Follow up nephrology recs     # Hypokalemia - resolved  - K was 3.4 repleted with 20mEq x 2 (7/26)   - Follow up BMP     # DM   - HbA1c 7.5%   - insulin gtt   - q1h FS     # DLD   - d/c atorvastatin while NPO    # Activity: Bed rest   # Diet: NPO with possible trickle feeds depending on when patient is approved for trach and peg  # DVT ppx: Subq heparin   # GI ppx: Protonix  # Code Status: FULL CODE    # DISPO: Acute

## 2020-08-02 NOTE — PROGRESS NOTE ADULT - SUBJECTIVE AND OBJECTIVE BOX
Patient is a 64y old  Male who presents with a chief complaint of Stroke s/p tpa (02 Aug 2020 04:25)        Over Night Events:  SP trach and PEG.  Off Dopamine, on Levophed 0.2         ROS:     All ROS are negative except HPI         PHYSICAL EXAM    ICU Vital Signs Last 24 Hrs  T(C): 37 (02 Aug 2020 04:00), Max: 38.2 (01 Aug 2020 08:00)  T(F): 98.6 (02 Aug 2020 04:00), Max: 100.8 (01 Aug 2020 08:00)  HR: 88 (02 Aug 2020 07:30) (78 - 106)  BP: 103/46 (02 Aug 2020 06:00) (62/41 - 134/52)  BP(mean): 65 (02 Aug 2020 06:00) (49 - 88)  ABP: 98/48 (02 Aug 2020 07:30) (62/34 - 168/58)  ABP(mean): 66 (02 Aug 2020 07:30) (44 - 94)  RR: 25 (02 Aug 2020 07:30) (23 - 27)  SpO2: 100% (02 Aug 2020 07:30) (95% - 100%)      CONSTITUTIONAL:  Well nourished.  NAD    ENT:   Airway patent,   Mouth with normal mucosa.   No thrush    EYES:   Pupils equal,   Round non reactive to light.    CARDIAC:   Normal rate,   Regular rhythm.    No edema      Vascular:  Normal systolic impulse  No Carotid bruits    RESPIRATORY:   No wheezing  Bilateral BS  Normal chest expansion  Not tachypneic,  No use of accessory muscles    GASTROINTESTINAL:  Abdomen soft,   Non-tender,   No guarding,   + BS    MUSCULOSKELETAL:   Range of motion is not limited,  No clubbing, cyanosis    NEUROLOGICAL:   No gag.  Moves to touch     SKIN:   Skin normal color for race,   Warm and dry  No evidence of rash.    PSYCHIATRIC:    No apparent risk to self or others.    HEMATOLOGICAL:  No cervical  lymphadenopathy.  no inguinal lymphadenopathy      08-01-20 @ 07:01  -  08-02-20 @ 07:00  --------------------------------------------------------  IN:    DOPamine Infusion: 614.5 mL    insulin regular Infusion: 4 mL    IV PiggyBack: 800 mL    norepinephrine Infusion: 545.3 mL    Peptamen A.F.: 250 mL    sodium chloride 3%.: 360 mL  Total IN: 2573.8 mL    OUT:    Indwelling Catheter - Urethral: 1895 mL    Rectal Tube: 100 mL  Total OUT: 1995 mL    Total NET: 578.8 mL          LABS:                            8.3    21.86 )-----------( 627      ( 02 Aug 2020 04:30 )             26.5                                               08-02             8.3    21.86 )-----------( 627      ( 08-02 @ 04:30 )             26.5                8.3    12.20 )-----------( 569      ( 07-31 @ 20:01 )             25.8                8.3    9.01  )-----------( 493      ( 07-31 @ 04:35 )             25.8           152<H>  |  119<H>  |  75<HH>  ----------------------------<  143<H>  4.7   |  17  |  4.2<HH>    Ca    8.5      02 Aug 2020 04:30  Mg     2.3     08-02    TPro  5.2<L>  /  Alb  1.6<L>  /  TBili  0.3  /  DBili  x   /  AST  138<H>  /  ALT  35  /  AlkPhos  79  08-02      PT/INR - ( 31 Jul 2020 11:23 )   PT: 15.10 sec;   INR: 1.31 ratio         PTT - ( 31 Jul 2020 11:23 )  PTT:36.3 sec                                                                                     LIVER FUNCTIONS - ( 02 Aug 2020 04:30 )  Alb: 1.6 g/dL / Pro: 5.2 g/dL / ALK PHOS: 79 U/L / ALT: 35 U/L / AST: 138 U/L / GGT: x                                                  Culture - Sputum (collected 01 Aug 2020 16:20)  Source: .Sputum Sputum  Gram Stain (02 Aug 2020 05:43):    Numerous polymorphonuclear leukocytes per low power field    Few Squamous epithelial cells per low power field    Rare Gram positive cocci in pairs seen per oil power field                                                   Mode: AC/ CMV (Assist Control/ Continuous Mandatory Ventilation)  RR (machine): 26  TV (machine): 450  FiO2: 100  PEEP: 7.5  ITime: 1  MAP: 14  PIP: 23                                      ABG - ( 02 Aug 2020 05:02 )  pH, Arterial: 7.25  pH, Blood: x     /  pCO2: 44    /  pO2: 123   / HCO3: 19    / Base Excess: -7.9  /  SaO2: 98                  MEDICATIONS  (STANDING):  aMIOdarone    Tablet 100 milliGRAM(s) Oral two times a day  chlorhexidine 0.12% Liquid 15 milliLiter(s) Oral Mucosa every 12 hours  chlorhexidine 4% Liquid 1 Application(s) Topical <User Schedule>  dextrose 50% Injectable 12.5 Gram(s) IV Push once  dextrose 50% Injectable 25 Gram(s) IV Push once  dextrose 50% Injectable 25 Gram(s) IV Push once  DOPamine Infusion 1 MICROgram(s)/kG/Min (3.17 mL/Hr) IV Continuous <Continuous>  heparin   Injectable 5000 Unit(s) SubCutaneous every 8 hours  insulin regular Infusion 1 Unit(s)/Hr (1 mL/Hr) IV Continuous <Continuous>  meropenem  IVPB 500 milliGRAM(s) IV Intermittent every 12 hours  meropenem  IVPB      norepinephrine Infusion 0.01 MICROgram(s)/kG/Min (1.58 mL/Hr) IV Continuous <Continuous>  pantoprazole   Suspension 40 milliGRAM(s) Enteral Tube daily  sodium chloride 3%. 500 milliLiter(s) (15 mL/Hr) IV Continuous <Continuous>  vancomycin    Solution 250 milliGRAM(s) Oral every 6 hours    MEDICATIONS  (PRN):  acetaminophen   Tablet .. 650 milliGRAM(s) Oral every 6 hours PRN Temp greater or equal to 38C (100.4F)  dextrose 40% Gel 15 Gram(s) Oral once PRN Blood Glucose LESS THAN 70 milliGRAM(s)/deciliter  glucagon  Injectable 1 milliGRAM(s) IntraMuscular once PRN Glucose LESS THAN 70 milligrams/deciliter      New X-rays reviewed:                                                                                  ECHO    CXR interpreted by me:  Bilateral infiltrates

## 2020-08-02 NOTE — PROGRESS NOTE ADULT - SUBJECTIVE AND OBJECTIVE BOX
GENERAL SURGERY PROGRESS NOTE     HUDSON LOVINGT  [address] [place] [address] day :15d  Procedure: EGD, with PEG  Bronchoscopy with percutaneous tracheostomy        24H events/hpi:  none    T(F): 99.9 (08-02-20 @ 00:00), Max: 100.8 (08-01-20 @ 08:00)  HR: 88 (08-02-20 @ 03:00) (78 - 124)  BP: 117/50 (08-02-20 @ 03:00) (62/41 - 134/52)  ABP: 102/46 (08-02-20 @ 03:00) (62/34 - 168/58)  ABP(mean): 64 (08-02-20 @ 03:00) (44 - 94)  RR: 25 (08-02-20 @ 03:00) (25 - 31)  SpO2: 100% (08-02-20 @ 03:00) (94% - 100%)      07-31-20 @ 07:01  -  08-01-20 @ 07:00  --------------------------------------------------------  IN:    DOPamine Infusion: 1140 mL    Enteral Tube Flush: 120 mL    norepinephrine Infusion: 80 mL    norepinephrine Infusion: 82.5 mL    Peptamen A.F.: 90 mL    Sodium Chloride 0.9% IV Bolus: 1000 mL    sodium chloride 3%: 275 mL    sodium chloride 3%: 10 mL    sodium chloride 3%.: 180 mL  Total IN: 2977.5 mL    OUT:    Indwelling Catheter - Urethral: 1580 mL    Rectal Tube: 200 mL  Total OUT: 1780 mL    Total NET: 1197.5 mL      08-01-20 @ 07:01  -  08-02-20 @ 04:25  --------------------------------------------------------  IN:    DOPamine Infusion: 598.7 mL    insulin regular Infusion: 3 mL    IV PiggyBack: 800 mL    norepinephrine Infusion: 412.2 mL    Peptamen A.F.: 170 mL    sodium chloride 3%.: 300 mL  Total IN: 2283.9 mL    OUT:    Indwelling Catheter - Urethral: 1685 mL  Total OUT: 1685 mL    Total NET: 598.9 mL        DIET/FLUIDS: sodium chloride 3%. 500 milliLiter(s) IV Continuous <Continuous>      I&O    07-31-20 @ 07:01  -  08-01-20 @ 07:00  --------------------------------------------------------  IN: 2977.5 mL / OUT: 1780 mL / NET: 1197.5 mL    08-01-20 @ 07:01  -  08-02-20 @ 04:25  --------------------------------------------------------  IN: 2283.9 mL / OUT: 1685 mL / NET: 598.9 mL        URINE:   07-31-20 @ 07:01  -  08-01-20 @ 07:00  --------------------------------------------------------  OUT: 1580 mL     Indwelling Urethral Catheter:     Connect To:  Straight Drainage/Gravity    Indication:  Urine Output Monitoring in Critically Ill (08-01-20 @ 21:23)  Indwelling Urethral Catheter:     Connect To:  Straight Drainage/Gravity    Indication:  Urine Output Monitoring in Critically Ill (08-01-20 @ 21:24)    GI proph:  pantoprazole   Suspension 40 milliGRAM(s) Enteral Tube daily    AC/ proph: heparin   Injectable 5000 Unit(s) SubCutaneous every 8 hours    ABx: meropenem  IVPB 500 milliGRAM(s) IV Intermittent every 12 hours  meropenem  IVPB      vancomycin    Solution 250 milliGRAM(s) Oral every 6 hours      PHYSICAL EXAM:  GENERAL: NAD. A&Ox0  CHEST/LUNG: Clear to auscultation bilaterally  HEART: Regular rate and rhythm  ABDOMEN: Soft, Nontender, mildly distended  EXTREMITIES:  No clubbing, cyanosis, or edema      LABS  Labs:  CAPILLARY BLOOD GLUCOSE  POCT Blood Glucose.: 145 mg/dL (02 Aug 2020 04:22)  POCT Blood Glucose.: 124 mg/dL (02 Aug 2020 03:05)  POCT Blood Glucose.: 150 mg/dL (02 Aug 2020 02:11)  POCT Blood Glucose.: 179 mg/dL (02 Aug 2020 01:04)  POCT Blood Glucose.: 170 mg/dL (02 Aug 2020 00:01)  POCT Blood Glucose.: 141 mg/dL (01 Aug 2020 22:19)  POCT Blood Glucose.: 134 mg/dL (01 Aug 2020 20:11)  POCT Blood Glucose.: 136 mg/dL (01 Aug 2020 18:47)  POCT Blood Glucose.: 127 mg/dL (01 Aug 2020 17:46)  POCT Blood Glucose.: 131 mg/dL (01 Aug 2020 16:29)  POCT Blood Glucose.: 143 mg/dL (01 Aug 2020 14:00)  POCT Blood Glucose.: 138 mg/dL (01 Aug 2020 12:00)  POCT Blood Glucose.: 146 mg/dL (01 Aug 2020 07:44)  POCT Blood Glucose.: 133 mg/dL (01 Aug 2020 06:09)                          8.3    12.20 )-----------( 569      ( 31 Jul 2020 20:01 )             25.8         07-31    149<H>  |  114<H>  |  80<HH>  ----------------------------<  152<H>  4.3   |  20  |  4.0<H>          LFTs:             5.3  | 0.4  | 84       ------------------[68      ( 31 Jul 2020 20:01 )  1.8  | x    | 24          Lipase:x      Amylase:x         Blood Gas Arterial, Lactate: 1.4 mmoL/L (08-01-20 @ 20:46)  Blood Gas Arterial, Lactate: 1.5 mmoL/L (08-01-20 @ 14:55)  Blood Gas Arterial, Lactate: 1.1 mmoL/L (08-01-20 @ 04:00)  Blood Gas Arterial, Lactate: 1.2 mmoL/L (07-31-20 @ 13:57)  Blood Gas Arterial, Lactate: 0.7 mmoL/L (07-31-20 @ 04:22)  Blood Gas Arterial, Lactate: 0.9 mmoL/L (07-30-20 @ 13:35)  Blood Gas Arterial, Lactate: 0.9 mmoL/L (07-30-20 @ 04:30)    ABG - ( 01 Aug 2020 20:46 )  pH: 7.28  /  pCO2: 41    /  pO2: 96    / HCO3: 19    / Base Excess: -6.7  /  SaO2: 95              ABG - ( 01 Aug 2020 14:55 )  pH: 7.29  /  pCO2: 42    /  pO2: 57    / HCO3: 20    / Base Excess: -6.3  /  SaO2: 86              ABG - ( 01 Aug 2020 04:00 )  pH: 7.38  /  pCO2: 34    /  pO2: 64    / HCO3: 20    / Base Excess: -4.3  /  SaO2: 92                Coags:     15.10  ----< 1.31    ( 31 Jul 2020 11:23 )     36.3

## 2020-08-02 NOTE — PROGRESS NOTE ADULT - ASSESSMENT
IMPRESSION:    Acute hypoxemic respiratory failure SP tracheostomy   Stroke SP TPA And thrombectomy  Pneumonia   NAEL ATN   DI   E coli bacteremia treated   G+ Cocci bacteremia Coag neg staph   Sepsis  Septic shock  CDiff     PLAN:    CNS: FU with Neurology. Keep off sedation.  FU MS.    HEENT:  Oral care / Trach care     PULMONARY:  HOB @ 45 degrees.  Vent changes:  Wean O2.  FU DTA.      CARDIOVASCULAR: I=O. Continue Rate control.        GI: GI prophylaxis. Increase trickle  feeding as tolerated     RENAL:  F/u repeat lytes 4 PM.  DC 3% Nacl.      INFECTIOUS DISEASE: FU repeat cultures.  Gray cultures Rakan and Vanc IV.  FU Vanc levels an Nasal MRSA.  FU DTA     HEMATOLOGICAL:  DVT prophylaxis.     ENDOCRINE:  Follow up FS.  Insulin protocol if needed.    CODE STATUS: FULL CODE    DISPOSITION: Pt requires continued monitoring in the MICU    Overall very Poor prognosis

## 2020-08-02 NOTE — PROGRESS NOTE ADULT - ASSESSMENT
64M, PMH HTN, CAD, DM, admitted for stroke s/p TPA c/b intracranial hemorrhage, now w/o brainstem function, c/b AHRF on mech vent, E.coli bacteremia/+cdiff, non-oliguric NAEL    #NAEL on CKD, p/w creatinine 1.9, unknown baseline, ATN iso CVA/shock  - creatinine stable   - non oliguric   - DARSHAN noted, non-echogenic kidneys, no HN  - last ph at goal   - resume sodium bicarbonate PO 1300mg TID  - abx per ID, dose vanc by level, on 2 pressors   - 3% saline d/cd  - no indication for RRT, would not improve prognosis  - surgical notes appreciated, sp trach/PEG   - prognosis poor   - will follow

## 2020-08-02 NOTE — PROGRESS NOTE ADULT - SUBJECTIVE AND OBJECTIVE BOX
ANA CRISTINA LOVING  64y, Male    All available historical data reviewed    OVERNIGHT EVENTS:  s/p trach/G tube  low grade fevers  on pressors  Fio2 100%  off sedation, non responsive  rectal tube with diarrhea    ROS:  unable to obtain history secondary to patient's mental status and/or sedation     VITALS:  T(F): 98.6, Max: 100.4 (08-01-20 @ 23:00)  HR: 88  BP: 103/46  RR: 25Vital Signs Last 24 Hrs  T(C): 37 (02 Aug 2020 04:00), Max: 38 (01 Aug 2020 23:00)  T(F): 98.6 (02 Aug 2020 04:00), Max: 100.4 (01 Aug 2020 23:00)  HR: 88 (02 Aug 2020 07:45) (78 - 106)  BP: 103/46 (02 Aug 2020 06:00) (62/41 - 134/52)  BP(mean): 65 (02 Aug 2020 06:00) (49 - 88)  RR: 25 (02 Aug 2020 07:45) (23 - 27)  SpO2: 100% (02 Aug 2020 07:45) (95% - 100%)    TESTS & MEASUREMENTS:                        8.3    21.86 )-----------( 627      ( 02 Aug 2020 04:30 )             26.5     08-02    152<H>  |  119<H>  |  75<HH>  ----------------------------<  143<H>  4.7   |  17  |  4.2<HH>    Ca    8.5      02 Aug 2020 04:30  Mg     2.3     08-02    TPro  5.2<L>  /  Alb  1.6<L>  /  TBili  0.3  /  DBili  x   /  AST  138<H>  /  ALT  35  /  AlkPhos  79  08-02    LIVER FUNCTIONS - ( 02 Aug 2020 04:30 )  Alb: 1.6 g/dL / Pro: 5.2 g/dL / ALK PHOS: 79 U/L / ALT: 35 U/L / AST: 138 U/L / GGT: x             Culture - Sputum (collected 08-01-20 @ 16:20)  Source: .Sputum Sputum  Gram Stain (08-02-20 @ 05:43):    Numerous polymorphonuclear leukocytes per low power field    Few Squamous epithelial cells per low power field    Rare Gram positive cocci in pairs seen per oil power field    Culture - Blood (collected 07-27-20 @ 14:53)  Source: .Blood Blood-Venous  Final Report (08-01-20 @ 23:01):    No Growth Final    Culture - Blood (collected 07-27-20 @ 04:17)  Source: .Blood None  Final Report (08-01-20 @ 14:01):    No Growth Final            RADIOLOGY & ADDITIONAL TESTS:  Personal review of radiological diagnostics performed  Echo and EKG results noted when applicable.     MEDICATIONS:  acetaminophen   Tablet .. 650 milliGRAM(s) Oral every 6 hours PRN  aMIOdarone    Tablet 100 milliGRAM(s) Oral two times a day  chlorhexidine 0.12% Liquid 15 milliLiter(s) Oral Mucosa every 12 hours  chlorhexidine 4% Liquid 1 Application(s) Topical <User Schedule>  dextrose 40% Gel 15 Gram(s) Oral once PRN  dextrose 50% Injectable 12.5 Gram(s) IV Push once  dextrose 50% Injectable 25 Gram(s) IV Push once  dextrose 50% Injectable 25 Gram(s) IV Push once  DOPamine Infusion 1 MICROgram(s)/kG/Min IV Continuous <Continuous>  glucagon  Injectable 1 milliGRAM(s) IntraMuscular once PRN  heparin   Injectable 5000 Unit(s) SubCutaneous every 8 hours  insulin regular Infusion 1 Unit(s)/Hr IV Continuous <Continuous>  meropenem  IVPB 500 milliGRAM(s) IV Intermittent every 12 hours  meropenem  IVPB      norepinephrine Infusion 0.01 MICROgram(s)/kG/Min IV Continuous <Continuous>  pantoprazole   Suspension 40 milliGRAM(s) Enteral Tube daily  vancomycin    Solution 250 milliGRAM(s) Oral every 6 hours      ANTIBIOTICS:  meropenem  IVPB 500 milliGRAM(s) IV Intermittent every 12 hours  meropenem  IVPB      vancomycin    Solution 250 milliGRAM(s) Oral every 6 hours

## 2020-08-02 NOTE — PROGRESS NOTE ADULT - ASSESSMENT
A/P:  ANA CRISTINA LOVING is a 64yMale pod 1  from EGD, with PEG  Bronchoscopy with percutaneous tracheostomy      Plan:   trach and peg working well no oozing or drainage or leaks at sites   peg may be used for feeds  no further surgical intervention required

## 2020-08-02 NOTE — PROGRESS NOTE ADULT - ASSESSMENT
· Assessment		  64 year old male pmh of DM, CAD with PCI x3 in 2017, HTN, DLD, trigeminal neuralgia who presents to ED from [facility] s/p TPA for stroke.    IMPRESSION;   # CVA s/p TPA s/p thrombectomy (Basiliar Artery) - unresponsive off sedation, on pressors  - repeat CT head:  Interval increase in posterior fossa edema and mass effect consistent with evolving infarct  with resultant new effacement of the fourth ventricle and new mild obstructive hydrocephalus. Increased hypergenicity within the left ambient and quadrigeminal plate cisterns, and new hypodensity within the right ambient and quadrigeminal plate cisterns, the suprasellar cistern, the right sylvian fissure and scattered sulci suspicious worsening/new subarachnoid hemorrhage.  - remained ventilated, off sedation  - no neurologic activity.   -fevers could well be central as I see no new focus of infection  -trach : gm positive cocci  #CD colitis: on po vancomycin. Diarrhea has decreased  #E coli bacteremia : secondary to  tract with UCx E coli but no overt pyuria. Could well also be secondary to translocation from the GI tract.  BCx 7/27 NGTD  CT Abdomen 7/28:  Liquid stool within the colon without evidence of obstruction or colonic wall thickening. No intraperitoneal free air. Bilateral small pleural effusions with associated opacities    RECOMMENDATIONS;  Serum fungitell assay  d/c R IJ catheter  Rocephin 2 gm iv q24h  vancomycin 750 mg iv q48h ( repeat nares for ORSA )  meropenem 750 mg iv q24h    Po vancomycin 125 mg q6h

## 2020-08-03 NOTE — PROGRESS NOTE ADULT - SUBJECTIVE AND OBJECTIVE BOX
FABIENNEANA CRISTINA  64y, Male  Allergy: No Known Allergies      LOS  18d    CHIEF COMPLAINT: Stroke s/p tpa (03 Aug 2020 07:20)      INTERVAL EVENTS/HPI  - T(F): , Max: 100.8 (08-03-20 @ 04:00), 80 % FiO2, on levophed  - WBC Count: 22.03 (08-03-20 @ 04:30)  WBC Count: 23.32 (08-02-20 @ 15:30)  - Creatinine, Serum: 5.5 (08-03-20 @ 04:30)  Creatinine, Serum: 5.2 (08-02-20 @ 23:21)       ROS  cannot obtain secondary to patient's sedation and/or mental status    VITALS:  T(F): 100, Max: 100.8 (08-03-20 @ 04:00)  HR: 86  BP: 120/51  RR: 27Vital Signs Last 24 Hrs  T(C): 37.8 (03 Aug 2020 05:00), Max: 38.2 (03 Aug 2020 04:00)  T(F): 100 (03 Aug 2020 05:00), Max: 100.8 (03 Aug 2020 04:00)  HR: 86 (03 Aug 2020 07:45) (80 - 98)  BP: 120/51 (03 Aug 2020 07:00) (98/46 - 129/50)  BP(mean): 74 (03 Aug 2020 07:00) (64 - 84)  RR: 27 (03 Aug 2020 07:45) (25 - 27)  SpO2: 100% (03 Aug 2020 07:45) (97% - 100%)    PHYSICAL EXAM:  Gen: Intubated  CV: RRR  Lungs: Decreased BS at bases  Abd: Soft  Neuro: Sedated  Lines clean, no phlebitis    FH: Non-contributory  Social Hx: Non-contributory    TESTS & MEASUREMENTS:                        7.5    22.03 )-----------( 575      ( 03 Aug 2020 04:30 )             24.4     08-03    151<H>  |  121<H>  |  84<HH>  ----------------------------<  138<H>  4.7   |  18  |  5.5<HH>    Ca    8.0<L>      03 Aug 2020 04:30  Mg     2.4     08-03    TPro  5.2<L>  /  Alb  1.5<L>  /  TBili  0.4  /  DBili  x   /  AST  129<H>  /  ALT  36  /  AlkPhos  104  08-03    eGFR if Non African American: 10 mL/min/1.73M2 (08-03-20 @ 04:30)  eGFR if African American: 12 mL/min/1.73M2 (08-03-20 @ 04:30)  eGFR if Non African American: 11 mL/min/1.73M2 (08-02-20 @ 23:21)  eGFR if : 12 mL/min/1.73M2 (08-02-20 @ 23:21)  eGFR if Non African American: 12 mL/min/1.73M2 (08-02-20 @ 15:30)  eGFR if : 14 mL/min/1.73M2 (08-02-20 @ 15:30)    LIVER FUNCTIONS - ( 03 Aug 2020 04:30 )  Alb: 1.5 g/dL / Pro: 5.2 g/dL / ALK PHOS: 104 U/L / ALT: 36 U/L / AST: 129 U/L / GGT: x               Culture - Sputum (collected 08-01-20 @ 16:20)  Source: .Sputum Sputum  Gram Stain (08-02-20 @ 05:43):    Numerous polymorphonuclear leukocytes per low power field    Few Squamous epithelial cells per low power field    Rare Gram positive cocci in pairs seen per oil power field  Preliminary Report (08-02-20 @ 20:59):    Few Pseudomonas aeruginosa    Culture - Blood (collected 07-27-20 @ 14:53)  Source: .Blood Blood-Venous  Final Report (08-01-20 @ 23:01):    No Growth Final    Culture - Blood (collected 07-27-20 @ 04:17)  Source: .Blood None  Final Report (08-01-20 @ 14:01):    No Growth Final    Culture - Blood (collected 07-23-20 @ 21:05)  Source: .Blood Blood  Gram Stain (07-27-20 @ 01:52):    Growth in anaerobic bottle: Gram Negative Rods    Growth in aerobic bottle:    Gram Positive Cocci in Clusters  Final Report (07-28-20 @ 11:37):    Growth in anaerobic bottle: Escherichia coli    Growth in aerobic bottle: Coag Negative Staphylococcus Unable to Identify    further    Coag Negative Staphylococcus    Single set isolate, possible contaminant. Contact    Microbiology if susceptibility testing clinically    indicated.    "Due to technical problems, Proteus sp. will Not be reported as part of    the BCID panel until further notice"    ***Blood Panel PCR results on this specimen are available    approximately 3 hours after the Gram stain result.***    Gram stain, PCR, and/or culture results may not always    correspond due to difference in methodologies.    ************************************************************    This PCR assay was performed using [facility].    The following targets are tested for: Enterococcus,    vancomycin resistant enterococci, Listeria monocytogenes,    coagulase negative staphylococci, S. aureus,    methicillin resistant S. aureus, Streptococcus agalactiae    (Group B), S. pneumoniae, S. pyogenes (Group A),    Acinetobacter baumannii, Enterobacter cloacae, E. coli,    Klebsiella oxytoca, K. pneumoniae, Proteus sp.,    Serratia marcescens, Haemophilus influenzae,    Neisseria meningitidis, Pseudomonas aeruginosa, Candida    albicans, C. glabrata, C krusei, C parapsilosis,    C. tropicalis and the KPC resistance gene.  Organism: Blood Culture PCR  Escherichia coli  Blood Culture PCR (07-27-20 @ 02:56)  Organism: Blood Culture PCR (07-27-20 @ 02:56)      -  Acinetobacter baumanii: Nondet      -  Candida albicans: Nondet      -  Candida glabrata: Nondet      -  Candida krusei: Nondet      -  Candida parapsilosis: Nondet      -  Candida tropicalis: Nondet      -  Coagulase negative Staphylococcus: Nondet      -  Enterobacter cloacae complex: Nondet      -  Enterococcus species: Nondet      -  Escherichia coli: Nondet      -  Haemophilus influenzae: Nondet      -  Klebsiella oxytoca: Nondet      -  Klebsiella pneumoniae: Nondet      -  Listeria monocytogenes: Nondet      -  Methicillin resistant Staphylococcus aureus (MRSA): Nondet      -  Multidrug (KPC pos) resistant organism: Nondet      -  Neisseria meningitidis: Nondet      -  Pseudomonas aeruginosa: Nondet      -  Serratia marcescens: Nondet      -  Staphylococcus aureus: Nondet      -  Streptococcus agalactiae (Group B): Nondet      -  Streptococcus pneumoniae: Nondet      -  Streptococcus pyogenes (Group A): Nondet      -  Streptococcus sp. (Not Grp A, B or S pneumoniae): Nondet      -  Vancomycin resistant Enterococcus sp.: Nondet      Method Type: PCR  Organism: Escherichia coli (07-27-20 @ 01:52)      -  Amikacin: S <=16      -  Ampicillin: R >16 These ampicillin results predict results for amoxicillin      -  Ampicillin/Sulbactam: I 16/8 Enterobacter, Citrobacter, and Serratia may develop resistance during prolonged therapy (3-4 days)      -  Aztreonam: S <=4      -  Cefazolin: R >16 Enterobacter, Citrobacter, and Serratia may develop resistance during prolonged therapy (3-4 days)      -  Cefepime: S <=2      -  Cefoxitin: R >16      -  Ceftriaxone: S <=1 Enterobacter, Citrobacter, and Serratia may develop resistance during prolonged therapy      -  Ciprofloxacin: R >2      -  Ertapenem: S <=0.5      -  Gentamicin: S <=2      -  Imipenem: S <=1      -  Levofloxacin: R >4      -  Meropenem: S <=1      -  Piperacillin/Tazobactam: S <=8      -  Tobramycin: S <=2      -  Trimethoprim/Sulfamethoxazole: S 2/38      Method Type: MORRIS  Organism: Blood Culture PCR (07-27-20 @ 01:52)      -  Escherichia coli: Detec      Method Type: PCR    Culture - Urine (collected 07-23-20 @ 11:11)  Source: .Urine Catheterized  Final Report (07-27-20 @ 14:36):    >100,000 CFU/ml Escherichia coli  Organism: Escherichia coli (07-27-20 @ 14:36)  Organism: Escherichia coli (07-27-20 @ 14:36)      -  Amikacin: S <=16      -  Amoxicillin/Clavulanic Acid: R >16/8      -  Ampicillin: R >16 These ampicillin results predict results for amoxicillin      -  Ampicillin/Sulbactam: I 16/8 Enterobacter, Citrobacter, and Serratia may develop resistance during prolonged therapy (3-4 days)      -  Aztreonam: S <=4      -  Cefazolin: R >16 ([id number]) For uncomplicated UTI with K. pneumoniae, E. coli, or P. mirablis: MORRIS <=16 is sensitive and MORRIS >=32 is resistant. This also predicts results for oral agents cefaclor, cefdinir, cefpodoxime, cefprozil, cefuroxime axetil, cephalexin and locarbef for uncomplicated UTI. Note that some isolates may be susceptible to these agents while testing resistant to cefazolin.      -  Cefepime: S <=2      -  Cefoxitin: R >16      -  Ceftriaxone: S <=1 Enterobacter, Citrobacter, and Serratia may develop resistance during prolonged therapy      -  Ciprofloxacin: R >2      -  Ertapenem: S <=0.5      -  Gentamicin: S <=2      -  Levofloxacin: R >4      -  Meropenem: S <=1      -  Nitrofurantoin: S <=32 Should not be used to treat pyelonephritis      -  Piperacillin/Tazobactam: S <=8      -  Tigecycline: S <=2      -  Tobramycin: S <=2      -  Trimethoprim/Sulfamethoxazole: S 2/38      Method Type: [facility]            INFECTIOUS DISEASES TESTING  MRSA PCR Result.: Negative (07-17-20 @ 03:37)  COVID-19 PCR: NotDetec (07-16-20 @ 16:44)      INFLAMMATORY MARKERS      RADIOLOGY & ADDITIONAL TESTS:  I have personally reviewed the last available Chest xray  CXR      CT      CARDIOLOGY TESTING  12 Lead ECG:   Ventricular Rate 112 BPM    Atrial Rate 394 BPM    QRS Duration 86 ms    Q-T Interval 408 ms    QTC Calculation(Bezet) 556 ms    R Axis 13 degrees    T Axis 77 degrees    Diagnosis Line Atrial fibrillation with rapid ventricular response  Nonspecific T wave abnormality  Prolonged QT  Abnormal ECG    Confirmed by LISA LOBATO MD ([id number]) on 8/1/2020 11:27:10 AM (08-01-20 @ 00:59)  12 Lead ECG:   Ventricular Rate 156 BPM    Atrial Rate 178 BPM    QRS Duration 90 ms    Q-T Interval 272 ms    QTC Calculation(Bezet) 438 ms    R Axis 14 degrees    T Axis 166 degrees    Diagnosis Line Atrial fibrillation with rapid ventricular response  Minimal voltage criteria for LVH, may be normal variant  Marked ST abnormality, possible inferior subendocardial injury  Abnormal ECG    Confirmed by Shakir Jerome ([id number]) on 7/23/2020 12:32:24 PM (07-23-20 @ 06:01)      MEDICATIONS  aMIOdarone    Tablet 100  chlorhexidine 0.12% Liquid 15  chlorhexidine 4% Liquid 1  dextrose 5%. 1000  dextrose 50% Injectable 12.5  dextrose 50% Injectable 25  dextrose 50% Injectable 25  heparin   Injectable 5000  insulin regular Infusion 1  meropenem  IVPB 500  meropenem  IVPB   norepinephrine Infusion 0.01  pantoprazole   Suspension 40  vancomycin    Solution 250      WEIGHT  Weight (kg): 84.5 (07-16-20 @ 20:42)  Creatinine, Serum: 5.5 mg/dL (08-03-20 @ 04:30)  Creatinine, Serum: 5.2 mg/dL (08-02-20 @ 23:21)  Creatinine, Serum: 4.6 mg/dL (08-02-20 @ 15:30)      ANTIBIOTICS:  meropenem  IVPB 500 milliGRAM(s) IV Intermittent every 12 hours  meropenem  IVPB      vancomycin    Solution 250 milliGRAM(s) Oral every 6 hours      All available historical records have been reviewed

## 2020-08-03 NOTE — PROGRESS NOTE ADULT - SUBJECTIVE AND OBJECTIVE BOX
Nephrology progress note    THIS IS AN INCOMPLETE NOTE . FULL NOTE TO FOLLOW SHORTLY    Patient is seen and examined, events over the last 24 h noted .    Allergies:  No Known Allergies    Hospital Medications:   MEDICATIONS  (STANDING):  aMIOdarone    Tablet 100 milliGRAM(s) Oral two times a day  dextrose 5%. 1000 milliLiter(s) (75 mL/Hr) IV Continuous <Continuous>  heparin   Injectable 5000 Unit(s) SubCutaneous every 8 hours  insulin regular Infusion 1 Unit(s)/Hr (1 mL/Hr) IV Continuous <Continuous>  meropenem  IVPB 500 milliGRAM(s) IV Intermittent every 12 hours  norepinephrine Infusion 0.01 MICROgram(s)/kG/Min (1.58 mL/Hr) IV Continuous <Continuous>  pantoprazole   Suspension 40 milliGRAM(s) Enteral Tube daily  vancomycin    Solution 250 milliGRAM(s) Oral every 6 hours        VITALS:  T(F): 100 (08-03-20 @ 05:00), Max: 100.8 (08-03-20 @ 04:00)  HR: 82 (08-03-20 @ 07:00)  BP: 120/51 (08-03-20 @ 07:00)  RR: 25 (08-03-20 @ 07:00)  SpO2: 100% (08-03-20 @ 07:00)  Wt(kg): --    08-01 @ 07:01  -  08-02 @ 07:00  --------------------------------------------------------  IN: 2573.8 mL / OUT: 1995 mL / NET: 578.8 mL    08-02 @ 07:01  -  08-03 @ 07:00  --------------------------------------------------------  IN: 2635.6 mL / OUT: 1105 mL / NET: 1530.6 mL          PHYSICAL EXAM:  Constitutional: NAD  HEENT: anicteric sclera, oropharynx clear, MMM  Neck: No JVD  Respiratory: CTAB, no wheezes, rales or rhonchi  Cardiovascular: S1, S2, RRR  Gastrointestinal: BS+, soft, NT/ND  Extremities: No cyanosis or clubbing. No peripheral edema  :  No vergara.   Skin: No rashes    LABS:  08-03    151<H>  |  121<H>  |  84<HH>  ----------------------------<  138<H>  4.7   |  18  |  5.5<HH>    Ca    8.0<L>      03 Aug 2020 04:30  Mg     2.4     08-03    TPro  5.2<L>  /  Alb  1.5<L>  /  TBili  0.4  /  DBili      /  AST  129<H>  /  ALT  36  /  AlkPhos  104  08-03                          7.5    22.03 )-----------( 575      ( 03 Aug 2020 04:30 )             24.4       Urine Studies:      RADIOLOGY & ADDITIONAL STUDIES: Nephrology progress note  Patient is seen and examined, events over the last 24 h noted .  trached on MV      Allergies:  No Known Allergies    Hospital Medications:   MEDICATIONS  (STANDING):  aMIOdarone    Tablet 100 milliGRAM(s) Oral two times a day  dextrose 5%. 1000 milliLiter(s) (75 mL/Hr) IV Continuous <Continuous>  heparin   Injectable 5000 Unit(s) SubCutaneous every 8 hours  insulin regular Infusion 1 Unit(s)/Hr (1 mL/Hr) IV Continuous <Continuous>  meropenem  IVPB 500 milliGRAM(s) IV Intermittent every 12 hours  norepinephrine Infusion 0.01 MICROgram(s)/kG/Min (1.58 mL/Hr) IV Continuous <Continuous>  pantoprazole   Suspension 40 milliGRAM(s) Enteral Tube daily  vancomycin    Solution 250 milliGRAM(s) Oral every 6 hours        VITALS:  T(F): 100 (08-03-20 @ 05:00), Max: 100.8 (08-03-20 @ 04:00)  HR: 82 (08-03-20 @ 07:00)  BP: 120/51 (08-03-20 @ 07:00)  RR: 25 (08-03-20 @ 07:00)  SpO2: 100% (08-03-20 @ 07:00)      08-01 @ 07:01  -  08-02 @ 07:00  --------------------------------------------------------  IN: 2573.8 mL / OUT: 1995 mL / NET: 578.8 mL    08-02 @ 07:01  -  08-03 @ 07:00  --------------------------------------------------------  IN: 2635.6 mL / OUT: 1105 mL / NET: 1530.6 mL          PHYSICAL EXAM:  Constitutional: intubated on MV trached   Neck: No JVD  Respiratory: CTAB, no wheezes, rales or rhonchi  Cardiovascular: S1, S2, RRR  Gastrointestinal: BS+, soft, NT/ND  Extremities: No cyanosis or clubbing. No peripheral edema  : positive vergara   Skin: No rashes    LABS:  08-03    151<H>  |  121<H>  |  84<HH>  ----------------------------<  138<H>  4.7   |  18  |  5.5<HH>    Creatinine Trend: 5.5<--, 5.2<--, 4.6<--, 4.2<--, 4.0<--, 4.0<--  SODIUM TREND:  Sodium 151 [08-03 @ 04:30]  Sodium 153 [08-02 @ 23:21]  Sodium 156 [08-02 @ 15:30]  Sodium 152 [08-02 @ 04:30]  Sodium 149 [07-31 @ 20:01]  Sodium 149 [07-31 @ 17:25]  Sodium 147 [07-31 @ 04:35]  Sodium 147 [07-31 @ 01:45]  Sodium 145 [07-30 @ 18:08]  Sodium 140 [07-30 @ 04:20]    Ca    8.0<L>      03 Aug 2020 04:30  Mg     2.4     08-03    TPro  5.2<L>  /  Alb  1.5<L>  /  TBili  0.4  /  DBili      /  AST  129<H>  /  ALT  36  /  AlkPhos  104  08-03                          7.5    22.03 )-----------( 575      ( 03 Aug 2020 04:30 )             24.4       Urine Studies:      RADIOLOGY & ADDITIONAL STUDIES:

## 2020-08-03 NOTE — PROGRESS NOTE ADULT - SUBJECTIVE AND OBJECTIVE BOX
pt seen earlier today in ICU. Examined, d/w bedside RN, discussed with medical residents.  pt s/p trach and GT placement.  unresponsive, off sedation.  abd slightly distended but much less than previously, and soft.  enteral feeds via GT only at 30 ml/h.  Vital Signs Last 24 Hrs  T(C): 38.9 (03 Aug 2020 20:00), Max: 38.9 (03 Aug 2020 20:00)  T(F): 102 (03 Aug 2020 20:00), Max: 102 (03 Aug 2020 20:00)  HR: 90 (03 Aug 2020 20:00) (80 - 94)  BP: 135/61 (03 Aug 2020 20:00) (82/41 - 140/57)  BP(mean): 93 (03 Aug 2020 20:00) (55 - 96)  RR: 27 (03 Aug 2020 20:00) (16 - 28)  SpO2: 100% (03 Aug 2020 20:00) (93% - 100%)    MEDICATIONS  (STANDING):  aMIOdarone    Tablet 100 milliGRAM(s) Oral two times a day  chlorhexidine 0.12% Liquid 15 milliLiter(s) Oral Mucosa every 12 hours  chlorhexidine 4% Liquid 1 Application(s) Topical <User Schedule>  heparin   Injectable 5000 Unit(s) SubCutaneous every 8 hours  insulin regular Infusion 1 Unit(s)/Hr (1 mL/Hr) IV Continuous <Continuous>  meropenem  IVPB 500 milliGRAM(s) IV Intermittent every 12 hours  norepinephrine Infusion 0.01 MICROgram(s)/kG/Min (1.58 mL/Hr) IV Continuous <Continuous>  pantoprazole   Suspension 40 milliGRAM(s) Enteral Tube daily  vancomycin    Solution 250 milliGRAM(s) Oral every 6 hours                        7.5    22.03 )-----------( 575      ( 03 Aug 2020 04:30 )             24.4   08-03    150<H>  |  119<H>  |  87<HH>  ----------------------------<  173<H>  5.0   |  17  |  6.0<HH>    Ca    8.2<L>      03 Aug 2020 16:00  Mg     2.4     08-03    TPro  5.2<L>  /  Alb  1.5<L>  /  TBili  0.4  /  DBili  x   /  AST  129<H>  /  ALT  36  /  AlkPhos  104  08-03  last phos from 7/25    POCT Blood Glucose.: 162 mg/dL (03 Aug 2020 19:42)  POCT Blood Glucose.: 168 mg/dL (03 Aug 2020 16:12)  POCT Blood Glucose.: 173 mg/dL (03 Aug 2020 12:31)  POCT Blood Glucose.: 164 mg/dL (03 Aug 2020 08:55)  POCT Blood Glucose.: 129 mg/dL (03 Aug 2020 06:04)  POCT Blood Glucose.: 137 mg/dL (03 Aug 2020 04:18)  POCT Blood Glucose.: 141 mg/dL (03 Aug 2020 03:07)  POCT Blood Glucose.: 139 mg/dL (03 Aug 2020 01:58)  POCT Blood Glucose.: 156 mg/dL (02 Aug 2020 23:58)  POCT Blood Glucose.: 162 mg/dL (02 Aug 2020 22:13)  POCT Blood Glucose.: 154 mg/dL (02 Aug 2020 21:01)

## 2020-08-03 NOTE — PROGRESS NOTE ADULT - ASSESSMENT
· Assessment		  64 year old male pmh of DM, CAD with PCI x3 in 2017, HTN, DLD, trigeminal neuralgia who presents to ED from [facility] s/p TPA for stroke.    IMPRESSION;   #Pseudomonal VAP, R-sided opacities>L    8/1 tracheal cx Pseudomonas  # CVA s/p TPA s/p thrombectomy (Basiliar Artery) - unresponsive off sedation, on pressors  - repeat CT head:  Interval increase in posterior fossa edema and mass effect consistent with evolving infarct  with resultant new effacement of the fourth ventricle and new mild obstructive hydrocephalus. Increased hypergenicity within the left ambient and quadrigeminal plate cisterns, and new hypodensity within the right ambient and quadrigeminal plate cisterns, the suprasellar cistern, the right sylvian fissure and scattered sulci suspicious worsening/new subarachnoid hemorrhage.  - remained ventilated, off sedation  - no neurologic activity.   #7/26 CD colitis: on po vancomycin. Diarrhea has decreased  #E coli bacteremia : secondary to  tract with UCx E coli but no overt pyuria. Could well also be secondary to translocation from the GI tract.  BCx 7/27 NGTD  CT Abdomen 7/28:  Liquid stool within the colon without evidence of obstruction or colonic wall thickening. No intraperitoneal free air. Bilateral small pleural effusions with associated opacities    RECOMMENDATIONS;  meropenem 500mg q12h IV  f/u pseudomonal sensitivities   If continues to spike or hemodynamic compromise, add levaquin 500mg q48h IV while awaiting sensitivities   Po vancomycin 125 mg q6h x 10 days end 8/4, then daily 125mg PO as remains on broad spectrum abx

## 2020-08-03 NOTE — PROGRESS NOTE ADULT - ASSESSMENT
IMPRESSION:    Acute hypoxemic respiratory failure SP tracheostomy / pseudomonas in DTA  Stroke SP TPA And thrombectomy  NAEL ATN   E coli bacteremia treated   Sepsis  Septic shock  CDiff     PLAN:    CNS: FU with Neurology. Keep off sedation.  FU MS.    HEENT:  Oral care / Trach care     PULMONARY:  HOB @ 45 degrees.  Vent changes:  Wean O2.  DEC fio2 to 60%, increase RR TO 28    CARDIOVASCULAR: I=O. Continue Rate control.        GI: GI prophylaxis. Increase trickle  feeding as tolerated , FLAT PLATE    RENAL:  F/u repeat lytes 4 PM.  keep d5 w    INFECTIOUS DISEASE: FU repeat cultures.  Gray cultures Rakan and Vanc po.      HEMATOLOGICAL:  DVT prophylaxis. Le doppler     ENDOCRINE:  Follow up FS.  Insulin protocol if needed.    CODE STATUS: FULL CODE    DISPOSITION: Pt requires continued monitoring in the MICU    Overall very Poor prognosis  dc a line

## 2020-08-03 NOTE — PROGRESS NOTE ADULT - ASSESSMENT
This is an incomplete note with full recommendations to follow. 64M, PMH HTN, CAD, DM, admitted for stroke s/p TPA c/b intracranial hemorrhage, now w/o brainstem function, c/b AHRF on mech vent, E.coli bacteremia/+cdiff, non-oliguric NAEL    #NAEL on CKD, p/w creatinine 1.9, unknown baseline, ATN in the context of CVA/shock  - creatinine stable   - non oliguric   - DARSHAN noted, non-echogenic kidneys, no hydro   - last ph at goal   - resume sodium bicarbonate PO 1300mg TID as per yesterday note   - no indication for RRT, would not improve prognosis  - surgical notes appreciated, sp trach/PEG / consider free water via  cc q4h   - prognosis poor   - will follow

## 2020-08-03 NOTE — PROGRESS NOTE ADULT - SUBJECTIVE AND OBJECTIVE BOX
OVERNIGHT EVENTS: events noted, s/p trach/ PEG, on levophed and D5 75 CC/H, still diarrhea     Vital Signs Last 24 Hrs  T(C): 37.8 (03 Aug 2020 05:00), Max: 38.2 (03 Aug 2020 04:00)  T(F): 100 (03 Aug 2020 05:00), Max: 100.8 (03 Aug 2020 04:00)  HR: 82 (03 Aug 2020 07:00) (80 - 98)  BP: 120/51 (03 Aug 2020 07:00) (98/46 - 129/50)  BP(mean): 74 (03 Aug 2020 07:00) (64 - 84)  RR: 25 (03 Aug 2020 07:00) (25 - 26)  SpO2: 100% (03 Aug 2020 07:00) (97% - 100%)    PHYSICAL EXAMINATION:    GENERAL: not following comamnds    HEENT: sp trach    NECK: Supple.    LUNGS: dec bs both bases    HEART: LOVE 3/6    ABDOMEN: Soft, nontender, and nondistended.      EXTREMITIES: Without any cyanosis, clubbing, rash, lesions or edema.    NEUROLOGIC: myoclonic jerk          LABS:                        7.5    22.03 )-----------( 575      ( 03 Aug 2020 04:30 )             24.4     08-03    151<H>  |  121<H>  |  84<HH>  ----------------------------<  138<H>  4.7   |  18  |  5.5<HH>    Ca    8.0<L>      03 Aug 2020 04:30  Mg     2.4     08-03    TPro  5.2<L>  /  Alb  1.5<L>  /  TBili  0.4  /  DBili  x   /  AST  129<H>  /  ALT  36  /  AlkPhos  104  08-03        ABG - ( 03 Aug 2020 04:39 )  pH, Arterial: 7.25  pH, Blood: x     /  pCO2: 43    /  pO2: 137   / HCO3: 19    / Base Excess: -8.2  /  SaO2: 98        450/26/80/7.5                        08-02-20 @ 07:01  -  08-03-20 @ 07:00  --------------------------------------------------------  IN: 2635.6 mL / OUT: 1105 mL / NET: 1530.6 mL        MICROBIOLOGY:  Culture Results:   Few Pseudomonas aeruginosa (08-01 @ 16:20)      MEDICATIONS  (STANDING):  aMIOdarone    Tablet 100 milliGRAM(s) Oral two times a day  chlorhexidine 0.12% Liquid 15 milliLiter(s) Oral Mucosa every 12 hours  chlorhexidine 4% Liquid 1 Application(s) Topical <User Schedule>  dextrose 5%. 1000 milliLiter(s) (75 mL/Hr) IV Continuous <Continuous>  dextrose 50% Injectable 12.5 Gram(s) IV Push once  dextrose 50% Injectable 25 Gram(s) IV Push once  dextrose 50% Injectable 25 Gram(s) IV Push once  heparin   Injectable 5000 Unit(s) SubCutaneous every 8 hours  insulin regular Infusion 1 Unit(s)/Hr (1 mL/Hr) IV Continuous <Continuous>  meropenem  IVPB 500 milliGRAM(s) IV Intermittent every 12 hours  meropenem  IVPB      norepinephrine Infusion 0.01 MICROgram(s)/kG/Min (1.58 mL/Hr) IV Continuous <Continuous>  pantoprazole   Suspension 40 milliGRAM(s) Enteral Tube daily  vancomycin    Solution 250 milliGRAM(s) Oral every 6 hours    MEDICATIONS  (PRN):  acetaminophen   Tablet .. 650 milliGRAM(s) Oral every 6 hours PRN Temp greater or equal to 38C (100.4F)  dextrose 40% Gel 15 Gram(s) Oral once PRN Blood Glucose LESS THAN 70 milliGRAM(s)/deciliter  glucagon  Injectable 1 milliGRAM(s) IntraMuscular once PRN Glucose LESS THAN 70 milligrams/deciliter      RADIOLOGY & ADDITIONAL STUDIES:

## 2020-08-03 NOTE — PROGRESS NOTE ADULT - SUBJECTIVE AND OBJECTIVE BOX
24H events:    Patient is a 64y old Male who was transfered to [facility]  from [facility] with Stroke s/p tpa (03 Aug 2020 08:38)    Primary diagnosis of Stroke (R vertebral artery occlusion and distal basal artery thrombus)      Today is hospital day 198d. This morning patient was seen and examined at bedside. Patient is still unresponsive off sedation. He has lower extremity myoclonic jerk movements.       PAST MEDICAL & SURGICAL HISTORY  HTN (hypertension)  Diabetes  No significant past surgical history    SOCIAL HISTORY:  Social History:   lives with wife at [facility]  NO smoking or Etoh (16 Jul 2020 18:12)      ALLERGIES:  No Known Allergies    MEDICATIONS:  STANDING MEDICATIONS  aMIOdarone    Tablet 100 milliGRAM(s) Oral two times a day  chlorhexidine 0.12% Liquid 15 milliLiter(s) Oral Mucosa every 12 hours  chlorhexidine 4% Liquid 1 Application(s) Topical <User Schedule>  dextrose 5%. 1000 milliLiter(s) IV Continuous <Continuous>  dextrose 50% Injectable 12.5 Gram(s) IV Push once  dextrose 50% Injectable 25 Gram(s) IV Push once  dextrose 50% Injectable 25 Gram(s) IV Push once  heparin   Injectable 5000 Unit(s) SubCutaneous every 8 hours  insulin regular Infusion 1 Unit(s)/Hr IV Continuous <Continuous>  meropenem  IVPB 500 milliGRAM(s) IV Intermittent every 12 hours  meropenem  IVPB      norepinephrine Infusion 0.01 MICROgram(s)/kG/Min IV Continuous <Continuous>  pantoprazole   Suspension 40 milliGRAM(s) Enteral Tube daily  vancomycin    Solution 250 milliGRAM(s) Oral every 6 hours    PRN MEDICATIONS  acetaminophen   Tablet .. 650 milliGRAM(s) Oral every 6 hours PRN  dextrose 40% Gel 15 Gram(s) Oral once PRN  glucagon  Injectable 1 milliGRAM(s) IntraMuscular once PRN        VITALS:   T(F): 100  HR: 90  BP: 123/57  RR: 27  SpO2: 100%    PHYSICAL EXAM:  GENERAL: unresponsive male in NAD   HEENT: NCAT, +tracheostomy, +R IJ  CHEST/LUNG: audible breath sounds b/l   HEART: Regular rate and rhythm; s1 s2 appreciated  ABDOMEN: soft, mildly distended abdomen, +PEG tube   EXTREMITIES: UE and LE edema   NERVOUS SYSTEM: some spontaneous nonpurposeful movements to tactile sensation, no apparent brainstem reflexes, absent reflexes in upper extremities. Hyper reflexia in lower extremities    LABS:                        7.5    22.03 )-----------( 575      ( 03 Aug 2020 04:30 )             24.4     08-03    151<H>  |  121<H>  |  84<HH>  ----------------------------<  138<H>  4.7   |  18  |  5.5<HH>    Ca    8.0<L>      03 Aug 2020 04:30  Mg     2.4     08-03    TPro  5.2<L>  /  Alb  1.5<L>  /  TBili  0.4  /  DBili  x   /  AST  129<H>  /  ALT  36  /  AlkPhos  104  08-03        ABG - ( 03 Aug 2020 04:39 )  pH, Arterial: 7.25  pH, Blood: x     /  pCO2: 43    /  pO2: 137   / HCO3: 19    / Base Excess: -8.2  /  SaO2: 98                    Culture - Sputum (collected 01 Aug 2020 16:20)  Source: .Sputum Sputum  Gram Stain (02 Aug 2020 05:43):    Numerous polymorphonuclear leukocytes per low power field    Few Squamous epithelial cells per low power field    Rare Gram positive cocci in pairs seen per oil power field  Preliminary Report (02 Aug 2020 20:59):    Few Pseudomonas aeruginosa pending sensitivity result          RADIOLOGY:    < from: CT Angio Head w/ IV Cont (07.16.20 @ 16:08) >  1.  *Occlusion of the right vertebral artery from about the level of the C2 vertebral body to the vertebrobasilar junction (V3 and V4 segments).  2.  *Thrombus within the distal basilar artery measuring about 6 mm in length and producing about 80% luminal stenosis.    < end of copied text > 24H events:    Patient is a 64y old Male who was transfered to [facility]  from [facility] with Stroke s/p tpa (03 Aug 2020 08:38)    Primary diagnosis of Stroke (R vertebral artery occlusion and distal basal artery thrombus)      Today is hospital day 19d. This morning patient was seen and examined at bedside. Patient is still unresponsive off sedation. He has lower extremity myoclonic jerk movements.       PAST MEDICAL & SURGICAL HISTORY  HTN (hypertension)  Diabetes  No significant past surgical history    SOCIAL HISTORY:  Social History:   lives with wife at [facility]  NO smoking or Etoh (16 Jul 2020 18:12)      ALLERGIES:  No Known Allergies    MEDICATIONS:  STANDING MEDICATIONS  aMIOdarone    Tablet 100 milliGRAM(s) Oral two times a day  chlorhexidine 0.12% Liquid 15 milliLiter(s) Oral Mucosa every 12 hours  chlorhexidine 4% Liquid 1 Application(s) Topical <User Schedule>  dextrose 5%. 1000 milliLiter(s) IV Continuous <Continuous>  dextrose 50% Injectable 12.5 Gram(s) IV Push once  dextrose 50% Injectable 25 Gram(s) IV Push once  dextrose 50% Injectable 25 Gram(s) IV Push once  heparin   Injectable 5000 Unit(s) SubCutaneous every 8 hours  insulin regular Infusion 1 Unit(s)/Hr IV Continuous <Continuous>  meropenem  IVPB 500 milliGRAM(s) IV Intermittent every 12 hours  meropenem  IVPB      norepinephrine Infusion 0.01 MICROgram(s)/kG/Min IV Continuous <Continuous>  pantoprazole   Suspension 40 milliGRAM(s) Enteral Tube daily  vancomycin    Solution 250 milliGRAM(s) Oral every 6 hours    PRN MEDICATIONS  acetaminophen   Tablet .. 650 milliGRAM(s) Oral every 6 hours PRN  dextrose 40% Gel 15 Gram(s) Oral once PRN  glucagon  Injectable 1 milliGRAM(s) IntraMuscular once PRN        VITALS:   T(F): 100  HR: 90  BP: 123/57  RR: 27  SpO2: 100%    PHYSICAL EXAM:  GENERAL: unresponsive male in NAD   HEENT: NCAT, +tracheostomy, +R IJ  CHEST/LUNG: audible breath sounds b/l   HEART: Regular rate and rhythm; s1 s2 appreciated  ABDOMEN: soft, mildly distended abdomen, +PEG tube   EXTREMITIES: UE and LE edema   NERVOUS SYSTEM: some spontaneous nonpurposeful movements to tactile sensation, no apparent brainstem reflexes, absent reflexes in upper extremities. Hyper reflexia in lower extremities    LABS:                        7.5    22.03 )-----------( 575      ( 03 Aug 2020 04:30 )             24.4     08-03    151<H>  |  121<H>  |  84<HH>  ----------------------------<  138<H>  4.7   |  18  |  5.5<HH>    Ca    8.0<L>      03 Aug 2020 04:30  Mg     2.4     08-03    TPro  5.2<L>  /  Alb  1.5<L>  /  TBili  0.4  /  DBili  x   /  AST  129<H>  /  ALT  36  /  AlkPhos  104  08-03        ABG - ( 03 Aug 2020 04:39 )  pH, Arterial: 7.25  pH, Blood: x     /  pCO2: 43    /  pO2: 137   / HCO3: 19    / Base Excess: -8.2  /  SaO2: 98                    Culture - Sputum (collected 01 Aug 2020 16:20)  Source: .Sputum Sputum  Gram Stain (02 Aug 2020 05:43):    Numerous polymorphonuclear leukocytes per low power field    Few Squamous epithelial cells per low power field    Rare Gram positive cocci in pairs seen per oil power field  Preliminary Report (02 Aug 2020 20:59):    Few Pseudomonas aeruginosa pending sensitivity result          RADIOLOGY:    < from: CT Angio Head w/ IV Cont (07.16.20 @ 16:08) >  1.  *Occlusion of the right vertebral artery from about the level of the C2 vertebral body to the vertebrobasilar junction (V3 and V4 segments).  2.  *Thrombus within the distal basilar artery measuring about 6 mm in length and producing about 80% luminal stenosis.    < end of copied text >

## 2020-08-03 NOTE — PROGRESS NOTE ADULT - ASSESSMENT
ASSESSMENT/PLAN  Acute hypoxemic respiratory failure   Stroke SP TAP And thrombectomy  NAEL  DI   E coli bacteremia  G+ Cocci bacteremia Coag neg staph   Sepsis  Septic shock  CDiff     - see previous NST notes:  goal enteral feeds are Peptamen AF at 65 ml/h to provide 1872 k, 119 gm protein, with lower carb content, better omega6:3 ratio  - should increase to goal today, as d/w residents earlier  - consider gravity meal-pattern feeds with pre-feed insulin sc, especially when planning to transfer out of ICU (goal then is 375 ml Peptamen AF x 4 feeds/d)  - will gradually need to transition to polymeric formula

## 2020-08-03 NOTE — PROGRESS NOTE ADULT - ASSESSMENT
This is a case of a 64 year old male KTH:  1. DM  2. CAD with PCI x3 in 2017  3. HTN  4. DLD  5. trigeminal neuralgia    presented to ED from [facility] s/p TPA for stroke.    # CVA s/p TPA s/p thrombectomy (Basiliar Artery) - unresponsive off sedation, pressors changed to vasopressin and phenylephrine   # s/p Tracheostomy and PEG tube placement (8/1)  - repeat CT head:  Interval increase in posterior fossa edema and mass effect consistent with evolving infarct  with resultant new effacement of the fourth ventricle and new mild obstructive hydrocephalus. Increased hyperdensity within the left ambient and quadrigeminal plate cisterns, and new hyperdensity within the right ambient and quadrigeminal plate cisterns, the suprasellar cistern, the right sylvian fissure and scattered sulci suspicious worsening/new subarachnoid hemorrhage.  - s/p 23% NS, 50g mannitol, 3% hypertonic saline drip   - remained ventilated, off sedation  - taper off pressors if possible   - integrillin completed  - no notable neurologic activity. Apnea test performed (7/22), patient took a breath.  - apnea test repeated (7/27), patient took 2 breaths.   - continue with Subq heparin for DVT ppx, cleared by neurology.   - continue with vergara catheter for strict I/O   - TLC changed to R IJ, confirmed placement with cxr 7/27  - Patient continues having spontaneous movements of LE, in response to and without tactile sensation, EEG negative for seizures.   - Trickle feeds at 30cc/hr - evaluate how well patient tolerating   - Follow up surgery recs   - Vianney Nye (daughter, HCP) would like a call before any MRI, apnea test, or brain death protocol is done again. She would also like to be informed of any changed in status regardless of time. Her number is [phone].    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 Diff. - diarrhea improving   - C. Diff PCR positive  - continue with vancomycin 250mg PO     # Pseudomonas in sputum (cultures taken on 8/1)  - on merpoenem day 3 (started 8/1)  -F/u sensitivity     # Distended abdomen - improved, stable s/p PEG   - xray of the abdomen performed showing gaseous distention of the ascending colon and stomach.  - CT Abdomen shows liquid stool within the colon without evidence of obstruction or colon thickening, no intraperitoneal free air.  - Patient on trickle feeds   - Surgery following    # Worsening NAEL - stable   - baseline Cr ~1   - Cr 4.7 - trending up  - Kidney bladder US unremarkable   - d/c bicarb gtt   - Follow up nephrology recs     # DM   - HbA1c 7.5%   - insulin gtt   - q1h FS     # DLD   - d/c atorvastatin while NPO    # Activity: Bed rest   # Diet: NPO with possible trickle feeds depending on when patient is approved for trach and peg  # DVT ppx: Subq heparin   # GI ppx: Protonix  # Code Status: FULL CODE    # DISPO: Acute This is a case of a 64 year old male KTH:  1. DM  2. CAD with PCI x3 in 2017  3. HTN  4. DLD  5. trigeminal neuralgia    presented to ED from [facility] s/p CVA for TPA. S/p thrombectomy (Basiliar Artery) - unresponsive off sedation, on pressors (levophed)  - repeat CT head (7/17):  Interval increase in posterior fossa edema and mass effect consistent with evolving infarct  with resultant new effacement of the fourth ventricle and new mild obstructive hydrocephalus. . Increased hyperdensity within the left ambient and quadrigeminal plate cisterns, and new hyperdensity within the right ambient and quadrigeminal plate cisterns, the suprasellar cistern, the right sylvian fissure and scattered sulci suspicious worsening/new subarachnoid hemorrhage.    #Myoclonic lower extremity jerks:  -evaluated by neurology, likely spinal cord reflexs due to absence of inhibition from UMN.    # s/p Tracheostomy and PEG tube placement (8/1)  - remains ventilated, off sedation  - taper off pressors if possible   - integrillin completed  - no notable neurologic activity. Apnea test performed (7/22), patient took a breath.  - apnea test repeated (7/27), patient took 2 breaths.   - continue with Subq heparin for DVT ppx, cleared by neurology.   - continue with vergara catheter for strict I/O   - TLC changed to R IJ, confirmed placement with cxr 7/27  - Patient continues having spontaneous movements of LE, in response to and without tactile sensation, EEG negative for seizures.   - Trickle feeds at 30cc/hr - evaluate how well patient tolerating   - Vianney Nye (daughter, HCP) would like a call before any MRI, apnea test, or brain death protocol is done again. She would also like to be informed of any changed in status regardless of time. Her number is [phone].    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 Diff. - diarrhea improving   - C. Diff PCR positive  - continue with vancomycin 250mg PO day 9 of 10. switch dose tomorrow as per ID  - Po vancomycin 125 mg q6h x 10 days end 8/4, then daily 125mg PO as remains on broad spectrum abx    # Pseudomonas in sputum (cultures taken on 8/1)  - on merpoenem 500mg q12 day 3 (started 8/1)  - F/u pseudomonas sensitivity   - If continues to spike or hemodynamic compromise, add levaquin 500mg q48h IV while awaiting sensitivities     # Distended abdomen - improved, stable s/p PEG   - xray of the abdomen performed showing gaseous distention of the ascending colon and stomach.  - CT Abdomen shows liquid stool within the colon without evidence of obstruction or colon thickening, no intraperitoneal free air.  - Patient on trickle feeds   - Surgery following    # Worsening NAEL - stable   - baseline Cr ~1   - Cr 4.7 - trending up  - Kidney bladder US unremarkable   - d/c bicarb gtt   - Follow up nephrology recs     # DM   - HbA1c 7.5%   - insulin gtt   - q1h FS     # DLD   - d/c atorvastatin while NPO    # Activity: Bed rest   # Diet: NPO with possible trickle feeds depending on when patient is approved for trach and peg  # DVT ppx: Subq heparin   # GI ppx: Protonix  # Code Status: FULL CODE    # DISPO: Acute

## 2020-08-04 NOTE — PROGRESS NOTE ADULT - ASSESSMENT
ASSESSMENT  64 year old male pmh of DM, CAD with PCI x3 in 2017, HTN, DLD, trigeminal neuralgia who presents to ED from [facility] s/p TPA for stroke.    IMPRESSION;   #Pseudomonal VAP (panS), R-sided opacities>L    8/1 tracheal cx Pseudomonas  #Trach, PEG  # CVA s/p TPA s/p thrombectomy (Basiliar Artery) - unresponsive off sedation, on pressors  - repeat CT head:  Interval increase in posterior fossa edema and mass effect consistent with evolving infarct  with resultant new effacement of the fourth ventricle and new mild obstructive hydrocephalus. Increased hypergenicity within the left ambient and quadrigeminal plate cisterns, and new hypodensity within the right ambient and quadrigeminal plate cisterns, the suprasellar cistern, the right sylvian fissure and scattered sulci suspicious worsening/new subarachnoid hemorrhage.  - remained ventilated, off sedation  - no neurologic activity.   #7/26 CD colitis: on po vancomycin. Diarrhea has decreased  #E coli bacteremia : secondary to  tract with UCx E coli but no overt pyuria. Could well also be secondary to translocation from the GI tract.  BCx 7/27 NGTD  CT Abdomen 7/28:  Liquid stool within the colon without evidence of obstruction or colonic wall thickening. No intraperitoneal free air. Bilateral small pleural effusions with associated opacities    RECOMMENDATIONS;  - Repeat BCX, change all lines (TLC 7/27, a-line 7/17)  - Dose Vanc 1g IV x1  - Narrow meropenem to Cefepime 1g q24h IV & Flagyl 500mg q8h IV given sensitivities and in the setting of Cdiff  - PO vancomycin 125 mg q6h x 14 days end 8/8 as persistent diarrhea, then daily 125mg PO as remains on broad spectrum abx  This is a preliminary incomplete pended note, all final recommendations to follow after interview and examination of the patient.    Please call with any questions or send a message on [employer] Teams  Spectra [phone] ASSESSMENT  64 year old male pmh of DM, CAD with PCI x3 in 2017, HTN, DLD, trigeminal neuralgia who presents to ED from [facility] s/p TPA for stroke.    IMPRESSION;   #Pseudomonal VAP (panS), R-sided opacities>L    8/1 tracheal cx Pseudomonas  #Trach, PEG  # CVA s/p TPA s/p thrombectomy (Basiliar Artery) - unresponsive off sedation, on pressors  - repeat CT head:  Interval increase in posterior fossa edema and mass effect consistent with evolving infarct  with resultant new effacement of the fourth ventricle and new mild obstructive hydrocephalus. Increased hypergenicity within the left ambient and quadrigeminal plate cisterns, and new hypodensity within the right ambient and quadrigeminal plate cisterns, the suprasellar cistern, the right sylvian fissure and scattered sulci suspicious worsening/new subarachnoid hemorrhage.  - remained ventilated, off sedation  - no neurologic activity.   #7/26 CD colitis: on po vancomycin. Diarrhea has decreased  #E coli bacteremia : secondary to  tract with UCx E coli but no overt pyuria. Could well also be secondary to translocation from the GI tract.  BCx 7/27 NGTD  CT Abdomen 7/28:  Liquid stool within the colon without evidence of obstruction or colonic wall thickening. No intraperitoneal free air. Bilateral small pleural effusions with associated opacities    RECOMMENDATIONS;  - Repeat BCX, change all lines (TLC 7/27, a-line 7/17)  - Dose Vanc 1g IV x1  - Narrow meropenem to Cefepime 1g q24h IV & Flagyl 500mg q8h IV given sensitivities and in the setting of Cdiff  - PO vancomycin 125 mg q6h x 14 days end 8/8 as persistent diarrhea, then daily 125mg PO as remains on broad spectrum abx    Please call with any questions or send a message on [employer] Teams  Spectra [phone]

## 2020-08-04 NOTE — PROGRESS NOTE ADULT - SUBJECTIVE AND OBJECTIVE BOX
seen and examined  trach/vent  on pressors         PAST HISTORY  --------------------------------------------------------------------------------  No significant changes to PMH, PSH, FHx, SHx, unless otherwise noted    ALLERGIES & MEDICATIONS  --------------------------------------------------------------------------------  Allergies    No Known Allergies    Intolerances      Standing Inpatient Medications  aMIOdarone    Tablet 100 milliGRAM(s) Oral two times a day  chlorhexidine 0.12% Liquid 15 milliLiter(s) Oral Mucosa every 12 hours  chlorhexidine 4% Liquid 1 Application(s) Topical <User Schedule>  dextrose 5%. 1000 milliLiter(s) IV Continuous <Continuous>  dextrose 50% Injectable 12.5 Gram(s) IV Push once  dextrose 50% Injectable 25 Gram(s) IV Push once  dextrose 50% Injectable 25 Gram(s) IV Push once  heparin   Injectable 5000 Unit(s) SubCutaneous every 8 hours  insulin regular Infusion 1 Unit(s)/Hr IV Continuous <Continuous>  meropenem  IVPB 500 milliGRAM(s) IV Intermittent every 12 hours  meropenem  IVPB      norepinephrine Infusion 0.01 MICROgram(s)/kG/Min IV Continuous <Continuous>  pantoprazole   Suspension 40 milliGRAM(s) Enteral Tube daily  vancomycin    Solution 250 milliGRAM(s) Oral every 6 hours    PRN Inpatient Medications  acetaminophen   Tablet .. 650 milliGRAM(s) Oral every 6 hours PRN  dextrose 40% Gel 15 Gram(s) Oral once PRN  glucagon  Injectable 1 milliGRAM(s) IntraMuscular once PRN          VITALS/PHYSICAL EXAM  --------------------------------------------------------------------------------  T(C): 38.3 (08-04-20 @ 04:00), Max: 38.9 (08-03-20 @ 20:00)  HR: 74 (08-04-20 @ 07:00) (72 - 94)  BP: 99/49 (08-04-20 @ 06:45) (82/41 - 147/56)  RR: 27 (08-04-20 @ 07:00) (16 - 28)  SpO2: 99% (08-04-20 @ 07:00) (93% - 100%)  Wt(kg): --        08-03-20 @ 07:01  -  08-04-20 @ 07:00  --------------------------------------------------------  IN: 3975.9 mL / OUT: 1260 mL / NET: 2715.9 mL      Physical Exam:  	Gen: trach/vent  	Pulm:  B/L kim   	CV:  S1S2; no rub  	Abd: +distended  	    LABS/STUDIES  --------------------------------------------------------------------------------              7.3    18.89 >-----------<  545      [08-04-20 @ 05:15]              23.6     150  |  120  |  84  ----------------------------<  151      [08-04-20 @ 05:15]  4.9   |  16  |  6.5        Ca     8.0     [08-04-20 @ 05:15]      Mg     2.4     [08-04-20 @ 05:15]      Phos  9.4     [08-04-20 @ 05:15]    TPro  5.2  /  Alb  1.5  /  TBili  0.4  /  DBili  x   /  AST  129  /  ALT  36  /  AlkPhos  104  [08-03-20 @ 04:30]          Creatinine Trend:  SCr 6.5 [08-04 @ 05:15]  SCr 6.0 [08-03 @ 16:00]  SCr 5.5 [08-03 @ 04:30]  SCr 5.2 [08-02 @ 23:21]  SCr 4.6 [08-02 @ 15:30]    Urinalysis - [07-23-20 @ 21:50]      Color Yellow / Appearance Slightly Turbid / SG 1.014 / pH 6.0      Gluc Negative / Ketone Negative  / Bili Negative / Urobili <2 mg/dL       Blood Moderate / Protein 100 mg/dL / Leuk Est Negative / Nitrite Positive      RBC 6 / WBC 14 / Hyaline 5 / Gran  / Sq Epi  / Non Sq Epi 4 / Bacteria Negative      Lipid: chol 146, , HDL 50, LDL 85      [07-16-20 @ 15:44]

## 2020-08-04 NOTE — ADVANCED PRACTICE NURSE CONSULT - RECOMMEDATIONS
1. Mucosal membrane pressure injury mid upper lip- Discontinue use of gauze, new order for Triad- Cleanse w/ normal saline, gently pat dry. Apply thin layer of Triad hydrophilic ointment to provide protective layer, absorb any wound exudate & assist w/ autolytic debridement of devitalized tissue. Apply Triad q12h & prn for any strike-through drainage or soiling.   2. MASD b/l buttock-Continue applying Jefry Protect moisture barrier cream daily and prn after each incontinent episode.    -Assess skin/wound qshift, report changes to primary provider.     Additional recs:   -Continue turning/positioning patient from side-to-side q2h while in bed, or in accordance w/ pt's plan of care. Continue utilizing pillows to assist w/ turning/positioning.   -Continue to offload heels from bed surface with offloading boots to BLEs.   -Continue utilizing one underpad underneath patient to contain incontinence episodes; change pad when saturated/soiled.   -When/if vergara d/c'ed, utilize condom catheter (if patient candidate) to contain any urinary incontinence.   -Continue utilizing fecal management system/rectal tube/DigniShield (if patient candidate; MD order required) to contain loose fecal incontinence.   -Continue nutrition consult for optimal wound healing.     Plan of Care: Primary RN Pedro at bedside & made aware of above. Spoke w/ covering/primary MD Jun in regards to above. No further needs/recs from [facility] service at this time. Staff RN to perform routine skin/wound assessment and manage wound care. Questions or concerns or if wound worsens and reconsult needed, please contact [facility], Spectra #[phone].

## 2020-08-04 NOTE — PROGRESS NOTE ADULT - ASSESSMENT
This is a case of a 64 year old male KTH:  1. DM  2. CAD with PCI x3 in 2017  3. HTN  4. DLD  5. trigeminal neuralgia    presented to ED from [facility] s/p CVA for TPA. S/p thrombectomy (Basiliar Artery) - unresponsive off sedation, on pressors (levophed)  - repeat CT head (7/17):  Interval increase in posterior fossa edema and mass effect consistent with evolving infarct  with resultant new effacement of the fourth ventricle and new mild obstructive hydrocephalus. . Increased hyperdensity within the left ambient and quadrigeminal plate cisterns, and new hyperdensity within the right ambient and quadrigeminal plate cisterns, the suprasellar cistern, the right sylvian fissure and scattered sulci suspicious worsening/new subarachnoid hemorrhage.    #Myoclonic lower extremity jerks:  -evaluated by neurology, likely spinal cord reflexs due to absence of inhibition from UMN.    # s/p Tracheostomy and PEG tube placement (8/1)  - remains ventilated, off sedation  - taper off pressors if possible   - integrillin completed  - no notable neurologic activity. Apnea test performed (7/22), patient took a breath.  - apnea test repeated (7/27), patient took 2 breaths.   - continue with Subq heparin for DVT ppx, cleared by neurology.   - continue with vergara catheter for strict I/O   - TLC changed to R IJ, confirmed placement with cxr 7/27  - Patient continues having spontaneous movements of LE, in response to and without tactile sensation, EEG negative for seizures.   - Trickle feeds at 30cc/hr - evaluate how well patient tolerating   - Vianney Nye (daughter, HCP) would like a call before any MRI, apnea test, or brain death protocol is done again. She would also like to be informed of any changed in status regardless of time. Her number is [phone].    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 Diff. - diarrhea improving   - C. Diff PCR positive  - Po vancomycin swithed to 125 mg q6h x 10 days (day 11/20), then daily 125mg PO as remains on broad spectrum abx    # Pseudomonas in sputum (cultures taken on 8/1)  - on merpoenem 500mg q12 day 4 (started 8/1)  - F/u pseudomonas sensitivity   - If continues to spike or hemodynamic compromise, add levaquin 500mg q48h IV while awaiting sensitivities     # Distended abdomen - improved, stable s/p PEG   - xray of the abdomen performed showing gaseous distention of the ascending colon and stomach.  - CT Abdomen shows liquid stool within the colon without evidence of obstruction or colon thickening, no intraperitoneal free air.  - Patient on trickle feeds   - Surgery following    # Worsening NAEL - stable   - baseline Cr ~1   - Cr 4.7 - trending up  - Kidney bladder US unremarkable   - d/c bicarb gtt   - Follow up nephrology recs     # DM   - HbA1c 7.5%   - lantus 12 units  - humalog 3 q6    # DLD   - d/c atorvastatin while NPO    # Activity: Bed rest   # Diet: NPO with possible trickle feeds depending on when patient is approved for trach and peg  # DVT ppx: Subq heparin   # GI ppx: Protonix  # Code Status: FULL CODE    # DISPO: Consider transfer to vent unit tomorrow

## 2020-08-04 NOTE — PROGRESS NOTE ADULT - SUBJECTIVE AND OBJECTIVE BOX
24H events:    Patient is a 64y old Male who presents with a chief complaint of Stroke s/p tpa (04 Aug 2020 07:35)    Primary diagnosis of Stroke     Today is hospital day 19d. This morning patient was seen and examined at bedside, resting comfortably in bed. No change in status. No pertinent events.     PAST MEDICAL & SURGICAL HISTORY  HTN (hypertension)  Diabetes  No significant past surgical history    SOCIAL HISTORY:  Social History:   lives with wife at [facility]  NO smoking or Etoh (16 Jul 2020 18:12)      ALLERGIES:  No Known Allergies    MEDICATIONS:  STANDING MEDICATIONS  aMIOdarone    Tablet 100 milliGRAM(s) Oral two times a day  chlorhexidine 0.12% Liquid 15 milliLiter(s) Oral Mucosa every 12 hours  chlorhexidine 4% Liquid 1 Application(s) Topical <User Schedule>  dextrose 50% Injectable 12.5 Gram(s) IV Push once  dextrose 50% Injectable 25 Gram(s) IV Push once  dextrose 50% Injectable 25 Gram(s) IV Push once  heparin   Injectable 5000 Unit(s) SubCutaneous every 8 hours  insulin glargine Injectable (LANTUS) 12 Unit(s) SubCutaneous at bedtime  insulin lispro Injectable (HumaLOG) 3 Unit(s) SubCutaneous every 8 hours  meropenem  IVPB 500 milliGRAM(s) IV Intermittent every 12 hours  meropenem  IVPB      norepinephrine Infusion 0.01 MICROgram(s)/kG/Min IV Continuous <Continuous>  pantoprazole   Suspension 40 milliGRAM(s) Enteral Tube daily  sodium bicarbonate 650 milliGRAM(s) Oral every 6 hours  vancomycin    Solution 125 milliGRAM(s) Oral every 6 hours    PRN MEDICATIONS  acetaminophen   Tablet .. 650 milliGRAM(s) Oral every 6 hours PRN  dextrose 40% Gel 15 Gram(s) Oral once PRN  glucagon  Injectable 1 milliGRAM(s) IntraMuscular once PRN    VITALS:   T(F): 99.1  HR: 68  BP: 85/41  RR: 27  SpO2: 99%    PHYSICAL EXAM:  GENERAL: NAD, well-groomed, well-developed  HEAD:  Atraumatic, Normocephalic  EYES: EOMI  NECK: Supple  NERVOUS SYSTEM:  Alert & Oriented X3, non focal   CHEST/LUNG: Clear to auscultation bilaterally; No rales, rhonchi, wheezing, or rubs  HEART: Regular rate and rhythm; No murmurs, rubs, or gallops  ABDOMEN: Soft, Nontender, Nondistended; Bowel sounds present  EXTREMITIES:  2+ Peripheral Pulses, No clubbing, cyanosis, or edema  LYMPH: No lymphadenopathy noted  SKIN: No rashes or lesions  LABS:                        7.3    18.89 )-----------( 545      ( 04 Aug 2020 05:15 )             23.6     08-04    150<H>  |  120<H>  |  84<HH>  ----------------------------<  151<H>  4.9   |  16<L>  |  6.5<HH>    Ca    8.0<L>      04 Aug 2020 05:15  Phos  9.4     08-04  Mg     2.4     08-04    TPro  5.2<L>  /  Alb  1.5<L>  /  TBili  0.4  /  DBili  x   /  AST  129<H>  /  ALT  36  /  AlkPhos  104  08-03        ABG - ( 04 Aug 2020 14:07 )  pH, Arterial: 7.22  pH, Blood: x     /  pCO2: 38    /  pO2: 82    / HCO3: 16    / Base Excess: -11.1 /  SaO2: 95                    Culture - Sputum (collected 01 Aug 2020 16:20)  Source: .Sputum Sputum  Gram Stain (02 Aug 2020 05:43):    Numerous polymorphonuclear leukocytes per low power field    Few Squamous epithelial cells per low power field    Rare Gram positive cocci in pairs seen per oil power field  Final Report (03 Aug 2020 15:52):    Few Pseudomonas aeruginosa    Normal Respiratory Jayde present  Organism: Pseudomonas aeruginosa (03 Aug 2020 15:52)  Organism: Pseudomonas aeruginosa (03 Aug 2020 15:52)          RADIOLOGY:

## 2020-08-04 NOTE — PROGRESS NOTE ADULT - ASSESSMENT
IMPRESSION:    Acute hypoxemic respiratory failure SP tracheostomy / pseudomonas in DTA  Stroke SP TPA And thrombectomy  NAEL ATN   E coli bacteremia treated   Sepsis  Septic shock  CDiff   hypernatremia    PLAN:    CNS: FU with Neurology. Keep off sedation.  FU MS.    HEENT:  Oral care / Trach care     PULMONARY:  HOB @ 45 degrees.  Vent changes:  Wean O2.  DEC fio2 to 50%, iRR TO 28    CARDIOVASCULAR: I=O. Continue Rate control.        GI: GI prophylaxis. Increase trickle  feeding as tolerated , free water    RENAL:  F/u repeat lytes 4 PM.  free water, po bicarb    INFECTIOUS DISEASE: FU repeat cultures.  Gray cultures Rakan and Vanc po.      HEMATOLOGICAL:  DVT prophylaxis.     ENDOCRINE:  Follow up FS.  Insulin protocol if needed.    CODE STATUS: FULL CODE    DISPOSITION: Pt requires continued monitoring in the MICU    Overall very Poor prognosis  dc a line

## 2020-08-04 NOTE — PROGRESS NOTE ADULT - SUBJECTIVE AND OBJECTIVE BOX
OVERNIGHT EVENTS: events noted, on levophed 0.24, on insulin drip, D 5 75 cc/h, diarrhea +    Vital Signs Last 24 Hrs  T(C): 38.3 (04 Aug 2020 04:00), Max: 38.9 (03 Aug 2020 20:00)  T(F): 101 (04 Aug 2020 04:00), Max: 102 (03 Aug 2020 20:00)  HR: 74 (04 Aug 2020 07:00) (72 - 94)  BP: 99/49 (04 Aug 2020 06:45) (82/41 - 147/56)  BP(mean): 66 (04 Aug 2020 06:45) (55 - 105)  RR: 27 (04 Aug 2020 07:00) (16 - 28)  SpO2: 99% (04 Aug 2020 07:00) (93% - 100%)    PHYSICAL EXAMINATION:    GENERAL: ill looking    HEENT: trach    NECK: Supple.    LUNGS: dec bs both abses    HEART: Regular rate and rhythm without murmur.    ABDOMEN: Soft, nontender, and nondistended.      EXTREMITIES: Without any cyanosis, clubbing, rash, lesions or edema.    NEUROLOGIC: unchanged    SKIN: No ulceration or induration present.      LABS:                        7.3    18.89 )-----------( 545      ( 04 Aug 2020 05:15 )             23.6     08-04    150<H>  |  120<H>  |  84<HH>  ----------------------------<  151<H>  4.9   |  16<L>  |  6.5<HH>    Ca    8.0<L>      04 Aug 2020 05:15  Phos  9.4     08-04  Mg     2.4     08-04    TPro  5.2<L>  /  Alb  1.5<L>  /  TBili  0.4  /  DBili  x   /  AST  129<H>  /  ALT  36  /  AlkPhos  104  08-03        ABG - ( 04 Aug 2020 04:12 )  pH, Arterial: 7.24  pH, Blood: x     /  pCO2: 37    /  pO2: 113   / HCO3: 16    / Base Excess: -10.7 /  SaO2: 98        450/28/60/7.5                        08-03-20 @ 07:01  -  08-04-20 @ 07:00  --------------------------------------------------------  IN: 3975.9 mL / OUT: 1260 mL / NET: 2715.9 mL        MICROBIOLOGY:  Culture Results:   Few Pseudomonas aeruginosa  Normal Respiratory Jayde present (08-01 @ 16:20)      MEDICATIONS  (STANDING):  aMIOdarone    Tablet 100 milliGRAM(s) Oral two times a day  chlorhexidine 0.12% Liquid 15 milliLiter(s) Oral Mucosa every 12 hours  chlorhexidine 4% Liquid 1 Application(s) Topical <User Schedule>  dextrose 5%. 1000 milliLiter(s) (75 mL/Hr) IV Continuous <Continuous>  dextrose 50% Injectable 12.5 Gram(s) IV Push once  dextrose 50% Injectable 25 Gram(s) IV Push once  dextrose 50% Injectable 25 Gram(s) IV Push once  heparin   Injectable 5000 Unit(s) SubCutaneous every 8 hours  insulin regular Infusion 1 Unit(s)/Hr (1 mL/Hr) IV Continuous <Continuous>  meropenem  IVPB 500 milliGRAM(s) IV Intermittent every 12 hours  meropenem  IVPB      norepinephrine Infusion 0.01 MICROgram(s)/kG/Min (1.58 mL/Hr) IV Continuous <Continuous>  pantoprazole   Suspension 40 milliGRAM(s) Enteral Tube daily  vancomycin    Solution 250 milliGRAM(s) Oral every 6 hours    MEDICATIONS  (PRN):  acetaminophen   Tablet .. 650 milliGRAM(s) Oral every 6 hours PRN Temp greater or equal to 38C (100.4F)  dextrose 40% Gel 15 Gram(s) Oral once PRN Blood Glucose LESS THAN 70 milliGRAM(s)/deciliter  glucagon  Injectable 1 milliGRAM(s) IntraMuscular once PRN Glucose LESS THAN 70 milligrams/deciliter      RADIOLOGY & ADDITIONAL STUDIES:

## 2020-08-04 NOTE — PROGRESS NOTE ADULT - ASSESSMENT
64M, PMH HTN, CAD, DM, admitted for stroke s/p TPA c/b intracranial hemorrhage, now w/o brainstem function, c/b AHRF on mech vent, E.coli bacteremia/+cdiff, non-oliguric NAEL    #NAEL on CKD, p/w creatinine 1.9, unknown baseline, ATN in the context of CVA/shock  - creatinine trending up   - non oliguric   - DARSHAN noted, non-echogenic kidneys, no hydro   - last ph at goal   - resume sodium bicarbonate PO 1300mg TID  - hypernatremia , continue d5, free water with feed   - no indication for RRT, would not improve prognosis  - surgical notes appreciated, sp trach/PEG   - ph noted , feed : nepro, renagel 1/1/1  - start midodrine 5 q 8   - prognosis poor   - will follow

## 2020-08-04 NOTE — ADVANCED PRACTICE NURSE CONSULT - ASSESSMENT
64 year old male pmh of DM, CAD with PCI x3 in 2017, HTN, DLD, trigeminal neuralgia who presents to ED from [facility] s/p TPA for stroke.  History obtained from charts and daughter Vianney [phone] as Pt. is currently aphasic. Per daughter Pt. who is AOx3 at baseline was at home playing with grandchildren and became nausea, had 3 episodes of NBNB vomiting. After laying down for 30 min family found Pt. confused with aphasia and brought Pt. to [facility] ED.  At 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 Other extensive atheromatous changes including moderate/severe subclavian and vertebral stenosis. Pt. was given TPA at 16:46 and sent to [facility] for neurointerventional eval and post TPA care. Pt. seen in ED with expressive aphasia although able to move all extremities with weakness of bilateral LE 1/5. Pt. was on cardine drip to maintain BP below 185. Interval Hx -While in ED RRT called as Pt. posturing and shaking of b/l upper extremities, not following any commands, Pt. was intubated for airway protection. Stat CT done no new hemmorage, CT perfusion Stat, Neuro sx and neurointerventional contacted. NI actual initiated.  Now s/p trach, PEG-8/1.    Patient remains in ICU, being managed for Acute hypoxemic respiratory failure; Stroke SP TAP And thrombectomy; NAEL; DI; E coli bacteremia; G+ Cocci bacteremia Coag neg staph; Sepsis; Septic shock; CDiff     Received patient in ICU,  laying supine in bed, turned to right side (pillow under left side), HOB elevated 30 degrees, offloading boots to BLEs in place. Pt nonresponsive, vented via trach, drips infusing, primary RN Pedro at bedside, made aware of purpose of WOCN visit, agreeable to consult.     Type of wound: Mucosal membrane pressure injury -device r/t to ET tube hodge   Location: mid upper lip    Wound measurements: 1.5cm x 2cm x 0.2cm, true depth indeterminable at this time   Tunneling/Undermining: none   Wound bed: now presenting as scabbing over purple tissue, area of yellow devitalized tissue   Wound edges: attached, flush, irregular   Periwound: intact   Wound exudate: none   Wound odor: none   Induration, erythema, warmth: none   Wound pain: no signs present     Moisture associated skin damage (MASD) to b/l buttock, skin macerated.     Patient bedbound, immobile, + vergara, + fecal management system/[place], receiving TF via OGT.

## 2020-08-04 NOTE — PROGRESS NOTE ADULT - SUBJECTIVE AND OBJECTIVE BOX
FABIENNEANA CRISTINA  64y, Male  Allergy: No Known Allergies      LOS  19d    CHIEF COMPLAINT: Stroke s/p tpa (04 Aug 2020 07:07)      INTERVAL EVENTS/HPI  - T(F): , Max: 102 (08-03-20 @ 20:00), on levophed  - pseudo is panS  - WBC Count: 18.89 (08-04-20 @ 05:15) downtrending   WBC Count: 22.03 (08-03-20 @ 04:30)  - Creatinine, Serum: 6.5 (08-04-20 @ 05:15)  Creatinine, Serum: 6.0 (08-03-20 @ 16:00)      ROS  cannot obtain secondary to patient's sedation and/or mental status    VITALS:  T(F): 101, Max: 102 (08-03-20 @ 20:00)  HR: 74  BP: 99/49  RR: 27Vital Signs Last 24 Hrs  T(C): 38.3 (04 Aug 2020 04:00), Max: 38.9 (03 Aug 2020 20:00)  T(F): 101 (04 Aug 2020 04:00), Max: 102 (03 Aug 2020 20:00)  HR: 74 (04 Aug 2020 07:00) (72 - 94)  BP: 99/49 (04 Aug 2020 06:45) (82/41 - 147/56)  BP(mean): 66 (04 Aug 2020 06:45) (55 - 105)  RR: 27 (04 Aug 2020 07:00) (16 - 28)  SpO2: 99% (04 Aug 2020 07:00) (93% - 100%)    PHYSICAL EXAM:  ***     FH: Non-contributory  Social Hx: Non-contributory    TESTS & MEASUREMENTS:                        7.3    18.89 )-----------( 545      ( 04 Aug 2020 05:15 )             23.6     08-04    150<H>  |  120<H>  |  84<HH>  ----------------------------<  151<H>  4.9   |  16<L>  |  6.5<HH>    Ca    8.0<L>      04 Aug 2020 05:15  Phos  9.4     08-04  Mg     2.4     08-04    TPro  5.2<L>  /  Alb  1.5<L>  /  TBili  0.4  /  DBili  x   /  AST  129<H>  /  ALT  36  /  AlkPhos  104  08-03    eGFR if Non African American: 8 mL/min/1.73M2 (08-04-20 @ 05:15)  eGFR if African American: 9 mL/min/1.73M2 (08-04-20 @ 05:15)  eGFR if Non African American: 9 mL/min/1.73M2 (08-03-20 @ 16:00)  eGFR if : 10 mL/min/1.73M2 (08-03-20 @ 16:00)    LIVER FUNCTIONS - ( 03 Aug 2020 04:30 )  Alb: 1.5 g/dL / Pro: 5.2 g/dL / ALK PHOS: 104 U/L / ALT: 36 U/L / AST: 129 U/L / GGT: x               Culture - Sputum (collected 08-01-20 @ 16:20)  Source: .Sputum Sputum  Gram Stain (08-02-20 @ 05:43):    Numerous polymorphonuclear leukocytes per low power field    Few Squamous epithelial cells per low power field    Rare Gram positive cocci in pairs seen per oil power field  Final Report (08-03-20 @ 15:52):    Few Pseudomonas aeruginosa    Normal Respiratory Jayde present  Organism: Pseudomonas aeruginosa (08-03-20 @ 15:52)  Organism: Pseudomonas aeruginosa (08-03-20 @ 15:52)      -  Amikacin: S <=16      -  Aztreonam: S <=4      -  Cefepime: S <=2      -  Ceftazidime: S <=1      -  Ciprofloxacin: S <=0.25      -  Gentamicin: S <=2      -  Imipenem: S <=1      -  Levofloxacin: S <=0.5      -  Meropenem: S <=1      -  Piperacillin/Tazobactam: S <=8      -  Tobramycin: S <=2      Method Type: MORRIS    Culture - Blood (collected 07-27-20 @ 14:53)  Source: .Blood Blood-Venous  Final Report (08-01-20 @ 23:01):    No Growth Final    Culture - Blood (collected 07-27-20 @ 04:17)  Source: .Blood None  Final Report (08-01-20 @ 14:01):    No Growth Final    Culture - Blood (collected 07-23-20 @ 21:05)  Source: .Blood Blood  Gram Stain (07-27-20 @ 01:52):    Growth in anaerobic bottle: Gram Negative Rods    Growth in aerobic bottle:    Gram Positive Cocci in Clusters  Final Report (07-28-20 @ 11:37):    Growth in anaerobic bottle: Escherichia coli    Growth in aerobic bottle: Coag Negative Staphylococcus Unable to Identify    further    Coag Negative Staphylococcus    Single set isolate, possible contaminant. Contact    Microbiology if susceptibility testing clinically    indicated.    "Due to technical problems, Proteus sp. will Not be reported as part of    the BCID panel until further notice"    ***Blood Panel PCR results on this specimen are available    approximately 3 hours after the Gram stain result.***    Gram stain, PCR, and/or culture results may not always    correspond due to difference in methodologies.    ************************************************************    This PCR assay was performed using [employer].    The following targets are tested for: Enterococcus,    vancomycin resistant enterococci, Listeria monocytogenes,    coagulase negative staphylococci, S. aureus,    methicillin resistant S. aureus, Streptococcus agalactiae    (Group B), S. pneumoniae, S. pyogenes (Group A),    Acinetobacter baumannii, Enterobacter cloacae, E. coli,    Klebsiella oxytoca, K. pneumoniae, Proteus sp.,    Serratia marcescens, Haemophilus influenzae,    Neisseria meningitidis, Pseudomonas aeruginosa, Candida    albicans, C. glabrata, C krusei, C parapsilosis,    C. tropicalis and the KPC resistance gene.  Organism: Blood Culture PCR  Escherichia coli  Blood Culture PCR (07-27-20 @ 02:56)  Organism: Blood Culture PCR (07-27-20 @ 02:56)      -  Acinetobacter baumanii: Nondet      -  Candida albicans: Nondet      -  Candida glabrata: Nondet      -  Candida krusei: Nondet      -  Candida parapsilosis: Nondet      -  Candida tropicalis: Nondet      -  Coagulase negative Staphylococcus: Nondet      -  Enterobacter cloacae complex: Nondet      -  Enterococcus species: Nondet      -  Escherichia coli: Nondet      -  Haemophilus influenzae: Nondet      -  Klebsiella oxytoca: Nondet      -  Klebsiella pneumoniae: Nondet      -  Listeria monocytogenes: Nondet      -  Methicillin resistant Staphylococcus aureus (MRSA): Nondet      -  Multidrug (KPC pos) resistant organism: Nondet      -  Neisseria meningitidis: Nondet      -  Pseudomonas aeruginosa: Nondet      -  Serratia marcescens: Nondet      -  Staphylococcus aureus: Nondet      -  Streptococcus agalactiae (Group B): Nondet      -  Streptococcus pneumoniae: Nondet      -  Streptococcus pyogenes (Group A): Nondet      -  Streptococcus sp. (Not Grp A, B or S pneumoniae): Nondet      -  Vancomycin resistant Enterococcus sp.: Nondet      Method Type: PCR  Organism: Escherichia coli (07-27-20 @ 01:52)      -  Amikacin: S <=16      -  Ampicillin: R >16 These ampicillin results predict results for amoxicillin      -  Ampicillin/Sulbactam: I 16/8 Enterobacter, Citrobacter, and Serratia may develop resistance during prolonged therapy (3-4 days)      -  Aztreonam: S <=4      -  Cefazolin: R >16 Enterobacter, Citrobacter, and Serratia may develop resistance during prolonged therapy (3-4 days)      -  Cefepime: S <=2      -  Cefoxitin: R >16      -  Ceftriaxone: S <=1 Enterobacter, Citrobacter, and Serratia may develop resistance during prolonged therapy      -  Ciprofloxacin: R >2      -  Ertapenem: S <=0.5      -  Gentamicin: S <=2      -  Imipenem: S <=1      -  Levofloxacin: R >4      -  Meropenem: S <=1      -  Piperacillin/Tazobactam: S <=8      -  Tobramycin: S <=2      -  Trimethoprim/Sulfamethoxazole: S 2/38      Method Type: MORRIS  Organism: Blood Culture PCR (07-27-20 @ 01:52)      -  Escherichia coli: Detec      Method Type: PCR    Culture - Urine (collected 07-23-20 @ 11:11)  Source: .Urine Catheterized  Final Report (07-27-20 @ 14:36):    >100,000 CFU/ml Escherichia coli  Organism: Escherichia coli (07-27-20 @ 14:36)  Organism: Escherichia coli (07-27-20 @ 14:36)      -  Amikacin: S <=16      -  Amoxicillin/Clavulanic Acid: R >16/8      -  Ampicillin: R >16 These ampicillin results predict results for amoxicillin      -  Ampicillin/Sulbactam: I 16/8 Enterobacter, Citrobacter, and Serratia may develop resistance during prolonged therapy (3-4 days)      -  Aztreonam: S <=4      -  Cefazolin: R >16 ([id number]) For uncomplicated UTI with K. pneumoniae, E. coli, or P. mirablis: MORRIS <=16 is sensitive and MORRIS >=32 is resistant. This also predicts results for oral agents cefaclor, cefdinir, cefpodoxime, cefprozil, cefuroxime axetil, cephalexin and locarbef for uncomplicated UTI. Note that some isolates may be susceptible to these agents while testing resistant to cefazolin.      -  Cefepime: S <=2      -  Cefoxitin: R >16      -  Ceftriaxone: S <=1 Enterobacter, Citrobacter, and Serratia may develop resistance during prolonged therapy      -  Ciprofloxacin: R >2      -  Ertapenem: S <=0.5      -  Gentamicin: S <=2      -  Levofloxacin: R >4      -  Meropenem: S <=1      -  Nitrofurantoin: S <=32 Should not be used to treat pyelonephritis      -  Piperacillin/Tazobactam: S <=8      -  Tigecycline: S <=2      -  Tobramycin: S <=2      -  Trimethoprim/Sulfamethoxazole: S 2/38      Method Type: [facility]            INFECTIOUS DISEASES TESTING  MRSA PCR Result.: Negative (07-17-20 @ 03:37)  COVID-19 PCR: NotDetec (07-16-20 @ 16:44)      INFLAMMATORY MARKERS      RADIOLOGY & ADDITIONAL TESTS:  I have personally reviewed the last available Chest xray  CXR      CT      CARDIOLOGY TESTING  12 Lead ECG:   Ventricular Rate 112 BPM    Atrial Rate 394 BPM    QRS Duration 86 ms    Q-T Interval 408 ms    QTC Calculation(Bezet) 556 ms    R Axis 13 degrees    T Axis 77 degrees    Diagnosis Line Atrial fibrillation with rapid ventricular response  Nonspecific T wave abnormality  Prolonged QT  Abnormal ECG    Confirmed by LISA LOBTAO MD [id number]) on 8/1/2020 11:27:10 AM (08-01-20 @ 00:59)  12 Lead ECG:   Ventricular Rate 156 BPM    Atrial Rate 178 BPM    QRS Duration 90 ms    Q-T Interval 272 ms    QTC Calculation(Bezet) 438 ms    R Axis 14 degrees    T Axis 166 degrees    Diagnosis Line Atrial fibrillation with rapid ventricular response  Minimal voltage criteria for LVH, may be normal variant  Marked ST abnormality, possible inferior subendocardial injury  Abnormal ECG    Confirmed by Shakir Jerome ([id number]) on 7/23/2020 12:32:24 PM (07-23-20 @ 06:01)      MEDICATIONS  aMIOdarone    Tablet 100  chlorhexidine 0.12% Liquid 15  chlorhexidine 4% Liquid 1  dextrose 5%. 1000  dextrose 50% Injectable 12.5  dextrose 50% Injectable 25  dextrose 50% Injectable 25  heparin   Injectable 5000  insulin regular Infusion 1  meropenem  IVPB 500  meropenem  IVPB   norepinephrine Infusion 0.01  pantoprazole   Suspension 40  vancomycin    Solution 250      WEIGHT  Weight (kg): 84.5 (07-16-20 @ 20:42)  Creatinine, Serum: 6.5 mg/dL (08-04-20 @ 05:15)  Creatinine, Serum: 6.0 mg/dL (08-03-20 @ 16:00)      ANTIBIOTICS:  meropenem  IVPB 500 milliGRAM(s) IV Intermittent every 12 hours  meropenem  IVPB      vancomycin    Solution 250 milliGRAM(s) Oral every 6 hours      All available historical records have been reviewed FABIENNEANA CRISTINA  64y, Male  Allergy: No Known Allergies      LOS  19d    CHIEF COMPLAINT: Stroke s/p tpa (04 Aug 2020 07:07)      INTERVAL EVENTS/HPI  - T(F): , Max: 102 (08-03-20 @ 20:00), on levophed  - pseudo is panS  - WBC Count: 18.89 (08-04-20 @ 05:15) downtrending   WBC Count: 22.03 (08-03-20 @ 04:30)  - Creatinine, Serum: 6.5 (08-04-20 @ 05:15)  Creatinine, Serum: 6.0 (08-03-20 @ 16:00)      ROS  cannot obtain secondary to patient's sedation and/or mental status    VITALS:  T(F): 101, Max: 102 (08-03-20 @ 20:00)  HR: 74  BP: 99/49  RR: 27Vital Signs Last 24 Hrs  T(C): 38.3 (04 Aug 2020 04:00), Max: 38.9 (03 Aug 2020 20:00)  T(F): 101 (04 Aug 2020 04:00), Max: 102 (03 Aug 2020 20:00)  HR: 74 (04 Aug 2020 07:00) (72 - 94)  BP: 99/49 (04 Aug 2020 06:45) (82/41 - 147/56)  BP(mean): 66 (04 Aug 2020 06:45) (55 - 105)  RR: 27 (04 Aug 2020 07:00) (16 - 28)  SpO2: 99% (04 Aug 2020 07:00) (93% - 100%)    PHYSICAL EXAM:  General: trach  HEENT: NCAT  CV: RRR  Lungs: symmetric chest expansion  Skin: no rash  Neuro: nonresponsive  Lines      FH: Non-contributory  Social Hx: Non-contributory    TESTS & MEASUREMENTS:                        7.3    18.89 )-----------( 545      ( 04 Aug 2020 05:15 )             23.6     08-04    150<H>  |  120<H>  |  84<HH>  ----------------------------<  151<H>  4.9   |  16<L>  |  6.5<HH>    Ca    8.0<L>      04 Aug 2020 05:15  Phos  9.4     08-04  Mg     2.4     08-04    TPro  5.2<L>  /  Alb  1.5<L>  /  TBili  0.4  /  DBili  x   /  AST  129<H>  /  ALT  36  /  AlkPhos  104  08-03    eGFR if Non African American: 8 mL/min/1.73M2 (08-04-20 @ 05:15)  eGFR if African American: 9 mL/min/1.73M2 (08-04-20 @ 05:15)  eGFR if Non African American: 9 mL/min/1.73M2 (08-03-20 @ 16:00)  eGFR if : 10 mL/min/1.73M2 (08-03-20 @ 16:00)    LIVER FUNCTIONS - ( 03 Aug 2020 04:30 )  Alb: 1.5 g/dL / Pro: 5.2 g/dL / ALK PHOS: 104 U/L / ALT: 36 U/L / AST: 129 U/L / GGT: x               Culture - Sputum (collected 08-01-20 @ 16:20)  Source: .Sputum Sputum  Gram Stain (08-02-20 @ 05:43):    Numerous polymorphonuclear leukocytes per low power field    Few Squamous epithelial cells per low power field    Rare Gram positive cocci in pairs seen per oil power field  Final Report (08-03-20 @ 15:52):    Few Pseudomonas aeruginosa    Normal Respiratory Jayde present  Organism: Pseudomonas aeruginosa (08-03-20 @ 15:52)  Organism: Pseudomonas aeruginosa (08-03-20 @ 15:52)      -  Amikacin: S <=16      -  Aztreonam: S <=4      -  Cefepime: S <=2      -  Ceftazidime: S <=1      -  Ciprofloxacin: S <=0.25      -  Gentamicin: S <=2      -  Imipenem: S <=1      -  Levofloxacin: S <=0.5      -  Meropenem: S <=1      -  Piperacillin/Tazobactam: S <=8      -  Tobramycin: S <=2      Method Type: MORRIS    Culture - Blood (collected 07-27-20 @ 14:53)  Source: .Blood Blood-Venous  Final Report (08-01-20 @ 23:01):    No Growth Final    Culture - Blood (collected 07-27-20 @ 04:17)  Source: .Blood None  Final Report (08-01-20 @ 14:01):    No Growth Final    Culture - Blood (collected 07-23-20 @ 21:05)  Source: .Blood Blood  Gram Stain (07-27-20 @ 01:52):    Growth in anaerobic bottle: Gram Negative Rods    Growth in aerobic bottle:    Gram Positive Cocci in Clusters  Final Report (07-28-20 @ 11:37):    Growth in anaerobic bottle: Escherichia coli    Growth in aerobic bottle: Coag Negative Staphylococcus Unable to Identify    further    Coag Negative Staphylococcus    Single set isolate, possible contaminant. Contact    Microbiology if susceptibility testing clinically    indicated.    "Due to technical problems, Proteus sp. will Not be reported as part of    the BCID panel until further notice"    ***Blood Panel PCR results on this specimen are available    approximately 3 hours after the Gram stain result.***    Gram stain, PCR, and/or culture results may not always    correspond due to difference in methodologies.    ************************************************************    This PCR assay was performed using [employer].    The following targets are tested for: Enterococcus,    vancomycin resistant enterococci, Listeria monocytogenes,    coagulase negative staphylococci, S. aureus,    methicillin resistant S. aureus, Streptococcus agalactiae    (Group B), S. pneumoniae, S. pyogenes (Group A),    Acinetobacter baumannii, Enterobacter cloacae, E. coli,    Klebsiella oxytoca, K. pneumoniae, Proteus sp.,    Serratia marcescens, Haemophilus influenzae,    Neisseria meningitidis, Pseudomonas aeruginosa, Candida    albicans, C. glabrata, C krusei, C parapsilosis,    C. tropicalis and the KPC resistance gene.  Organism: Blood Culture PCR  Escherichia coli  Blood Culture PCR (07-27-20 @ 02:56)  Organism: Blood Culture PCR (07-27-20 @ 02:56)      -  Acinetobacter baumanii: Nondet      -  Candida albicans: Nondet      -  Candida glabrata: Nondet      -  Candida krusei: Nondet      -  Candida parapsilosis: Nondet      -  Candida tropicalis: Nondet      -  Coagulase negative Staphylococcus: Nondet      -  Enterobacter cloacae complex: Nondet      -  Enterococcus species: Nondet      -  Escherichia coli: Nondet      -  Haemophilus influenzae: Nondet      -  Klebsiella oxytoca: Nondet      -  Klebsiella pneumoniae: Nondet      -  Listeria monocytogenes: Nondet      -  Methicillin resistant Staphylococcus aureus (MRSA): Nondet      -  Multidrug (KPC pos) resistant organism: Nondet      -  Neisseria meningitidis: Nondet      -  Pseudomonas aeruginosa: Nondet      -  Serratia marcescens: Nondet      -  Staphylococcus aureus: Nondet      -  Streptococcus agalactiae (Group B): Nondet      -  Streptococcus pneumoniae: Nondet      -  Streptococcus pyogenes (Group A): Nondet      -  Streptococcus sp. (Not Grp A, B or S pneumoniae): Nondet      -  Vancomycin resistant Enterococcus sp.: Nondet      Method Type: PCR  Organism: Escherichia coli (07-27-20 @ 01:52)      -  Amikacin: S <=16      -  Ampicillin: R >16 These ampicillin results predict results for amoxicillin      -  Ampicillin/Sulbactam: I 16/8 Enterobacter, Citrobacter, and Serratia may develop resistance during prolonged therapy (3-4 days)      -  Aztreonam: S <=4      -  Cefazolin: R >16 Enterobacter, Citrobacter, and Serratia may develop resistance during prolonged therapy (3-4 days)      -  Cefepime: S <=2      -  Cefoxitin: R >16      -  Ceftriaxone: S <=1 Enterobacter, Citrobacter, and Serratia may develop resistance during prolonged therapy      -  Ciprofloxacin: R >2      -  Ertapenem: S <=0.5      -  Gentamicin: S <=2      -  Imipenem: S <=1      -  Levofloxacin: R >4      -  Meropenem: S <=1      -  Piperacillin/Tazobactam: S <=8      -  Tobramycin: S <=2      -  Trimethoprim/Sulfamethoxazole: S 2/38      Method Type: MORRIS  Organism: Blood Culture PCR (07-27-20 @ 01:52)      -  Escherichia coli: Detec      Method Type: PCR    Culture - Urine (collected 07-23-20 @ 11:11)  Source: .Urine Catheterized  Final Report (07-27-20 @ 14:36):    >100,000 CFU/ml Escherichia coli  Organism: Escherichia coli (07-27-20 @ 14:36)  Organism: Escherichia coli (07-27-20 @ 14:36)      -  Amikacin: S <=16      -  Amoxicillin/Clavulanic Acid: R >16/8      -  Ampicillin: R >16 These ampicillin results predict results for amoxicillin      -  Ampicillin/Sulbactam: I 16/8 Enterobacter, Citrobacter, and Serratia may develop resistance during prolonged therapy (3-4 days)      -  Aztreonam: S <=4      -  Cefazolin: R >16 (MIC_CL_COM_[email or web address]) For uncomplicated UTI with K. pneumoniae, E. coli, or P. mirablis: MORRIS <=16 is sensitive and MORRIS >=32 is resistant. This also predicts results for oral agents cefaclor, cefdinir, cefpodoxime, cefprozil, cefuroxime axetil, cephalexin and locarbef for uncomplicated UTI. Note that some isolates may be susceptible to these agents while testing resistant to cefazolin.      -  Cefepime: S <=2      -  Cefoxitin: R >16      -  Ceftriaxone: S <=1 Enterobacter, Citrobacter, and Serratia may develop resistance during prolonged therapy      -  Ciprofloxacin: R >2      -  Ertapenem: S <=0.5      -  Gentamicin: S <=2      -  Levofloxacin: R >4      -  Meropenem: S <=1      -  Nitrofurantoin: S <=32 Should not be used to treat pyelonephritis      -  Piperacillin/Tazobactam: S <=8      -  Tigecycline: S <=2      -  Tobramycin: S <=2      -  Trimethoprim/Sulfamethoxazole: S 2/38      Method Type: [facility]            INFECTIOUS DISEASES TESTING  MRSA PCR Result.: Negative (07-17-20 @ 03:37)  COVID-19 PCR: NotDetec (07-16-20 @ 16:44)      INFLAMMATORY MARKERS      RADIOLOGY & ADDITIONAL TESTS:  I have personally reviewed the last available Chest xray  CXR      CT      CARDIOLOGY TESTING  12 Lead ECG:   Ventricular Rate 112 BPM    Atrial Rate 394 BPM    QRS Duration 86 ms    Q-T Interval 408 ms    QTC Calculation(Bezet) 556 ms    R Axis 13 degrees    T Axis 77 degrees    Diagnosis Line Atrial fibrillation with rapid ventricular response  Nonspecific T wave abnormality  Prolonged QT  Abnormal ECG    Confirmed by LISA LOBATO MD ([id number]) on 8/1/2020 11:27:10 AM (08-01-20 @ 00:59)  12 Lead ECG:   Ventricular Rate 156 BPM    Atrial Rate 178 BPM    QRS Duration 90 ms    Q-T Interval 272 ms    QTC Calculation(Bezet) 438 ms    R Axis 14 degrees    T Axis 166 degrees    Diagnosis Line Atrial fibrillation with rapid ventricular response  Minimal voltage criteria for LVH, may be normal variant  Marked ST abnormality, possible inferior subendocardial injury  Abnormal ECG    Confirmed by Shakir Jerome ([id number]) on 7/23/2020 12:32:24 PM (07-23-20 @ 06:01)      MEDICATIONS  aMIOdarone    Tablet 100  chlorhexidine 0.12% Liquid 15  chlorhexidine 4% Liquid 1  dextrose 5%. 1000  dextrose 50% Injectable 12.5  dextrose 50% Injectable 25  dextrose 50% Injectable 25  heparin   Injectable 5000  insulin regular Infusion 1  meropenem  IVPB 500  meropenem  IVPB   norepinephrine Infusion 0.01  pantoprazole   Suspension 40  vancomycin    Solution 250      WEIGHT  Weight (kg): 84.5 (07-16-20 @ 20:42)  Creatinine, Serum: 6.5 mg/dL (08-04-20 @ 05:15)  Creatinine, Serum: 6.0 mg/dL (08-03-20 @ 16:00)      ANTIBIOTICS:  meropenem  IVPB 500 milliGRAM(s) IV Intermittent every 12 hours  meropenem  IVPB      vancomycin    Solution 250 milliGRAM(s) Oral every 6 hours      All available historical records have been reviewed

## 2020-08-05 NOTE — PROGRESS NOTE ADULT - SUBJECTIVE AND OBJECTIVE BOX
Nephrology progress note    THIS IS AN INCOMPLETE NOTE . FULL NOTE TO FOLLOW SHORTLY    Patient is seen and examined, events over the last 24 h noted .    Allergies:  No Known Allergies    Hospital Medications:   MEDICATIONS  (STANDING):  aMIOdarone    Tablet 100 milliGRAM(s) Oral two times a day  chlorhexidine 0.12% Liquid 15 milliLiter(s) Oral Mucosa every 12 hours  chlorhexidine 4% Liquid 1 Application(s) Topical <User Schedule>  dextrose 50% Injectable 12.5 Gram(s) IV Push once  dextrose 50% Injectable 25 Gram(s) IV Push once  dextrose 50% Injectable 25 Gram(s) IV Push once  heparin   Injectable 5000 Unit(s) SubCutaneous every 8 hours  insulin glargine Injectable (LANTUS) 12 Unit(s) SubCutaneous at bedtime  insulin lispro Injectable (HumaLOG) 3 Unit(s) SubCutaneous every 8 hours  meropenem  IVPB 500 milliGRAM(s) IV Intermittent every 12 hours  meropenem  IVPB      midodrine 10 milliGRAM(s) Oral every 8 hours  norepinephrine Infusion 0.01 MICROgram(s)/kG/Min (1.58 mL/Hr) IV Continuous <Continuous>  pantoprazole   Suspension 40 milliGRAM(s) Enteral Tube daily  sodium bicarbonate 1300 milliGRAM(s) Oral three times a day  vancomycin    Solution 125 milliGRAM(s) Oral every 6 hours        VITALS:  T(F): 96.3 (08-05-20 @ 08:00), Max: 99.9 (08-05-20 @ 00:00)  HR: 74 (08-05-20 @ 08:30)  BP: 84/49 (08-05-20 @ 08:30)  RR: 27 (08-05-20 @ 08:30)  SpO2: 97% (08-05-20 @ 08:30)  Wt(kg): --    08-03 @ 07:01  -  08-04 @ 07:00  --------------------------------------------------------  IN: 3975.9 mL / OUT: 1260 mL / NET: 2715.9 mL    08-04 @ 07:01  -  08-05 @ 07:00  --------------------------------------------------------  IN: 3008 mL / OUT: 1815 mL / NET: 1193 mL          PHYSICAL EXAM:  Constitutional: NAD  HEENT: anicteric sclera, oropharynx clear, MMM  Neck: No JVD  Respiratory: CTAB, no wheezes, rales or rhonchi  Cardiovascular: S1, S2, RRR  Gastrointestinal: BS+, soft, NT/ND  Extremities: No cyanosis or clubbing. No peripheral edema  :  No vergara.   Skin: No rashes    LABS:  08-05    146  |  118<H>  |  95<HH>  ----------------------------<  132<H>  4.8   |  16<L>  |  7.4<HH>    Ca    8.2<L>      05 Aug 2020 05:20  Phos  9.4     08-04  Mg     2.4     08-04                            7.9    15.29 )-----------( 487      ( 05 Aug 2020 05:20 )             26.0       Urine Studies:      RADIOLOGY & ADDITIONAL STUDIES: Nephrology progress note  Patient is seen and examined, events over the last 24 h noted .  sp code blue this morning   on MV     Allergies:  No Known Allergies    Hospital Medications:   MEDICATIONS  (STANDING):  aMIOdarone    Tablet 100 milliGRAM(s) Oral two times a day  heparin   Injectable 5000 Unit(s) SubCutaneous every 8 hours  insulin glargine Injectable (LANTUS) 12 Unit(s) SubCutaneous at bedtime  insulin lispro Injectable (HumaLOG) 3 Unit(s) SubCutaneous every 8 hours  meropenem  IVPB 500 milliGRAM(s) IV Intermittent every 12 hours  midodrine 10 milliGRAM(s) Oral every 8 hours  norepinephrine Infusion 0.01 MICROgram(s)/kG/Min (1.58 mL/Hr) IV Continuous <Continuous>  pantoprazole   Suspension 40 milliGRAM(s) Enteral Tube daily  sodium bicarbonate 1300 milliGRAM(s) Oral three times a day  vancomycin    Solution 125 milliGRAM(s) Oral every 6 hours        VITALS:  T(F): 96.3 (08-05-20 @ 08:00), Max: 99.9 (08-05-20 @ 00:00)  HR: 74 (08-05-20 @ 08:30)  BP: 84/49 (08-05-20 @ 08:30)  RR: 27 (08-05-20 @ 08:30)  SpO2: 97% (08-05-20 @ 08:30)      08-03 @ 07:01  -  08-04 @ 07:00  --------------------------------------------------------  IN: 3975.9 mL / OUT: 1260 mL / NET: 2715.9 mL    08-04 @ 07:01  -  08-05 @ 07:00  --------------------------------------------------------  IN: 3008 mL / OUT: 1815 mL / NET: 1193 mL          PHYSICAL EXAM:  Constitutional: on MV . Intubated   Neck: No JVD  Respiratory: CTAB, no wheezes, rales or rhonchi  Cardiovascular: S1, S2, RRR  Gastrointestinal: BS+, soft, NT/ND  Extremities: No cyanosis or clubbing. No peripheral edema  Skin: No rashes    LABS:  08-05    146  |  118<H>  |  95<HH>  ----------------------------<  132<H>  4.8   |  16<L>  |  7.4<HH>    Ca    8.2<L>      05 Aug 2020 05:20  Phos  9.4     08-04  Mg     2.4     08-04                            7.9    15.29 )-----------( 487      ( 05 Aug 2020 05:20 )             26.0       Urine Studies:      RADIOLOGY & ADDITIONAL STUDIES:

## 2020-08-05 NOTE — PROGRESS NOTE ADULT - ASSESSMENT
64M, PMH HTN, CAD, DM, admitted for stroke s/p TPA c/b intracranial hemorrhage, now w/o brainstem function, c/b AHRF on mech vent, E.coli bacteremia/+cdiff, non-oliguric NAEL    #NAEL on CKD, p/w creatinine 1.9, unknown baseline, ATN in the context of CVA/shock  - creatinine trending up   - non oliguric   - DARSHAN noted, non-echogenic kidneys, no hydro   - last ph at goal   - resume sodium bicarbonate PO 1300mg TID  - hypernatremia , continue d5, free water with feed   - no indication for RRT, would not improve prognosis  - surgical notes appreciated, sp trach/PEG   - ph noted , feed : nepro, renagel 1/1/1  - start midodrine 5 q 8   - prognosis poor   - will follow        This is an incomplete note with full recommendations to follow. 64M, PMH HTN, CAD, DM, admitted for stroke s/p TPA c/b intracranial hemorrhage, now w/o brainstem function, c/b AHRF on mech vent, E.coli bacteremia/+cdiff, non-oliguric NAEL    #NAEL on CKD, p/w creatinine 1.9, unknown baseline, ATN in the context of CVA/shock  - creatinine trending up sp code blue this morning   - non oliguric   - DARSHAN noted, non-echogenic kidneys, no hydro   - last ph at goal   - cont  sodium bicarbonate PO 1300mg TID  - hypernatremia , continue d5, free water with feeds  - no indication for RRT, would not improve prognosis/ prognosis poor after code blue too   - surgical notes appreciated, sp trach/PEG   - ph noted , feed : nepro, renagel 1/1/1  - continue midodrine   - prognosis poor   - will follow

## 2020-08-05 NOTE — PROGRESS NOTE ADULT - SUBJECTIVE AND OBJECTIVE BOX
OVERNIGHT EVENTS: events noted, off insulin/ d5w and levophed 0.2, sp dc a line    Vital Signs Last 24 Hrs  T(C): 37.7 (05 Aug 2020 04:00), Max: 37.7 (04 Aug 2020 12:00)  T(F): 99.8 (05 Aug 2020 04:00), Max: 99.9 (05 Aug 2020 00:00)  HR: 68 (05 Aug 2020 07:00) (64 - 80)  BP: 71/44 (05 Aug 2020 07:00) (71/44 - 122/81)  BP(mean): 51 (05 Aug 2020 07:00) (51 - 103)  RR: 27 (05 Aug 2020 07:00) (27 - 28)  SpO2: 97% (05 Aug 2020 07:00) (97% - 100%)    PHYSICAL EXAMINATION:    GENERAL: not following commands    HEENT: Head is normocephalic and atraumatic.    NECK: Supple.    LUNGS: dec  bs both bases    HEART: LOVE 3/6    ABDOMEN: Soft, nontender, and nondistended.      EXTREMITIES: Without any cyanosis, clubbing, rash, lesions or edema.    NEUROLOGIC: unchanged      LABS:                        7.9    15.29 )-----------( 487      ( 05 Aug 2020 05:20 )             26.0     08-05    146  |  118<H>  |  95<HH>  ----------------------------<  132<H>  4.8   |  16<L>  |  7.4<HH>    Ca    8.2<L>      05 Aug 2020 05:20  Phos  9.4     08-04  Mg     2.4     08-04          ABG - ( 05 Aug 2020 04:37 )  pH, Arterial: 7.21  pH, Blood: x     /  pCO2: 37    /  pO2: 105   / HCO3: 15    / Base Excess: -12.0 /  SaO2: 97        28/450/50/7.5                        08-04-20 @ 07:01  -  08-05-20 @ 07:00  --------------------------------------------------------  IN: 3008 mL / OUT: 1815 mL / NET: 1193 mL        MICROBIOLOGY:  Culture Results:   Few Pseudomonas aeruginosa  Normal Respiratory Jayde present (08-01 @ 16:20)      MEDICATIONS  (STANDING):  aMIOdarone    Tablet 100 milliGRAM(s) Oral two times a day  chlorhexidine 0.12% Liquid 15 milliLiter(s) Oral Mucosa every 12 hours  chlorhexidine 4% Liquid 1 Application(s) Topical <User Schedule>  dextrose 50% Injectable 12.5 Gram(s) IV Push once  dextrose 50% Injectable 25 Gram(s) IV Push once  dextrose 50% Injectable 25 Gram(s) IV Push once  heparin   Injectable 5000 Unit(s) SubCutaneous every 8 hours  insulin glargine Injectable (LANTUS) 12 Unit(s) SubCutaneous at bedtime  insulin lispro Injectable (HumaLOG) 3 Unit(s) SubCutaneous every 8 hours  meropenem  IVPB 500 milliGRAM(s) IV Intermittent every 12 hours  meropenem  IVPB      norepinephrine Infusion 0.01 MICROgram(s)/kG/Min (1.58 mL/Hr) IV Continuous <Continuous>  pantoprazole   Suspension 40 milliGRAM(s) Enteral Tube daily  sodium bicarbonate 650 milliGRAM(s) Oral every 6 hours  vancomycin    Solution 125 milliGRAM(s) Oral every 6 hours    MEDICATIONS  (PRN):  acetaminophen   Tablet .. 650 milliGRAM(s) Oral every 6 hours PRN Temp greater or equal to 38C (100.4F)  dextrose 40% Gel 15 Gram(s) Oral once PRN Blood Glucose LESS THAN 70 milliGRAM(s)/deciliter  glucagon  Injectable 1 milliGRAM(s) IntraMuscular once PRN Glucose LESS THAN 70 milligrams/deciliter      RADIOLOGY & ADDITIONAL STUDIES:

## 2020-08-05 NOTE — PROGRESS NOTE ADULT - ASSESSMENT
ASSESSMENT/PLAN  cardiac arrest PEA on 8/5/2020  ROSC achieved after 1 cycle of CPR  Acute hypoxemic respiratory failure   S/P TRACH  Stroke SP TAP And thrombectomy  NAEL  DI   E coli bacteremia  G+ Cocci bacteremia Coag neg staph   Septic shock  CDiff   when ok to increase tube feed continue with peptamen af up to a goal of Peptamen AF at 65 ml/h to provide 1872 k, 119 gm protein, with lower carb content, better omega6:3    check bmp/phos/mg and correct lytes ASSESSMENT/PLAN  cardiac arrest PEA on 8/5/2020  ROSC achieved after 1 cycle of CPR  Acute hypoxemic respiratory failure   S/P TRACH  Stroke SP TAP And thrombectomy  NAEL  DI   E coli bacteremia  G+ Cocci bacteremia Coag neg staph   Septic shock  CDiff     when ok to increase tube feed continue with peptamen af up to a goal of Peptamen AF at 65 ml/h to provide 1872 k, 119 gm protein, with lower carb content, better omega6:3    d/w residents  check bmp/phos/mg and correct lytes  prognosis grave

## 2020-08-05 NOTE — PROGRESS NOTE ADULT - ASSESSMENT
This is a case of a 64 year old male KTH:  1. DM  2. CAD with PCI x3 in 2017  3. HTN  4. DLD  5. trigeminal neuralgia    presented to ED from [facility] s/p CVA for TPA. S/p thrombectomy (Basiliar Artery) - unresponsive off sedation, on pressors (levophed)  - repeat CT head (7/17):  Interval increase in posterior fossa edema and mass effect consistent with evolving infarct  with resultant new effacement of the fourth ventricle and new mild obstructive hydrocephalus. . Increased hyperdensity within the left ambient and quadrigeminal plate cisterns, and new hyperdensity within the right ambient and quadrigeminal plate cisterns, the suprasellar cistern, the right sylvian fissure and scattered sulci suspicious worsening/new subarachnoid hemorrhage.    # cardiac arrest PEA on 8/5/2020  ROSC achieved after 1 cycle of CPR  Currently on levophed    #Myoclonic lower extremity jerks:  -evaluated by neurology, likely spinal cord reflexs due to absence of inhibition from UMN.    # s/p Tracheostomy and PEG tube placement (8/1)  - remains ventilated, off sedation  - taper off pressors if possible   - integrillin completed  - no notable neurologic activity. Apnea test performed (7/22), patient took a breath.  - apnea test repeated (7/27), patient took 2 breaths.   - continue with Subq heparin for DVT ppx, cleared by neurology.   - continue with vergara catheter for strict I/O   - TLC changed to R IJ, confirmed placement with cxr 7/27  - Patient continues having spontaneous movements of LE, in response to and without tactile sensation, EEG negative for seizures.   - Trickle feeds at 30cc/hr - evaluate how well patient tolerating   - Vianney Nye (daughter, HCP) would like a call before any MRI, apnea test, or brain death protocol is done again. She would also like to be informed of any changed in status regardless of time. Her number is [phone].    # Likely [facility] DI likely secondary to CVA   sodium today 141  - make give free water 300 cc q12 hours  - gice bicarb 1300 meq q8   - Monitor BMP     # Diarrhea secondary to C. Diff. - diarrhea improving   - C. Diff PCR positive  - Po vancomycin swithed to 125 mg q6h x 10 days (day 11/20), then daily 125mg PO as remains on broad spectrum abx    # Pseudomonas in sputum (cultures taken on 8/1)  - on merpoenem 500mg q12 day 5 (started 8/1)  - If continues to spike or hemodynamic compromise, add levaquin 500mg q48h IV while awaiting sensitivities     # Distended abdomen - improved, stable s/p PEG   - xray of the abdomen performed showing gaseous distention of the ascending colon and stomach.  - CT Abdomen shows liquid stool within the colon without evidence of obstruction or colon thickening, no intraperitoneal free air.  - Patient on trickle feeds   - Surgery following    # Worsening NAEL - stable   - baseline Cr ~1   - Cr 4.7 - trending up  - Kidney bladder US unremarkable   - d/c bicarb gtt   - Follow up nephrology recs     # DM   - HbA1c 7.5%   - lantus 12 units  - humalog 3 q6    # DLD   - d/c atorvastatin while NPO    # Activity: Bed rest   # Diet: NPO with possible trickle feeds depending on when patient is approved for trach and peg  # DVT ppx: Subq heparin   # GI ppx: Protonix  # Code Status: FULL CODE    # DISPO: Consider transfer to vent unit tomorrow

## 2020-08-05 NOTE — PROGRESS NOTE ADULT - SUBJECTIVE AND OBJECTIVE BOX
LOVINGANA CRISTINA  64y, Male  Allergy: No Known Allergies      LOS  20d    CHIEF COMPLAINT: Stroke s/p tpa (04 Aug 2020 15:13)      INTERVAL EVENTS/HPI  - T(F): , Max: 99.9 (08-05-20 @ 00:00) afebrile   - WBC Count: 15.29 (08-05-20 @ 05:20) downtrending   WBC Count: 18.89 (08-04-20 @ 05:15)  - Creatinine, Serum: 7.4 (08-05-20 @ 05:20)  Creatinine, Serum: 7.0 (08-04-20 @ 16:34)      ROS  cannot obtain secondary to patient's sedation and/or mental status    VITALS:  T(F): 99.8, Max: 99.9 (08-05-20 @ 00:00)  HR: 68  BP: 71/44  RR: 27Vital Signs Last 24 Hrs  T(C): 37.7 (05 Aug 2020 04:00), Max: 37.7 (04 Aug 2020 12:00)  T(F): 99.8 (05 Aug 2020 04:00), Max: 99.9 (05 Aug 2020 00:00)  HR: 68 (05 Aug 2020 07:00) (64 - 82)  BP: 71/44 (05 Aug 2020 07:00) (71/44 - 122/81)  BP(mean): 51 (05 Aug 2020 07:00) (51 - 103)  RR: 27 (05 Aug 2020 07:00) (27 - 28)  SpO2: 97% (05 Aug 2020 07:00) (97% - 100%)    PHYSICAL EXAM:  Gen: chronically ill appearing trach   CV: RRR  Lungs: Decreased BS at bases  Abd: Soft  Neuro non-responsive   Lines clean, no phlebitis    FH: Non-contributory  Social Hx: Non-contributory    TESTS & MEASUREMENTS:                        7.9    15.29 )-----------( 487      ( 05 Aug 2020 05:20 )             26.0     08-05    146  |  118<H>  |  95<HH>  ----------------------------<  132<H>  4.8   |  16<L>  |  7.4<HH>    Ca    8.2<L>      05 Aug 2020 05:20  Phos  9.4     08-04  Mg     2.4     08-04      eGFR if Non African American: 7 mL/min/1.73M2 (08-05-20 @ 05:20)  eGFR if African American: 8 mL/min/1.73M2 (08-05-20 @ 05:20)  eGFR if Non African American: 7 mL/min/1.73M2 (08-04-20 @ 16:34)  eGFR if : 9 mL/min/1.73M2 (08-04-20 @ 16:34)          Culture - Sputum (collected 08-01-20 @ 16:20)  Source: .Sputum Sputum  Gram Stain (08-02-20 @ 05:43):    Numerous polymorphonuclear leukocytes per low power field    Few Squamous epithelial cells per low power field    Rare Gram positive cocci in pairs seen per oil power field  Final Report (08-03-20 @ 15:52):    Few Pseudomonas aeruginosa    Normal Respiratory Jayde present  Organism: Pseudomonas aeruginosa (08-03-20 @ 15:52)  Organism: Pseudomonas aeruginosa (08-03-20 @ 15:52)      -  Amikacin: S <=16      -  Aztreonam: S <=4      -  Cefepime: S <=2      -  Ceftazidime: S <=1      -  Ciprofloxacin: S <=0.25      -  Gentamicin: S <=2      -  Imipenem: S <=1      -  Levofloxacin: S <=0.5      -  Meropenem: S <=1      -  Piperacillin/Tazobactam: S <=8      -  Tobramycin: S <=2      Method Type: MORRIS    Culture - Blood (collected 07-27-20 @ 14:53)  Source: .Blood Blood-Venous  Final Report (08-01-20 @ 23:01):    No Growth Final    Culture - Blood (collected 07-27-20 @ 04:17)  Source: .Blood None  Final Report (08-01-20 @ 14:01):    No Growth Final    Culture - Blood (collected 07-23-20 @ 21:05)  Source: .Blood Blood  Gram Stain (07-27-20 @ 01:52):    Growth in anaerobic bottle: Gram Negative Rods    Growth in aerobic bottle:    Gram Positive Cocci in Clusters  Final Report (07-28-20 @ 11:37):    Growth in anaerobic bottle: Escherichia coli    Growth in aerobic bottle: Coag Negative Staphylococcus Unable to Identify    further    Coag Negative Staphylococcus    Single set isolate, possible contaminant. Contact    Microbiology if susceptibility testing clinically    indicated.    "Due to technical problems, Proteus sp. will Not be reported as part of    the BCID panel until further notice"    ***Blood Panel PCR results on this specimen are available    approximately 3 hours after the Gram stain result.***    Gram stain, PCR, and/or culture results may not always    correspond due to difference in methodologies.    ************************************************************    This PCR assay was performed using [email or web address].    The following targets are tested for: Enterococcus,    vancomycin resistant enterococci, Listeria monocytogenes,    coagulase negative staphylococci, S. aureus,    methicillin resistant S. aureus, Streptococcus agalactiae    (Group B), S. pneumoniae, S. pyogenes (Group A),    Acinetobacter baumannii, Enterobacter cloacae, E. coli,    Klebsiella oxytoca, K. pneumoniae, Proteus sp.,    Serratia marcescens, Haemophilus influenzae,    Neisseria meningitidis, Pseudomonas aeruginosa, Candida    albicans, C. glabrata, C krusei, C parapsilosis,    C. tropicalis and the KPC resistance gene.  Organism: Blood Culture PCR  Escherichia coli  Blood Culture PCR (07-27-20 @ 02:56)  Organism: Blood Culture PCR (07-27-20 @ 02:56)      -  Acinetobacter baumanii: Nondet      -  Candida albicans: Nondet      -  Candida glabrata: Nondet      -  Candida krusei: Nondet      -  Candida parapsilosis: Nondet      -  Candida tropicalis: Nondet      -  Coagulase negative Staphylococcus: Nondet      -  Enterobacter cloacae complex: Nondet      -  Enterococcus species: Nondet      -  Escherichia coli: Nondet      -  Haemophilus influenzae: Nondet      -  Klebsiella oxytoca: Nondet      -  Klebsiella pneumoniae: Nondet      -  Listeria monocytogenes: Nondet      -  Methicillin resistant Staphylococcus aureus (MRSA): Nondet      -  Multidrug (KPC pos) resistant organism: Nondet      -  Neisseria meningitidis: Nondet      -  Pseudomonas aeruginosa: Nondet      -  Serratia marcescens: Nondet      -  Staphylococcus aureus: Nondet      -  Streptococcus agalactiae (Group B): Nondet      -  Streptococcus pneumoniae: Nondet      -  Streptococcus pyogenes (Group A): Nondet      -  Streptococcus sp. (Not Grp A, B or S pneumoniae): Nondet      -  Vancomycin resistant Enterococcus sp.: Nondet      Method Type: PCR  Organism: Escherichia coli (07-27-20 @ 01:52)      -  Amikacin: S <=16      -  Ampicillin: R >16 These ampicillin results predict results for amoxicillin      -  Ampicillin/Sulbactam: I 16/8 Enterobacter, Citrobacter, and Serratia may develop resistance during prolonged therapy (3-4 days)      -  Aztreonam: S <=4      -  Cefazolin: R >16 Enterobacter, Citrobacter, and Serratia may develop resistance during prolonged therapy (3-4 days)      -  Cefepime: S <=2      -  Cefoxitin: R >16      -  Ceftriaxone: S <=1 Enterobacter, Citrobacter, and Serratia may develop resistance during prolonged therapy      -  Ciprofloxacin: R >2      -  Ertapenem: S <=0.5      -  Gentamicin: S <=2      -  Imipenem: S <=1      -  Levofloxacin: R >4      -  Meropenem: S <=1      -  Piperacillin/Tazobactam: S <=8      -  Tobramycin: S <=2      -  Trimethoprim/Sulfamethoxazole: S 2/38      Method Type: MORRIS  Organism: Blood Culture PCR (07-27-20 @ 01:52)      -  Escherichia coli: Detec      Method Type: PCR    Culture - Urine (collected 07-23-20 @ 11:11)  Source: .Urine Catheterized  Final Report (07-27-20 @ 14:36):    >100,000 CFU/ml Escherichia coli  Organism: Escherichia coli (07-27-20 @ 14:36)  Organism: Escherichia coli (07-27-20 @ 14:36)      -  Amikacin: S <=16      -  Amoxicillin/Clavulanic Acid: R >16/8      -  Ampicillin: R >16 These ampicillin results predict results for amoxicillin      -  Ampicillin/Sulbactam: I 16/8 Enterobacter, Citrobacter, and Serratia may develop resistance during prolonged therapy (3-4 days)      -  Aztreonam: S <=4      -  Cefazolin: R >16 ([id number]) For uncomplicated UTI with K. pneumoniae, E. coli, or P. mirablis: MORRIS <=16 is sensitive and MORRIS >=32 is resistant. This also predicts results for oral agents cefaclor, cefdinir, cefpodoxime, cefprozil, cefuroxime axetil, cephalexin and locarbef for uncomplicated UTI. Note that some isolates may be susceptible to these agents while testing resistant to cefazolin.      -  Cefepime: S <=2      -  Cefoxitin: R >16      -  Ceftriaxone: S <=1 Enterobacter, Citrobacter, and Serratia may develop resistance during prolonged therapy      -  Ciprofloxacin: R >2      -  Ertapenem: S <=0.5      -  Gentamicin: S <=2      -  Levofloxacin: R >4      -  Meropenem: S <=1      -  Nitrofurantoin: S <=32 Should not be used to treat pyelonephritis      -  Piperacillin/Tazobactam: S <=8      -  Tigecycline: S <=2      -  Tobramycin: S <=2      -  Trimethoprim/Sulfamethoxazole: S 2/38      Method Type: [facility]            INFECTIOUS DISEASES TESTING  MRSA PCR Result.: Negative (07-17-20 @ 03:37)  COVID-19 PCR: NotDetec (07-16-20 @ 16:44)      INFLAMMATORY MARKERS      RADIOLOGY & ADDITIONAL TESTS:  I have personally reviewed the last available Chest xray  CXR      CT      CARDIOLOGY TESTING  12 Lead ECG:   Ventricular Rate 112 BPM    Atrial Rate 394 BPM    QRS Duration 86 ms    Q-T Interval 408 ms    QTC Calculation(Bezet) 556 ms    R Axis 13 degrees    T Axis 77 degrees    Diagnosis Line Atrial fibrillation with rapid ventricular response  Nonspecific T wave abnormality  Prolonged QT  Abnormal ECG    Confirmed by LISA LOBATO MD [id number]) on 8/1/2020 11:27:10 AM (08-01-20 @ 00:59)  12 Lead ECG:   Ventricular Rate 156 BPM    Atrial Rate 178 BPM    QRS Duration 90 ms    Q-T Interval 272 ms    QTC Calculation(Bezet) 438 ms    R Axis 14 degrees    T Axis 166 degrees    Diagnosis Line Atrial fibrillation with rapid ventricular response  Minimal voltage criteria for LVH, may be normal variant  Marked ST abnormality, possible inferior subendocardial injury  Abnormal ECG    Confirmed by Shakir Jerome ([id number]) on 7/23/2020 12:32:24 PM (07-23-20 @ 06:01)      MEDICATIONS  aMIOdarone    Tablet 100  chlorhexidine 0.12% Liquid 15  chlorhexidine 4% Liquid 1  dextrose 50% Injectable 12.5  dextrose 50% Injectable 25  dextrose 50% Injectable 25  heparin   Injectable 5000  insulin glargine Injectable (LANTUS) 12  insulin lispro Injectable (HumaLOG) 3  meropenem  IVPB 500  meropenem  IVPB   norepinephrine Infusion 0.01  pantoprazole   Suspension 40  sodium bicarbonate 650  vancomycin    Solution 125      WEIGHT  Weight (kg): 84.5 (07-16-20 @ 20:42)  Creatinine, Serum: 7.4 mg/dL (08-05-20 @ 05:20)  Creatinine, Serum: 7.0 mg/dL (08-04-20 @ 16:34)      ANTIBIOTICS:  meropenem  IVPB 500 milliGRAM(s) IV Intermittent every 12 hours  meropenem  IVPB      vancomycin    Solution 125 milliGRAM(s) Oral every 6 hours      All available historical records have been reviewed

## 2020-08-05 NOTE — PROGRESS NOTE ADULT - SUBJECTIVE AND OBJECTIVE BOX
Patient is a 64y old  Male who presents with a chief complaint of Stroke s/p tpa (05 Aug 2020 13:18)  pt seen and evaluated   remain vented via trach    ICU Vital Signs Last 24 Hrs  T(C): 35.7 (05 Aug 2020 12:00), Max: 37.7 (04 Aug 2020 20:00)  T(F): 96.3 (05 Aug 2020 12:00), Max: 99.9 (05 Aug 2020 00:00)  HR: 66 (05 Aug 2020 14:00) (56 - 88)  BP: 103/49 (05 Aug 2020 14:00) (39/23 - 132/57)  BP(mean): 74 (05 Aug 2020 14:00) (28 - 103)  RR: 27 (05 Aug 2020 14:00) (27 - 28)  SpO2: 99% (05 Aug 2020 14:00) (95% - 100%)      Drug Dosing Weight  Height (cm): 177.8 (17 Jul 2020 00:00)  Weight (kg): 84.5 (16 Jul 2020 20:42)  BMI (kg/m2): 26.7 (17 Jul 2020 00:00)  BSA (m2): 2.03 (17 Jul 2020 00:00)    I&O's Detail    04 Aug 2020 07:01  -  05 Aug 2020 07:00  --------------------------------------------------------  IN:    dextrose 5%.: 150 mL    Free Water: 600 mL    insulin regular Infusion: 2 mL    norepinephrine Infusion: 696 mL    Peptamen A.F.: 1560 mL  Total IN: 3008 mL    OUT:    Intermittent Catheterization - Urethral: 500 mL    Rectal Tube: 1000 mL    Voided: 315 mL  Total OUT: 1815 mL    Total NET: 1193 mL      05 Aug 2020 07:01  -  05 Aug 2020 14:52  --------------------------------------------------------  IN:    norepinephrine Infusion: 127.4 mL    Peptamen A.F.: 260 mL  Total IN: 387.4 mL    OUT:  Total OUT: 0 mL    Total NET: 387.4 mL     PHYSICAL EXAM:  Constitutional:   remain vented via trach  Gastrointestinal:  +peg tube feed , soft  MEDICATIONS  (STANDING):  aMIOdarone    Tablet 100 milliGRAM(s) Oral two times a day  chlorhexidine 0.12% Liquid 15 milliLiter(s) Oral Mucosa every 12 hours  chlorhexidine 4% Liquid 1 Application(s) Topical <User Schedule>  dextrose 50% Injectable 12.5 Gram(s) IV Push once  dextrose 50% Injectable 25 Gram(s) IV Push once  dextrose 50% Injectable 25 Gram(s) IV Push once  heparin   Injectable 5000 Unit(s) SubCutaneous every 8 hours  insulin glargine Injectable (LANTUS) 12 Unit(s) SubCutaneous at bedtime  insulin lispro Injectable (HumaLOG) 3 Unit(s) SubCutaneous every 8 hours  meropenem  IVPB 500 milliGRAM(s) IV Intermittent every 12 hours  meropenem  IVPB      midodrine 10 milliGRAM(s) Oral every 8 hours  norepinephrine Infusion 0.01 MICROgram(s)/kG/Min (1.58 mL/Hr) IV Continuous <Continuous>  pantoprazole   Suspension 40 milliGRAM(s) Enteral Tube daily  sodium bicarbonate 1300 milliGRAM(s) Oral three times a day  vancomycin    Solution 125 milliGRAM(s) Oral every 6 hours      Diet, NPO:   Tube Feeding Modality: Orogastric  Peptamen A.F. Formula  Total Volume for 24 Hours (mL): 1560  Continuous  Starting Tube Feed Rate mL per Hour: 30  Increase Tube Feed Rate by (mL): 10     Every 3 hours  Until Goal Tube Feed Rate (mL per Hour): 65  Tube Feed Duration (in Hours): 24  Tube Feed Start Time: 21:00 (08-03-20 @ 20:33)      LABS  08-05    148<H>  |  120<H>  |  95<HH>  ----------------------------<  176<H>  5.0   |  15<L>  |  7.4<HH>    Ca    7.8<L>      05 Aug 2020 09:34  Phos  9.4     08-04  Mg     2.4     08-04    TPro  5.2<L>  /  Alb  1.4<L>  /  TBili  0.3  /  DBili  x   /  AST  73<H>  /  ALT  24  /  AlkPhos  168<H>  08-05                        8.2    17.57 )-----------( 494      ( 05 Aug 2020 09:34 )             26.7     CAPILLARY BLOOD GLUCOSE  POCT Blood Glucose.: 130 mg/dL (05 Aug 2020 05:43)  POCT Blood Glucose.: 153 mg/dL (04 Aug 2020 22:55)   RADIOLOGY STUDIES  < from: Xray Chest 1 View- PORTABLE-Routine (08.04.20 @ 05:31) >  Impression:  Bilateral pulmonary opacities and effusions, right greater than left, not significantly changed from prior Patient is a 64y old  Male who presents with a chief complaint of Stroke s/p tpa (05 Aug 2020 13:18)  pt seen and evaluated   remain vented via trach  s/p code this morning    ICU Vital Signs Last 24 Hrs  T(C): 35.7 (05 Aug 2020 12:00), Max: 37.7 (04 Aug 2020 20:00)  T(F): 96.3 (05 Aug 2020 12:00), Max: 99.9 (05 Aug 2020 00:00)  HR: 66 (05 Aug 2020 14:00) (56 - 88)  BP: 103/49 (05 Aug 2020 14:00) (39/23 - 132/57)  BP(mean): 74 (05 Aug 2020 14:00) (28 - 103)  RR: 27 (05 Aug 2020 14:00) (27 - 28)  SpO2: 99% (05 Aug 2020 14:00) (95% - 100%)    Drug Dosing Weight  Height (cm): 177.8 (17 Jul 2020 00:00)  Weight (kg): 84.5 (16 Jul 2020 20:42)  BMI (kg/m2): 26.7 (17 Jul 2020 00:00)  BSA (m2): 2.03 (17 Jul 2020 00:00)    I&O's Detail    04 Aug 2020 07:01  -  05 Aug 2020 07:00  --------------------------------------------------------  IN:    dextrose 5%.: 150 mL    Free Water: 600 mL    insulin regular Infusion: 2 mL    norepinephrine Infusion: 696 mL    Peptamen A.F.: 1560 mL  Total IN: 3008 mL    OUT:    Intermittent Catheterization - Urethral: 500 mL    Rectal Tube: 1000 mL    Voided: 315 mL  Total OUT: 1815 mL    Total NET: 1193 mL      05 Aug 2020 07:01  -  05 Aug 2020 14:52  --------------------------------------------------------  IN:    norepinephrine Infusion: 127.4 mL    Peptamen A.F.: 260 mL  Total IN: 387.4 mL    OUT:  Total OUT: 0 mL    Total NET: 387.4 mL     PHYSICAL EXAM:  Constitutional:   remain vented via trach  Gastrointestinal:  +peg tube feed , soft  MEDICATIONS  (STANDING):  aMIOdarone    Tablet 100 milliGRAM(s) Oral two times a day  chlorhexidine 0.12% Liquid 15 milliLiter(s) Oral Mucosa every 12 hours  chlorhexidine 4% Liquid 1 Application(s) Topical <User Schedule>  heparin   Injectable 5000 Unit(s) SubCutaneous every 8 hours  insulin glargine Injectable (LANTUS) 12 Unit(s) SubCutaneous at bedtime  insulin lispro Injectable (HumaLOG) 3 Unit(s) SubCutaneous every 8 hours  meropenem  IVPB 500 milliGRAM(s) IV Intermittent every 12 hours    midodrine 10 milliGRAM(s) Oral every 8 hours  norepinephrine Infusion 0.01 MICROgram(s)/kG/Min (1.58 mL/Hr) IV Continuous <Continuous>  pantoprazole   Suspension 40 milliGRAM(s) Enteral Tube daily  sodium bicarbonate 1300 milliGRAM(s) Oral three times a day  vancomycin    Solution 125 milliGRAM(s) Oral every 6 hours    Diet, NPO:   Tube Feeding Modality: Orogastric  Peptamen A.F. Formula  Total Volume for 24 Hours (mL): 1560  Continuous  Starting Tube Feed Rate mL per Hour: 30  Increase Tube Feed Rate by (mL): 10     Every 3 hours  Until Goal Tube Feed Rate (mL per Hour): 65  Tube Feed Duration (in Hours): 24  Tube Feed Start Time: 21:00 (08-03-20 @ 20:33)    LABS  08-05    148<H>  |  120<H>  |  95<HH>  ----------------------------<  176<H>  5.0   |  15<L>  |  7.4<HH>    Ca    7.8<L>      05 Aug 2020 09:34  Phos  9.4     08-04  Mg     2.4     08-04    TPro  5.2<L>  /  Alb  1.4<L>  /  TBili  0.3  /  DBili  x   /  AST  73<H>  /  ALT  24  /  AlkPhos  168<H>  08-05                        8.2    17.57 )-----------( 494      ( 05 Aug 2020 09:34 )             26.7     CAPILLARY BLOOD GLUCOSE  POCT Blood Glucose.: 130 mg/dL (05 Aug 2020 05:43)  POCT Blood Glucose.: 153 mg/dL (04 Aug 2020 22:55)   RADIOLOGY STUDIES  < from: Xray Chest 1 View- PORTABLE-Routine (08.04.20 @ 05:31) >  Impression:  Bilateral pulmonary opacities and effusions, right greater than left, not significantly changed from prior

## 2020-08-05 NOTE — PROGRESS NOTE ADULT - ASSESSMENT
ASSESSMENT  64 year old male pmh of DM, CAD with PCI x3 in 2017, HTN, DLD, trigeminal neuralgia who presents to ED from [facility] s/p TPA for stroke.    IMPRESSION;   #Pseudomonal VAP (panS), R-sided opacities>L    8/1 tracheal cx Pseudomonas  #Trach, PEG  # CVA s/p TPA s/p thrombectomy (Basiliar Artery) - unresponsive off sedation, on pressors  - repeat CT head:  Interval increase in posterior fossa edema and mass effect consistent with evolving infarct  with resultant new effacement of the fourth ventricle and new mild obstructive hydrocephalus. Increased hypergenicity within the left ambient and quadrigeminal plate cisterns, and new hypodensity within the right ambient and quadrigeminal plate cisterns, the suprasellar cistern, the right sylvian fissure and scattered sulci suspicious worsening/new subarachnoid hemorrhage.  - remained ventilated, off sedation  - no neurologic activity.   #7/26 CD colitis: on po vancomycin. Diarrhea has decreased  #E coli bacteremia : secondary to  tract with UCx E coli but no overt pyuria. Could well also be secondary to translocation from the GI tract.  BCx 7/27 NGTD  CT Abdomen 7/28:  Liquid stool within the colon without evidence of obstruction or colonic wall thickening. No intraperitoneal free air. Bilateral small pleural effusions with associated opacities    RECOMMENDATIONS;  - No repeat BCX sent with fever; If repeat fever, then Repeat BCX, change all lines (TLC 7/27, a-line 7/17) and Dose Vanc 1g IV x1  - Narrow meropenem to Cefepime 1g q24h IV & Flagyl 500mg q8h IV given sensitivities and in the setting of Cdiff (end 8/10)  - PO vancomycin 125 mg q6h x 14 days end 8/8 as persistent diarrhea, then daily 125mg PO as remains on broad spectrum abx (until 8/12)  This is a preliminary incomplete pended note, all final recommendations to follow after interview and examination of the patient.     Please call with any questions or send a message on [employer] Teams  Spectra [phone] ASSESSMENT  64 year old male pmh of DM, CAD with PCI x3 in 2017, HTN, DLD, trigeminal neuralgia who presents to ED from [facility] s/p TPA for stroke.    IMPRESSION;   #Pseudomonal VAP (panS), R-sided opacities>L    8/1 tracheal cx Pseudomonas  #Trach, PEG  # CVA s/p TPA s/p thrombectomy (Basiliar Artery) - unresponsive off sedation, on pressors  - repeat CT head:  Interval increase in posterior fossa edema and mass effect consistent with evolving infarct  with resultant new effacement of the fourth ventricle and new mild obstructive hydrocephalus. Increased hypergenicity within the left ambient and quadrigeminal plate cisterns, and new hypodensity within the right ambient and quadrigeminal plate cisterns, the suprasellar cistern, the right sylvian fissure and scattered sulci suspicious worsening/new subarachnoid hemorrhage.  - remained ventilated, off sedation  - no neurologic activity.   #7/26 CD colitis: on po vancomycin. Diarrhea has decreased  #E coli bacteremia : secondary to  tract with UCx E coli but no overt pyuria. Could well also be secondary to translocation from the GI tract.  BCx 7/27 NGTD  CT Abdomen 7/28:  Liquid stool within the colon without evidence of obstruction or colonic wall thickening. No intraperitoneal free air. Bilateral small pleural effusions with associated opacities    RECOMMENDATIONS;  - No repeat BCX sent with fever; If repeat fever, then Repeat BCX, change all lines (TLC 7/27, a-line 7/17) and Dose Vanc 1g IV x1  - Narrow meropenem to Cefepime 1g q24h IV & Flagyl 500mg q8h IV given sensitivities and in the setting of Cdiff (end 8/10)  - PO vancomycin 125 mg q6h x 14 days end 8/8 as persistent diarrhea, then daily 125mg PO as remains on broad spectrum abx (until 8/12)    Please call with any questions or send a message on [employer] Teams  Spectra [phone]

## 2020-08-05 NOTE — PROGRESS NOTE ADULT - ASSESSMENT
IMPRESSION:    Acute hypoxemic respiratory failure SP tracheostomy / pseudomonas in DTA  Stroke SP TPA And thrombectomy  NAEL ATN non oliguric   E coli bacteremia treated   Sepsis  Septic shock  CDiff   hypernatremia improving    PLAN:    CNS: FU with Neurology. Keep off sedation.     HEENT:  Oral care / Trach care     PULMONARY:  HOB @ 45 degrees.  Vent changes:  Wean O2.  DEC fio2 to 40%    CARDIOVASCULAR: I=O. Continue Rate control.        GI: GI prophylaxis. Increase trickle  feeding as tolerated , free water q 12h    RENAL:  F/u repeat lytes 4 PM.  free water, po bicarb per renal    INFECTIOUS DISEASE: FU repeat cultures.  Gray cultures Rakan and Vanc po.      HEMATOLOGICAL:  DVT prophylaxis.     ENDOCRINE:  Follow up FS.  Insulin protocol if needed.    CODE STATUS: FULL CODE    DISPOSITION: vent unit    Overall very Poor prognosis  dc a line

## 2020-08-05 NOTE — PROGRESS NOTE ADULT - SUBJECTIVE AND OBJECTIVE BOX
24H events:    Patient is a 64y old Male who presents with a chief complaint of Stroke s/p tpa (05 Aug 2020 09:17)  Primary diagnosis of Stroke    Today is hospital day 21d. This morning the patient developed bradycardia which quickly progressed to cardiopulmonary arrest with PEA rhythm. CPR was performed and ROSC was achieved after 1 cycle of CPR. Metabolic profile and labs were taken and patient was started on levophed.      PAST MEDICAL & SURGICAL HISTORY  HTN (hypertension)  Diabetes  No significant past surgical history    SOCIAL HISTORY:  Social History:   lives with wife at [facility]  NO smoking or Etoh (16 Jul 2020 18:12)      ALLERGIES:  No Known Allergies    MEDICATIONS:  STANDING MEDICATIONS  aMIOdarone    Tablet 100 milliGRAM(s) Oral two times a day  chlorhexidine 0.12% Liquid 15 milliLiter(s) Oral Mucosa every 12 hours  chlorhexidine 4% Liquid 1 Application(s) Topical <User Schedule>  dextrose 50% Injectable 12.5 Gram(s) IV Push once  dextrose 50% Injectable 25 Gram(s) IV Push once  dextrose 50% Injectable 25 Gram(s) IV Push once  heparin   Injectable 5000 Unit(s) SubCutaneous every 8 hours  insulin glargine Injectable (LANTUS) 12 Unit(s) SubCutaneous at bedtime  insulin lispro Injectable (HumaLOG) 3 Unit(s) SubCutaneous every 8 hours  meropenem  IVPB 500 milliGRAM(s) IV Intermittent every 12 hours  meropenem  IVPB      midodrine 10 milliGRAM(s) Oral every 8 hours  norepinephrine Infusion 0.01 MICROgram(s)/kG/Min IV Continuous <Continuous>  pantoprazole   Suspension 40 milliGRAM(s) Enteral Tube daily  sodium bicarbonate 1300 milliGRAM(s) Oral three times a day  vancomycin    Solution 125 milliGRAM(s) Oral every 6 hours    PRN MEDICATIONS  acetaminophen   Tablet .. 650 milliGRAM(s) Oral every 6 hours PRN  dextrose 40% Gel 15 Gram(s) Oral once PRN  glucagon  Injectable 1 milliGRAM(s) IntraMuscular once PRN    VITALS:   T(F): 96.3  HR: 70  BP: 92/50  RR: 28  SpO2: 99%    PHYSICAL EXAM:  GENERAL: NAD, well-groomed, well-developed  HEAD:  Atraumatic, Normocephalic  EYES: EOMI  NECK: Supple  NERVOUS SYSTEM:  Alert & Oriented X3, non focal   CHEST/LUNG: Clear to auscultation bilaterally; No rales, rhonchi, wheezing, or rubs  HEART: Regular rate and rhythm; No murmurs, rubs, or gallops  ABDOMEN: Soft, Nontender, Nondistended; Bowel sounds present  EXTREMITIES:  2+ Peripheral Pulses, No clubbing, cyanosis, or edema  LYMPH: No lymphadenopathy noted  SKIN: No rashes or lesions  LABS:                        8.2    17.57 )-----------( 494      ( 05 Aug 2020 09:34 )             26.7     08-05    148<H>  |  120<H>  |  95<HH>  ----------------------------<  176<H>  5.0   |  15<L>  |  7.4<HH>    Ca    7.8<L>      05 Aug 2020 09:34  Phos  9.4     08-04  Mg     2.4     08-04    TPro  5.2<L>  /  Alb  1.4<L>  /  TBili  0.3  /  DBili  x   /  AST  73<H>  /  ALT  24  /  AlkPhos  168<H>  08-05    PT/INR - ( 05 Aug 2020 09:34 )   PT: 15.80 sec;   INR: 1.37 ratio         PTT - ( 05 Aug 2020 09:34 )  PTT:40.7 sec    ABG - ( 05 Aug 2020 04:37 )  pH, Arterial: 7.21  pH, Blood: x     /  pCO2: 37    /  pO2: 105   / HCO3: 15    / Base Excess: -12.0 /  SaO2: 97                Lactate, Blood: 2.8 mmol/L <H> (08-05-20 @ 09:34)  Troponin T, Serum: 0.10 ng/mL <HH> (08-05-20 @ 09:34)      CARDIAC MARKERS ( 05 Aug 2020 09:34 )  x     / 0.10 ng/mL / x     / x     / x          RADIOLOGY:

## 2020-08-06 NOTE — CONSULT NOTE ADULT - REASON FOR ADMISSION
Stroke s/p tpa

## 2020-08-06 NOTE — PROGRESS NOTE ADULT - ASSESSMENT
#NAEL on CKD, p/w creatinine 1.9, unknown baseline, ATN in the context of CVA/shock  - creatinine trending up sp code blue    - ? UO    - DARSHAN noted, non-echogenic kidneys, no hydro   - last ph at goal   - cont  sodium bicarbonate PO 1300mg TID/ start d5 with 3 amp of bicarbonate at 60 cc/h   - k noted , start lokelma 10 q 12, feed : nepro  - hypernatremia , improving   - no indication for RRT, would not improve prognosis/ prognosis poor after code blue too   -  sp trach/PEG   - ph noted , feed : nepro, renagel 2/2/2  - continue midodrine   - prognosis poor   - will follow

## 2020-08-06 NOTE — PROGRESS NOTE ADULT - ASSESSMENT
This is a case of a 64 year old male KTH:  1. DM  2. CAD with PCI x3 in 2017  3. HTN  4. DLD  5. trigeminal neuralgia    presented to ED from [facility] s/p CVA for TPA. S/p thrombectomy (Basiliar Artery) - unresponsive off sedation, on pressors (levophed)  - repeat CT head (7/17):  Interval increase in posterior fossa edema and mass effect consistent with evolving infarct  with resultant new effacement of the fourth ventricle and new mild obstructive hydrocephalus. . Increased hyperdensity within the left ambient and quadrigeminal plate cisterns, and new hyperdensity within the right ambient and quadrigeminal plate cisterns, the suprasellar cistern, the right sylvian fissure and scattered sulci suspicious worsening/new subarachnoid hemorrhage.    # cardiac arrest PEA on 8/5/2020  ROSC achieved after 1 cycle of CPR  Currently on levophed  No acute events since    #Myoclonic lower extremity jerks:  -evaluated by neurology, likely spinal cord reflexs due to absence of inhibition from UMN.    # s/p Tracheostomy and PEG tube placement (8/1)  - remains ventilated, off sedation  - taper off pressors if possible   - integrillin completed  - no notable neurologic activity. Apnea test performed (7/22), patient took a breath.  - apnea test repeated (7/27), patient took 2 breaths.   - continue with Subq heparin for DVT ppx, cleared by neurology.   - continue with vergara catheter for strict I/O   - TLC changed to R IJ, confirmed placement with cxr 7/27  - Patient continues having spontaneous movements of LE, in response to and without tactile sensation, EEG negative for seizures.   - Trickle feeds at 30cc/hr - evaluate how well patient tolerating   - Vianney Nye (daughter, HCP) would like a call before any MRI, apnea test, or brain death protocol is done again. She would also like to be informed of any changed in status regardless of time. Her number is [phone].    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 Diff. - diarrhea improving   - C. Diff PCR positive  - Po vancomycin swithed to 125 mg q6h x 10 days (day 11/20), then daily 125mg PO as remains on broad spectrum abx    # Pseudomonas in sputum (cultures taken on 8/1)  - on merpoenem 500mg q12 day 5 (started 8/1)  - If continues to spike or hemodynamic compromise, add levaquin 500mg q48h IV while awaiting sensitivities     # Distended abdomen - improved, stable s/p PEG   - xray of the abdomen performed showing gaseous distention of the ascending colon and stomach.  - CT Abdomen shows liquid stool within the colon without evidence of obstruction or colon thickening, no intraperitoneal free air.  - Patient on trickle feeds   - Surgery following    # Worsening NAEL - stable   baseline Cr ~1   Cr 7.9 - trending up  Kidney bladder US unremarkable   evaluated by nephrology today:   - cont  sodium bicarbonate PO 1300mg TID/ start d5 with 3 amp of bicarbonate at 60 cc/h   - k noted , start lokelma 10 q 12, feed : nepro  - hypernatremia , improving   - no indication for RRT, would not improve prognosis/ prognosis poor after code blue too     # DM   - HbA1c 7.5%   - lantus 12 units  - humalog 3 q6    # DLD   - d/c atorvastatin while NPO    # Activity: Bed rest   # Diet: NPO with possible trickle feeds depending on when patient is approved for trach and peg  # DVT ppx: Subq heparin   # GI ppx: Protonix  # Code Status: FULL CODE    # DISPO: keep in ICU for now

## 2020-08-06 NOTE — PROGRESS NOTE ADULT - ASSESSMENT
64 year old male pmh of DM, CAD with PCI x3 in 2017, HTN, DLD, trigeminal neuralgia who presents to ED from [facility] s/p TPA for stroke.    IMPRESSION;   #Pseudomonal VAP (panS), R-sided opacities>L    8/1 tracheal cx Pseudomonas    On meropenem  IVPB 500 milliGRAM(s) IV Intermittent every 12 hours  vancomycin    Solution 125 milliGRAM(s) Oral every 6 hours    #Trach, PEG  # CVA s/p TPA s/p thrombectomy (Basiliar Artery) - unresponsive off sedation, on pressors  - repeat CT head:  Interval increase in posterior fossa edema and mass effect consistent with evolving infarct  with resultant new effacement of the fourth ventricle and new mild obstructive hydrocephalus. Increased hypergenicity within the left ambient and quadrigeminal plate cisterns, and new hypodensity within the right ambient and quadrigeminal plate cisterns, the suprasellar cistern, the right sylvian fissure and scattered sulci suspicious worsening/new subarachnoid hemorrhage.  - remained ventilated, off sedation  - no neurologic activity.   #7/26 CD colitis: on po vancomycin. Diarrhea has decreased  #E coli bacteremia : secondary to  tract with UCx E coli but no overt pyuria. Could well also be secondary to translocation from the GI tract.  BCx 7/27 NGTD  CT Abdomen 7/28:  Liquid stool within the colon without evidence of obstruction or colonic wall thickening. No intraperitoneal free air. Bilateral small pleural effusions with associated opacities    RECOMMENDATIONS;  - No repeat BCX sent with fever; If repeat fever, then Repeat BCX, change all lines (TLC 7/27, a-line 7/17) and Dose Vanc 1g IV x1    - Narrow meropenem to Cefepime 1g q24h IV & Flagyl 500mg q8h IV given sensitivities and in the setting of Cdiff (end 8/10)  - PO vancomycin 125 mg q6h x 14 days end 8/8 as persistent diarrhea, then daily 125mg PO as remains on broad spectrum abx (until 8/12)

## 2020-08-06 NOTE — CONSULT NOTE ADULT - CONSULT REQUESTED DATE/TIME
06-Aug-2020
28-Jul-2020 22:36
16-Jul-2020 19:03
16-Jul-2020 20:34
17-Jul-2020 08:02
22-Jul-2020 14:22
26-Jul-2020 09:35
27-Jul-2020 08:26
30-Jul-2020 21:23

## 2020-08-06 NOTE — PROGRESS NOTE ADULT - SUBJECTIVE AND OBJECTIVE BOX
PAST HISTORY  --------------------------------------------------------------------------------  No significant changes to PMH, PSH, FHx, SHx, unless otherwise noted    ALLERGIES & MEDICATIONS  --------------------------------------------------------------------------------  Allergies    No Known Allergies    Intolerances      Standing Inpatient Medications  aMIOdarone    Tablet 100 milliGRAM(s) Oral two times a day  chlorhexidine 0.12% Liquid 15 milliLiter(s) Oral Mucosa every 12 hours  chlorhexidine 4% Liquid 1 Application(s) Topical <User Schedule>  dextrose 50% Injectable 12.5 Gram(s) IV Push once  dextrose 50% Injectable 25 Gram(s) IV Push once  dextrose 50% Injectable 25 Gram(s) IV Push once  heparin   Injectable 5000 Unit(s) SubCutaneous every 8 hours  insulin glargine Injectable (LANTUS) 12 Unit(s) SubCutaneous at bedtime  insulin lispro Injectable (HumaLOG) 3 Unit(s) SubCutaneous every 8 hours  meropenem  IVPB 500 milliGRAM(s) IV Intermittent every 12 hours  meropenem  IVPB      midodrine 10 milliGRAM(s) Oral every 8 hours  norepinephrine Infusion 0.01 MICROgram(s)/kG/Min IV Continuous <Continuous>  pantoprazole   Suspension 40 milliGRAM(s) Enteral Tube daily  sodium bicarbonate 1300 milliGRAM(s) Oral three times a day  vancomycin    Solution 125 milliGRAM(s) Oral every 6 hours    PRN Inpatient Medications  acetaminophen   Tablet .. 650 milliGRAM(s) Oral every 6 hours PRN  dextrose 40% Gel 15 Gram(s) Oral once PRN  glucagon  Injectable 1 milliGRAM(s) IntraMuscular once PRN          VITALS/PHYSICAL EXAM  --------------------------------------------------------------------------------  T(C): 35.8 (08-06-20 @ 04:00), Max: 36.1 (08-05-20 @ 20:00)  HR: 68 (08-06-20 @ 08:30) (56 - 88)  BP: 107/54 (08-06-20 @ 08:15) (39/23 - 136/100)  RR: 27 (08-05-20 @ 19:30) (27 - 28)  SpO2: 100% (08-06-20 @ 08:30) (94% - 100%)  Wt(kg): --        08-05-20 @ 07:01  -  08-06-20 @ 07:00  --------------------------------------------------------  IN: 3287.2 mL / OUT: 230 mL / NET: 3057.2 mL    08-06-20 @ 07:01  -  08-06-20 @ 08:40  --------------------------------------------------------  IN: 96.7 mL / OUT: 0 mL / NET: 96.7 mL      Physical Exam:  	Gen: trach/ventilated   	Pulm: B/L kim   	CV:  S1S2; no rub  	Abd: +distended    LABS/STUDIES  --------------------------------------------------------------------------------              7.7    16.70 >-----------<  500      [08-06-20 @ 04:50]              25.0     146  |  116  |  101  ----------------------------<  129      [08-06-20 @ 04:50]  5.5   |  14  |  7.9        Ca     8.0     [08-06-20 @ 04:50]      Mg     2.4     [08-06-20 @ 04:50]      Phos  10.7     [08-06-20 @ 04:50]    TPro  5.2  /  Alb  1.4  /  TBili  0.3  /  DBili  x   /  AST  73  /  ALT  24  /  AlkPhos  168  [08-05-20 @ 09:34]    PT/INR: PT 15.80, INR 1.37       [08-05-20 @ 09:34]  PTT: 40.7       [08-05-20 @ 09:34]    Troponin 0.10      [08-05-20 @ 09:34]    Creatinine Trend:  SCr 7.9 [08-06 @ 04:50]  SCr 7.3 [08-05 @ 15:51]  SCr 7.4 [08-05 @ 09:34]  SCr 7.4 [08-05 @ 05:20]  SCr 7.0 [08-04 @ 16:34]    Urinalysis - [07-23-20 @ 21:50]      Color Yellow / Appearance Slightly Turbid / SG 1.014 / pH 6.0      Gluc Negative / Ketone Negative  / Bili Negative / Urobili <2 mg/dL       Blood Moderate / Protein 100 mg/dL / Leuk Est Negative / Nitrite Positive      RBC 6 / WBC 14 / Hyaline 5 / Gran  / Sq Epi  / Non Sq Epi 4 / Bacteria Negative      Lipid: chol 146, , HDL 50, LDL 85      [07-16-20 @ 15:44]

## 2020-08-06 NOTE — CONSULT NOTE ADULT - SUBJECTIVE AND OBJECTIVE BOX
REQUESTED OF: DR Avila    Chart reviewed, Hospital Day 21    ANA CRISTINA LOVING 64yMale  HPI:  64 year old male pmh of DM, CAD with PCI x3 in 2017, HTN, DLD, trigeminal neuralgia who presents to ED from [facility] s/p TPA for stroke.  History obtained from charts and daughter Vianney [phone] as Pt. is currently aphasic. Per daughter Pt. who is AOx3 at baseline was at home playing with grandchildren and became nausea, had 3 episodes of NBNB vomiting. After laying down for 30 min family found Pt. confused with aphasia and brought Pt. to [facility] ED.  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 Other extensive atheromatous changes including moderate/severe subclavian and vertebral stenosis.   Pt. was given TPA at 16:46 and sent to [facility] for neurointerventional eval and post TPA care.  Pt. seen in ED with expressive aphasia although able to move all extremities with weakness of bilateral LE 1/5. Pt. was on cardine drip to maintain BP below 185.   Interval Hx -While in ED RRT called as Pt. posturing and shaking of b/l upper extremities, not following any commands, Pt. was intubated for airway protection. Stat CT done no new hemmorage, CT perfusion Stat, Neuro sx and neurointerventional contacted. NI actual initiated. Pending Neurointerventional recs. (16 Jul 2020 18:12)        PAST MEDICAL & SURGICAL HISTORY:  HTN (hypertension)  Diabetes  No significant past surgical history      Subjective and Objective:  Today,  Discussed with ICU team    Focused Palliative Care Evaluation:                   Symptoms:                                                        Support Devices: vent via tracheostomy, and PEG tube feeding             PHYSICAL EXAM:      Constitutional: pt appears severely critically ill    Respiratory: vented     Cardiovascular: (-) JVD     Gastrointestinal: large abdomen PEG in situ    Genitourinary: vergara to straight drainage (+) some urine o/p    Extremities: mild anasarca     Neurological: unresponsive, no gag, no corneal reflex, pinpoint pupils    Skin: see nurses assessment         T(C): 36.1, Max: 36.1 (20:00)  HR: 76 (66 - 78)  BP: 122/53 (71/39 - 136/100)  RR: 27 (27 - 27)  SpO2: 98% (94% - 100%)      LABS/STUDIES:  08-06    146  |  116<H>  |  101<HH>  ----------------------------<  129<H>  5.5<H>   |  14<L>  |  7.9<HH>    Ca    8.0<L>      06 Aug 2020 04:50  Phos  10.7     08-06  Mg     2.4     08-06    TPro  5.2<L>  /  Alb  1.4<L>  /  TBili  0.3  /  DBili  x   /  AST  73<H>  /  ALT  24  /  AlkPhos  168<H>  08-05                            7.7    16.70 )-----------( 500      ( 06 Aug 2020 04:50 )             25.0       MEDICATIONS  (STANDING):  aMIOdarone    Tablet 100 milliGRAM(s) Oral two times a day  cefepime   IVPB 1000 milliGRAM(s) IV Intermittent every 24 hours  chlorhexidine 0.12% Liquid 15 milliLiter(s) Oral Mucosa every 12 hours  chlorhexidine 4% Liquid 1 Application(s) Topical <User Schedule>  dextrose 5% 1000 milliLiter(s) (60 mL/Hr) IV Continuous <Continuous>  dextrose 50% Injectable 12.5 Gram(s) IV Push once  dextrose 50% Injectable 25 Gram(s) IV Push once  dextrose 50% Injectable 25 Gram(s) IV Push once  heparin   Injectable 5000 Unit(s) SubCutaneous every 8 hours  insulin glargine Injectable (LANTUS) 12 Unit(s) SubCutaneous at bedtime  insulin lispro Injectable (HumaLOG) 3 Unit(s) SubCutaneous every 8 hours  metroNIDAZOLE  IVPB 500 milliGRAM(s) IV Intermittent every 8 hours  midodrine 10 milliGRAM(s) Oral every 8 hours  norepinephrine Infusion 0.01 MICROgram(s)/kG/Min (1.58 mL/Hr) IV Continuous <Continuous>  pantoprazole   Suspension 40 milliGRAM(s) Enteral Tube daily  sevelamer carbonate 1600 milliGRAM(s) Oral three times a day with meals  sevelamer carbonate Powder 1600 milliGRAM(s) Oral three times a day with meals  sodium bicarbonate 1300 milliGRAM(s) Oral three times a day  sodium zirconium cyclosilicate 10 Gram(s) Oral two times a day  vancomycin    Solution 125 milliGRAM(s) Oral every 6 hours    MEDICATIONS  (PRN):  acetaminophen   Tablet .. 650 milliGRAM(s) Oral every 6 hours PRN Temp greater or equal to 38C (100.4F)  dextrose 40% Gel 15 Gram(s) Oral once PRN Blood Glucose LESS THAN 70 milliGRAM(s)/deciliter  glucagon  Injectable 1 milliGRAM(s) IntraMuscular once PRN Glucose LESS THAN 70 milligrams/deciliter          iStop: Rx Written	Rx Dispensed	Drug	Quantity	Days Supply	Prescriber Name	Payment Method	Dispenser  04/09/2020	04/13/2020	alprazolam 0.5 mg tablet	30	30	Jay Viera	[facility]      PPS  Level  10%       Note PPS = Palliative Performance Scale; (c)2001, [facility] Society       Range from 100% meaning Full ambulation/self-care/intake/Level of Consicous                                                                              to        10% meaning Bedbound/Unable to do any activity/extensive disease /Total Care/ No PO intake/ LOC=Full/drowsy/+/-confusion        (0% = death)                     Prior to acute illness, patient's functionality reportedly functional prior to current hospitalization, home playing with grandchildren when he had altered mental status

## 2020-08-06 NOTE — CONSULT NOTE ADULT - ATTENDING COMMENTS
63 y/o male with CVA, admitted to MICU with respiratory failure, on a vent. Overall poor prognosis  Developed abdominal distention the past 1 day.    PE:  unconscious   Abdomen: +BS, soft, mildly distended.    ASSESSMENT:  63 y/o male with Abdominal distention.    PLAN:  - npo, IVF, NGT to suction  - CT A/P   - serial abdominal exams  We will follow up.    This note reflects my exam and care of this patient on 07/28/2020.
s/p CVA   for trache and PEG tomorrow at bedside
As above. Pt with extremely poor neurologic exam off sedation secondary to basilar thrombosis, stroke. Per Dr. Abebe/stroke, no intervention given aneurysmal deformity of posterior circulation. No neurosurgical intervention at this time. Prognosis grave. Care per MICU/Neurointensive care. Please reconsult as needed.
Pt seen w above NP, case d/w ICU team and bedside RN.    Spoke to pt's daughter (Vianney). See [facility] note for details.     Pt to remain full code status  25 minutes spent discussing advanced care planning     We will follow   [phone]

## 2020-08-06 NOTE — PROGRESS NOTE ADULT - SUBJECTIVE AND OBJECTIVE BOX
ANA CRISTINA LOVING  64y, Male  Allergy: No Known Allergies      CHIEF COMPLAINT: Stroke s/p tpa (05 Aug 2020 14:52)      INTERVAL EVENTS/HPI  - No acute events overnight  - T(F): , Max: 97 (08-05-20 @ 20:00)  - Denies any worsening symptoms  - Tolerating medication  - WBC Count: 16.70 K/uL (08-06-20 @ 04:50)      ROS  General: Denies fevers, chills, nightsweats, weight loss  HEENT: Denies headache, rhinorrhea, sore throat, eye pain  CV: Denies CP, palpitations  PULM: Denies SOB, cough  GI: Denies abdominal pain, diarrhea  : Denies dysuria, hematuria  MSK: Denies arthralgias  SKIN: Denies rash   NEURO: Denies paresthesias, weakness  PSYCH: Denies depression    FH non-contributory   Social Hx non-contributory    VITALS:  T(F): 96.5, Max: 97 (08-05-20 @ 20:00)  HR: 70  BP: 97/47  RR: 27Vital Signs Last 24 Hrs  T(C): 35.8 (06 Aug 2020 04:00), Max: 36.1 (05 Aug 2020 20:00)  T(F): 96.5 (06 Aug 2020 04:00), Max: 97 (05 Aug 2020 20:00)  HR: 70 (06 Aug 2020 07:45) (56 - 88)  BP: 97/47 (06 Aug 2020 07:45) (39/23 - 136/100)  BP(mean): 63 (06 Aug 2020 07:45) (28 - 120)  RR: 27 (05 Aug 2020 19:30) (27 - 28)  SpO2: 100% (06 Aug 2020 07:45) (94% - 100%)    PHYSICAL EXAM:  Gen: NAD, resting in bed  HEENT: Normocephalic, atraumatic  Neck: supple, no lymphadenopathy  CV: Regular rate & regular rhythm  Lungs: decreased BS at bases, no fremitus  Abdomen: Soft, BS present  Ext: Warm, well perfused  Neuro: non focal, awake  Skin: no rash, no erythema      TESTS & MEASUREMENTS:                        7.7    16.70 )-----------( 500      ( 06 Aug 2020 04:50 )             25.0     08-06    146  |  116<H>  |  101<HH>  ----------------------------<  129<H>  5.5<H>   |  14<L>  |  7.9<HH>    Ca    8.0<L>      06 Aug 2020 04:50  Phos  10.7     08-06  Mg     2.4     08-06    TPro  5.2<L>  /  Alb  1.4<L>  /  TBili  0.3  /  DBili  x   /  AST  73<H>  /  ALT  24  /  AlkPhos  168<H>  08-05    eGFR if Non African American: 6 mL/min/1.73M2 (08-06-20 @ 04:50)  eGFR if African American: 7 mL/min/1.73M2 (08-06-20 @ 04:50)  eGFR if African American: 8 mL/min/1.73M2 (08-05-20 @ 15:51)  eGFR if Non African American: 7 mL/min/1.73M2 (08-05-20 @ 15:51)  eGFR if Non African American: 7 mL/min/1.73M2 (08-05-20 @ 09:34)  eGFR if : 8 mL/min/1.73M2 (08-05-20 @ 09:34)    LIVER FUNCTIONS - ( 05 Aug 2020 09:34 )  Alb: 1.4 g/dL / Pro: 5.2 g/dL / ALK PHOS: 168 U/L / ALT: 24 U/L / AST: 73 U/L / GGT: x               Culture - Sputum (collected 08-01-20 @ 16:20)  Source: .Sputum Sputum  Gram Stain (08-02-20 @ 05:43):    Numerous polymorphonuclear leukocytes per low power field    Few Squamous epithelial cells per low power field    Rare Gram positive cocci in pairs seen per oil power field  Final Report (08-03-20 @ 15:52):    Few Pseudomonas aeruginosa    Normal Respiratory Jayde present  Organism: Pseudomonas aeruginosa (08-03-20 @ 15:52)  Organism: Pseudomonas aeruginosa (08-03-20 @ 15:52)      -  Amikacin: S <=16      -  Aztreonam: S <=4      -  Cefepime: S <=2      -  Ceftazidime: S <=1      -  Ciprofloxacin: S <=0.25      -  Gentamicin: S <=2      -  Imipenem: S <=1      -  Levofloxacin: S <=0.5      -  Meropenem: S <=1      -  Piperacillin/Tazobactam: S <=8      -  Tobramycin: S <=2      Method Type: MORRIS        Lactate, Blood: 2.8 mmol/L (08-05-20 @ 09:34)      INFECTIOUS DISEASES TESTING  MRSA PCR Result.: Negative (07-17-20 @ 03:37)      RADIOLOGY & ADDITIONAL TESTS:  I have personally reviewed the last Chest xray  CXR      CT      CARDIOLOGY TESTING  12 Lead ECG:   Ventricular Rate 112 BPM    Atrial Rate 394 BPM    QRS Duration 86 ms    Q-T Interval 408 ms    QTC Calculation(Bezet) 556 ms    R Axis 13 degrees    T Axis 77 degrees    Diagnosis Line Atrial fibrillation with rapid ventricular response  Nonspecific T wave abnormality  Prolonged QT  Abnormal ECG    Confirmed by LISA LOBATO MD [id number]) on 8/1/2020 11:27:10 AM (08-01-20 @ 00:59)      MEDICATIONS  aMIOdarone    Tablet 100  chlorhexidine 0.12% Liquid 15  chlorhexidine 4% Liquid 1  dextrose 50% Injectable 12.5  dextrose 50% Injectable 25  dextrose 50% Injectable 25  heparin   Injectable 5000  insulin glargine Injectable (LANTUS) 12  insulin lispro Injectable (HumaLOG) 3  meropenem  IVPB 500  meropenem  IVPB   midodrine 10  norepinephrine Infusion 0.01  pantoprazole   Suspension 40  sodium bicarbonate 1300  vancomycin    Solution 125      ANTIBIOTICS:  meropenem  IVPB 500 milliGRAM(s) IV Intermittent every 12 hours  meropenem  IVPB      vancomycin    Solution 125 milliGRAM(s) Oral every 6 hours      All available historical data has been reviewed

## 2020-08-06 NOTE — PROGRESS NOTE ADULT - SUBJECTIVE AND OBJECTIVE BOX
24H events:    Patient is a 64y old Male who presents with a chief complaint of Stroke s/p tpa (06 Aug 2020 08:54)    Primary diagnosis of Stroke     This morning patient was seen and examined at bedside, resting comfortably in bed. He is off sedation. Maintained on levophed drip. No fever. No notable events    PAST MEDICAL & SURGICAL HISTORY  HTN (hypertension)  Diabetes  No significant past surgical history    SOCIAL HISTORY:  Social History:   lives with wife at [facility]  NO smoking or Etoh (16 Jul 2020 18:12)      ALLERGIES:  No Known Allergies    MEDICATIONS:  STANDING MEDICATIONS  aMIOdarone    Tablet 100 milliGRAM(s) Oral two times a day  cefepime   IVPB 1000 milliGRAM(s) IV Intermittent every 24 hours  chlorhexidine 0.12% Liquid 15 milliLiter(s) Oral Mucosa every 12 hours  chlorhexidine 4% Liquid 1 Application(s) Topical <User Schedule>  dextrose 5% 1000 milliLiter(s) IV Continuous <Continuous>  dextrose 50% Injectable 12.5 Gram(s) IV Push once  dextrose 50% Injectable 25 Gram(s) IV Push once  dextrose 50% Injectable 25 Gram(s) IV Push once  heparin   Injectable 5000 Unit(s) SubCutaneous every 8 hours  insulin glargine Injectable (LANTUS) 12 Unit(s) SubCutaneous at bedtime  insulin lispro Injectable (HumaLOG) 3 Unit(s) SubCutaneous every 8 hours  metroNIDAZOLE  IVPB 500 milliGRAM(s) IV Intermittent every 8 hours  midodrine 10 milliGRAM(s) Oral every 8 hours  norepinephrine Infusion 0.01 MICROgram(s)/kG/Min IV Continuous <Continuous>  pantoprazole   Suspension 40 milliGRAM(s) Enteral Tube daily  sevelamer carbonate 1600 milliGRAM(s) Oral three times a day with meals  sevelamer carbonate Powder 1600 milliGRAM(s) Oral three times a day with meals  sodium bicarbonate 1300 milliGRAM(s) Oral three times a day  sodium zirconium cyclosilicate 10 Gram(s) Oral two times a day  vancomycin    Solution 125 milliGRAM(s) Oral every 6 hours    PRN MEDICATIONS  acetaminophen   Tablet .. 650 milliGRAM(s) Oral every 6 hours PRN  dextrose 40% Gel 15 Gram(s) Oral once PRN  glucagon  Injectable 1 milliGRAM(s) IntraMuscular once PRN    VITALS:   T(F): 96.9  HR: 78  BP: 84/46  RR: 27  SpO2: 98%    PHYSICAL EXAM:  GENERAL: unresponsive male in NAD   HEENT: NCAT, +tracheostomy, +R IJ  CHEST/LUNG: audible breath sounds b/l   HEART: Regular rate and rhythm; s1 s2 appreciated  ABDOMEN: soft, mildly distended abdomen, +PEG tube   EXTREMITIES: UE and LE edema   NERVOUS SYSTEM: some spontaneous nonpurposeful movements to tactile sensation, no apparent brainstem reflexes, absent reflexes in upper extremities. Hyper reflexia in lower extremities    LABS:                        7.7    16.70 )-----------( 500      ( 06 Aug 2020 04:50 )             25.0     08-06    146  |  116<H>  |  101<HH>  ----------------------------<  129<H>  5.5<H>   |  14<L>  |  7.9<HH>    Ca    8.0<L>      06 Aug 2020 04:50  Phos  10.7     08-06  Mg     2.4     08-06    TPro  5.2<L>  /  Alb  1.4<L>  /  TBili  0.3  /  DBili  x   /  AST  73<H>  /  ALT  24  /  AlkPhos  168<H>  08-05    PT/INR - ( 05 Aug 2020 09:34 )   PT: 15.80 sec;   INR: 1.37 ratio         PTT - ( 05 Aug 2020 09:34 )  PTT:40.7 sec    ABG - ( 06 Aug 2020 03:56 )  pH, Arterial: 7.21  pH, Blood: x     /  pCO2: 35    /  pO2: 156   / HCO3: 14    / Base Excess: -12.8 /  SaO2: 98                    CARDIAC MARKERS ( 05 Aug 2020 09:34 )  x     / 0.10 ng/mL / x     / x     / x          RADIOLOGY:

## 2020-08-06 NOTE — GOALS OF CARE CONVERSATION - ADVANCED CARE PLANNING - CONVERSATION DETAILS
Spoke to pt's daughter (Vianney)  Palliative Care services introduced.  Clinical condition reviewed, grave prognosis understood. 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   Daughter reports family remain hopeful for recovery as pt's wife recently survived a renal transplant therefor they do not want to upset her with the potential passing of her  (the pt). Explained low likelihood for survival despite all efforts and high chances of further physical burden rather than clinical benefit of further cardiac resuscitation should pt arrest again.   Daughter verbalized her understanding and reiterates that their priority is their mother and she cannot cope with the loss of the pt or deescalating his care..     Family wish to continue current level of care with the hopes that pt at least clears his infectious processes and then will consider advanced directives accordingly.   If no improvement in status or further clinical decline, offered to meet for family meeting with pt's children and wife.   Family to discuss over the weekend.    All questions answered in detail  Support provided

## 2020-08-06 NOTE — CONSULT NOTE ADULT - PROVIDER SPECIALTY LIST ADULT
Nephrology
Neurosurgery
Palliative Care
Surgery
Neurology
Nutrition Support
Trauma Surgery
Critical Care
Infectious Disease

## 2020-08-06 NOTE — PROGRESS NOTE ADULT - SUBJECTIVE AND OBJECTIVE BOX
Over Night Events:  s/p CPA arrest for 3 minms(nonshockable)      ROS:  See HPI    PHYSICAL EXAM    ICU Vital Signs Last 24 Hrs  T(C): 35.8 (06 Aug 2020 04:00), Max: 36.1 (05 Aug 2020 20:00)  T(F): 96.5 (06 Aug 2020 04:00), Max: 97 (05 Aug 2020 20:00)  HR: 68 (06 Aug 2020 08:30) (56 - 88)  BP: 107/54 (06 Aug 2020 08:15) (39/23 - 136/100)  BP(mean): 80 (06 Aug 2020 08:15) (28 - 120)  RR: 27 (05 Aug 2020 19:30) (27 - 28)  SpO2: 100% (06 Aug 2020 08:30) (94% - 100%)      General: Intubated,   HEENT: MARGE             Lungs: Bilateral BS  Cardiovascular: Regular   Abdomen: Soft, Positive BS  Extremities: No clubbing   Skin: Warm  Neurological: unresponsive      08-05-20 @ 07:01  -  08-06-20 @ 07:00  --------------------------------------------------------  IN:    Free Water: 900 mL    IV PiggyBack: 50 mL    norepinephrine Infusion: 777.2 mL    Peptamen A.F.: 1560 mL  Total IN: 3287.2 mL    OUT:    Rectal Tube: 200 mL    Voided: 30 mL  Total OUT: 230 mL    Total NET: 3057.2 mL      08-06-20 @ 07:01  -  08-06-20 @ 08:55  --------------------------------------------------------  IN:    norepinephrine Infusion: 31.7 mL    Peptamen A.F.: 65 mL  Total IN: 96.7 mL    OUT:  Total OUT: 0 mL    Total NET: 96.7 mL          LABS:                          7.7    16.70 )-----------( 500      ( 06 Aug 2020 04:50 )             25.0                                               08-06    146  |  116<H>  |  101<HH>  ----------------------------<  129<H>  5.5<H>   |  14<L>  |  7.9<HH>    Ca    8.0<L>      06 Aug 2020 04:50  Phos  10.7     08-06  Mg     2.4     08-06    TPro  5.2<L>  /  Alb  1.4<L>  /  TBili  0.3  /  DBili  x   /  AST  73<H>  /  ALT  24  /  AlkPhos  168<H>  08-05      PT/INR - ( 05 Aug 2020 09:34 )   PT: 15.80 sec;   INR: 1.37 ratio         PTT - ( 05 Aug 2020 09:34 )  PTT:40.7 sec                                           CARDIAC MARKERS ( 05 Aug 2020 09:34 )  x     / 0.10 ng/mL / x     / x     / x                                                LIVER FUNCTIONS - ( 05 Aug 2020 09:34 )  Alb: 1.4 g/dL / Pro: 5.2 g/dL / ALK PHOS: 168 U/L / ALT: 24 U/L / AST: 73 U/L / GGT: x                                                                                               Mode: AC/ CMV (Assist Control/ Continuous Mandatory Ventilation)  RR (machine): 28  TV (machine): 450  FiO2: 60  PEEP: 10  ITime: 1  MAP: 17  PIP: 25                                      ABG - ( 06 Aug 2020 03:56 )  pH, Arterial: 7.21  pH, Blood: x     /  pCO2: 35    /  pO2: 156   / HCO3: 14    / Base Excess: -12.8 /  SaO2: 98                  MEDICATIONS  (STANDING):  aMIOdarone    Tablet 100 milliGRAM(s) Oral two times a day  chlorhexidine 0.12% Liquid 15 milliLiter(s) Oral Mucosa every 12 hours  chlorhexidine 4% Liquid 1 Application(s) Topical <User Schedule>  dextrose 50% Injectable 12.5 Gram(s) IV Push once  dextrose 50% Injectable 25 Gram(s) IV Push once  dextrose 50% Injectable 25 Gram(s) IV Push once  heparin   Injectable 5000 Unit(s) SubCutaneous every 8 hours  insulin glargine Injectable (LANTUS) 12 Unit(s) SubCutaneous at bedtime  insulin lispro Injectable (HumaLOG) 3 Unit(s) SubCutaneous every 8 hours  meropenem  IVPB 500 milliGRAM(s) IV Intermittent every 12 hours  meropenem  IVPB      midodrine 10 milliGRAM(s) Oral every 8 hours  norepinephrine Infusion 0.01 MICROgram(s)/kG/Min (1.58 mL/Hr) IV Continuous <Continuous>  pantoprazole   Suspension 40 milliGRAM(s) Enteral Tube daily  sodium bicarbonate 1300 milliGRAM(s) Oral three times a day  vancomycin    Solution 125 milliGRAM(s) Oral every 6 hours    MEDICATIONS  (PRN):  acetaminophen   Tablet .. 650 milliGRAM(s) Oral every 6 hours PRN Temp greater or equal to 38C (100.4F)  dextrose 40% Gel 15 Gram(s) Oral once PRN Blood Glucose LESS THAN 70 milliGRAM(s)/deciliter  glucagon  Injectable 1 milliGRAM(s) IntraMuscular once PRN Glucose LESS THAN 70 milligrams/deciliter      Xrays:         Right side opacity stable.                                                                            ECHO Over Night Events:    s/p CPA arrest for 3 min (nonshockable), on bicarb drip      ROS:  See HPI    PHYSICAL EXAM    ICU Vital Signs Last 24 Hrs  T(C): 35.8 (06 Aug 2020 04:00), Max: 36.1 (05 Aug 2020 20:00)  T(F): 96.5 (06 Aug 2020 04:00), Max: 97 (05 Aug 2020 20:00)  HR: 68 (06 Aug 2020 08:30) (56 - 88)  BP: 107/54 (06 Aug 2020 08:15) (39/23 - 136/100)  BP(mean): 80 (06 Aug 2020 08:15) (28 - 120)  RR: 27 (05 Aug 2020 19:30) (27 - 28)  SpO2: 100% (06 Aug 2020 08:30) (94% - 100%)      General: Intubated,   HEENT: MARGE             Lungs: Bilateral BS, dec both abses  Cardiovascular: SE, 3/6  Abdomen: Soft, Positive BS  Extremities: No clubbing   Skin: Warm  Neurological: unresponsive      08-05-20 @ 07:01  -  08-06-20 @ 07:00  --------------------------------------------------------  IN:    Free Water: 900 mL    IV PiggyBack: 50 mL    norepinephrine Infusion: 777.2 mL    Peptamen A.F.: 1560 mL  Total IN: 3287.2 mL    OUT:    Rectal Tube: 200 mL    Voided: 30 mL  Total OUT: 230 mL    Total NET: 3057.2 mL      08-06-20 @ 07:01  -  08-06-20 @ 08:55  --------------------------------------------------------  IN:    norepinephrine Infusion: 31.7 mL    Peptamen A.F.: 65 mL  Total IN: 96.7 mL    OUT:  Total OUT: 0 mL    Total NET: 96.7 mL          LABS:                          7.7    16.70 )-----------( 500      ( 06 Aug 2020 04:50 )             25.0                                               08-06    146  |  116<H>  |  101<HH>  ----------------------------<  129<H>  5.5<H>   |  14<L>  |  7.9<HH>    Ca    8.0<L>      06 Aug 2020 04:50  Phos  10.7     08-06  Mg     2.4     08-06    TPro  5.2<L>  /  Alb  1.4<L>  /  TBili  0.3  /  DBili  x   /  AST  73<H>  /  ALT  24  /  AlkPhos  168<H>  08-05      PT/INR - ( 05 Aug 2020 09:34 )   PT: 15.80 sec;   INR: 1.37 ratio         PTT - ( 05 Aug 2020 09:34 )  PTT:40.7 sec                                           CARDIAC MARKERS ( 05 Aug 2020 09:34 )  x     / 0.10 ng/mL / x     / x     / x                                                LIVER FUNCTIONS - ( 05 Aug 2020 09:34 )  Alb: 1.4 g/dL / Pro: 5.2 g/dL / ALK PHOS: 168 U/L / ALT: 24 U/L / AST: 73 U/L / GGT: x                                                                                               Mode: AC/ CMV (Assist Control/ Continuous Mandatory Ventilation)  RR (machine): 28  TV (machine): 450  FiO2: 60  PEEP: 10  ITime: 1  MAP: 17  PIP: 25                                      ABG - ( 06 Aug 2020 03:56 )  pH, Arterial: 7.21  pH, Blood: x     /  pCO2: 35    /  pO2: 156   / HCO3: 14    / Base Excess: -12.8 /  SaO2: 98                  MEDICATIONS  (STANDING):  aMIOdarone    Tablet 100 milliGRAM(s) Oral two times a day  chlorhexidine 0.12% Liquid 15 milliLiter(s) Oral Mucosa every 12 hours  chlorhexidine 4% Liquid 1 Application(s) Topical <User Schedule>  dextrose 50% Injectable 12.5 Gram(s) IV Push once  dextrose 50% Injectable 25 Gram(s) IV Push once  dextrose 50% Injectable 25 Gram(s) IV Push once  heparin   Injectable 5000 Unit(s) SubCutaneous every 8 hours  insulin glargine Injectable (LANTUS) 12 Unit(s) SubCutaneous at bedtime  insulin lispro Injectable (HumaLOG) 3 Unit(s) SubCutaneous every 8 hours  meropenem  IVPB 500 milliGRAM(s) IV Intermittent every 12 hours  meropenem  IVPB      midodrine 10 milliGRAM(s) Oral every 8 hours  norepinephrine Infusion 0.01 MICROgram(s)/kG/Min (1.58 mL/Hr) IV Continuous <Continuous>  pantoprazole   Suspension 40 milliGRAM(s) Enteral Tube daily  sodium bicarbonate 1300 milliGRAM(s) Oral three times a day  vancomycin    Solution 125 milliGRAM(s) Oral every 6 hours    MEDICATIONS  (PRN):  acetaminophen   Tablet .. 650 milliGRAM(s) Oral every 6 hours PRN Temp greater or equal to 38C (100.4F)  dextrose 40% Gel 15 Gram(s) Oral once PRN Blood Glucose LESS THAN 70 milliGRAM(s)/deciliter  glucagon  Injectable 1 milliGRAM(s) IntraMuscular once PRN Glucose LESS THAN 70 milligrams/deciliter      Xrays:         Right side opacity stable.                                                                            ECHO

## 2020-08-06 NOTE — CONSULT NOTE ADULT - ASSESSMENT
Consult Summary: Pt is a 64 yr old male with h/o DM, trigeminal neuralgia, CAD w PCI (stents) x 3 in 2017. Pt has had difficult hospital course, originally admitted with CVA and got TPA. Pt was intubated for airway protection during initial stroke. Pt s/p thrombectomy/neurosurgery intervention on 7/17/20. Pt postoperatively did not improve clinically but family was clear - his children and spouse that they wanted aggressive medical management. Pt had tracheostomy and PEG placed 8/1. Pt has worsening status (+) CDIFF infection, and NAEL - severe. On 8/5 had cardiac arrest was down for 3 minutes before ROSC was achieved.     Palliative care consulted due to pt's grave prognosis, neurologically pt has no reflexes except that he breathes over the vent at times and has lower extremity jerking on and off. Pt requiring high dose pressors  to maintain BP. No family at bedside. ICU team reports that pt's spouse had recent organ transplant, pt's children include several physicians. Pt's daughter is the point person for the family palliative care attending will reach out to assess family's needs. ICU team has discussed DNR and overall poor prognosis before and family feels they cannot give up on dad.        Morphine Equivalent Daily Dose (MEDD): 0    Recommendations:  Full code  Pressors/Bicarb Drip  Vent/Trach/Peg  neurology f/u   palliative care team to call daughter           Please Call [phone] PRN Consult Summary: Pt is a 64 yr old male with h/o DM, trigeminal neuralgia, CAD w PCI (stents) x 3 in 2017. Pt has had difficult hospital course, originally admitted with CVA and received TPA. Pt was intubated for airway protection during initial stroke. Pt s/p thrombectomy/neurosurgery intervention on 7/17/20. Pt postoperatively did not improve clinically but family was clear - his children and spouse that they wanted aggressive medical management. Pt had tracheostomy and PEG placed 8/1. Pt has worsening status (+) CDIFF infection, and NAEL - severe. On 8/5 had cardiac arrest was down for 3 minutes before ROSC was achieved.     Palliative care consulted due to pt's grave prognosis, neurologically pt has no reflexes except that he breathes over the vent at times and has lower extremity jerking on and off. Pt requiring high dose pressors  to maintain BP. No family at bedside. ICU team reports that pt's spouse had recent organ transplant, pt's children include several physicians. Pt's daughter is the point person for the family palliative care attending will reach out to assess family's needs. ICU team has discussed DNR and overall poor prognosis before and family feels they cannot give up on dad.        Morphine Equivalent Daily Dose (MEDD): 0    Recommendations:  Full code  ongoing ICU mngnmt  neurology f/u   palliative care team to call daughter           Please Call [phone] PRN

## 2020-08-06 NOTE — PROGRESS NOTE ADULT - ASSESSMENT
IMPRESSION:    Acute hypoxemic respiratory failure SP tracheostomy / pseudomonas in DTA  Stroke SP TPA And thrombectomy  NAEL ATN non oliguric   E coli bacteremia treated   Sepsis  Septic shock  CDiff   hypernatremia improving  Cardiopulmonary arrest(3 mins) nonshockable(8/5/2020)  PLAN:    CNS: FU with Neurology. Keep off sedation.     HEENT:  Oral care / Trach care     PULMONARY:  HOB @ 45 degrees.  Vent changes:  Wean O2.  DEC fio2 to 50%(60%)    CARDIOVASCULAR: I=O. Continue Rate control.        GI: GI prophylaxis. Increase trickle  feeding as tolerated , free water q 12h    RENAL:  F/u repeat lytes 4 PM.  free water, start bicarc drip 60 cc/hr.    INFECTIOUS DISEASE: FU repeat cultures.  Pan cultures Rakan(6 of 7) and Vanc po.      HEMATOLOGICAL:  DVT prophylaxis.     ENDOCRINE:  Follow up FS.  Insulin protocol if needed.    CODE STATUS: FULL CODE    MICU    Overall very Poor prognosis  Palliative care IMPRESSION:    Acute hypoxemic respiratory failure SP tracheostomy / pseudomonas in DTA  Stroke SP TPA And thrombectomy  NAEL ATN non oliguric   E coli bacteremia treated   Sepsis  Septic shock  CDiff   hypernatremia improving  Cardiopulmonary arrest(3 mins) nonshockable(8/5/2020)  PLAN:    CNS: FU with Neurology. Keep off sedation.     HEENT:  Oral care / Trach care     PULMONARY:  HOB @ 45 degrees.  Vent changes:  Wean O2.  DEC fio2 to 50%(60%), DEC PEEP    CARDIOVASCULAR: I=O. Continue Rate control.        GI: GI prophylaxis. Increase trickle  feeding as tolerated , free water q 12h    RENAL:  F/u repeat lytes 4 PM.  free water, start bicarc drip 60 cc/hr.    INFECTIOUS DISEASE: FU repeat cultures.  Pan cultures Rakan(6 of 7) and Vanc po.      HEMATOLOGICAL:  DVT prophylaxis.     ENDOCRINE:  Follow up FS.  Insulin protocol if needed.    CODE STATUS: FULL CODE    MICU    Overall very Poor prognosis  Palliative care

## 2020-08-07 NOTE — PROGRESS NOTE ADULT - SUBJECTIVE AND OBJECTIVE BOX
Over Night Events:  no major events overnight.      ROS:  See HPI    PHYSICAL EXAM    ICU Vital Signs Last 24 Hrs  T(C): 35.8 (07 Aug 2020 08:00), Max: 36 (06 Aug 2020 16:00)  T(F): 96.4 (07 Aug 2020 08:00), Max: 96.8 (06 Aug 2020 16:00)  HR: 60 (07 Aug 2020 09:00) (60 - 82)  BP: 116/55 (07 Aug 2020 08:45) (74/35 - 154/60)  BP(mean): 82 (07 Aug 2020 08:45) (48 - 102)  SpO2: 96% (07 Aug 2020 09:00) (87% - 100%)      General: unresponsive  HEENT: MARGE             Lymph Nodes: No cervical LN   Lungs: Bilateral BS  Cardiovascular: Regular   Abdomen: Soft, Positive BS  Extremities: No clubbing   Skin: Warm  Neurological: no change      08-06-20 @ 07:01  -  08-07-20 @ 07:00  --------------------------------------------------------  IN:    dextrose 5%: 1320 mL    Enteral Tube Flush: 420 mL    norepinephrine Infusion: 582.2 mL    Peptamen A.F.: 1305 mL    Solution: 350 mL  Total IN: 3977.2 mL    OUT:    Indwelling Catheter - Urethral: 300 mL    Rectal Tube: 750 mL    Voided: 250 mL  Total OUT: 1300 mL    Total NET: 2677.2 mL      08-07-20 @ 07:01  -  08-07-20 @ 09:22  --------------------------------------------------------  IN:    dextrose 5%: 120 mL    Enteral Tube Flush: 45 mL    norepinephrine Infusion: 38 mL    Peptamen A.F.: 150 mL  Total IN: 353 mL    OUT:  Total OUT: 0 mL    Total NET: 353 mL          LABS:                          7.1    16.16 )-----------( 445      ( 07 Aug 2020 04:40 )             22.4                                               08-07    145  |  113<H>  |  103<HH>  ----------------------------<  187<H>  4.7   |  16<L>  |  8.6<HH>    Ca    7.9<L>      07 Aug 2020 04:40  Phos  9.9     08-07  Mg     2.4     08-07    TPro  5.2<L>  /  Alb  1.4<L>  /  TBili  0.3  /  DBili  x   /  AST  73<H>  /  ALT  24  /  AlkPhos  168<H>  08-05      PT/INR - ( 05 Aug 2020 09:34 )   PT: 15.80 sec;   INR: 1.37 ratio         PTT - ( 05 Aug 2020 09:34 )  PTT:40.7 sec                                           CARDIAC MARKERS ( 05 Aug 2020 09:34 )  x     / 0.10 ng/mL / x     / x     / x                                                LIVER FUNCTIONS - ( 05 Aug 2020 09:34 )  Alb: 1.4 g/dL / Pro: 5.2 g/dL / ALK PHOS: 168 U/L / ALT: 24 U/L / AST: 73 U/L / GGT: x                                                                                               Mode: AC/ CMV (Assist Control/ Continuous Mandatory Ventilation)  RR (machine): 28  TV (machine): 450  FiO2: 40  PEEP: 5  ITime: 1  MAP: 13  PIP: 25                                      ABG - ( 07 Aug 2020 04:30 )  pH, Arterial: 7.25  pH, Blood: x     /  pCO2: 37    /  pO2: 67    / HCO3: 16    / Base Excess: -10.2 /  SaO2: 91                  MEDICATIONS  (STANDING):  aMIOdarone    Tablet 100 milliGRAM(s) Oral two times a day  cefepime   IVPB 1000 milliGRAM(s) IV Intermittent every 24 hours  chlorhexidine 0.12% Liquid 15 milliLiter(s) Oral Mucosa every 12 hours  chlorhexidine 4% Liquid 1 Application(s) Topical <User Schedule>  dextrose 5% 1000 milliLiter(s) (60 mL/Hr) IV Continuous <Continuous>  dextrose 50% Injectable 12.5 Gram(s) IV Push once  dextrose 50% Injectable 25 Gram(s) IV Push once  dextrose 50% Injectable 25 Gram(s) IV Push once  heparin   Injectable 5000 Unit(s) SubCutaneous every 8 hours  insulin glargine Injectable (LANTUS) 12 Unit(s) SubCutaneous at bedtime  insulin lispro Injectable (HumaLOG) 3 Unit(s) SubCutaneous every 8 hours  metroNIDAZOLE  IVPB 500 milliGRAM(s) IV Intermittent every 8 hours  midodrine 10 milliGRAM(s) Oral every 8 hours  norepinephrine Infusion 0.01 MICROgram(s)/kG/Min (1.58 mL/Hr) IV Continuous <Continuous>  pantoprazole   Suspension 40 milliGRAM(s) Enteral Tube daily  sevelamer carbonate Powder 1600 milliGRAM(s) Oral three times a day with meals  sodium bicarbonate 1300 milliGRAM(s) Oral three times a day  sodium zirconium cyclosilicate 10 Gram(s) Oral two times a day  vancomycin    Solution 125 milliGRAM(s) Oral every 6 hours    MEDICATIONS  (PRN):  acetaminophen   Tablet .. 650 milliGRAM(s) Oral every 6 hours PRN Temp greater or equal to 38C (100.4F)  dextrose 40% Gel 15 Gram(s) Oral once PRN Blood Glucose LESS THAN 70 milliGRAM(s)/deciliter  glucagon  Injectable 1 milliGRAM(s) IntraMuscular once PRN Glucose LESS THAN 70 milligrams/deciliter      Xrays:                                                                                     ECHO Over Night Events:    no major events overnight, on bicarb drip      ROS:  See HPI    PHYSICAL EXAM    ICU Vital Signs Last 24 Hrs  T(C): 35.8 (07 Aug 2020 08:00), Max: 36 (06 Aug 2020 16:00)  T(F): 96.4 (07 Aug 2020 08:00), Max: 96.8 (06 Aug 2020 16:00)  HR: 60 (07 Aug 2020 09:00) (60 - 82)  BP: 116/55 (07 Aug 2020 08:45) (74/35 - 154/60)  BP(mean): 82 (07 Aug 2020 08:45) (48 - 102)  SpO2: 96% (07 Aug 2020 09:00) (87% - 100%)      General: unresponsive     Lymph Nodes: No cervical LN   Lungs: Bilateral BS  Cardiovascular: Regular, dec bs both bases  Abdomen: Soft, Positive BS  Extremities: No clubbing   Skin: DTI  Neurological: no change      08-06-20 @ 07:01  -  08-07-20 @ 07:00  --------------------------------------------------------  IN:    dextrose 5%: 1320 mL    Enteral Tube Flush: 420 mL    norepinephrine Infusion: 582.2 mL    Peptamen A.F.: 1305 mL    Solution: 350 mL  Total IN: 3977.2 mL    OUT:    Indwelling Catheter - Urethral: 300 mL    Rectal Tube: 750 mL    Voided: 250 mL  Total OUT: 1300 mL    Total NET: 2677.2 mL      08-07-20 @ 07:01  -  08-07-20 @ 09:22  --------------------------------------------------------  IN:    dextrose 5%: 120 mL    Enteral Tube Flush: 45 mL    norepinephrine Infusion: 38 mL    Peptamen A.F.: 150 mL  Total IN: 353 mL    OUT:  Total OUT: 0 mL    Total NET: 353 mL          LABS:                          7.1    16.16 )-----------( 445      ( 07 Aug 2020 04:40 )             22.4                                               08-07    145  |  113<H>  |  103<HH>  ----------------------------<  187<H>  4.7   |  16<L>  |  8.6<HH>    Ca    7.9<L>      07 Aug 2020 04:40  Phos  9.9     08-07  Mg     2.4     08-07    TPro  5.2<L>  /  Alb  1.4<L>  /  TBili  0.3  /  DBili  x   /  AST  73<H>  /  ALT  24  /  AlkPhos  168<H>  08-05      PT/INR - ( 05 Aug 2020 09:34 )   PT: 15.80 sec;   INR: 1.37 ratio         PTT - ( 05 Aug 2020 09:34 )  PTT:40.7 sec                                           CARDIAC MARKERS ( 05 Aug 2020 09:34 )  x     / 0.10 ng/mL / x     / x     / x                                                LIVER FUNCTIONS - ( 05 Aug 2020 09:34 )  Alb: 1.4 g/dL / Pro: 5.2 g/dL / ALK PHOS: 168 U/L / ALT: 24 U/L / AST: 73 U/L / GGT: x                                                                                               Mode: AC/ CMV (Assist Control/ Continuous Mandatory Ventilation)  RR (machine): 28  TV (machine): 450  FiO2: 40  PEEP: 5  ITime: 1  MAP: 13  PIP: 25                                      ABG - ( 07 Aug 2020 04:30 )  pH, Arterial: 7.25  pH, Blood: x     /  pCO2: 37    /  pO2: 67    / HCO3: 16    / Base Excess: -10.2 /  SaO2: 91                  MEDICATIONS  (STANDING):  aMIOdarone    Tablet 100 milliGRAM(s) Oral two times a day  cefepime   IVPB 1000 milliGRAM(s) IV Intermittent every 24 hours  chlorhexidine 0.12% Liquid 15 milliLiter(s) Oral Mucosa every 12 hours  chlorhexidine 4% Liquid 1 Application(s) Topical <User Schedule>  dextrose 5% 1000 milliLiter(s) (60 mL/Hr) IV Continuous <Continuous>  dextrose 50% Injectable 12.5 Gram(s) IV Push once  dextrose 50% Injectable 25 Gram(s) IV Push once  dextrose 50% Injectable 25 Gram(s) IV Push once  heparin   Injectable 5000 Unit(s) SubCutaneous every 8 hours  insulin glargine Injectable (LANTUS) 12 Unit(s) SubCutaneous at bedtime  insulin lispro Injectable (HumaLOG) 3 Unit(s) SubCutaneous every 8 hours  metroNIDAZOLE  IVPB 500 milliGRAM(s) IV Intermittent every 8 hours  midodrine 10 milliGRAM(s) Oral every 8 hours  norepinephrine Infusion 0.01 MICROgram(s)/kG/Min (1.58 mL/Hr) IV Continuous <Continuous>  pantoprazole   Suspension 40 milliGRAM(s) Enteral Tube daily  sevelamer carbonate Powder 1600 milliGRAM(s) Oral three times a day with meals  sodium bicarbonate 1300 milliGRAM(s) Oral three times a day  sodium zirconium cyclosilicate 10 Gram(s) Oral two times a day  vancomycin    Solution 125 milliGRAM(s) Oral every 6 hours    MEDICATIONS  (PRN):  acetaminophen   Tablet .. 650 milliGRAM(s) Oral every 6 hours PRN Temp greater or equal to 38C (100.4F)  dextrose 40% Gel 15 Gram(s) Oral once PRN Blood Glucose LESS THAN 70 milliGRAM(s)/deciliter  glucagon  Injectable 1 milliGRAM(s) IntraMuscular once PRN Glucose LESS THAN 70 milligrams/deciliter      Xrays: REVIEWED

## 2020-08-07 NOTE — PROGRESS NOTE ADULT - ASSESSMENT
64 year old male pmh of DM, CAD with PCI x3 in 2017, HTN, DLD, trigeminal neuralgia who presents to ED from [facility] s/p TPA for stroke.    IMPRESSION;   #Pseudomonal VAP (panS), R-sided opacities>L    8/1 tracheal cx Pseudomonas    On meropenem  IVPB 500 milliGRAM(s) IV Intermittent every 12 hours  vancomycin    Solution 125 milliGRAM(s) Oral every 6 hours    #Trach, PEG  # CVA s/p TPA s/p thrombectomy (Basiliar Artery) - unresponsive off sedation, on pressors  - repeat CT head:  Interval increase in posterior fossa edema and mass effect consistent with evolving infarct  with resultant new effacement of the fourth ventricle and new mild obstructive hydrocephalus. Increased hypergenicity within the left ambient and quadrigeminal plate cisterns, and new hypodensity within the right ambient and quadrigeminal plate cisterns, the suprasellar cistern, the right sylvian fissure and scattered sulci suspicious worsening/new subarachnoid hemorrhage.  - remained ventilated, off sedation  - no neurologic activity.   #7/26 CD colitis: on po vancomycin. Diarrhea has decreased  #E coli bacteremia : secondary to  tract with UCx E coli but no overt pyuria. Could well also be secondary to translocation from the GI tract.  BCx 7/27 NGTD  CT Abdomen 7/28:  Liquid stool within the colon without evidence of obstruction or colonic wall thickening. No intraperitoneal free air. Bilateral small pleural effusions with associated opacities    RECOMMENDATIONS;  - If febrile, please obtain blood cultures , change all lines (TLC 7/27, a-line 7/17) and Dose Vanc 1g IV x1  - continue Cefepime 1g q24h IV & Flagyl 500mg q8h IV given sensitivities and in the setting of Cdiff (end 8/10)  - PO vancomycin 125 mg q6h x 14 days end 8/8 as persistent diarrhea, then daily 125mg PO as remains on broad spectrum abx (until 8/12)    This is a preliminary incomplete pended note, all final recommendations to follow after interview and examination of the patient. 64 year old male pmh of DM, CAD with PCI x3 in 2017, HTN, DLD, trigeminal neuralgia who presents to ED from [facility] s/p TPA for stroke.    IMPRESSION;   #Pseudomonal VAP (panS), R-sided opacities>L    8/1 tracheal cx Pseudomonas    On meropenem  IVPB 500 milliGRAM(s) IV Intermittent every 12 hours  vancomycin    Solution 125 milliGRAM(s) Oral every 6 hours    #Trach, PEG  # CVA s/p TPA s/p thrombectomy (Basiliar Artery) - unresponsive off sedation, on pressors  - repeat CT head:  Interval increase in posterior fossa edema and mass effect consistent with evolving infarct  with resultant new effacement of the fourth ventricle and new mild obstructive hydrocephalus. Increased hypergenicity within the left ambient and quadrigeminal plate cisterns, and new hypodensity within the right ambient and quadrigeminal plate cisterns, the suprasellar cistern, the right sylvian fissure and scattered sulci suspicious worsening/new subarachnoid hemorrhage.  - remained ventilated, off sedation  - no neurologic activity.   #7/26 CD colitis: on po vancomycin. Diarrhea has decreased  #E coli bacteremia : secondary to  tract with UCx E coli but no overt pyuria. Could well also be secondary to translocation from the GI tract.  BCx 7/27 NGTD  CT Abdomen 7/28:  Liquid stool within the colon without evidence of obstruction or colonic wall thickening. No intraperitoneal free air. Bilateral small pleural effusions with associated opacities    RECOMMENDATIONS;  - If febrile, please obtain blood cultures , change all lines (TLC 7/27, a-line 7/17) and Dose Vanc 1g IV x1  - continue Cefepime 1g q24h IV & Flagyl 500mg q8h IV given sensitivities and in the setting of Cdiff (end 8/10)  - PO vancomycin 125 mg q6h x 14 days end 8/8 as persistent diarrhea, then daily 125mg PO as remains on broad spectrum abx (until 8/12)    Please call if with any questions.  Spectra [phone]

## 2020-08-07 NOTE — PROGRESS NOTE ADULT - SUBJECTIVE AND OBJECTIVE BOX
Patient is a 64y old  Male who presents with a chief complaint of Stroke s/p tpa (07 Aug 2020 10:54)  pt seen and evaluated   remain vented via trach  resting comfortably  medical f/u noted    ICU Vital Signs Last 24 Hrs  T(C): 35.4 (07 Aug 2020 12:00), Max: 36 (06 Aug 2020 16:00)  T(F): 95.8 (07 Aug 2020 12:00), Max: 96.8 (06 Aug 2020 16:00)  HR: 60 (07 Aug 2020 14:30) (56 - 82)  BP: 120/56 (07 Aug 2020 14:30) (74/38 - 154/60)  BP(mean): 80 (07 Aug 2020 14:30) (48 - 102)  SpO2: 96% (07 Aug 2020 14:30) (87% - 99%)      Drug Dosing Weight  Height (cm): 177.8 (17 Jul 2020 00:00)  Weight (kg): 84.5 (16 Jul 2020 20:42)  BMI (kg/m2): 26.7 (17 Jul 2020 00:00)  BSA (m2): 2.03 (17 Jul 2020 00:00)    I&O's Detail    06 Aug 2020 07:01  -  07 Aug 2020 07:00  --------------------------------------------------------  IN:    dextrose 5%: 1320 mL    Enteral Tube Flush: 420 mL    norepinephrine Infusion: 582.2 mL    Peptamen A.F.: 1305 mL    Solution: 350 mL  Total IN: 3977.2 mL    OUT:    Indwelling Catheter - Urethral: 300 mL    Rectal Tube: 750 mL    Voided: 250 mL  Total OUT: 1300 mL    Total NET: 2677.2 mL      07 Aug 2020 07:01  -  07 Aug 2020 15:32  --------------------------------------------------------  IN:    dextrose 5%: 480 mL    Enteral Tube Flush: 195 mL    norepinephrine Infusion: 125.8 mL    Peptamen A.F.: 400 mL  Total IN: 1200.8 mL    OUT:    Rectal Tube: 200 mL    Voided: 200 mL  Total OUT: 400 mL    Total NET: 800.8 mL     PHYSICAL EXAM:  Constitutional:   remain vented via trach  Gastrointestinal:  +peg tube feed , soft  MEDICATIONS  (STANDING):  aMIOdarone    Tablet 100 milliGRAM(s) Oral two times a day  cefepime   IVPB 1000 milliGRAM(s) IV Intermittent every 24 hours  chlorhexidine 0.12% Liquid 15 milliLiter(s) Oral Mucosa every 12 hours  chlorhexidine 4% Liquid 1 Application(s) Topical <User Schedule>  dextrose 5% 1000 milliLiter(s) (60 mL/Hr) IV Continuous <Continuous>  dextrose 50% Injectable 12.5 Gram(s) IV Push once  dextrose 50% Injectable 25 Gram(s) IV Push once  dextrose 50% Injectable 25 Gram(s) IV Push once  heparin   Injectable 5000 Unit(s) SubCutaneous every 8 hours  insulin glargine Injectable (LANTUS) 12 Unit(s) SubCutaneous at bedtime  insulin lispro Injectable (HumaLOG) 3 Unit(s) SubCutaneous every 8 hours  metroNIDAZOLE  IVPB 500 milliGRAM(s) IV Intermittent every 8 hours  midodrine 10 milliGRAM(s) Oral every 8 hours  norepinephrine Infusion 0.01 MICROgram(s)/kG/Min (1.58 mL/Hr) IV Continuous <Continuous>  pantoprazole   Suspension 40 milliGRAM(s) Enteral Tube daily  sevelamer carbonate Powder 1600 milliGRAM(s) Oral three times a day with meals  sodium bicarbonate 1300 milliGRAM(s) Oral three times a day  sodium zirconium cyclosilicate 10 Gram(s) Oral two times a day  vancomycin    Solution 125 milliGRAM(s) Oral every 6 hours      Diet, NPO with Tube Feed:   Tube Feeding Modality: Orogastric  Nepro with Carb Steady  Total Volume for 24 Hours (mL): 1200  Continuous  Starting Tube Feed Rate mL per Hour: 10  Increase Tube Feed Rate by (mL): 10     Every 4 hours  Until Goal Tube Feed Rate (mL per Hour): 50  Tube Feed Duration (in Hours): 24  Tube Feed Start Time: 13:30 (08-06-20 @ 13:17)      LABS  08-07    145  |  113<H>  |  103<HH>  ----------------------------<  187<H>  4.7   |  16<L>  |  8.6<HH>    Ca    7.9<L>      07 Aug 2020 04:40  Phos  9.9     08-07  Mg     2.4     08-07                            7.1    16.16 )-----------( 445      ( 07 Aug 2020 04:40 )             22.4     CAPILLARY BLOOD GLUCOSE  POCT Blood Glucose.: 229 mg/dL (07 Aug 2020 13:21)  POCT Blood Glucose.: 187 mg/dL (07 Aug 2020 04:32)  POCT Blood Glucose.: 163 mg/dL (06 Aug 2020 21:47)   RADIOLOGY STUDIES  < from: Xray Chest 1 View- PORTABLE-Routine (08.07.20 @ 04:57) >  Impression:  Bilateral opacities and pleural effusions unchanged. No air leak.

## 2020-08-07 NOTE — PROGRESS NOTE ADULT - SUBJECTIVE AND OBJECTIVE BOX
Nephrology progress note    THIS IS AN INCOMPLETE NOTE . FULL NOTE TO FOLLOW SHORTLY    Patient is seen and examined, events over the last 24 h noted .    Allergies:  No Known Allergies    Hospital Medications:   MEDICATIONS  (STANDING):  aMIOdarone    Tablet 100 milliGRAM(s) Oral two times a day  cefepime   IVPB 1000 milliGRAM(s) IV Intermittent every 24 hours  chlorhexidine 0.12% Liquid 15 milliLiter(s) Oral Mucosa every 12 hours  chlorhexidine 4% Liquid 1 Application(s) Topical <User Schedule>  dextrose 5% 1000 milliLiter(s) (60 mL/Hr) IV Continuous <Continuous>  dextrose 50% Injectable 12.5 Gram(s) IV Push once  dextrose 50% Injectable 25 Gram(s) IV Push once  dextrose 50% Injectable 25 Gram(s) IV Push once  heparin   Injectable 5000 Unit(s) SubCutaneous every 8 hours  insulin glargine Injectable (LANTUS) 12 Unit(s) SubCutaneous at bedtime  insulin lispro Injectable (HumaLOG) 3 Unit(s) SubCutaneous every 8 hours  metroNIDAZOLE  IVPB 500 milliGRAM(s) IV Intermittent every 8 hours  midodrine 10 milliGRAM(s) Oral every 8 hours  norepinephrine Infusion 0.01 MICROgram(s)/kG/Min (1.58 mL/Hr) IV Continuous <Continuous>  pantoprazole   Suspension 40 milliGRAM(s) Enteral Tube daily  sevelamer carbonate Powder 1600 milliGRAM(s) Oral three times a day with meals  sodium bicarbonate 1300 milliGRAM(s) Oral three times a day  sodium zirconium cyclosilicate 10 Gram(s) Oral two times a day  vancomycin    Solution 125 milliGRAM(s) Oral every 6 hours        VITALS:  T(F): 96.4 (08-07-20 @ 08:00), Max: 96.8 (08-06-20 @ 16:00)  HR: 60 (08-07-20 @ 09:00)  BP: 116/55 (08-07-20 @ 08:45)  RR: --  SpO2: 96% (08-07-20 @ 09:00)  Wt(kg): --    08-05 @ 07:01  -  08-06 @ 07:00  --------------------------------------------------------  IN: 3587.2 mL / OUT: 230 mL / NET: 3357.2 mL    08-06 @ 07:01  -  08-07 @ 07:00  --------------------------------------------------------  IN: 3977.2 mL / OUT: 1300 mL / NET: 2677.2 mL    08-07 @ 07:01 - 08-07 @ 09:46  --------------------------------------------------------  IN: 353 mL / OUT: 0 mL / NET: 353 mL          PHYSICAL EXAM:  Constitutional: NAD  HEENT: anicteric sclera, oropharynx clear, MMM  Neck: No JVD  Respiratory: CTAB, no wheezes, rales or rhonchi  Cardiovascular: S1, S2, RRR  Gastrointestinal: BS+, soft, NT/ND  Extremities: No cyanosis or clubbing. No peripheral edema  :  No vergara.   Skin: No rashes    LABS:  08-07    145  |  113<H>  |  103<HH>  ----------------------------<  187<H>  4.7   |  16<L>  |  8.6<HH>    Ca    7.9<L>      07 Aug 2020 04:40  Phos  9.9     08-07  Mg     2.4     08-07                            7.1    16.16 )-----------( 445      ( 07 Aug 2020 04:40 )             22.4       Urine Studies:      RADIOLOGY & ADDITIONAL STUDIES: Nephrology progress note  Patient is seen and examined, events over the last 24 h noted .  still intubated on MV     Allergies:  No Known Allergies    Hospital Medications:   MEDICATIONS  (STANDING):    aMIOdarone    Tablet 100 milliGRAM(s) Oral two times a day  cefepime   IVPB 1000 milliGRAM(s) IV Intermittent every 24 hours  dextrose 5% 1000 milliLiter(s) (60 mL/Hr) IV Continuous <Continuous>  heparin   Injectable 5000 Unit(s) SubCutaneous every 8 hours  insulin glargine Injectable (LANTUS) 12 Unit(s) SubCutaneous at bedtime  insulin lispro Injectable (HumaLOG) 3 Unit(s) SubCutaneous every 8 hours  metroNIDAZOLE  IVPB 500 milliGRAM(s) IV Intermittent every 8 hours  midodrine 10 milliGRAM(s) Oral every 8 hours  norepinephrine Infusion 0.01 MICROgram(s)/kG/Min (1.58 mL/Hr) IV Continuous <Continuous>  pantoprazole   Suspension 40 milliGRAM(s) Enteral Tube daily  sevelamer carbonate Powder 1600 milliGRAM(s) Oral three times a day with meals  sodium bicarbonate 1300 milliGRAM(s) Oral three times a day  sodium zirconium cyclosilicate 10 Gram(s) Oral two times a day  vancomycin    Solution 125 milliGRAM(s) Oral every 6 hours        VITALS:  T(F): 96.4 (08-07-20 @ 08:00), Max: 96.8 (08-06-20 @ 16:00)  HR: 60 (08-07-20 @ 09:00)  BP: 116/55 (08-07-20 @ 08:45)  SpO2: 96% (08-07-20 @ 09:00)  Wt(kg): --    08-05 @ 07:01  -  08-06 @ 07:00  --------------------------------------------------------  IN: 3587.2 mL / OUT: 230 mL / NET: 3357.2 mL    08-06 @ 07:01  -  08-07 @ 07:00  --------------------------------------------------------  IN: 3977.2 mL / OUT: 1300 mL / NET: 2677.2 mL    08-07 @ 07:01  -  08-07 @ 09:46  --------------------------------------------------------  IN: 353 mL / OUT: 0 mL / NET: 353 mL          PHYSICAL EXAM:  Constitutional: intubated on MV   Neck: No JVD trach  Respiratory: CTAB, no wheezes, rales or rhonchi  Cardiovascular: S1, S2, RRR  Gastrointestinal: BS+, soft, NT/ND  Extremities: No cyanosis or clubbing. No peripheral edema  Skin: No rashes    LABS:  08-07    145  |  113<H>  |  103<HH>  ----------------------------<  187<H>  4.7   |  16<L>  |  8.6<HH>    Creatinine Trend: 8.6<--, 7.9<--, 8.1<--, 7.9<--, 7.3<--, 7.4<--    Ca    7.9<L>      07 Aug 2020 04:40  Phos  9.9     08-07  Mg     2.4     08-07                            7.1    16.16 )-----------( 445      ( 07 Aug 2020 04:40 )             22.4       Urine Studies:      RADIOLOGY & ADDITIONAL STUDIES:

## 2020-08-07 NOTE — PROGRESS NOTE ADULT - ASSESSMENT
ASSESSMENT/PLAN  cardiac arrest PEA on 8/5/2020  ROSC achieved after 1 cycle of CPR  Acute hypoxemic respiratory failure   S/P TRACH  Stroke SP TAP And thrombectomy  NAEL  DI   E coli bacteremia  G+ Cocci bacteremia Coag neg staph   Septic shock  CDiff     when ok to increase tube feed continue with peptamen af up to a goal of Peptamen AF at 65 ml/h to provide 1872 k, 119 gm protein, with lower carb content, better omega6:3    check bmp/phos/mg and correct lytes

## 2020-08-07 NOTE — PROGRESS NOTE ADULT - SUBJECTIVE AND OBJECTIVE BOX
24H events:    Patient is a 64y old Male who presents with a chief complaint of Stroke s/p tpa (07 Aug 2020 09:46)    Primary diagnosis of Stroke     This morning patient was seen and examined at bedside, resting comfortably in bed.   He is off sedation. Today he is hemodynamically stable, off levophed drip. No fever. No notable events    PAST MEDICAL & SURGICAL HISTORY  HTN (hypertension)  Diabetes  No significant past surgical history    SOCIAL HISTORY:  Social History:   lives with wife at [facility]  NO smoking or Etoh (16 Jul 2020 18:12)      ALLERGIES:  No Known Allergies    MEDICATIONS:  STANDING MEDICATIONS  aMIOdarone    Tablet 100 milliGRAM(s) Oral two times a day  cefepime   IVPB 1000 milliGRAM(s) IV Intermittent every 24 hours  chlorhexidine 0.12% Liquid 15 milliLiter(s) Oral Mucosa every 12 hours  chlorhexidine 4% Liquid 1 Application(s) Topical <User Schedule>  dextrose 5% 1000 milliLiter(s) IV Continuous <Continuous>  dextrose 50% Injectable 12.5 Gram(s) IV Push once  dextrose 50% Injectable 25 Gram(s) IV Push once  dextrose 50% Injectable 25 Gram(s) IV Push once  heparin   Injectable 5000 Unit(s) SubCutaneous every 8 hours  insulin glargine Injectable (LANTUS) 12 Unit(s) SubCutaneous at bedtime  insulin lispro Injectable (HumaLOG) 3 Unit(s) SubCutaneous every 8 hours  metroNIDAZOLE  IVPB 500 milliGRAM(s) IV Intermittent every 8 hours  midodrine 10 milliGRAM(s) Oral every 8 hours  norepinephrine Infusion 0.01 MICROgram(s)/kG/Min IV Continuous <Continuous>  pantoprazole   Suspension 40 milliGRAM(s) Enteral Tube daily  sevelamer carbonate Powder 1600 milliGRAM(s) Oral three times a day with meals  sodium bicarbonate 1300 milliGRAM(s) Oral three times a day  sodium zirconium cyclosilicate 10 Gram(s) Oral two times a day  vancomycin    Solution 125 milliGRAM(s) Oral every 6 hours    PRN MEDICATIONS  acetaminophen   Tablet .. 650 milliGRAM(s) Oral every 6 hours PRN  dextrose 40% Gel 15 Gram(s) Oral once PRN  glucagon  Injectable 1 milliGRAM(s) IntraMuscular once PRN    VITALS:   T(F): 96.4  HR: 56  BP: 137/67  RR: --  SpO2: 96%    PHYSICAL EXAM:  GENERAL: unresponsive male in NAD   HEENT: NCAT, +tracheostomy, +R IJ  CHEST/LUNG: audible breath sounds b/l   HEART: Regular rate and rhythm; s1 s2 appreciated  ABDOMEN: soft, mildly distended abdomen, +PEG tube   EXTREMITIES: UE and LE edema   NERVOUS SYSTEM: some spontaneous nonpurposeful movements to tactile sensation, no apparent brainstem reflexes, absent reflexes in upper extremities. Hyper reflexia in lower extremities    LABS:                        7.1    16.16 )-----------( 445      ( 07 Aug 2020 04:40 )             22.4     08-07    145  |  113<H>  |  103<HH>  ----------------------------<  187<H>  4.7   |  16<L>  |  8.6<HH>    Ca    7.9<L>      07 Aug 2020 04:40  Phos  9.9     08-07  Mg     2.4     08-07          ABG - ( 07 Aug 2020 04:30 )  pH, Arterial: 7.25  pH, Blood: x     /  pCO2: 37    /  pO2: 67    / HCO3: 16    / Base Excess: -10.2 /  SaO2: 91                        RADIOLOGY:

## 2020-08-07 NOTE — PROGRESS NOTE ADULT - ASSESSMENT
#NAEL on CKD, p/w creatinine 1.9, unknown baseline, ATN in the context of CVA/shock  - creatinine trending up sp code blue    - non oliguric still   - DARSHAN noted, non-echogenic kidneys, no hydro   - last ph at goal   - cont  sodium bicarbonate PO 1300mg TID with D5w  with 3 amp of bicarbonate at 60 cc/h   - k noted , continue  lokelma 10 q 12, feed : nepro  - hypernatremia , improving   - no indication for RRT, would not improve prognosis/ prognosis poor after code blue too   -  sp trach/PEG   - ph noted , feed : nepro, renagel 2/2/2 and add Phoslo one tab po q8h   - continue midodrine   - prognosis poor / appreciate palliative notes   - will follow

## 2020-08-07 NOTE — PROGRESS NOTE ADULT - SUBJECTIVE AND OBJECTIVE BOX
ANA CRISTINA LOVING  64y, Male  Allergy: No Known Allergies      LOS  22d    CHIEF COMPLAINT: Stroke s/p tpa (06 Aug 2020 14:35)      INTERVAL EVENTS/HPI  - No acute events overnight  - T(F): , Max: 96.8 (08-06-20 @ 16:00)  - Denies any worsening symptoms  - Tolerating medication  - WBC Count: 16.16 (08-07-20 @ 04:40)  WBC Count: 16.70 (08-06-20 @ 04:50)     - Creatinine, Serum: 8.6 (08-07-20 @ 04:40)  Creatinine, Serum: 7.9 (08-06-20 @ 22:21)       ROS  General: Denies rigors, nightsweats  HEENT: Denies headache, rhinorrhea, sore throat, eye pain  CV: Denies CP, palpitations  PULM: Denies wheezing, hemoptysis  GI: Denies hematemesis, hematochezia, melena  : Denies discharge, hematuria  MSK: Denies arthralgias, myalgias  SKIN: Denies rash, lesions  NEURO: Denies paresthesias, weakness  PSYCH: Denies depression, anxiety    VITALS:  T(F): 96.4, Max: 96.8 (08-06-20 @ 16:00)  HR: 60  BP: 116/55  RR: --Vital Signs Last 24 Hrs  T(C): 35.8 (07 Aug 2020 08:00), Max: 36 (06 Aug 2020 16:00)  T(F): 96.4 (07 Aug 2020 08:00), Max: 96.8 (06 Aug 2020 16:00)  HR: 60 (07 Aug 2020 09:00) (60 - 82)  BP: 116/55 (07 Aug 2020 08:45) (74/35 - 154/60)  BP(mean): 82 (07 Aug 2020 08:45) (48 - 102)  RR: --  SpO2: 96% (07 Aug 2020 09:00) (87% - 100%)    PHYSICAL EXAM:  Gen: NAD, resting in bed  HEENT: Normocephalic, atraumatic  Neck: supple, no lymphadenopathy  CV: Regular rate & regular rhythm  Lungs: decreased BS at bases, no fremitus  Abdomen: Soft, BS present  Ext: Warm, well perfused  Neuro: non focal, awake  Skin: no rash, no erythema  Lines: no phlebitis    FH: Non-contributory  Social Hx: Non-contributory    TESTS & MEASUREMENTS:                        7.1    16.16 )-----------( 445      ( 07 Aug 2020 04:40 )             22.4     08-07    145  |  113<H>  |  103<HH>  ----------------------------<  187<H>  4.7   |  16<L>  |  8.6<HH>    Ca    7.9<L>      07 Aug 2020 04:40  Phos  9.9     08-07  Mg     2.4     08-07    TPro  5.2<L>  /  Alb  1.4<L>  /  TBili  0.3  /  DBili  x   /  AST  73<H>  /  ALT  24  /  AlkPhos  168<H>  08-05    eGFR if Non African American: 6 mL/min/1.73M2 (08-07-20 @ 04:40)  eGFR if African American: 7 mL/min/1.73M2 (08-07-20 @ 04:40)  eGFR if Non African American: 6 mL/min/1.73M2 (08-06-20 @ 22:21)  eGFR if : 7 mL/min/1.73M2 (08-06-20 @ 22:21)  eGFR if Non African American: 6 mL/min/1.73M2 (08-06-20 @ 17:50)  eGFR if : 7 mL/min/1.73M2 (08-06-20 @ 17:50)    LIVER FUNCTIONS - ( 05 Aug 2020 09:34 )  Alb: 1.4 g/dL / Pro: 5.2 g/dL / ALK PHOS: 168 U/L / ALT: 24 U/L / AST: 73 U/L / GGT: x               Culture - Sputum (collected 08-01-20 @ 16:20)  Source: .Sputum Sputum  Gram Stain (08-02-20 @ 05:43):    Numerous polymorphonuclear leukocytes per low power field    Few Squamous epithelial cells per low power field    Rare Gram positive cocci in pairs seen per oil power field  Final Report (08-03-20 @ 15:52):    Few Pseudomonas aeruginosa    Normal Respiratory Jayde present  Organism: Pseudomonas aeruginosa (08-03-20 @ 15:52)  Organism: Pseudomonas aeruginosa (08-03-20 @ 15:52)      -  Amikacin: S <=16      -  Aztreonam: S <=4      -  Cefepime: S <=2      -  Ceftazidime: S <=1      -  Ciprofloxacin: S <=0.25      -  Gentamicin: S <=2      -  Imipenem: S <=1      -  Levofloxacin: S <=0.5      -  Meropenem: S <=1      -  Piperacillin/Tazobactam: S <=8      -  Tobramycin: S <=2      Method Type: MORRIS    Culture - Blood (collected 07-27-20 @ 14:53)  Source: .Blood Blood-Venous  Final Report (08-01-20 @ 23:01):    No Growth Final    Culture - Blood (collected 07-27-20 @ 04:17)  Source: .Blood None  Final Report (08-01-20 @ 14:01):    No Growth Final    Culture - Blood (collected 07-23-20 @ 21:05)  Source: .Blood Blood  Gram Stain (07-27-20 @ 01:52):    Growth in anaerobic bottle: Gram Negative Rods    Growth in aerobic bottle:    Gram Positive Cocci in Clusters  Final Report (07-28-20 @ 11:37):    Growth in anaerobic bottle: Escherichia coli    Growth in aerobic bottle: Coag Negative Staphylococcus Unable to Identify    further    Coag Negative Staphylococcus    Single set isolate, possible contaminant. Contact    Microbiology if susceptibility testing clinically    indicated.    "Due to technical problems, Proteus sp. will Not be reported as part of    the BCID panel until further notice"    ***Blood Panel PCR results on this specimen are available    approximately 3 hours after the Gram stain result.***    Gram stain, PCR, and/or culture results may not always    correspond due to difference in methodologies.    ************************************************************    This PCR assay was performed using [employer].    The following targets are tested for: Enterococcus,    vancomycin resistant enterococci, Listeria monocytogenes,    coagulase negative staphylococci, S. aureus,    methicillin resistant S. aureus, Streptococcus agalactiae    (Group B), S. pneumoniae, S. pyogenes (Group A),    Acinetobacter baumannii, Enterobacter cloacae, E. coli,    Klebsiella oxytoca, K. pneumoniae, Proteus sp.,    Serratia marcescens, Haemophilus influenzae,    Neisseria meningitidis, Pseudomonas aeruginosa, Candida    albicans, C. glabrata, C krusei, C parapsilosis,    C. tropicalis and the KPC resistance gene.  Organism: Blood Culture PCR  Escherichia coli  Blood Culture PCR (07-27-20 @ 02:56)  Organism: Blood Culture PCR (07-27-20 @ 02:56)      -  Acinetobacter baumanii: Nondet      -  Candida albicans: Nondet      -  Candida glabrata: Nondet      -  Candida krusei: Nondet      -  Candida parapsilosis: Nondet      -  Candida tropicalis: Nondet      -  Coagulase negative Staphylococcus: Nondet      -  Enterobacter cloacae complex: Nondet      -  Enterococcus species: Nondet      -  Escherichia coli: Nondet      -  Haemophilus influenzae: Nondet      -  Klebsiella oxytoca: Nondet      -  Klebsiella pneumoniae: Nondet      -  Listeria monocytogenes: Nondet      -  Methicillin resistant Staphylococcus aureus (MRSA): Nondet      -  Multidrug (KPC pos) resistant organism: Nondet      -  Neisseria meningitidis: Nondet      -  Pseudomonas aeruginosa: Nondet      -  Serratia marcescens: Nondet      -  Staphylococcus aureus: Nondet      -  Streptococcus agalactiae (Group B): Nondet      -  Streptococcus pneumoniae: Nondet      -  Streptococcus pyogenes (Group A): Nondet      -  Streptococcus sp. (Not Grp A, B or S pneumoniae): Nondet      -  Vancomycin resistant Enterococcus sp.: Nondet      Method Type: PCR  Organism: Escherichia coli (07-27-20 @ 01:52)      -  Amikacin: S <=16      -  Ampicillin: R >16 These ampicillin results predict results for amoxicillin      -  Ampicillin/Sulbactam: I 16/8 Enterobacter, Citrobacter, and Serratia may develop resistance during prolonged therapy (3-4 days)      -  Aztreonam: S <=4      -  Cefazolin: R >16 Enterobacter, Citrobacter, and Serratia may develop resistance during prolonged therapy (3-4 days)      -  Cefepime: S <=2      -  Cefoxitin: R >16      -  Ceftriaxone: S <=1 Enterobacter, Citrobacter, and Serratia may develop resistance during prolonged therapy      -  Ciprofloxacin: R >2      -  Ertapenem: S <=0.5      -  Gentamicin: S <=2      -  Imipenem: S <=1      -  Levofloxacin: R >4      -  Meropenem: S <=1      -  Piperacillin/Tazobactam: S <=8      -  Tobramycin: S <=2      -  Trimethoprim/Sulfamethoxazole: S 2/38      Method Type: MORRIS  Organism: Blood Culture PCR (07-27-20 @ 01:52)      -  Escherichia coli: Detec      Method Type: PCR    Culture - Urine (collected 07-23-20 @ 11:11)  Source: .Urine Catheterized  Final Report (07-27-20 @ 14:36):    >100,000 CFU/ml Escherichia coli  Organism: Escherichia coli (07-27-20 @ 14:36)  Organism: Escherichia coli (07-27-20 @ 14:36)      -  Amikacin: S <=16      -  Amoxicillin/Clavulanic Acid: R >16/8      -  Ampicillin: R >16 These ampicillin results predict results for amoxicillin      -  Ampicillin/Sulbactam: I 16/8 Enterobacter, Citrobacter, and Serratia may develop resistance during prolonged therapy (3-4 days)      -  Aztreonam: S <=4      -  Cefazolin: R >16 ([id number]) For uncomplicated UTI with K. pneumoniae, E. coli, or P. mirablis: MORRIS <=16 is sensitive and MORRIS >=32 is resistant. This also predicts results for oral agents cefaclor, cefdinir, cefpodoxime, cefprozil, cefuroxime axetil, cephalexin and locarbef for uncomplicated UTI. Note that some isolates may be susceptible to these agents while testing resistant to cefazolin.      -  Cefepime: S <=2      -  Cefoxitin: R >16      -  Ceftriaxone: S <=1 Enterobacter, Citrobacter, and Serratia may develop resistance during prolonged therapy      -  Ciprofloxacin: R >2      -  Ertapenem: S <=0.5      -  Gentamicin: S <=2      -  Levofloxacin: R >4      -  Meropenem: S <=1      -  Nitrofurantoin: S <=32 Should not be used to treat pyelonephritis      -  Piperacillin/Tazobactam: S <=8      -  Tigecycline: S <=2      -  Tobramycin: S <=2      -  Trimethoprim/Sulfamethoxazole: S 2/38      Method Type: MORRIS        Lactate, Blood: 2.8 mmol/L (08-05-20 @ 09:34)      INFECTIOUS DISEASES TESTING  MRSA PCR Result.: Negative (07-17-20 @ 03:37)  COVID-19 PCR: NotDetec (07-16-20 @ 16:44)      INFLAMMATORY MARKERS      RADIOLOGY & ADDITIONAL TESTS:  I have personally reviewed the last available Chest xray  CXR      CT      CARDIOLOGY TESTING  12 Lead ECG:   Ventricular Rate 112 BPM    Atrial Rate 394 BPM    QRS Duration 86 ms    Q-T Interval 408 ms    QTC Calculation(Bezet) 556 ms    R Axis 13 degrees    T Axis 77 degrees    Diagnosis Line Atrial fibrillation with rapid ventricular response  Nonspecific T wave abnormality  Prolonged QT  Abnormal ECG    Confirmed by LISA LOBATO MD ([id number]) on 8/1/2020 11:27:10 AM (08-01-20 @ 00:59)      MEDICATIONS  aMIOdarone    Tablet 100 Oral two times a day  cefepime   IVPB 1000 IV Intermittent every 24 hours  chlorhexidine 0.12% Liquid 15 Oral Mucosa every 12 hours  chlorhexidine 4% Liquid 1 Topical <User Schedule>  dextrose 5% 1000 IV Continuous <Continuous>  dextrose 50% Injectable 12.5 IV Push once  dextrose 50% Injectable 25 IV Push once  dextrose 50% Injectable 25 IV Push once  heparin   Injectable 5000 SubCutaneous every 8 hours  insulin glargine Injectable (LANTUS) 12 SubCutaneous at bedtime  insulin lispro Injectable (HumaLOG) 3 SubCutaneous every 8 hours  metroNIDAZOLE  IVPB 500 IV Intermittent every 8 hours  midodrine 10 Oral every 8 hours  norepinephrine Infusion 0.01 IV Continuous <Continuous>  pantoprazole   Suspension 40 Enteral Tube daily  sevelamer carbonate Powder 1600 Oral three times a day with meals  sodium bicarbonate 1300 Oral three times a day  sodium zirconium cyclosilicate 10 Oral two times a day  vancomycin    Solution 125 Oral every 6 hours      WEIGHT  Weight (kg): 84.5 (07-16-20 @ 20:42)  Creatinine, Serum: 8.6 mg/dL (08-07-20 @ 04:40)  Creatinine, Serum: 7.9 mg/dL (08-06-20 @ 22:21)  Creatinine, Serum: 8.1 mg/dL (08-06-20 @ 17:50)      ANTIBIOTICS:  cefepime   IVPB 1000 milliGRAM(s) IV Intermittent every 24 hours  metroNIDAZOLE  IVPB 500 milliGRAM(s) IV Intermittent every 8 hours  vancomycin    Solution 125 milliGRAM(s) Oral every 6 hours      All available historical records have been reviewed ANA CRISTINA LOVING  64y, Male  Allergy: No Known Allergies      LOS  22d    CHIEF COMPLAINT: Stroke s/p tpa (06 Aug 2020 14:35)      INTERVAL EVENTS/HPI  - No acute events overnight  - T(F): , Max: 96.8 (08-06-20 @ 16:00)  - continues to have thick secretions   - WBC Count: 16.16 (08-07-20 @ 04:40)  WBC Count: 16.70 (08-06-20 @ 04:50)     - Creatinine, Serum: 8.6 (08-07-20 @ 04:40)  Creatinine, Serum: 7.9 (08-06-20 @ 22:21)       ROS  General: Denies rigors, nightsweats  HEENT: Denies headache, rhinorrhea, sore throat, eye pain  CV: Denies CP, palpitations  PULM: Denies wheezing, hemoptysis  GI: Denies hematemesis, hematochezia, melena  : Denies discharge, hematuria  MSK: Denies arthralgias, myalgias  SKIN: Denies rash, lesions  NEURO: Denies paresthesias, weakness  PSYCH: Denies depression, anxiety    VITALS:  T(F): 96.4, Max: 96.8 (08-06-20 @ 16:00)  HR: 60  BP: 116/55  RR: --Vital Signs Last 24 Hrs  T(C): 35.8 (07 Aug 2020 08:00), Max: 36 (06 Aug 2020 16:00)  T(F): 96.4 (07 Aug 2020 08:00), Max: 96.8 (06 Aug 2020 16:00)  HR: 60 (07 Aug 2020 09:00) (60 - 82)  BP: 116/55 (07 Aug 2020 08:45) (74/35 - 154/60)  BP(mean): 82 (07 Aug 2020 08:45) (48 - 102)  RR: --  SpO2: 96% (07 Aug 2020 09:00) (87% - 100%)    PHYSICAL EXAM:  Gen: vented; not awake   HEENT: Normocephalic, atraumatic  Neck: supple, no lymphadenopathy  CV: Regular rate & regular rhythm  Lungs: decreased BS at bases, no fremitus  Abdomen: Soft, BS present  Ext: Warm, well perfused  Neuro: sedated  Skin: no rash, no erythema  Lines: no phlebitis    FH: Non-contributory  Social Hx: Non-contributory    TESTS & MEASUREMENTS:                        7.1    16.16 )-----------( 445      ( 07 Aug 2020 04:40 )             22.4     08-07    145  |  113<H>  |  103<HH>  ----------------------------<  187<H>  4.7   |  16<L>  |  8.6<HH>    Ca    7.9<L>      07 Aug 2020 04:40  Phos  9.9     08-07  Mg     2.4     08-07    TPro  5.2<L>  /  Alb  1.4<L>  /  TBili  0.3  /  DBili  x   /  AST  73<H>  /  ALT  24  /  AlkPhos  168<H>  08-05    eGFR if Non African American: 6 mL/min/1.73M2 (08-07-20 @ 04:40)  eGFR if African American: 7 mL/min/1.73M2 (08-07-20 @ 04:40)  eGFR if Non African American: 6 mL/min/1.73M2 (08-06-20 @ 22:21)  eGFR if : 7 mL/min/1.73M2 (08-06-20 @ 22:21)  eGFR if Non African American: 6 mL/min/1.73M2 (08-06-20 @ 17:50)  eGFR if : 7 mL/min/1.73M2 (08-06-20 @ 17:50)    LIVER FUNCTIONS - ( 05 Aug 2020 09:34 )  Alb: 1.4 g/dL / Pro: 5.2 g/dL / ALK PHOS: 168 U/L / ALT: 24 U/L / AST: 73 U/L / GGT: x               Culture - Sputum (collected 08-01-20 @ 16:20)  Source: .Sputum Sputum  Gram Stain (08-02-20 @ 05:43):    Numerous polymorphonuclear leukocytes per low power field    Few Squamous epithelial cells per low power field    Rare Gram positive cocci in pairs seen per oil power field  Final Report (08-03-20 @ 15:52):    Few Pseudomonas aeruginosa    Normal Respiratory Jayde present  Organism: Pseudomonas aeruginosa (08-03-20 @ 15:52)  Organism: Pseudomonas aeruginosa (08-03-20 @ 15:52)      -  Amikacin: S <=16      -  Aztreonam: S <=4      -  Cefepime: S <=2      -  Ceftazidime: S <=1      -  Ciprofloxacin: S <=0.25      -  Gentamicin: S <=2      -  Imipenem: S <=1      -  Levofloxacin: S <=0.5      -  Meropenem: S <=1      -  Piperacillin/Tazobactam: S <=8      -  Tobramycin: S <=2      Method Type: MORRIS    Culture - Blood (collected 07-27-20 @ 14:53)  Source: .Blood Blood-Venous  Final Report (08-01-20 @ 23:01):    No Growth Final    Culture - Blood (collected 07-27-20 @ 04:17)  Source: .Blood None  Final Report (08-01-20 @ 14:01):    No Growth Final    Culture - Blood (collected 07-23-20 @ 21:05)  Source: .Blood Blood  Gram Stain (07-27-20 @ 01:52):    Growth in anaerobic bottle: Gram Negative Rods    Growth in aerobic bottle:    Gram Positive Cocci in Clusters  Final Report (07-28-20 @ 11:37):    Growth in anaerobic bottle: Escherichia coli    Growth in aerobic bottle: Coag Negative Staphylococcus Unable to Identify    further    Coag Negative Staphylococcus    Single set isolate, possible contaminant. Contact    Microbiology if susceptibility testing clinically    indicated.    "Due to technical problems, Proteus sp. will Not be reported as part of    the BCID panel until further notice"    ***Blood Panel PCR results on this specimen are available    approximately 3 hours after the Gram stain result.***    Gram stain, PCR, and/or culture results may not always    correspond due to difference in methodologies.    ************************************************************    This PCR assay was performed using [employer].    The following targets are tested for: Enterococcus,    vancomycin resistant enterococci, Listeria monocytogenes,    coagulase negative staphylococci, S. aureus,    methicillin resistant S. aureus, Streptococcus agalactiae    (Group B), S. pneumoniae, S. pyogenes (Group A),    Acinetobacter baumannii, Enterobacter cloacae, E. coli,    Klebsiella oxytoca, K. pneumoniae, Proteus sp.,    Serratia marcescens, Haemophilus influenzae,    Neisseria meningitidis, Pseudomonas aeruginosa, Candida    albicans, C. glabrata, C krusei, C parapsilosis,    C. tropicalis and the KPC resistance gene.  Organism: Blood Culture PCR  Escherichia coli  Blood Culture PCR (07-27-20 @ 02:56)  Organism: Blood Culture PCR (07-27-20 @ 02:56)      -  Acinetobacter baumanii: Nondet      -  Candida albicans: Nondet      -  Candida glabrata: Nondet      -  Candida krusei: Nondet      -  Candida parapsilosis: Nondet      -  Candida tropicalis: Nondet      -  Coagulase negative Staphylococcus: Nondet      -  Enterobacter cloacae complex: Nondet      -  Enterococcus species: Nondet      -  Escherichia coli: Nondet      -  Haemophilus influenzae: Nondet      -  Klebsiella oxytoca: Nondet      -  Klebsiella pneumoniae: Nondet      -  Listeria monocytogenes: Nondet      -  Methicillin resistant Staphylococcus aureus (MRSA): Nondet      -  Multidrug (KPC pos) resistant organism: Nondet      -  Neisseria meningitidis: Nondet      -  Pseudomonas aeruginosa: Nondet      -  Serratia marcescens: Nondet      -  Staphylococcus aureus: Nondet      -  Streptococcus agalactiae (Group B): Nondet      -  Streptococcus pneumoniae: Nondet      -  Streptococcus pyogenes (Group A): Nondet      -  Streptococcus sp. (Not Grp A, B or S pneumoniae): Nondet      -  Vancomycin resistant Enterococcus sp.: Nondet      Method Type: PCR  Organism: Escherichia coli (07-27-20 @ 01:52)      -  Amikacin: S <=16      -  Ampicillin: R >16 These ampicillin results predict results for amoxicillin      -  Ampicillin/Sulbactam: I 16/8 Enterobacter, Citrobacter, and Serratia may develop resistance during prolonged therapy (3-4 days)      -  Aztreonam: S <=4      -  Cefazolin: R >16 Enterobacter, Citrobacter, and Serratia may develop resistance during prolonged therapy (3-4 days)      -  Cefepime: S <=2      -  Cefoxitin: R >16      -  Ceftriaxone: S <=1 Enterobacter, Citrobacter, and Serratia may develop resistance during prolonged therapy      -  Ciprofloxacin: R >2      -  Ertapenem: S <=0.5      -  Gentamicin: S <=2      -  Imipenem: S <=1      -  Levofloxacin: R >4      -  Meropenem: S <=1      -  Piperacillin/Tazobactam: S <=8      -  Tobramycin: S <=2      -  Trimethoprim/Sulfamethoxazole: S 2/38      Method Type: MORRIS  Organism: Blood Culture PCR (07-27-20 @ 01:52)      -  Escherichia coli: Detec      Method Type: PCR    Culture - Urine (collected 07-23-20 @ 11:11)  Source: .Urine Catheterized  Final Report (07-27-20 @ 14:36):    >100,000 CFU/ml Escherichia coli  Organism: Escherichia coli (07-27-20 @ 14:36)  Organism: Escherichia coli (07-27-20 @ 14:36)      -  Amikacin: S <=16      -  Amoxicillin/Clavulanic Acid: R >16/8      -  Ampicillin: R >16 These ampicillin results predict results for amoxicillin      -  Ampicillin/Sulbactam: I 16/8 Enterobacter, Citrobacter, and Serratia may develop resistance during prolonged therapy (3-4 days)      -  Aztreonam: S <=4      -  Cefazolin: R >16 (MIC_CL_COM_[email or web address]) For uncomplicated UTI with K. pneumoniae, E. coli, or P. mirablis: MORRIS <=16 is sensitive and MORRIS >=32 is resistant. This also predicts results for oral agents cefaclor, cefdinir, cefpodoxime, cefprozil, cefuroxime axetil, cephalexin and locarbef for uncomplicated UTI. Note that some isolates may be susceptible to these agents while testing resistant to cefazolin.      -  Cefepime: S <=2      -  Cefoxitin: R >16      -  Ceftriaxone: S <=1 Enterobacter, Citrobacter, and Serratia may develop resistance during prolonged therapy      -  Ciprofloxacin: R >2      -  Ertapenem: S <=0.5      -  Gentamicin: S <=2      -  Levofloxacin: R >4      -  Meropenem: S <=1      -  Nitrofurantoin: S <=32 Should not be used to treat pyelonephritis      -  Piperacillin/Tazobactam: S <=8      -  Tigecycline: S <=2      -  Tobramycin: S <=2      -  Trimethoprim/Sulfamethoxazole: S 2/38      Method Type: MORRIS        Lactate, Blood: 2.8 mmol/L (08-05-20 @ 09:34)      INFECTIOUS DISEASES TESTING  MRSA PCR Result.: Negative (07-17-20 @ 03:37)  COVID-19 PCR: NotDetec (07-16-20 @ 16:44)      INFLAMMATORY MARKERS      RADIOLOGY & ADDITIONAL TESTS:  I have personally reviewed the last available Chest xray  CXR      CT      CARDIOLOGY TESTING  12 Lead ECG:   Ventricular Rate 112 BPM    Atrial Rate 394 BPM    QRS Duration 86 ms    Q-T Interval 408 ms    QTC Calculation(Bezet) 556 ms    R Axis 13 degrees    T Axis 77 degrees    Diagnosis Line Atrial fibrillation with rapid ventricular response  Nonspecific T wave abnormality  Prolonged QT  Abnormal ECG    Confirmed by LISA LOBATO MD ([id number]) on 8/1/2020 11:27:10 AM (08-01-20 @ 00:59)      MEDICATIONS  aMIOdarone    Tablet 100 Oral two times a day  cefepime   IVPB 1000 IV Intermittent every 24 hours  chlorhexidine 0.12% Liquid 15 Oral Mucosa every 12 hours  chlorhexidine 4% Liquid 1 Topical <User Schedule>  dextrose 5% 1000 IV Continuous <Continuous>  dextrose 50% Injectable 12.5 IV Push once  dextrose 50% Injectable 25 IV Push once  dextrose 50% Injectable 25 IV Push once  heparin   Injectable 5000 SubCutaneous every 8 hours  insulin glargine Injectable (LANTUS) 12 SubCutaneous at bedtime  insulin lispro Injectable (HumaLOG) 3 SubCutaneous every 8 hours  metroNIDAZOLE  IVPB 500 IV Intermittent every 8 hours  midodrine 10 Oral every 8 hours  norepinephrine Infusion 0.01 IV Continuous <Continuous>  pantoprazole   Suspension 40 Enteral Tube daily  sevelamer carbonate Powder 1600 Oral three times a day with meals  sodium bicarbonate 1300 Oral three times a day  sodium zirconium cyclosilicate 10 Oral two times a day  vancomycin    Solution 125 Oral every 6 hours      WEIGHT  Weight (kg): 84.5 (07-16-20 @ 20:42)  Creatinine, Serum: 8.6 mg/dL (08-07-20 @ 04:40)  Creatinine, Serum: 7.9 mg/dL (08-06-20 @ 22:21)  Creatinine, Serum: 8.1 mg/dL (08-06-20 @ 17:50)      ANTIBIOTICS:  cefepime   IVPB 1000 milliGRAM(s) IV Intermittent every 24 hours  metroNIDAZOLE  IVPB 500 milliGRAM(s) IV Intermittent every 8 hours  vancomycin    Solution 125 milliGRAM(s) Oral every 6 hours      All available historical records have been reviewed

## 2020-08-07 NOTE — PROGRESS NOTE ADULT - ASSESSMENT
· Assessment		  This is a case of a 64 year old male KTH:  1. DM  2. CAD with PCI x3 in 2017  3. HTN  4. DLD  5. trigeminal neuralgia    presented to ED from [facility] s/p CVA for TPA. S/p thrombectomy (Basiliar Artery) - unresponsive off sedation, on pressors (levophed)  - repeat CT head (7/17):  Interval increase in posterior fossa edema and mass effect consistent with evolving infarct  with resultant new effacement of the fourth ventricle and new mild obstructive hydrocephalus. . Increased hyperdensity within the left ambient and quadrigeminal plate cisterns, and new hyperdensity within the right ambient and quadrigeminal plate cisterns, the suprasellar cistern, the right sylvian fissure and scattered sulci suspicious worsening/new subarachnoid hemorrhage.    # cardiac arrest PEA on 8/5/2020  ROSC achieved after 1 cycle of CPR  No acute events since    #Myoclonic lower extremity jerks:  -evaluated by neurology, likely spinal cord reflexs due to absence of inhibition from UMN.    # s/p Tracheostomy and PEG tube placement (8/1)  - remains ventilated, off sedation  - taper off pressors if possible   - integrillin completed  - no notable neurologic activity. Apnea test performed (7/22), patient took a breath.  - apnea test repeated (7/27), patient took 2 breaths.   - continue with Subq heparin for DVT ppx, cleared by neurology.   - continue with vergara catheter   - TLC changed to R IJ, confirmed placement with cxr 7/27  - Patient continues having spontaneous movements of LE, in response to and without tactile sensation, EEG negative for seizures.   - Trickle feeds at 30cc/hr - evaluate how well patient tolerating   - Vianney Nye (daughter, HCP) would like a call before any MRI, apnea test, or brain death protocol is done again. She would also like to be informed of any changed in status regardless of time. Her number is [phone].    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 Diff. - diarrhea improving   Non-AG metabolic Acidosis   - give bicarb 1300 meq q8 and add IV bicarb L with 3 ampules    # Worsening NAEL   baseline Cr ~1   Cr 8.6 - trending up  Kidney bladder US unremarkable    Nephrology recommendations  - cont  sodium bicarbonate PO 1300mg TID/ start 1L d5 with 3 amp of bicarbonate at 60 cc/h   - k noted , start lokelma 10 q 12, feed : nepro  - hypernatremia , improving   - no indication for RRT, would not improve prognosis/ prognosis poor after code blue too   - Follow up with nephrology    - C. Diff PCR positive  - Po vancomycin switched to 125 mg q6h (end date is tomorrow 8/8) then daily 125mg PO as remains on broad spectrum abx    # Pseudomonas in sputum (cultures taken on 8/1)  - on merpoenem 500mg q12 day 7 (started 8/1) --> end date 8/10  - If continues to spike or hemodynamic compromise, add levaquin 500mg q48h IV while awaiting sensitivities     # Distended abdomen - improved, stable s/p PEG   - xray of the abdomen performed showing gaseous distention of the ascending colon and stomach.  - CT Abdomen shows liquid stool within the colon without evidence of obstruction or colon thickening, no intraperitoneal free air.  - Patient on trickle feeds   - Surgery following      # DM   - HbA1c 7.5%   - lantus 12 units  - humalog 3 q6    # DLD   - d/c atorvastatin while NPO    # Activity: Bed rest   # Diet: NPO with Tube feeding  # DVT ppx: Subq heparin   # GI ppx: Protonix  # Code Status: FULL CODE    # DISPO: keep in ICU for now

## 2020-08-08 NOTE — PROGRESS NOTE ADULT - SUBJECTIVE AND OBJECTIVE BOX
Over Night Events:  no major events overnight      ROS:  See HPI    PHYSICAL EXAM    ICU Vital Signs Last 24 Hrs  T(C): 35.1 (08 Aug 2020 04:00), Max: 35.6 (07 Aug 2020 16:00)  T(F): 95.2 (08 Aug 2020 04:00), Max: 96.1 (07 Aug 2020 16:00)  HR: 64 (08 Aug 2020 08:30) (56 - 70)  BP: 85/44 (08 Aug 2020 08:30) (68/40 - 137/67)  BP(mean): 60 (08 Aug 2020 08:30) (47 - 100)  RR: 28 (08 Aug 2020 08:30) (27 - 28)  SpO2: 95% (08 Aug 2020 08:30) (92% - 100%)      General: trach/peg  HEENT: MARGE             Lungs: Bilateral BS  Cardiovascular: Regular   Abdomen: Soft, Positive BS  Extremities: No clubbing   Skin: Warm  Neurological: no change in MS      08-07-20 @ 07:01  -  08-08-20 @ 07:00  --------------------------------------------------------  IN:    dextrose 5%: 1440 mL    Enteral Tube Flush: 495 mL    Free Water: 300 mL    IV PiggyBack: 250 mL    Nepro with Carb Steady: 500 mL    norepinephrine Infusion: 391.8 mL    Peptamen A.F.: 700 mL  Total IN: 4076.8 mL    OUT:    Intermittent Catheterization - Urethral: 550 mL    Rectal Tube: 400 mL    Voided: 225 mL  Total OUT: 1175 mL    Total NET: 2901.8 mL          LABS:                          7.2    15.88 )-----------( 419      ( 08 Aug 2020 05:00 )             22.7                                               08-08    148<H>  |  113<H>  |  99<HH>  ----------------------------<  173<H>  4.4   |  18  |  8.3<HH>    Ca    7.6<L>      08 Aug 2020 05:00  Phos  9.9     08-07  Mg     2.4     08-08                                                                                                                                                                                   Mode: AC/ CMV (Assist Control/ Continuous Mandatory Ventilation)  RR (machine): 28  TV (machine): 450  FiO2: 40  PEEP: 5  ITime: 1  MAP: 10  PIP: 20                                      ABG - ( 07 Aug 2020 04:30 )  pH, Arterial: 7.25  pH, Blood: x     /  pCO2: 37    /  pO2: 67    / HCO3: 16    / Base Excess: -10.2 /  SaO2: 91                  MEDICATIONS  (STANDING):  aMIOdarone    Tablet 100 milliGRAM(s) Oral two times a day  cefepime   IVPB 1000 milliGRAM(s) IV Intermittent every 24 hours  chlorhexidine 0.12% Liquid 15 milliLiter(s) Oral Mucosa every 12 hours  chlorhexidine 4% Liquid 1 Application(s) Topical <User Schedule>  dextrose 5% 1000 milliLiter(s) (60 mL/Hr) IV Continuous <Continuous>  dextrose 50% Injectable 12.5 Gram(s) IV Push once  dextrose 50% Injectable 25 Gram(s) IV Push once  dextrose 50% Injectable 25 Gram(s) IV Push once  heparin   Injectable 5000 Unit(s) SubCutaneous every 8 hours  insulin glargine Injectable (LANTUS) 12 Unit(s) SubCutaneous at bedtime  insulin lispro Injectable (HumaLOG) 3 Unit(s) SubCutaneous every 8 hours  metroNIDAZOLE  IVPB 500 milliGRAM(s) IV Intermittent every 8 hours  midodrine 10 milliGRAM(s) Oral every 8 hours  norepinephrine Infusion 0.01 MICROgram(s)/kG/Min (1.58 mL/Hr) IV Continuous <Continuous>  pantoprazole   Suspension 40 milliGRAM(s) Enteral Tube daily  petrolatum Ophthalmic Ointment 1 Application(s) Both EYES daily  sevelamer carbonate Powder 1600 milliGRAM(s) Oral three times a day with meals  sodium bicarbonate 1300 milliGRAM(s) Oral three times a day  sodium zirconium cyclosilicate 10 Gram(s) Oral two times a day  vancomycin    Solution 125 milliGRAM(s) Oral every 6 hours    MEDICATIONS  (PRN):  acetaminophen   Tablet .. 650 milliGRAM(s) Oral every 6 hours PRN Temp greater or equal to 38C (100.4F)  dextrose 40% Gel 15 Gram(s) Oral once PRN Blood Glucose LESS THAN 70 milliGRAM(s)/deciliter  glucagon  Injectable 1 milliGRAM(s) IntraMuscular once PRN Glucose LESS THAN 70 milligrams/deciliter      Xrays:          B/l infiltrates                                                                           ECHO

## 2020-08-08 NOTE — PROGRESS NOTE ADULT - ASSESSMENT
IMPRESSION:    Acute hypoxemic respiratory failure SP tracheostomy / pseudomonas in DTA  Stroke SP TPA And thrombectomy  NAEL ATN non oliguric   E coli bacteremia treated   Sepsis  Septic shock  CDiff   hypernatremia improving  Cardiopulmonary arrest (3 mins) nonshockable(8/5/2020)    PLAN:    CNS: FU with Neurology. Keep off sedation.     HEENT:  Oral care / Trach care     PULMONARY:  HOB @ 45 degrees.  Vent changes:  Wean O2.  Fio2 40%. no change in vent settings.    CARDIOVASCULAR: I=O. Continue Rate control.        GI: GI prophylaxis. trickle  feeding as tolerated , free water q 12h    RENAL:  increase free water to 300q6, Continue bicarb drip at 60CC, f/u renal     INFECTIOUS DISEASE: FU repeat cultures.  Pan cultures switch to cefepime on thursday(8 of 10) and Vanc po.      HEMATOLOGICAL:  DVT prophylaxis.     ENDOCRINE:  Follow up FS.  Insulin protocol if needed.    CODE STATUS: FULL CODE    MICU    Overall very Poor prognosis  Palliative care

## 2020-08-08 NOTE — PROGRESS NOTE ADULT - SUBJECTIVE AND OBJECTIVE BOX
Nephrology progress note    THIS IS AN INCOMPLETE NOTE . FULL NOTE TO FOLLOW SHORTLY    Patient is seen and examined, events over the last 24 h noted .    Allergies:  No Known Allergies    Hospital Medications:   MEDICATIONS  (STANDING):  aMIOdarone    Tablet 100 milliGRAM(s) Oral two times a day  cefepime   IVPB 1000 milliGRAM(s) IV Intermittent every 24 hours  chlorhexidine 0.12% Liquid 15 milliLiter(s) Oral Mucosa every 12 hours  chlorhexidine 4% Liquid 1 Application(s) Topical <User Schedule>  dextrose 5% 1000 milliLiter(s) (60 mL/Hr) IV Continuous <Continuous>  dextrose 50% Injectable 12.5 Gram(s) IV Push once  dextrose 50% Injectable 25 Gram(s) IV Push once  dextrose 50% Injectable 25 Gram(s) IV Push once  heparin   Injectable 5000 Unit(s) SubCutaneous every 8 hours  insulin glargine Injectable (LANTUS) 12 Unit(s) SubCutaneous at bedtime  insulin lispro Injectable (HumaLOG) 3 Unit(s) SubCutaneous every 8 hours  metroNIDAZOLE  IVPB 500 milliGRAM(s) IV Intermittent every 8 hours  midodrine 10 milliGRAM(s) Oral every 8 hours  norepinephrine Infusion 0.01 MICROgram(s)/kG/Min (1.58 mL/Hr) IV Continuous <Continuous>  pantoprazole   Suspension 40 milliGRAM(s) Enteral Tube daily  petrolatum Ophthalmic Ointment 1 Application(s) Both EYES daily  sevelamer carbonate Powder 1600 milliGRAM(s) Oral three times a day with meals  sodium bicarbonate 1300 milliGRAM(s) Oral three times a day  sodium zirconium cyclosilicate 10 Gram(s) Oral two times a day  vancomycin    Solution 125 milliGRAM(s) Oral every 6 hours        VITALS:  T(F): 95.2 (08-08-20 @ 04:00), Max: 96.1 (08-07-20 @ 16:00)  HR: 62 (08-08-20 @ 07:30)  BP: 92/45 (08-08-20 @ 07:30)  RR: 27 (08-08-20 @ 07:30)  SpO2: 95% (08-08-20 @ 07:30)  Wt(kg): --    08-06 @ 07:01  -  08-07 @ 07:00  --------------------------------------------------------  IN: 3977.2 mL / OUT: 1300 mL / NET: 2677.2 mL    08-07 @ 07:01  -  08-08 @ 07:00  --------------------------------------------------------  IN: 4076.8 mL / OUT: 1175 mL / NET: 2901.8 mL          PHYSICAL EXAM:  Constitutional: NAD  HEENT: anicteric sclera, oropharynx clear, MMM  Neck: No JVD  Respiratory: CTAB, no wheezes, rales or rhonchi  Cardiovascular: S1, S2, RRR  Gastrointestinal: BS+, soft, NT/ND  Extremities: No cyanosis or clubbing. No peripheral edema  :  No vergara.   Skin: No rashes    LABS:  08-08    148<H>  |  113<H>  |  99<HH>  ----------------------------<  173<H>  4.4   |  18  |  8.3<HH>    Ca    7.6<L>      08 Aug 2020 05:00  Phos  9.9     08-07  Mg     2.4     08-08                            7.2    15.88 )-----------( 419      ( 08 Aug 2020 05:00 )             22.7       Urine Studies:      RADIOLOGY & ADDITIONAL STUDIES: Nephrology progress note  Patient is seen and examined, events over the last 24 h noted .  no major clinical changes     Allergies:  No Known Allergies    Hospital Medications:   MEDICATIONS  (STANDING):  aMIOdarone    Tablet 100 milliGRAM(s) Oral two times a day  cefepime   IVPB 1000 milliGRAM(s) IV Intermittent every 24 hours  dextrose 5% 1000 milliLiter(s) (60 mL/Hr) IV Continuous <Continuous>  heparin   Injectable 5000 Unit(s) SubCutaneous every 8 hours  insulin glargine Injectable (LANTUS) 12 Unit(s) SubCutaneous at bedtime  insulin lispro Injectable (HumaLOG) 3 Unit(s) SubCutaneous every 8 hours  metroNIDAZOLE  IVPB 500 milliGRAM(s) IV Intermittent every 8 hours  midodrine 10 milliGRAM(s) Oral every 8 hours  norepinephrine Infusion 0.01 MICROgram(s)/kG/Min (1.58 mL/Hr) IV Continuous <Continuous>  pantoprazole   Suspension 40 milliGRAM(s) Enteral Tube daily  petrolatum Ophthalmic Ointment 1 Application(s) Both EYES daily  sevelamer carbonate Powder 1600 milliGRAM(s) Oral three times a day with meals  sodium bicarbonate 1300 milliGRAM(s) Oral three times a day  sodium zirconium cyclosilicate 10 Gram(s) Oral two times a day  vancomycin    Solution 125 milliGRAM(s) Oral every 6 hours        VITALS:  T(F): 95.2 (08-08-20 @ 04:00), Max: 96.1 (08-07-20 @ 16:00)  HR: 62 (08-08-20 @ 07:30)  BP: 92/45 (08-08-20 @ 07:30)  RR: 27 (08-08-20 @ 07:30)  SpO2: 95% (08-08-20 @ 07:30)      08-06 @ 07:01  -  08-07 @ 07:00  --------------------------------------------------------  IN: 3977.2 mL / OUT: 1300 mL / NET: 2677.2 mL    08-07 @ 07:01  -  08-08 @ 07:00  --------------------------------------------------------  IN: 4076.8 mL / OUT: 1175 mL / NET: 2901.8 mL          PHYSICAL EXAM:  Constitutional: intubated on MV   HEENT: trached   Neck: No JVD  Respiratory: CTAB, no wheezes, rales or rhonchi  Cardiovascular: S1, S2, RRR  Gastrointestinal: BS+, soft, NT/ND  :  No vergara.   Skin: No rashes    LABS:  08-08    148<H>  |  113<H>  |  99<HH>  ----------------------------<  173<H>  4.4   |  18  |  8.3<HH>    Ca    7.6<L>      08 Aug 2020 05:00  Phos  9.9     08-07  Mg     2.4     08-08                            7.2    15.88 )-----------( 419      ( 08 Aug 2020 05:00 )             22.7       Urine Studies:      RADIOLOGY & ADDITIONAL STUDIES:

## 2020-08-08 NOTE — PROGRESS NOTE ADULT - ASSESSMENT
This is a case of a 64 year old male KTH:  1. DM  2. CAD with PCI x3 in 2017  3. HTN  4. DLD  5. trigeminal neuralgia    presented to ED from [facility] s/p CVA for TPA. S/p thrombectomy (Basiliar Artery) - unresponsive off sedation, on pressors (levophed)  - repeat CT head (7/17):  Interval increase in posterior fossa edema and mass effect consistent with evolving infarct  with resultant new effacement of the fourth ventricle and new mild obstructive hydrocephalus. . Increased hyperdensity within the left ambient and quadrigeminal plate cisterns, and new hyperdensity within the right ambient and quadrigeminal plate cisterns, the suprasellar cistern, the right sylvian fissure and scattered sulci suspicious worsening/new subarachnoid hemorrhage.    # cardiac arrest PEA on 8/5/2020  ROSC achieved after 1 cycle of CPR  No acute events since    #Myoclonic lower extremity jerks:  -evaluated by neurology, likely spinal cord reflexs due to absence of inhibition from UMN.    # s/p Tracheostomy and PEG tube placement (8/1)  - remains ventilated, off sedation  - taper off pressors if possible   - integrillin completed  - no notable neurologic activity. Apnea test performed (7/22), patient took a breath.  - apnea test repeated (7/27), patient took 2 breaths.   - continue with Subq heparin for DVT ppx, cleared by neurology.   - continue with vergara catheter   - TLC changed to R IJ, confirmed placement with cxr 7/27  - Patient continues having spontaneous movements of LE, in response to and without tactile sensation, EEG negative for seizures.   - Trickle feeds at 30cc/hr - evaluate how well patient tolerating   - Vianney Nye (daughter, HCP) would like a call before any MRI, apnea test, or brain death protocol is done again. She would also like to be informed of any changed in status regardless of time. Her number is [phone].    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 Diff. - diarrhea improving   Non-AG metabolic Acidosis   - give bicarb 1300 meq q8 and add IV bicarb L with 3 ampules    # Worsening NAEL   baseline Cr ~1   Cr 8.6 - trending up  Kidney bladder US unremarkable    Nephrology recommendations  - cont  sodium bicarbonate PO 1300mg TID/ start 1L d5 with 3 amp of bicarbonate at 60 cc/h   - k noted , start lokelma 10 q 12, feed : nepro  - hypernatremia , improving   - no indication for RRT, would not improve prognosis/ prognosis poor after code blue too   - Follow up with nephrology    - C. Diff PCR positive  - Po vancomycin switched to 125 mg daily 125mg PO, for as as remains on broad spectrum abx    # Pseudomonas in sputum (cultures taken on 8/1)  - on merpoenem 500mg q12 day 8 (started 8/1) --> end date 8/10  - If continues to spike or hemodynamic compromise, add levaquin 500mg q48h IV while awaiting sensitivities     # Distended abdomen - improved, stable s/p PEG   - xray of the abdomen performed showing gaseous distention of the ascending colon and stomach.  - CT Abdomen shows liquid stool within the colon without evidence of obstruction or colon thickening, no intraperitoneal free air.  - Patient on trickle feeds   - Surgery following      # DM   - HbA1c 7.5%   - lantus 12 units  - humalog 3 q6    # DLD   - d/c atorvastatin while NPO    # Activity: Bed rest   # Diet: NPO with Tube feeding  # DVT ppx: Subq heparin   # GI ppx: Protonix  # Code Status: FULL CODE    # DISPO: keep in ICU for now

## 2020-08-08 NOTE — PROGRESS NOTE ADULT - ASSESSMENT
#NAEL on CKD, p/w creatinine 1.9, unknown baseline, ATN in the context of CVA/shock  - creatinine trending up sp code blue    - non oliguric still   - DARSHAN noted, non-echogenic kidneys, no hydro   - last ph noted high on sevelamer add phoslo one tab po q8h   - cont  sodium bicarbonate PO 1300mg TID with D5w  with 3 amp of bicarbonate at 60 cc/h   - k noted , continue  lokelma 10 q 12, feed : nepro  - no indication for RRT, would not improve prognosis/ prognosis poor after code blue too   -  sp trach/PEG   - continue midodrine   - prognosis poor / appreciate palliative notes   - will follow

## 2020-08-08 NOTE — PROGRESS NOTE ADULT - SUBJECTIVE AND OBJECTIVE BOX
24H events:    Patient is a 64y old Male who presents with a chief complaint of Stroke s/p tpa (08 Aug 2020 09:46)    Primary diagnosis of Stroke     Today is hospital day 23d. This morning patient was seen and examined at bedside, resting comfortably in bed.  He is off sedation. Today he is hemodynamically stable, off levophed drip. No fever. No notable events      PAST MEDICAL & SURGICAL HISTORY  HTN (hypertension)  Diabetes  No significant past surgical history    SOCIAL HISTORY:  Social History:   lives with [facility] at [facility]  NO smoking or Etoh (16 Jul 2020 18:12)      ALLERGIES:  No Known Allergies    MEDICATIONS:  STANDING MEDICATIONS  aMIOdarone    Tablet 100 milliGRAM(s) Oral two times a day  cefepime   IVPB 1000 milliGRAM(s) IV Intermittent every 24 hours  chlorhexidine 0.12% Liquid 15 milliLiter(s) Oral Mucosa every 12 hours  chlorhexidine 4% Liquid 1 Application(s) Topical <User Schedule>  dextrose 5% 1000 milliLiter(s) IV Continuous <Continuous>  dextrose 50% Injectable 12.5 Gram(s) IV Push once  dextrose 50% Injectable 25 Gram(s) IV Push once  dextrose 50% Injectable 25 Gram(s) IV Push once  heparin   Injectable 5000 Unit(s) SubCutaneous every 8 hours  insulin glargine Injectable (LANTUS) 12 Unit(s) SubCutaneous at bedtime  insulin lispro Injectable (HumaLOG) 3 Unit(s) SubCutaneous every 8 hours  metroNIDAZOLE  IVPB 500 milliGRAM(s) IV Intermittent every 8 hours  midodrine 10 milliGRAM(s) Oral every 8 hours  norepinephrine Infusion 0.01 MICROgram(s)/kG/Min IV Continuous <Continuous>  pantoprazole   Suspension 40 milliGRAM(s) Enteral Tube daily  petrolatum Ophthalmic Ointment 1 Application(s) Both EYES daily  sevelamer carbonate Powder 1600 milliGRAM(s) Oral three times a day with meals  sodium bicarbonate 1300 milliGRAM(s) Oral three times a day  sodium zirconium cyclosilicate 10 Gram(s) Oral two times a day  vancomycin    Solution 125 milliGRAM(s) Oral every 24 hours    PRN MEDICATIONS  acetaminophen   Tablet .. 650 milliGRAM(s) Oral every 6 hours PRN  dextrose 40% Gel 15 Gram(s) Oral once PRN  glucagon  Injectable 1 milliGRAM(s) IntraMuscular once PRN    VITALS:   T(F): 98.8  HR: 66  BP: 102/47  RR: 27  SpO2: 96%    PHYSICAL EXAM:  GENERAL: unresponsive male in NAD   HEENT: NCAT, +tracheostomy, +R IJ  CHEST/LUNG: audible breath sounds b/l   HEART: Regular rate and rhythm; s1 s2 appreciated  ABDOMEN: soft, mildly distended abdomen, +PEG tube   EXTREMITIES: UE and LE edema   NERVOUS SYSTEM: some spontaneous nonpurposeful movements to tactile sensation, no apparent brainstem reflexes, absent reflexes in upper extremities. Hyper reflexia in lower extremities    LABS:                        7.2    15.88 )-----------( 419      ( 08 Aug 2020 05:00 )             22.7     08-08    148<H>  |  113<H>  |  99<HH>  ----------------------------<  173<H>  4.4   |  18  |  8.3<HH>    Ca    7.6<L>      08 Aug 2020 05:00  Phos  9.9     08-07  Mg     2.4     08-08          ABG - ( 07 Aug 2020 04:30 )  pH, Arterial: 7.25  pH, Blood: x     /  pCO2: 37    /  pO2: 67    / HCO3: 16    / Base Excess: -10.2 /  SaO2: 91                        RADIOLOGY:

## 2020-08-09 NOTE — PROGRESS NOTE ADULT - ATTENDING COMMENTS
Patient examined this morning and remains brainstem areflexic.  In past 24 hours the NA level has risen to 170 so 3% has been discontinued.  I/O past 24 hours and normal renal function do not support central DI so would administer free water at 300 cc's q4 hours until sodium level of 160 achieved.  I spoke with Dr. Downey regarding the free water recommendations and communicated to the ICU resident.  Additionally I spoke to pt daughter Dr. Nunezcy Popeye at [phone] and updated her on her fathers condition including the neurologic exam findings and plan.  I cannot make a brain death determination today due to Na = 170.  She indicates she will be in to visit her father tomorrow and would like to speak to Dr. Downey again at that time.
trache and PEG bedside today
Attending Statement: I have personally performed a face to face diagnostic evaluation on this patient. The patient is suffering from CVA>   I have reviewed the above note and agree with the history, exam and suggestions for care, except as I have noted in the text.
Attending Statement: I have personally performed a face to face diagnostic evaluation on this patient. The patient is suffering from Stroke SP TPA And thrombectomy. I have reviewed the above note and agree with the history, exam and suggestions for care, except as I have noted in the text.
doing well   site clean and dry   resume TF and advance as tolerated   recall as needed
pt seen, examined, and d/w bedside RN and with residents  enteral feeds held since late yesterday due to abdominal distention  abd less distended and less tense today  overall status and prognosis remain very poor  If to cont ICU level of care, need to resume nutrition support - will f/u tomorrow
Abdominal distention has resolved.  CT A/P from last night shows no bowel obstruction or ileus.    I recommend:  - start trickle feeds and advance as tolerated    Overall poor prognosis.  Please recall Surgery as needed.
Patient seen and examined, agree with above, CP arrest for 3 minutes, bicar drip/ Neuro f/up, very poor prognosis
Patient seen and examined, agree with above, IV ABX, bicarb drip, very poor prognosis
Patient examined today and is brainstem areflexic:  Patient is on no sedations and is unresponsive to voice and painful stimuli including sternal rub, pressure on nailbeds and proximal extremities, pressure supraorbital notch.  Pupils are 2.5 mm and unreactive to light.  Corneal response is absent bilaterally.  Oculocephalic maneuvers are negative.  TM's visualized bilaterally and cold water caloric testing is negative.  Patient not overbreathing the ventilator according to nurse.  Absent gag and cough to suctioning.  No posturing.  No purposeful withdrawal though painful stimulus to foot triggers flexion at knee and hip bilaterally.  I believe this to be a spinally mediated reflex.  Brain imaging reviewed.    Brainstem areflexia on bedside exam.  No chance for recovery.  The [facility] brain death checklist attached to chart mentions a requirement for midposition pupils which has traditionally been defined as >=4 mm.    Current literature exists demonstrating  patients with pupils 2.5 mm so the midposition requirement in my view is not absolute.
patient seen and examined, agree with above, worsening Neuro status, s/p 23%, mannitol, on on hypertonic, pupil small 2 mm sluggish, withdraw LE to painful stimuli, Neuro f/up, very poor prognosis
History, events, data and scans reviewed, patient examined and critical care was provided at bedside  Patient became fully comatose (GCS of 3) with absent brain stem reflexes associated with hypotension while went for CT.  NEURO: likely completion of infarction in posterior fossa (severe cytotoxic edema): Patient was given 30 cc of 23.5% saline, hpervrntilated breifly, BP augmented with vasopressor and bolus of mannitol administered. BP improved without any change in neurologic status.     CVS:  Volume resuscitation and pressors to keep  to 150    RESP: Breif hypervntilation by increasing set rate: Keep PCO2 30-35 for few hours with gradulal increase to normal, non alkalotic ranges, keep airway pressures low by adjusting TV    FLUID/ELECT: 3% N/saline to keep S Na 150 to 160, avoid hypovolemia/hypotension, check volume responsiveness with IVC diameter    GI: Prophylaxis with PPI    DVT: mechanical and pharmacological

## 2020-08-09 NOTE — PROGRESS NOTE ADULT - SUBJECTIVE AND OBJECTIVE BOX
LOVINGANA CRISTINA  64y, Male  Allergy: No Known Allergies      LOS  24d    CHIEF COMPLAINT: Stroke s/p tpa (08 Aug 2020 16:21)      INTERVAL EVENTS/HPI  - T(F): , Max: 99 (08-08-20 @ 16:00), on levophed  - WBC Count: 16.73 (08-09-20 @ 04:30)  WBC Count: 15.88 (08-08-20 @ 05:00)  - Creatinine, Serum: 8.3 (08-09-20 @ 04:30)  Creatinine, Serum: 8.3 (08-08-20 @ 05:00)       ROS  cannot obtain secondary to patient's sedation and/or mental status    VITALS:  T(F): 97, Max: 99 (08-08-20 @ 16:00)  HR: 64  BP: 107/46  RR: 28Vital Signs Last 24 Hrs  T(C): 36.1 (09 Aug 2020 04:00), Max: 37.2 (08 Aug 2020 16:00)  T(F): 97 (09 Aug 2020 04:00), Max: 99 (08 Aug 2020 16:00)  HR: 64 (09 Aug 2020 07:00) (62 - 76)  BP: 107/46 (09 Aug 2020 07:00) (71/42 - 128/61)  BP(mean): 71 (09 Aug 2020 07:00) (48 - 91)  RR: 28 (09 Aug 2020 06:00) (13 - 28)  SpO2: 94% (09 Aug 2020 07:00) (80% - 100%)    PHYSICAL EXAM:  Gen: trach  CV: RRR  Lungs: Decreased BS at bases  Abd: Soft  Neuro: awake  Lines clean, no phlebitis    FH: Non-contributory  Social Hx: Non-contributory    TESTS & MEASUREMENTS:                        7.1    16.73 )-----------( 380      ( 09 Aug 2020 04:30 )             21.9     08-09    145  |  109  |  110<HH>  ----------------------------<  174<H>  3.8   |  21  |  8.3<HH>    Ca    7.7<L>      09 Aug 2020 04:30  Phos  8.7     08-09  Mg     2.3     08-09      eGFR if Non African American: 6 mL/min/1.73M2 (08-09-20 @ 04:30)  eGFR if African American: 7 mL/min/1.73M2 (08-09-20 @ 04:30)          Culture - Sputum (collected 08-01-20 @ 16:20)  Source: .Sputum Sputum  Gram Stain (08-02-20 @ 05:43):    Numerous polymorphonuclear leukocytes per low power field    Few Squamous epithelial cells per low power field    Rare Gram positive cocci in pairs seen per oil power field  Final Report (08-03-20 @ 15:52):    Few Pseudomonas aeruginosa    Normal Respiratory Jayde present  Organism: Pseudomonas aeruginosa (08-03-20 @ 15:52)  Organism: Pseudomonas aeruginosa (08-03-20 @ 15:52)      -  Amikacin: S <=16      -  Aztreonam: S <=4      -  Cefepime: S <=2      -  Ceftazidime: S <=1      -  Ciprofloxacin: S <=0.25      -  Gentamicin: S <=2      -  Imipenem: S <=1      -  Levofloxacin: S <=0.5      -  Meropenem: S <=1      -  Piperacillin/Tazobactam: S <=8      -  Tobramycin: S <=2      Method Type: MORRIS    Culture - Blood (collected 07-27-20 @ 14:53)  Source: .Blood Blood-Venous  Final Report (08-01-20 @ 23:01):    No Growth Final    Culture - Blood (collected 07-27-20 @ 04:17)  Source: .Blood None  Final Report (08-01-20 @ 14:01):    No Growth Final    Culture - Blood (collected 07-23-20 @ 21:05)  Source: .Blood Blood  Gram Stain (07-27-20 @ 01:52):    Growth in anaerobic bottle: Gram Negative Rods    Growth in aerobic bottle:    Gram Positive Cocci in Clusters  Final Report (07-28-20 @ 11:37):    Growth in anaerobic bottle: Escherichia coli    Growth in aerobic bottle: Coag Negative Staphylococcus Unable to Identify    further    Coag Negative Staphylococcus    Single set isolate, possible contaminant. Contact    Microbiology if susceptibility testing clinically    indicated.    "Due to technical problems, Proteus sp. will Not be reported as part of    the BCID panel until further notice"    ***Blood Panel PCR results on this specimen are available    approximately 3 hours after the Gram stain result.***    Gram stain, PCR, and/or culture results may not always    correspond due to difference in methodologies.    ************************************************************    This PCR assay was performed using [employer].    The following targets are tested for: Enterococcus,    vancomycin resistant enterococci, Listeria monocytogenes,    coagulase negative staphylococci, S. aureus,    methicillin resistant S. aureus, Streptococcus agalactiae    (Group B), S. pneumoniae, S. pyogenes (Group A),    Acinetobacter baumannii, Enterobacter cloacae, E. coli,    Klebsiella oxytoca, K. pneumoniae, Proteus sp.,    Serratia marcescens, Haemophilus influenzae,    Neisseria meningitidis, Pseudomonas aeruginosa, Candida    albicans, C. glabrata, C krusei, C parapsilosis,    C. tropicalis and the KPC resistance gene.  Organism: Blood Culture PCR  Escherichia coli  Blood Culture PCR (07-27-20 @ 02:56)  Organism: Blood Culture PCR (07-27-20 @ 02:56)      -  Acinetobacter baumanii: Nondet      -  Candida albicans: Nondet      -  Candida glabrata: Nondet      -  Candida krusei: Nondet      -  Candida parapsilosis: Nondet      -  Candida tropicalis: Nondet      -  Coagulase negative Staphylococcus: Nondet      -  Enterobacter cloacae complex: Nondet      -  Enterococcus species: Nondet      -  Escherichia coli: Nondet      -  Haemophilus influenzae: Nondet      -  Klebsiella oxytoca: Nondet      -  Klebsiella pneumoniae: Nondet      -  Listeria monocytogenes: Nondet      -  Methicillin resistant Staphylococcus aureus (MRSA): Nondet      -  Multidrug (KPC pos) resistant organism: Nondet      -  Neisseria meningitidis: Nondet      -  Pseudomonas aeruginosa: Nondet      -  Serratia marcescens: Nondet      -  Staphylococcus aureus: Nondet      -  Streptococcus agalactiae (Group B): Nondet      -  Streptococcus pneumoniae: Nondet      -  Streptococcus pyogenes (Group A): Nondet      -  Streptococcus sp. (Not Grp A, B or S pneumoniae): Nondet      -  Vancomycin resistant Enterococcus sp.: Nondet      Method Type: PCR  Organism: Escherichia coli (07-27-20 @ 01:52)      -  Amikacin: S <=16      -  Ampicillin: R >16 These ampicillin results predict results for amoxicillin      -  Ampicillin/Sulbactam: I 16/8 Enterobacter, Citrobacter, and Serratia may develop resistance during prolonged therapy (3-4 days)      -  Aztreonam: S <=4      -  Cefazolin: R >16 Enterobacter, Citrobacter, and Serratia may develop resistance during prolonged therapy (3-4 days)      -  Cefepime: S <=2      -  Cefoxitin: R >16      -  Ceftriaxone: S <=1 Enterobacter, Citrobacter, and Serratia may develop resistance during prolonged therapy      -  Ciprofloxacin: R >2      -  Ertapenem: S <=0.5      -  Gentamicin: S <=2      -  Imipenem: S <=1      -  Levofloxacin: R >4      -  Meropenem: S <=1      -  Piperacillin/Tazobactam: S <=8      -  Tobramycin: S <=2      -  Trimethoprim/Sulfamethoxazole: S 2/38      Method Type: MORRIS  Organism: Blood Culture PCR (07-27-20 @ 01:52)      -  Escherichia coli: Detec      Method Type: PCR    Culture - Urine (collected 07-23-20 @ 11:11)  Source: .Urine Catheterized  Final Report (07-27-20 @ 14:36):    >100,000 CFU/ml Escherichia coli  Organism: Escherichia coli (07-27-20 @ 14:36)  Organism: Escherichia coli (07-27-20 @ 14:36)      -  Amikacin: S <=16      -  Amoxicillin/Clavulanic Acid: R >16/8      -  Ampicillin: R >16 These ampicillin results predict results for amoxicillin      -  Ampicillin/Sulbactam: I 16/8 Enterobacter, Citrobacter, and Serratia may develop resistance during prolonged therapy (3-4 days)      -  Aztreonam: S <=4      -  Cefazolin: R >16 ([id number]) For uncomplicated UTI with K. pneumoniae, E. coli, or P. mirablis: MORRIS <=16 is sensitive and MORRIS >=32 is resistant. This also predicts results for oral agents cefaclor, cefdinir, cefpodoxime, cefprozil, cefuroxime axetil, cephalexin and locarbef for uncomplicated UTI. Note that some isolates may be susceptible to these agents while testing resistant to cefazolin.      -  Cefepime: S <=2      -  Cefoxitin: R >16      -  Ceftriaxone: S <=1 Enterobacter, Citrobacter, and Serratia may develop resistance during prolonged therapy      -  Ciprofloxacin: R >2      -  Ertapenem: S <=0.5      -  Gentamicin: S <=2      -  Levofloxacin: R >4      -  Meropenem: S <=1      -  Nitrofurantoin: S <=32 Should not be used to treat pyelonephritis      -  Piperacillin/Tazobactam: S <=8      -  Tigecycline: S <=2      -  Tobramycin: S <=2      -  Trimethoprim/Sulfamethoxazole: S 2/38      Method Type: MORRIS        Lactate, Blood: 2.8 mmol/L (08-05-20 @ 09:34)      INFECTIOUS DISEASES TESTING  MRSA PCR Result.: Negative (07-17-20 @ 03:37)  COVID-19 PCR: NotDetec (07-16-20 @ 16:44)      INFLAMMATORY MARKERS      RADIOLOGY & ADDITIONAL TESTS:  I have personally reviewed the last available Chest xray  CXR      CT      CARDIOLOGY TESTING  12 Lead ECG:   Ventricular Rate 112 BPM    Atrial Rate 394 BPM    QRS Duration 86 ms    Q-T Interval 408 ms    QTC Calculation(Bezet) 556 ms    R Axis 13 degrees    T Axis 77 degrees    Diagnosis Line Atrial fibrillation with rapid ventricular response  Nonspecific T wave abnormality  Prolonged QT  Abnormal ECG    Confirmed by LISA LOBATO MD [id number]) on 8/1/2020 11:27:10 AM (08-01-20 @ 00:59)      MEDICATIONS  aMIOdarone    Tablet 100  cefepime   IVPB 1000  chlorhexidine 0.12% Liquid 15  chlorhexidine 4% Liquid 1  dextrose 5% 1000  dextrose 50% Injectable 12.5  dextrose 50% Injectable 25  dextrose 50% Injectable 25  heparin   Injectable 5000  insulin glargine Injectable (LANTUS) 12  insulin lispro Injectable (HumaLOG) 3  metroNIDAZOLE  IVPB 500  midodrine 10  norepinephrine Infusion 0.01  pantoprazole   Suspension 40  petrolatum Ophthalmic Ointment 1  sevelamer carbonate Powder 1600  sodium bicarbonate 1300  sodium zirconium cyclosilicate 10  vancomycin    Solution 125      WEIGHT  Weight (kg): 84.5 (07-16-20 @ 20:42)  Creatinine, Serum: 8.3 mg/dL (08-09-20 @ 04:30)      ANTIBIOTICS:  cefepime   IVPB 1000 milliGRAM(s) IV Intermittent every 24 hours  metroNIDAZOLE  IVPB 500 milliGRAM(s) IV Intermittent every 8 hours  vancomycin    Solution 125 milliGRAM(s) Oral every 24 hours      All available historical records have been reviewed

## 2020-08-09 NOTE — PROGRESS NOTE ADULT - ASSESSMENT
64 year old male pmh of DM, CAD with PCI x3 in 2017, HTN, DLD, trigeminal neuralgia who presents to ED from [facility] s/p TPA for stroke.    IMPRESSION;   #Pseudomonal VAP (panS), R-sided opacities>L    8/1 tracheal cx Pseudomonas  #Cardiopulmonary arrest (3 mins) nonshockable(8/5/2020)  #Trach, PEG  # CVA s/p TPA s/p thrombectomy (Basiliar Artery) - unresponsive off sedation, on pressors  - repeat CT head:  Interval increase in posterior fossa edema and mass effect consistent with evolving infarct  with resultant new effacement of the fourth ventricle and new mild obstructive hydrocephalus. Increased hypergenicity within the left ambient and quadrigeminal plate cisterns, and new hypodensity within the right ambient and quadrigeminal plate cisterns, the suprasellar cistern, the right sylvian fissure and scattered sulci suspicious worsening/new subarachnoid hemorrhage.  - remained ventilated, off sedation  - no neurologic activity.   #7/26 CD colitis: on po vancomycin. Diarrhea has decreased  #E coli bacteremia : secondary to  tract with UCx E coli but no overt pyuria. Could well also be secondary to translocation from the GI tract.  BCx 7/27 NGTD  CT Abdomen 7/28:  Liquid stool within the colon without evidence of obstruction or colonic wall thickening. No intraperitoneal free air. Bilateral small pleural effusions with associated opacities    RECOMMENDATIONS;  - continue Cefepime 1g q24h IV & Flagyl 500mg q8h IV given sensitivities and in the setting of Cdiff (end 8/10)  - PO vancomycin 125 mg q6h x 14 days end 8/8 as persistent diarrhea, then daily 125mg PO as remains on broad spectrum abx (until 8/12)    Please call if with any questions.  Spectra [phone]

## 2020-08-09 NOTE — PROGRESS NOTE ADULT - ASSESSMENT
IMPRESSION:    Acute hypoxemic respiratory failure SP tracheostomy / pseudomonas in DTA  Stroke SP TPA And thrombectomy  NAEL ATN non oliguric   E coli bacteremia treated   Sepsis  Septic shock  CDiff   hypernatremia improving  Cardiopulmonary arrest (3 mins) nonshockable(8/5/2020)    PLAN:    CNS: FU with Neurology. Keep off sedation.     HEENT:  Oral care / Trach care    PULMONARY:  HOB @ 45 degrees.  Vent changes:  Wean O2.  Fio2 40%. no change in vent settings.    CARDIOVASCULAR: I=O. Continue Rate control.        GI: GI prophylaxis. trickle  feeding as tolerated , free water q 12h    RENAL:  Increase free water to 300q6. Continue bicarb drip at 60CC, f/u renal    INFECTIOUS DISEASE: FU repeat cultures.  Pan cultures switch to cefepime on thursday(8 of 10) and Vanc po.      HEMATOLOGICAL:  DVT prophylaxis.     ENDOCRINE:  Follow up FS.  Insulin protocol if needed    CODE STATUS: FULL CODE    MICU  Overall very Poor prognosis  Palliative care

## 2020-08-09 NOTE — PROGRESS NOTE ADULT - ASSESSMENT
#NAEL on CKD, p/w creatinine 1.9, unknown baseline, ATN in the context of CVA/shock/cardiac arrest  - creatinine rising, worsened sp cardiac arrest    - non oliguric   - DARSHAN noted, non-echogenic kidneys, no hydro   - phos noted, down trending, continue binders   - cont sodium bicarbonate PO 1300mg TID. d/c iv fluids  - K noted , d/c Lokelma  - no indication for RRT, would not improve prognosis  - sp trach/PEG   - on cefepime/flagyl/PO vanc per ID, appreciate f/u  - continue midodrine   - prognosis poor / appreciate palliative notes   - will follow

## 2020-08-09 NOTE — PROGRESS NOTE ADULT - SUBJECTIVE AND OBJECTIVE BOX
Nephrology Progress Note    ANA CRISTINA LOVING  [id number]  64y  Male    S:  Patient is seen and examined, events over the last 24h noted.    O:  Allergies:  No Known Allergies    Hospital Medications:   MaleMEDICATIONS  (STANDING):  aMIOdarone    Tablet 100 milliGRAM(s) Oral two times a day  cefepime   IVPB 1000 milliGRAM(s) IV Intermittent every 24 hours  chlorhexidine 0.12% Liquid 15 milliLiter(s) Oral Mucosa every 12 hours  chlorhexidine 4% Liquid 1 Application(s) Topical <User Schedule>  dextrose 5% 1000 milliLiter(s) (60 mL/Hr) IV Continuous <Continuous>  dextrose 50% Injectable 12.5 Gram(s) IV Push once  dextrose 50% Injectable 25 Gram(s) IV Push once  dextrose 50% Injectable 25 Gram(s) IV Push once  heparin   Injectable 5000 Unit(s) SubCutaneous every 8 hours  insulin glargine Injectable (LANTUS) 12 Unit(s) SubCutaneous at bedtime  insulin lispro Injectable (HumaLOG) 3 Unit(s) SubCutaneous every 8 hours  metroNIDAZOLE  IVPB 500 milliGRAM(s) IV Intermittent every 8 hours  midodrine 10 milliGRAM(s) Oral every 8 hours  norepinephrine Infusion 0.01 MICROgram(s)/kG/Min (1.58 mL/Hr) IV Continuous <Continuous>  pantoprazole   Suspension 40 milliGRAM(s) Enteral Tube daily  petrolatum Ophthalmic Ointment 1 Application(s) Both EYES daily  sevelamer carbonate Powder 1600 milliGRAM(s) Oral three times a day with meals  sodium bicarbonate 1300 milliGRAM(s) Oral three times a day  sodium zirconium cyclosilicate 10 Gram(s) Oral two times a day  vancomycin    Solution 125 milliGRAM(s) Oral every 24 hours    MEDICATIONS  (PRN):  acetaminophen   Tablet .. 650 milliGRAM(s) Oral every 6 hours PRN Temp greater or equal to 38C (100.4F)  dextrose 40% Gel 15 Gram(s) Oral once PRN Blood Glucose LESS THAN 70 milliGRAM(s)/deciliter  glucagon  Injectable 1 milliGRAM(s) IntraMuscular once PRN Glucose LESS THAN 70 milligrams/deciliter    Home Medications:  Home Medications:  ALPRAZolam 0.5 mg oral tablet, extended release: 1 tab(s) orally once a day (at bedtime) (2020 19:14)  amLODIPine 5 mg oral tablet: 1 tab(s) orally once a day (2020 19:14)  clopidogrel 75 mg oral tablet: 1 tab(s) orally once a day (2020 19:11)  losartan 50 mg oral tablet: 1 tab(s) orally once a day (2020 19:13)  metFORMIN 500 mg oral tablet, extended release: 1 tab(s) orally once a day (2020 19:13)  omeprazole 20 mg oral delayed release capsule: 1 cap(s) orally once a day (2020 19:14)  OXcarbazepine 150 mg oral tablet: 1 tab(s) orally once a day (2020 19:15)  OXcarbazepine 300 mg oral tablet: 1 tab(s) orally once a day (at bedtime) (2020 19:12)  trihexyphenidyl: 1 milligram(s) orally 2 times a day (2020 19:13)      VITALS:  Daily     Daily Weight in k.9 (09 Aug 2020 04:00)  T(F): 96.5 (20 @ 08:00), Max: 99 (20 @ 16:00)  HR: 66 (20 @ 08:15)  BP: 114/50 (20 @ 08:00)  RR: 28 (20 @ 06:00)  SpO2: 93% (20 @ 08:15)  Wt(kg): --  I&O's Detail    08 Aug 2020 07:  -  09 Aug 2020 07:00  --------------------------------------------------------  IN:    dextrose 5%: 1440 mL    Enteral Tube Flush: 765 mL    Free Water: 900 mL    IV PiggyBack: 350 mL    Nepro with Carb Steady: 1200 mL    norepinephrine Infusion: 255.5 mL  Total IN: 4910.5 mL    OUT:    Intermittent Catheterization - Urethral: 975 mL    Rectal Tube: 1300 mL  Total OUT: 2275 mL    Total NET: 2635.5 mL      09 Aug 2020 07:  -  09 Aug 2020 08:26  --------------------------------------------------------  IN:    dextrose 5%: 60 mL    Nepro with Carb Steady: 50 mL    norepinephrine Infusion: 7 mL  Total IN: 117 mL    OUT:  Total OUT: 0 mL    Total NET: 117 mL        I&O's Summary    08 Aug 2020 07:  -  09 Aug 2020 07:00  --------------------------------------------------------  IN: 4910.5 mL / OUT: 2275 mL / NET: 2635.5 mL    09 Aug 2020 07:  -  09 Aug 2020 08:26  --------------------------------------------------------  IN: 117 mL / OUT: 0 mL / NET: 117 mL          PHYSICAL EXAM:  Gen: trach  Resp: b/l breath sounds  Card: S1/S2  Abd: distended  Extremities: dependent edema      LABS:        145  |  109  |  110<HH>  ----------------------------<  174<H>  3.8   |  21  |  8.3<HH>    Ca    7.7<L>      09 Aug 2020 04:30  Phos  8.7       Mg     2.3           Phosphorus Level, Serum: 8.7 mg/dL (20 @ 04:30)  Phosphorus Level, Serum: 9.9 mg/dL (20 @ 04:40)  Phosphorus Level, Serum: 10.7 mg/dL (20 @ 04:50)                          7.1    16.73 )-----------( 380      ( 09 Aug 2020 04:30 )             21.9       CBC Full  -  ( 09 Aug 2020 04:30 )  WBC Count : 16.73 K/uL  RBC Count : 2.49 M/uL  Hemoglobin : 7.1 g/dL  Hematocrit : 21.9 %  Platelet Count - Automated : 380 K/uL  Mean Cell Volume : 88.0 fL      Urine Studies:        Creatinine trend:  Creatinine, Serum: 8.3 mg/dL (20 @ 04:30)  Creatinine, Serum: 8.3 mg/dL (20 @ 05:00)  Creatinine, Serum: 8.6 mg/dL (20 @ 04:40)  Creatinine, Serum: 7.9 mg/dL (20 @ 22:21)  Creatinine, Serum: 8.1 mg/dL (20 @ 17:50)  Creatinine, Serum: 7.9 mg/dL (20 @ 04:50)  Creatinine, Serum: 7.3 mg/dL (20 @ 15:51)  Creatinine, Serum: 7.4 mg/dL (20 @ 09:34)  Creatinine, Serum: 7.4 mg/dL (20 @ 05:20)  Creatinine, Serum: 7.0 mg/dL (20 @ 16:34)  Creatinine, Serum: 6.5 mg/dL (20 @ 05:15)  Creatinine, Serum: 6.0 mg/dL (20 @ 16:00)  Creatinine, Serum: 5.5 mg/dL (20 @ 04:30)  Creatinine, Serum: 5.2 mg/dL (20 @ 23:21)  Creatinine, Serum: 4.6 mg/dL (20 @ 15:30)  Creatinine, Serum: 4.2 mg/dL (20 @ 04:30)

## 2020-08-09 NOTE — PROGRESS NOTE ADULT - SUBJECTIVE AND OBJECTIVE BOX
Patient is a 64y old  Male who presents with a chief complaint of Stroke s/p tpa (09 Aug 2020 08:25)    Over Night Events: Remains on MV. trached and PEGd. Levo 0.04. On bicarb drip    ROS:   Unable to obtain due to patient's current clinical status     PHYSICAL EXAM    ICU Vital Signs Last 24 Hrs  T(C): 35.8 (09 Aug 2020 08:00), Max: 37.2 (08 Aug 2020 16:00)  T(F): 96.5 (09 Aug 2020 08:00), Max: 99 (08 Aug 2020 16:00)  HR: 66 (09 Aug 2020 08:15) (62 - 76)  BP: 114/50 (09 Aug 2020 08:00) (71/42 - 128/61)  BP(mean): 73 (09 Aug 2020 08:00) (48 - 91)  RR: 28 (09 Aug 2020 06:00) (13 - 28)  SpO2: 93% (09 Aug 2020 08:15) (80% - 99%)    General: trach/peg  HEENT: MARGE             Lungs: Bilateral BS  Cardiovascular: Regular   Abdomen: Soft, Positive BS  Extremities: No clubbing  Skin: Warm  Neurological: no change in MS    08-08-20 @ 07:01  -  08-09-20 @ 07:00  --------------------------------------------------------  IN:    dextrose 5%: 1440 mL    Enteral Tube Flush: 765 mL    Free Water: 900 mL    IV PiggyBack: 350 mL    Nepro with Carb Steady: 1200 mL    norepinephrine Infusion: 255.5 mL  Total IN: 4910.5 mL    OUT:    Intermittent Catheterization - Urethral: 975 mL    Rectal Tube: 1300 mL  Total OUT: 2275 mL    Total NET: 2635.5 mL      08-09-20 @ 07:01  -  08-09-20 @ 10:07  --------------------------------------------------------  IN:    dextrose 5%: 120 mL    Nepro with Carb Steady: 100 mL    norepinephrine Infusion: 14 mL  Total IN: 234 mL    OUT:  Total OUT: 0 mL    Total NET: 234 mL    LABS:                        7.1    16.73 )-----------( 380      ( 09 Aug 2020 04:30 )             21.9                                               08-09    145  |  109  |  110<HH>  ----------------------------<  174<H>  3.8   |  21  |  8.3<HH>    Ca    7.7<L>      09 Aug 2020 04:30  Phos  8.7     08-09  Mg     2.3     08-09                                                 Mode: AC/ CMV (Assist Control/ Continuous Mandatory Ventilation)  RR (machine): 28  TV (machine): 450  FiO2: 40  PEEP: 5  ITime: 1  MAP: 11  PIP: 20                                        MEDICATIONS  (STANDING):  aMIOdarone    Tablet 100 milliGRAM(s) Oral two times a day  cefepime   IVPB 1000 milliGRAM(s) IV Intermittent every 24 hours  chlorhexidine 0.12% Liquid 15 milliLiter(s) Oral Mucosa every 12 hours  chlorhexidine 4% Liquid 1 Application(s) Topical <User Schedule>  dextrose 5% 1000 milliLiter(s) (60 mL/Hr) IV Continuous <Continuous>  dextrose 50% Injectable 12.5 Gram(s) IV Push once  dextrose 50% Injectable 25 Gram(s) IV Push once  dextrose 50% Injectable 25 Gram(s) IV Push once  heparin   Injectable 5000 Unit(s) SubCutaneous every 8 hours  insulin glargine Injectable (LANTUS) 12 Unit(s) SubCutaneous at bedtime  insulin lispro Injectable (HumaLOG) 3 Unit(s) SubCutaneous every 8 hours  metroNIDAZOLE  IVPB 500 milliGRAM(s) IV Intermittent every 8 hours  midodrine 10 milliGRAM(s) Oral every 8 hours  norepinephrine Infusion 0.01 MICROgram(s)/kG/Min (1.58 mL/Hr) IV Continuous <Continuous>  pantoprazole   Suspension 40 milliGRAM(s) Enteral Tube daily  petrolatum Ophthalmic Ointment 1 Application(s) Both EYES daily  sevelamer carbonate Powder 1600 milliGRAM(s) Oral three times a day with meals  sodium bicarbonate 1300 milliGRAM(s) Oral three times a day  sodium zirconium cyclosilicate 10 Gram(s) Oral two times a day  vancomycin    Solution 125 milliGRAM(s) Oral every 24 hours    MEDICATIONS  (PRN):  acetaminophen   Tablet .. 650 milliGRAM(s) Oral every 6 hours PRN Temp greater or equal to 38C (100.4F)  dextrose 40% Gel 15 Gram(s) Oral once PRN Blood Glucose LESS THAN 70 milliGRAM(s)/deciliter  glucagon  Injectable 1 milliGRAM(s) IntraMuscular once PRN Glucose LESS THAN 70 milligrams/deciliter

## 2020-08-10 NOTE — PROGRESS NOTE ADULT - SUBJECTIVE AND OBJECTIVE BOX
24H events:    Patient is a 64y old Male who presents with a chief complaint of Stroke s/p tpa (10 Aug 2020 08:51)    Primary diagnosis of Stroke     Today is hospital day 25d. This morning patient was seen and examined at bedside, resting comfortably in bed.     He is off sedation. Today he is hemodynamically stable, on levophed drip. No fever. No notable events      PAST MEDICAL & SURGICAL HISTORY  HTN (hypertension)  Diabetes  No significant past surgical history    SOCIAL HISTORY:  Social History:   lives with wife at [facility]  NO smoking or Etoh (16 Jul 2020 18:12)      ALLERGIES:  No Known Allergies    MEDICATIONS:  STANDING MEDICATIONS  aMIOdarone    Tablet 100 milliGRAM(s) Oral two times a day  cefepime   IVPB 1000 milliGRAM(s) IV Intermittent every 24 hours  chlorhexidine 0.12% Liquid 15 milliLiter(s) Oral Mucosa every 12 hours  chlorhexidine 4% Liquid 1 Application(s) Topical <User Schedule>  dextrose 5% 1000 milliLiter(s) IV Continuous <Continuous>  dextrose 50% Injectable 12.5 Gram(s) IV Push once  dextrose 50% Injectable 25 Gram(s) IV Push once  dextrose 50% Injectable 25 Gram(s) IV Push once  heparin   Injectable 5000 Unit(s) SubCutaneous every 8 hours  insulin glargine Injectable (LANTUS) 12 Unit(s) SubCutaneous at bedtime  insulin lispro Injectable (HumaLOG) 3 Unit(s) SubCutaneous every 8 hours  metroNIDAZOLE  IVPB 500 milliGRAM(s) IV Intermittent every 8 hours  midodrine 10 milliGRAM(s) Oral every 8 hours  norepinephrine Infusion 0.01 MICROgram(s)/kG/Min IV Continuous <Continuous>  pantoprazole   Suspension 40 milliGRAM(s) Enteral Tube daily  petrolatum Ophthalmic Ointment 1 Application(s) Both EYES daily  sevelamer carbonate Powder 1600 milliGRAM(s) Oral three times a day with meals  sodium bicarbonate 1300 milliGRAM(s) Oral three times a day  vancomycin    Solution 125 milliGRAM(s) Oral every 24 hours    PRN MEDICATIONS  acetaminophen   Tablet .. 650 milliGRAM(s) Oral every 6 hours PRN  dextrose 40% Gel 15 Gram(s) Oral once PRN  glucagon  Injectable 1 milliGRAM(s) IntraMuscular once PRN    VITALS:   T(F): 96  HR: 72  BP: 121/54  RR: 28  SpO2: 91%    PHYSICAL EXAM:  GENERAL: unresponsive male in NAD   HEENT: NCAT, +tracheostomy, +R IJ  CHEST/LUNG: audible breath sounds b/l   HEART: Regular rate and rhythm; s1 s2 appreciated  ABDOMEN: soft, mildly distended abdomen, +PEG tube   EXTREMITIES: UE and LE edema   NERVOUS SYSTEM: some spontaneous nonpurposeful movements to tactile sensation, no apparent brainstem reflexes, absent reflexes in upper extremities. Hyper reflexia in lower extremities lost, no more response to touch today      LABS:                        7.5    14.50 )-----------( 349      ( 10 Aug 2020 05:40 )             22.7     08-10    144  |  106  |  108<HH>  ----------------------------<  160<H>  3.8   |  24  |  7.8<HH>    Ca    7.4<L>      10 Aug 2020 05:40  Phos  8.7     08-09  Mg     2.2     08-10                    RADIOLOGY:

## 2020-08-10 NOTE — PHARMACOTHERAPY INTERVENTION NOTE - COMMENTS
Lokelma 10gms q12h-d/w med team to evaluate, K 3.8  -d/c Lokelma
per rounds restart desmopressin 2mcg IV q12h  -d/w team, will reorder
sevelamer 1600mg q8h, recommended changing to powder, pt intubated
Vanco ordered as q24hr scheduled when renal function worsening with eGRF 15-17. Pt received a dose 2 days prior. Recommended to check level prior to giving a dose today and change the order to ONCE or at least q48hr instead of q24hr dosing.
recommended vanco 1.25g IV x1

## 2020-08-10 NOTE — PROGRESS NOTE ADULT - SUBJECTIVE AND OBJECTIVE BOX
seen and examined   trach/vent  on pressors         PAST HISTORY  --------------------------------------------------------------------------------  No significant changes to PMH, PSH, FHx, SHx, unless otherwise noted    ALLERGIES & MEDICATIONS  --------------------------------------------------------------------------------  Allergies    No Known Allergies    Intolerances      Standing Inpatient Medications  aMIOdarone    Tablet 100 milliGRAM(s) Oral two times a day  cefepime   IVPB 1000 milliGRAM(s) IV Intermittent every 24 hours  chlorhexidine 0.12% Liquid 15 milliLiter(s) Oral Mucosa every 12 hours  chlorhexidine 4% Liquid 1 Application(s) Topical <User Schedule>  dextrose 5% 1000 milliLiter(s) IV Continuous <Continuous>  dextrose 50% Injectable 12.5 Gram(s) IV Push once  dextrose 50% Injectable 25 Gram(s) IV Push once  dextrose 50% Injectable 25 Gram(s) IV Push once  heparin   Injectable 5000 Unit(s) SubCutaneous every 8 hours  insulin glargine Injectable (LANTUS) 12 Unit(s) SubCutaneous at bedtime  insulin lispro Injectable (HumaLOG) 3 Unit(s) SubCutaneous every 8 hours  metroNIDAZOLE  IVPB 500 milliGRAM(s) IV Intermittent every 8 hours  midodrine 10 milliGRAM(s) Oral every 8 hours  norepinephrine Infusion 0.01 MICROgram(s)/kG/Min IV Continuous <Continuous>  pantoprazole   Suspension 40 milliGRAM(s) Enteral Tube daily  petrolatum Ophthalmic Ointment 1 Application(s) Both EYES daily  sevelamer carbonate Powder 1600 milliGRAM(s) Oral three times a day with meals  sodium bicarbonate 1300 milliGRAM(s) Oral three times a day  vancomycin    Solution 125 milliGRAM(s) Oral every 24 hours    PRN Inpatient Medications  acetaminophen   Tablet .. 650 milliGRAM(s) Oral every 6 hours PRN  dextrose 40% Gel 15 Gram(s) Oral once PRN  glucagon  Injectable 1 milliGRAM(s) IntraMuscular once PRN      VITALS/PHYSICAL EXAM  --------------------------------------------------------------------------------  T(C): 35.8 (08-10-20 @ 04:00), Max: 35.9 (08-09-20 @ 16:00)  HR: 56 (08-10-20 @ 07:00) (56 - 72)  BP: 96/48 (08-10-20 @ 07:00) (80/35 - 128/56)  RR: 28 (08-10-20 @ 07:00) (28 - 28)  SpO2: 92% (08-10-20 @ 07:00) (88% - 98%)  Wt(kg): --        08-09-20 @ 07:01  -  08-10-20 @ 07:00  --------------------------------------------------------  IN: 3909 mL / OUT: 1100 mL / NET: 2809 mL      Physical Exam:  	Gen: trach/vent  	Pulm: decrease BS B/L  	CV: S1S2; no rub  	Abd: +distended      LABS/STUDIES  --------------------------------------------------------------------------------              7.5    14.50 >-----------<  349      [08-10-20 @ 05:40]              22.7     144  |  106  |  108  ----------------------------<  160      [08-10-20 @ 05:40]  3.8   |  24  |  7.8        Ca     7.4     [08-10-20 @ 05:40]      Mg     2.2     [08-10-20 @ 05:40]      Phos  8.7     [08-09-20 @ 04:30]            Creatinine Trend:  SCr 7.8 [08-10 @ 05:40]  SCr 8.3 [08-09 @ 04:30]  SCr 8.3 [08-08 @ 05:00]  SCr 8.6 [08-07 @ 04:40]  SCr 7.9 [08-06 @ 22:21]    Urinalysis - [07-23-20 @ 21:50]      Color Yellow / Appearance Slightly Turbid / SG 1.014 / pH 6.0      Gluc Negative / Ketone Negative  / Bili Negative / Urobili <2 mg/dL       Blood Moderate / Protein 100 mg/dL / Leuk Est Negative / Nitrite Positive      RBC 6 / WBC 14 / Hyaline 5 / Gran  / Sq Epi  / Non Sq Epi 4 / Bacteria Negative      Lipid: chol 146, , HDL 50, LDL 85      [07-16-20 @ 15:44]

## 2020-08-10 NOTE — PROGRESS NOTE ADULT - SUBJECTIVE AND OBJECTIVE BOX
ANA CRISTINA LOVING  64y, Male    All available historical data reviewed    OVERNIGHT EVENTS:    remains non responsive to all stimuli off allsedation  [id number]%  rectal tube with diarrhea  ROS:  unable to obtain history secondary to patient's mental status and/or sedation     VITALS:  T(F): 96.5, Max: 96.6 (08-09-20 @ 16:00)  HR: 56  BP: 96/48  RR: 28Vital Signs Last 24 Hrs  T(C): 35.8 (10 Aug 2020 04:00), Max: 35.9 (09 Aug 2020 16:00)  T(F): 96.5 (10 Aug 2020 04:00), Max: 96.6 (09 Aug 2020 16:00)  HR: 56 (10 Aug 2020 07:00) (56 - 72)  BP: 96/48 (10 Aug 2020 07:00) (80/35 - 128/56)  BP(mean): 62 (10 Aug 2020 07:00) (46 - 85)  RR: 28 (10 Aug 2020 07:00) (28 - 28)  SpO2: 92% (10 Aug 2020 07:00) (88% - 98%)    TESTS & MEASUREMENTS:                        7.5    14.50 )-----------( 349      ( 10 Aug 2020 05:40 )             22.7     08-10    144  |  106  |  108<HH>  ----------------------------<  160<H>  3.8   |  24  |  7.8<HH>    Ca    7.4<L>      10 Aug 2020 05:40  Phos  8.7     08-09  Mg     2.2     08-10                RADIOLOGY & ADDITIONAL TESTS:  Personal review of radiological diagnostics performed  Echo and EKG results noted when applicable.     MEDICATIONS:  acetaminophen   Tablet .. 650 milliGRAM(s) Oral every 6 hours PRN  aMIOdarone    Tablet 100 milliGRAM(s) Oral two times a day  cefepime   IVPB 1000 milliGRAM(s) IV Intermittent every 24 hours  chlorhexidine 0.12% Liquid 15 milliLiter(s) Oral Mucosa every 12 hours  chlorhexidine 4% Liquid 1 Application(s) Topical <User Schedule>  dextrose 40% Gel 15 Gram(s) Oral once PRN  dextrose 5% 1000 milliLiter(s) IV Continuous <Continuous>  dextrose 50% Injectable 12.5 Gram(s) IV Push once  dextrose 50% Injectable 25 Gram(s) IV Push once  dextrose 50% Injectable 25 Gram(s) IV Push once  glucagon  Injectable 1 milliGRAM(s) IntraMuscular once PRN  heparin   Injectable 5000 Unit(s) SubCutaneous every 8 hours  insulin glargine Injectable (LANTUS) 12 Unit(s) SubCutaneous at bedtime  insulin lispro Injectable (HumaLOG) 3 Unit(s) SubCutaneous every 8 hours  metroNIDAZOLE  IVPB 500 milliGRAM(s) IV Intermittent every 8 hours  midodrine 10 milliGRAM(s) Oral every 8 hours  norepinephrine Infusion 0.01 MICROgram(s)/kG/Min IV Continuous <Continuous>  pantoprazole   Suspension 40 milliGRAM(s) Enteral Tube daily  petrolatum Ophthalmic Ointment 1 Application(s) Both EYES daily  sevelamer carbonate Powder 1600 milliGRAM(s) Oral three times a day with meals  sodium bicarbonate 1300 milliGRAM(s) Oral three times a day  sodium zirconium cyclosilicate 10 Gram(s) Oral two times a day  vancomycin    Solution 125 milliGRAM(s) Oral every 24 hours      ANTIBIOTICS:  cefepime   IVPB 1000 milliGRAM(s) IV Intermittent every 24 hours  metroNIDAZOLE  IVPB 500 milliGRAM(s) IV Intermittent every 8 hours  vancomycin    Solution 125 milliGRAM(s) Oral every 24 hours

## 2020-08-10 NOTE — CHART NOTE - NSCHARTNOTEFT_GEN_A_CORE
ICU Transfer Note    Transfer from: ICU    Transfer to: (  ) Medicine    (  ) Telemetry    (  ) RCU                               (  ) Palliative    (  ) Stroke Unit    (  ) MICU    (X) Vent Unit    HPI / ICU COURSE:  64 year old male with pmh of DM, CAD with PCI x3 in 2017, HTN, DLD, presented to  [facility] initially for expressive aphasia and confusion with NIHSS of 9 was s/p tpa at 16:46 hrs . CTA/ perfusion Stat, with basilar artery near occlusion.  Patient  s/p mechanical thrombectomy with full recanalization of aneurysmal basilar artery. Post procedurally Repeat HCT showed contrast staining and no bleed. Patient was monitored being monitored in  ICU patient deteriorated and became hemodynamically unstable with absent brain stem reflexes . Stat HCT revealed worsening edema and ischemia in the posterior circulation. Patient has no no brain stem exam.    Important events: 8/5/2020:  PEA, achieved ROSC after 1 cycle of CPR    Today is hospital day 25d. This morning patient was seen and examined at bedside, resting comfortably in bed.     He is off sedation. Today he is hemodynamically stable, on levophed drip. No fever. No notable events        Vital Signs Last 24 Hrs  T(C): 35.6 (10 Aug 2020 08:00), Max: 35.9 (09 Aug 2020 16:00)  T(F): 96 (10 Aug 2020 08:00), Max: 96.6 (09 Aug 2020 16:00)  HR: 72 (10 Aug 2020 11:00) (56 - 72)  BP: 121/54 (10 Aug 2020 11:00) (80/35 - 133/60)  BP(mean): 76 (10 Aug 2020 11:00) (46 - 87)  RR: 28 (10 Aug 2020 08:00) (28 - 28)  SpO2: 91% (10 Aug 2020 11:00) (88% - 98%)    I&O's Summary    09 Aug 2020 07:01  -  10 Aug 2020 07:00  --------------------------------------------------------  IN: 3909 mL / OUT: 1100 mL / NET: 2809 mL    10 Aug 2020 07:01  -  10 Aug 2020 13:09  --------------------------------------------------------  IN: 354 mL / OUT: 0 mL / NET: 354 mL        Physical Exam:       LABS:                               7.5    14.50 )-----------( 349      ( 10 Aug 2020 05:40 )             22.7       08-10    144  |  106  |  108<HH>  ----------------------------<  160<H>  3.8   |  24  |  7.8<HH>    Ca    7.4<L>      10 Aug 2020 05:40  Phos  8.7     08-09  Mg     2.2     08-10          ASSESSMENT & PLAN:     1. DM  2. CAD with PCI x3 in 2017  3. HTN  4. DLD  5. trigeminal neuralgia    presented to ED from [facility] s/p CVA for TPA. S/p thrombectomy (Basiliar Artery) - unresponsive off sedation, on pressors (levophed)  - repeat CT head (7/17):  Interval increase in posterior fossa edema and mass effect consistent with evolving infarct  with resultant new effacement of the fourth ventricle and new mild obstructive hydrocephalus. . Increased hyperdensity within the left ambient and quadrigeminal plate cisterns, and new hyperdensity within the right ambient and quadrigeminal plate cisterns, the suprasellar cistern, the right sylvian fissure and scattered sulci suspicious worsening/new subarachnoid hemorrhage.    # cardiac arrest PEA on 8/5/2020  ROSC achieved after 1 cycle of CPR  No acute events since    # s/p Tracheostomy and PEG tube placement (8/1)  - remains ventilated, off sedation  - On low dose levophed, taper off pressors if possible   - integrillin completed  - no notable neurologic activity. Apnea test performed (7/22), patient took a breath.  - apnea test repeated (7/27), patient took 2 breaths.   - continue with Subq heparin for DVT ppx, cleared by neurology.   - continue with vergara catheter   - TLC changed to R IJ, confirmed placement with cxr 7/27  - Patient continues having spontaneous movements of LE, in response to and without tactile sensation, EEG negative for seizures.   - Tube feeding   - Vianney Nye (daughter, HCP) would like a call before any MRI, apnea test, or brain death protocol is done again. She would also like to be informed of any changed in status regardless of time. Her number is [phone].    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 Diff. - diarrhea improving   Non-AG metabolic Acidosis   - give bicarb 1300 meq q8 and add IV bicarb L with 3 ampules    # Worsening NAEL   baseline Cr ~1   Cr 7.8 - improving  Kidney bladder US unremarkable    Nephrology recommendations  - VBGs today to decide on bicarb  - cont  sodium bicarbonate PO 1300mg TID/ start 1L d5 with 3 amp of bicarbonate at 60 cc/h   - hypernatremia , improving   - no indication for RRT, would not improve prognosis/ prognosis poor after code blue too   - Follow up with nephrology    - C. Diff PCR positive  - Po vancomycin switched to 125 mg daily 125mg PO, for as as remains on broad spectrum abx    # Pseudomonas in sputum (cultures taken on 8/1)  - on cefepime and metronidazole 500mg q12 day 8 (started 8/1) --> d/c today after last ATB dose    # Distended abdomen - improved, stable s/p PEG   - xray of the abdomen performed showing gaseous distention of the ascending colon and stomach.  - CT Abdomen shows liquid stool within the colon without evidence of obstruction or colon thickening, no intraperitoneal free air.      # DM   - HbA1c 7.5%   - lantus 12 units  - humalog 3 q6    # DLD   - d/c atorvastatin while NPO    # Activity: Bed rest   # Diet: NPO with Nepro Tube feeding  # DVT ppx: Subq heparin   # GI ppx: Protonix  # Code Status: FULL CODE    # DISPO: Transfer to [facility] unit          FOR FOLLOW UP:  [1] D/C antibiotics after last dose today. keep oral vanco   [2] decide on bicarb after VBGs   [ ]

## 2020-08-10 NOTE — PROGRESS NOTE ADULT - ASSESSMENT
IMPRESSION:    Acute hypoxemic respiratory failure SP tracheostomy / pseudomonas in DTA  Stroke SP TPA And thrombectomy  NAEL ATN non oliguric   E coli bacteremia treated   Sepsis  Septic shock  CDiff   SP Cardiopulmonary arrest (3 mins)     PLAN:    CNS: FU MS.     HEENT:  Oral care / Trach care    PULMONARY:  HOB @ 45 degrees.  Vent changes: VBG.      CARDIOVASCULAR: I=O. Continue Rate control.        GI: GI prophylaxis.   Continue Feeding.      RENAL:  DC free H2O.  DC NAHCO3 based on VBG.     INFECTIOUS DISEASE: FU repeat cultures.  Finish ABX course per ID       HEMATOLOGICAL:  DVT prophylaxis.     ENDOCRINE:  Follow up FS.     CODE STATUS: FULL CODE    DC TLC     MICU  Overall very Poor prognosis  DOwn grade to vent unit

## 2020-08-10 NOTE — PROGRESS NOTE ADULT - ASSESSMENT
#NAEL on CKD, p/w creatinine 1.9, unknown baseline, ATN in the context of CVA/shock/cardiac arrest  - creatinine trending  down   - non oliguric   - DARSHAN noted, non-echogenic kidneys, no hydro   - phos noted, on binders  - cont sodium bicarbonate PO 1300mg TID. d/c bicarbonate drip   - K noted , d/c Lokelma  - no indication for RRT, would not improve prognosis  - sp trach/PEG   - on cefepime/flagyl/PO vanc per ID, appreciate f/u  -  BP noted,continue midodrine ,on levophed   - prognosis poor   - will follow

## 2020-08-10 NOTE — PROGRESS NOTE ADULT - GIT ABD PE PAL DETAILS PC
bowel sounds hypoactive/distended
bowel sounds hypoactive/distended
distended/bowel sounds hypoactive
distended/bowel sounds hypoactive
bowel sounds hypoactive
distended/bowel sounds hypoactive

## 2020-08-10 NOTE — PROGRESS NOTE ADULT - ASSESSMENT
· Assessment		  64 year old male pmh of DM, CAD with PCI x3 in 2017, HTN, DLD, trigeminal neuralgia who presents to ED from [facility] s/p TPA for stroke.    IMPRESSION;   #Pseudomonal VAP (panS), R-sided opacities>L    8/1 tracheal cx Pseudomonas  #Cardiopulmonary arrest (3 mins) nonshockable(8/5/2020)  #Trach, PEG  # CVA s/p TPA s/p thrombectomy (Basiliar Artery) - unresponsive off sedation, on pressors  - repeat CT head:  Interval increase in posterior fossa edema and mass effect consistent with evolving infarct  with resultant new effacement of the fourth ventricle and new mild obstructive hydrocephalus. Increased hypergenicity within the left ambient and quadrigeminal plate cisterns, and new hypodensity within the right ambient and quadrigeminal plate cisterns, the suprasellar cistern, the right sylvian fissure and scattered sulci suspicious worsening/new subarachnoid hemorrhage.  - remained ventilated, off sedation  - no neurologic activity.   #7/26 CD colitis: on po vancomycin. Diarrhea has decreased  #E coli bacteremia : secondary to  tract with UCx E coli but no overt pyuria. Could well also be secondary to translocation from the GI tract.  BCx 7/27 NGTD  CT Abdomen 7/28:  Liquid stool within the colon without evidence of obstruction or colonic wall thickening. No intraperitoneal free air. Bilateral small pleural effusions with associated opacities    RECOMMENDATIONS;  - continue Cefepime 1g q24h IV & Flagyl 500mg q8h IV given sensitivities and in the setting of Cdiff (end 8/10)  - PO vancomycin 125 mg q6h x 14 days end 8/8 as persistent diarrhea, then daily 125mg PO as remains on broad spectrum abx (until 8/12)

## 2020-08-10 NOTE — PROGRESS NOTE ADULT - SUBJECTIVE AND OBJECTIVE BOX
Patient is a 64y old  Male who presents with a chief complaint of Stroke s/p tpa (10 Aug 2020 08:31)        Over Night Events:  On MV.  On Levophed 0.045.          ROS:     All ROS are negative except HPI         PHYSICAL EXAM    ICU Vital Signs Last 24 Hrs  T(C): 35.6 (10 Aug 2020 08:00), Max: 35.9 (09 Aug 2020 16:00)  T(F): 96 (10 Aug 2020 08:00), Max: 96.6 (09 Aug 2020 16:00)  HR: 56 (10 Aug 2020 08:00) (56 - 72)  BP: 83/44 (10 Aug 2020 08:00) (80/35 - 128/56)  BP(mean): 55 (10 Aug 2020 08:00) (46 - 85)  ABP: --  ABP(mean): --  RR: 28 (10 Aug 2020 08:00) (28 - 28)  SpO2: 92% (10 Aug 2020 08:00) (88% - 98%)      CONSTITUTIONAL:  Well nourished.  NAD    ENT:   Airway patent,   Mouth with normal mucosa.   No thrush    EYES:   Pupils equal,   Round and reactive to light.    CARDIAC:   Normal rate,   Regular rhythm.    edema      Vascular:  Normal systolic impulse  No Carotid bruits    RESPIRATORY:   No wheezing  Bilateral BS  Normal chest expansion      GASTROINTESTINAL:  Abdomen soft,   Non-tender,   No guarding,   + BS    MUSCULOSKELETAL:   Range of motion is not limited,  No clubbing, cyanosis    NEUROLOGICAL:       SKIN:   Skin normal color for race,   Warm   No evidence of rash.    PSYCHIATRIC:   No apparent risk to self or others.    HEMATOLOGICAL:  No cervical  lymphadenopathy.  no inguinal lymphadenopathy      08-09-20 @ 07:01  -  08-10-20 @ 07:00  --------------------------------------------------------  IN:    dextrose 5%: 1440 mL    Enteral Tube Flush: 235 mL    Free Water: 600 mL    IV PiggyBack: 150 mL    Nepro with Carb Steady: 1200 mL    norepinephrine Infusion: 48 mL    norepinephrine Infusion: 136 mL    Solution: 100 mL  Total IN: 3909 mL    OUT:    Intermittent Catheterization - Urethral: 600 mL    Rectal Tube: 300 mL    Stool: 200 mL  Total OUT: 1100 mL    Total NET: 2809 mL          LABS:                            7.5    14.50 )-----------( 349      ( 10 Aug 2020 05:40 )             22.7                                               08-10    144  |  106  |  108<HH>  ----------------------------<  160<H>  3.8   |  24  |  7.8<HH>          Ca    7.4<L>      10 Aug 2020 05:40  Phos  8.7     08-09  Mg     2.2     08-10                                                                                                                                                                                   Mode: AC/ CMV (Assist Control/ Continuous Mandatory Ventilation)  RR (machine): 28  TV (machine): 450  FiO2: 40  PEEP: 5  ITime: 1  MAP: 12  PIP: 20                                          MEDICATIONS  (STANDING):  aMIOdarone    Tablet 100 milliGRAM(s) Oral two times a day  cefepime   IVPB 1000 milliGRAM(s) IV Intermittent every 24 hours  chlorhexidine 0.12% Liquid 15 milliLiter(s) Oral Mucosa every 12 hours  chlorhexidine 4% Liquid 1 Application(s) Topical <User Schedule>  dextrose 5% 1000 milliLiter(s) (60 mL/Hr) IV Continuous <Continuous>  dextrose 50% Injectable 12.5 Gram(s) IV Push once  dextrose 50% Injectable 25 Gram(s) IV Push once  dextrose 50% Injectable 25 Gram(s) IV Push once  heparin   Injectable 5000 Unit(s) SubCutaneous every 8 hours  insulin glargine Injectable (LANTUS) 12 Unit(s) SubCutaneous at bedtime  insulin lispro Injectable (HumaLOG) 3 Unit(s) SubCutaneous every 8 hours  metroNIDAZOLE  IVPB 500 milliGRAM(s) IV Intermittent every 8 hours  midodrine 10 milliGRAM(s) Oral every 8 hours  norepinephrine Infusion 0.01 MICROgram(s)/kG/Min (1.58 mL/Hr) IV Continuous <Continuous>  pantoprazole   Suspension 40 milliGRAM(s) Enteral Tube daily  petrolatum Ophthalmic Ointment 1 Application(s) Both EYES daily  sevelamer carbonate Powder 1600 milliGRAM(s) Oral three times a day with meals  sodium bicarbonate 1300 milliGRAM(s) Oral three times a day  vancomycin    Solution 125 milliGRAM(s) Oral every 24 hours    MEDICATIONS  (PRN):  acetaminophen   Tablet .. 650 milliGRAM(s) Oral every 6 hours PRN Temp greater or equal to 38C (100.4F)  dextrose 40% Gel 15 Gram(s) Oral once PRN Blood Glucose LESS THAN 70 milliGRAM(s)/deciliter  glucagon  Injectable 1 milliGRAM(s) IntraMuscular once PRN Glucose LESS THAN 70 milligrams/deciliter      New X-rays reviewed:                                                                                  ECHO    CXR interpreted by me:  TLC slightly low.  Bilateral effu

## 2020-08-10 NOTE — PROGRESS NOTE ADULT - ASSESSMENT
This is a case of a 64 year old male KTH:  1. DM  2. CAD with PCI x3 in 2017  3. HTN  4. DLD  5. trigeminal neuralgia    presented to ED from [facility] s/p CVA for TPA. S/p thrombectomy (Basiliar Artery) - unresponsive off sedation, on pressors (levophed)  - repeat CT head (7/17):  Interval increase in posterior fossa edema and mass effect consistent with evolving infarct  with resultant new effacement of the fourth ventricle and new mild obstructive hydrocephalus. . Increased hyperdensity within the left ambient and quadrigeminal plate cisterns, and new hyperdensity within the right ambient and quadrigeminal plate cisterns, the suprasellar cistern, the right sylvian fissure and scattered sulci suspicious worsening/new subarachnoid hemorrhage.    # cardiac arrest PEA on 8/5/2020  ROSC achieved after 1 cycle of CPR  No acute events since    # s/p Tracheostomy and PEG tube placement (8/1)  - remains ventilated, off sedation  - On low dose levophed, taper off pressors if possible   - integrillin completed  - no notable neurologic activity. Apnea test performed (7/22), patient took a breath.  - apnea test repeated (7/27), patient took 2 breaths.   - continue with Subq heparin for DVT ppx, cleared by neurology.   - continue with vergara catheter   - TLC changed to R IJ, confirmed placement with cxr 7/27  - Patient continues having spontaneous movements of LE, in response to and without tactile sensation, EEG negative for seizures.   - Tube feeding   - Vianney Nye (daughter, HCP) would like a call before any MRI, apnea test, or brain death protocol is done again. She would also like to be informed of any changed in status regardless of time. Her number is [phone].    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 Diff. - diarrhea improving   Non-AG metabolic Acidosis   - give bicarb 1300 meq q8 and add IV bicarb L with 3 ampules    # Worsening NAEL   baseline Cr ~1   Cr 7.8 - improving  Kidney bladder US unremarkable    Nephrology recommendations  - VBGs today to decide on bicarb  - cont  sodium bicarbonate PO 1300mg TID/ start 1L d5 with 3 amp of bicarbonate at 60 cc/h   - hypernatremia , improving   - no indication for RRT, would not improve prognosis/ prognosis poor after code blue too   - Follow up with nephrology    - C. Diff PCR positive  - Po vancomycin switched to 125 mg daily 125mg PO, for as as remains on broad spectrum abx    # Pseudomonas in sputum (cultures taken on 8/1)  - on cefepime and metronidazole 500mg q12 day 8 (started 8/1) --> d/c today after last ATB dose    # Distended abdomen - improved, stable s/p PEG   - xray of the abdomen performed showing gaseous distention of the ascending colon and stomach.  - CT Abdomen shows liquid stool within the colon without evidence of obstruction or colon thickening, no intraperitoneal free air.      # DM   - HbA1c 7.5%   - lantus 12 units  - humalog 3 q6    # DLD   - d/c atorvastatin while NPO    # Activity: Bed rest   # Diet: NPO with Nepro Tube feeding  # DVT ppx: Subq heparin   # GI ppx: Protonix  # Code Status: FULL CODE    # DISPO: Transfer to vent unit

## 2020-08-11 NOTE — PROGRESS NOTE ADULT - SUBJECTIVE AND OBJECTIVE BOX
Nephrology progress note    THIS IS AN INCOMPLETE NOTE . FULL NOTE TO FOLLOW SHORTLY    Patient is seen and examined, events over the last 24 h noted .    Allergies:  No Known Allergies    Hospital Medications:   MEDICATIONS  (STANDING):  aMIOdarone    Tablet 100 milliGRAM(s) Oral two times a day  chlorhexidine 0.12% Liquid 15 milliLiter(s) Oral Mucosa every 12 hours  chlorhexidine 4% Liquid 1 Application(s) Topical <User Schedule>  dextrose 50% Injectable 12.5 Gram(s) IV Push once  dextrose 50% Injectable 25 Gram(s) IV Push once  dextrose 50% Injectable 25 Gram(s) IV Push once  heparin   Injectable 5000 Unit(s) SubCutaneous every 8 hours  insulin glargine Injectable (LANTUS) 12 Unit(s) SubCutaneous at bedtime  insulin lispro Injectable (HumaLOG) 3 Unit(s) SubCutaneous every 8 hours  midodrine 10 milliGRAM(s) Oral every 8 hours  norepinephrine Infusion 0.01 MICROgram(s)/kG/Min (1.58 mL/Hr) IV Continuous <Continuous>  pantoprazole   Suspension 40 milliGRAM(s) Enteral Tube daily  petrolatum Ophthalmic Ointment 1 Application(s) Both EYES daily  sevelamer carbonate Powder 1600 milliGRAM(s) Oral three times a day with meals  sodium bicarbonate 1300 milliGRAM(s) Oral three times a day  vancomycin    Solution 125 milliGRAM(s) Oral every 24 hours        VITALS:  T(F): 97.6 (08-11-20 @ 08:27), Max: 97.6 (08-11-20 @ 08:27)  HR: 65 (08-11-20 @ 08:27)  BP: 103/51 (08-11-20 @ 08:27)  RR: 26 (08-11-20 @ 08:27)  SpO2: 95% (08-11-20 @ 00:15)  Wt(kg): --    08-09 @ 07:01  -  08-10 @ 07:00  --------------------------------------------------------  IN: 3909 mL / OUT: 1100 mL / NET: 2809 mL    08-10 @ 07:01  -  08-11 @ 07:00  --------------------------------------------------------  IN: 1796 mL / OUT: 2500 mL / NET: -704 mL          PHYSICAL EXAM:  Constitutional: NAD  HEENT: anicteric sclera, oropharynx clear, MMM  Neck: No JVD  Respiratory: CTAB, no wheezes, rales or rhonchi  Cardiovascular: S1, S2, RRR  Gastrointestinal: BS+, soft, NT/ND  Extremities: No cyanosis or clubbing. No peripheral edema  :  No vergara.   Skin: No rashes    LABS:  08-11    142  |  103  |  x   ----------------------------<  121<H>  4.0   |  21  |  x     Ca    8.2<L>      11 Aug 2020 05:43  Mg     2.3     08-11    TPro  5.0<L>  /  Alb  1.1<L>  /  TBili  0.3  /  DBili      /  AST  34  /  ALT  6   /  AlkPhos  137<H>  08-11                          7.9    14.64 )-----------( 333      ( 11 Aug 2020 05:43 )             24.5       Urine Studies:      RADIOLOGY & ADDITIONAL STUDIES: Nephrology progress note    Patient is seen and examined, events over the last 24 h noted .  still intubated on MV     Allergies:  No Known Allergies    Hospital Medications:   MEDICATIONS  (STANDING):    aMIOdarone    Tablet 100 milliGRAM(s) Oral two times a day  heparin   Injectable 5000 Unit(s) SubCutaneous every 8 hours  insulin glargine Injectable (LANTUS) 12 Unit(s) SubCutaneous at bedtime  insulin lispro Injectable (HumaLOG) 3 Unit(s) SubCutaneous every 8 hours  midodrine 10 milliGRAM(s) Oral every 8 hours  norepinephrine Infusion 0.01 MICROgram(s)/kG/Min (1.58 mL/Hr) IV Continuous <Continuous>  pantoprazole   Suspension 40 milliGRAM(s) Enteral Tube daily  petrolatum Ophthalmic Ointment 1 Application(s) Both EYES daily  sevelamer carbonate Powder 1600 milliGRAM(s) Oral three times a day with meals  sodium bicarbonate 1300 milliGRAM(s) Oral three times a day  vancomycin    Solution 125 milliGRAM(s) Oral every 24 hours        VITALS:  T(F): 97.6 (08-11-20 @ 08:27), Max: 97.6 (08-11-20 @ 08:27)  HR: 65 (08-11-20 @ 08:27)  BP: 103/51 (08-11-20 @ 08:27)  RR: 26 (08-11-20 @ 08:27)  SpO2: 95% (08-11-20 @ 00:15)      08-09 @ 07:01  -  08-10 @ 07:00  --------------------------------------------------------  IN: 3909 mL / OUT: 1100 mL / NET: 2809 mL    08-10 @ 07:01  -  08-11 @ 07:00  --------------------------------------------------------  IN: 1796 mL / OUT: 2500 mL / NET: -704 mL          PHYSICAL EXAM:  Constitutional: intubated on MV   Neck: No JVD  Respiratory: CTAB, no wheezes, rales or rhonchi  Cardiovascular: S1, S2, RRR  Gastrointestinal: BS+, soft, NT/ND  Extremities: No cyanosis or clubbing. No peripheral edema  :  No vergara.   Skin: No rashes    LABS:  08-11    142  |  103  |  104<HH>  ----------------------------<  121<H>  4.0   |  21  |  8.0<HH>    Creatinine Trend: 8.0<--, 7.8<--, 8.3<--, 8.3<--, 8.6<--, 7.9<--    Ca    8.2<L>      11 Aug 2020 05:43  Mg     2.3     08-11    TPro  5.0<L>  /  Alb  1.1<L>  /  TBili  0.3  /  DBili  x   /  AST  34  /  ALT  6   /  AlkPhos  137<H>  08-11                          7.9    14.64 )-----------( 333      ( 11 Aug 2020 05:43 )             24.5       Urine Studies:      RADIOLOGY & ADDITIONAL STUDIES:

## 2020-08-11 NOTE — PROGRESS NOTE ADULT - SUBJECTIVE AND OBJECTIVE BOX
ANA CRISTINA LOVING  64y  Male    Patient is a 64y old  Male who presents with a chief complaint of Stroke s/p tpa (11 Aug 2020 08:58)      SUBJECTIVE:    No overnight events    Vent: yes   Sedation: no   Pressors: yes   Bowel Movements: yes dignishield in place       PAST MEDICAL & SURGICAL HISTORY:  HTN (hypertension)  Diabetes  No significant past surgical history      OBJECTIVE:    Vital Signs Last 24 Hrs  T(C): 36.4 (11 Aug 2020 08:27), Max: 36.4 (11 Aug 2020 08:27)  T(F): 97.6 (11 Aug 2020 08:27), Max: 97.6 (11 Aug 2020 08:27)  HR: 65 (11 Aug 2020 08:27) (60 - 72)  BP: 117/56 (11 Aug 2020 10:43) (63/35 - 127/56)  BP(mean): 80 (11 Aug 2020 10:43) (44 - 88)  RR: 26 (11 Aug 2020 08:27) (26 - 26)  SpO2: 95% (11 Aug 2020 00:15) (90% - 99%)    General: No apparent distress  HEENT: + trach               Neck: No JVD, No Cervical LN    Lungs: Bilateral BS, CTA  Cardiovascular: +S1 S2  Abdomen: Soft, nontender  Extremities: Pulses intact  Skin: dti left ear, trach, lip   Neurological: non Focal     I&O's Summary    10 Aug 2020 07:01  -  11 Aug 2020 07:00  --------------------------------------------------------  IN: 1796 mL / OUT: 2500 mL / NET: -704 mL          LABS:                          7.9    14.64 )-----------( 333      ( 11 Aug 2020 05:43 )             24.5                                               08-11    142  |  103  |  104<HH>  ----------------------------<  121<H>  4.0   |  21  |  8.0<HH>    Ca    8.2<L>      11 Aug 2020 05:43  Mg     2.3     08-11    TPro  5.0<L>  /  Alb  1.1<L>  /  TBili  0.3  /  DBili  x   /  AST  34  /  ALT  6   /  AlkPhos  137<H>  08-11                                                                                           LIVER FUNCTIONS - ( 11 Aug 2020 05:43 )  Alb: 1.1 g/dL / Pro: 5.0 g/dL / ALK PHOS: 137 U/L / ALT: 6 U/L / AST: 34 U/L / GGT: x                                                                                               Mode: AC/ CMV (Assist Control/ Continuous Mandatory Ventilation)  RR (machine): 26  TV (machine): 450  FiO2: 55  PEEP: 5  ITime: 1  MAP: 12  PIP: 23                                            MEDICATIONS  (STANDING):  aMIOdarone    Tablet 100 milliGRAM(s) Oral two times a day  chlorhexidine 0.12% Liquid 15 milliLiter(s) Oral Mucosa every 12 hours  chlorhexidine 4% Liquid 1 Application(s) Topical <User Schedule>  dextrose 50% Injectable 12.5 Gram(s) IV Push once  dextrose 50% Injectable 25 Gram(s) IV Push once  dextrose 50% Injectable 25 Gram(s) IV Push once  heparin   Injectable 5000 Unit(s) SubCutaneous every 8 hours  insulin glargine Injectable (LANTUS) 12 Unit(s) SubCutaneous at bedtime  insulin lispro Injectable (HumaLOG) 3 Unit(s) SubCutaneous every 8 hours  midodrine 10 milliGRAM(s) Oral every 8 hours  norepinephrine Infusion 0.01 MICROgram(s)/kG/Min (1.58 mL/Hr) IV Continuous <Continuous>  pantoprazole   Suspension 40 milliGRAM(s) Enteral Tube daily  petrolatum Ophthalmic Ointment 1 Application(s) Both EYES daily  sevelamer carbonate Powder 1600 milliGRAM(s) Oral three times a day with meals  sodium bicarbonate 1300 milliGRAM(s) Oral three times a day  vancomycin    Solution 125 milliGRAM(s) Oral every 24 hours    MEDICATIONS  (PRN):  acetaminophen   Tablet .. 650 milliGRAM(s) Oral every 6 hours PRN Temp greater or equal to 38C (100.4F)  dextrose 40% Gel 15 Gram(s) Oral once PRN Blood Glucose LESS THAN 70 milliGRAM(s)/deciliter  glucagon  Injectable 1 milliGRAM(s) IntraMuscular once PRN Glucose LESS THAN 70 milligrams/deciliter

## 2020-08-11 NOTE — PROGRESS NOTE ADULT - ASSESSMENT
IMPRESSION:    Acute hypoxemic respiratory failure SP tracheostomy / pseudomonas in DTA  Stroke SP TPA And thrombectomy  NAEL ATN non oliguric   E coli bacteremia treated   Sepsis  Septic shock  CDiff   SP Cardiopulmonary arrest (3 mins)     PLAN:    CNS: FU MS    HEENT:  Oral care / Trach care    PULMONARY:  HOB @ 45 degrees. Vent settings: 450/26/60/5     CARDIOVASCULAR: I=O. Continue Rate control.        GI: GI prophylaxis. Continue Feeding.      RENAL: Cont sodium bicarbonate PO 1300mg TID. Nephrology following     INFECTIOUS DISEASE: FU repeat cultures.  Finish ABX course per ID       HEMATOLOGICAL:  DVT prophylaxis.     ENDOCRINE:  Follow up FS.     CODE STATUS: FULL CODE    Poor Prognosis IMPRESSION:    Acute hypoxemic respiratory failure SP tracheostomy / pseudomonas in DTA  Stroke SP TPA And thrombectomy  NAEL ATN non oliguric   E coli bacteremia treated   Sepsis  Septic shock  CDiff   SP Cardiopulmonary arrest (3 mins)     PLAN:    CNS: FU MS    HEENT:  Oral care / Trach care    PULMONARY:  HOB @ 45 degrees. Vent settings: 450/26/60/5     CARDIOVASCULAR: I=O. Continue Rate control.        GI: GI prophylaxis. Continue Feeding.      RENAL: Cont sodium bicarbonate PO 1300mg TID. Nephrology following. Abbasi for retention      INFECTIOUS DISEASE: FU repeat cultures.  Finish ABX course per ID       HEMATOLOGICAL:  DVT prophylaxis.     ENDOCRINE:  Follow up FS.     CODE STATUS: FULL CODE    Poor Prognosis IMPRESSION:    Acute hypoxemic respiratory failure SP tracheostomy / pseudomonas in DTA  Stroke SP TPA And thrombectomy  NAEL ATN non oliguric   E coli bacteremia treated   Sepsis  Septic shock  C Diff   SP Cardiopulmonary arrest (3 mins)   Functional quadriplegia    PLAN:    CNS: FU MS    HEENT:  Oral care / Trach care    PULMONARY:  HOB @ 45 degrees. Vent settings: 450/26/60/5     CARDIOVASCULAR: I=O. Continue Rate control.        GI: GI prophylaxis. Continue Feeding.      RENAL: Cont sodium bicarbonate PO 1300mg TID. Nephrology following. Abbasi for retention      INFECTIOUS DISEASE: FU repeat cultures.  Finish ABX course per ID       HEMATOLOGICAL:  DVT prophylaxis.     ENDOCRINE:  Follow up FS    MUSCULOSKELETAL: Continue providing assistance with ADLS. Turn and Position q 2 hours. Passive ROM     CODE STATUS: FULL CODE    Poor Prognosis

## 2020-08-11 NOTE — PROGRESS NOTE ADULT - ASSESSMENT
#NAEL on CKD, p/w creatinine 1.9, unknown baseline, ATN in the context of CVA/shock/cardiac arrest  - creatinine stable   - non oliguric   - DARSHAN noted, non-echogenic kidneys, no hydro   - phos noted, on binders  - cont sodium bicarbonate PO 1300mg TID.   - no indication for RRT, would not improve prognosis  - sp trach/PEG   - off antibx for now   -  BP noted,continue midodrine ,on levophed   - prognosis poor   - will follow

## 2020-08-12 NOTE — PROGRESS NOTE ADULT - GASTROINTESTINAL
Soft, non-tender, no hepatosplenomegaly, normal bowel sounds
detailed exam

## 2020-08-12 NOTE — ADVANCED PRACTICE NURSE CONSULT - RECOMMEDATIONS
1. Mucosal membrane pressure injury mid upper lip- Continue w/ previous recs for Triad- Cleanse w/ normal saline, gently pat dry. Apply thin layer of Triad hydrophilic ointment to provide protective layer and assist w/ unroofing of scabbed tissue. Apply Triad q12h & prn for any strike-through drainage or soiling.   2. DTPI L ear- Cleanse w/ normal saline, gently pat dry. Apply thin layer of Triad hydrophilic ointment to provide protective layer and absorb wound exudate. Apply Triad q12h & prn for any strike-through drainage or soiling.   3. Stage 3 trach pressure injury-Cleanse w/ normal saline, gently pat dry. Apply thin layer of Triad hydrophilic ointment to provide protective layer, absorb wound exudate & assist w/ autolytic debridement of devitalized tissue. Cover w/ dry gauze dressing. Apply Triad & change dressing q12h & prn for any strike-through drainage or soiling.   3. DTPI coccyx-Cleanse w/ normal saline, gently pat dry. Apply thin layer of Triad hydrophilic ointment to provide protective layer, absorb wound exudate & assist w/ autolytic debridement of devitalized tissue. Cover w/ dry gauze dressing & foam Allevyn dressing. Apply Triad & change dressing q12h & prn for any strike-through drainage or soiling.   -Assess skin/wounds qshift, report changes to primary provider.     Additional recs:   -Continue turning/positioning patient from side-to-side q2h while in bed, or in accordance w/ pt's plan of care. Continue utilizing pillows to assist w/ turning/positioning.   -Continue to offload heels from bed surface with offloading boots to BLEs.   -Continue utilizing one underpad underneath patient to contain incontinence episodes; change pad when saturated/soiled.   -Continue applying Jefry moisture protective barrier cream to buttock & perineal area daily & prn after each incontinence episode.   -When/if vergara d/c'ed, utilize condom catheter (if patient candidate) to contain any urinary incontinence.   -Continue utilizing fecal management system/rectal tube/DigniShield (if patient candidate; MD order required) to contain loose fecal incontinence.   -Continue nutrition consult for optimal wound healing.     Plan of Care: Primary RN Silke at bedside & made aware of above. Spoke w/ covering/primary NP Beatrice in regards to above. No further needs/recs from [facility] service at this time. Staff RN to perform routine skin/wound assessment and manage wound care. Questions or concerns or if wound worsens and reconsult needed, please contact [facility], [phone].

## 2020-08-12 NOTE — PROGRESS NOTE ADULT - GASTROINTESTINAL DETAILS
soft/no rigidity/no guarding
no rebound tenderness/soft/nontender
soft/no rigidity/no guarding/no bruit

## 2020-08-12 NOTE — PROGRESS NOTE ADULT - CARDIOVASCULAR
detailed exam
Regular rate & rhythm, normal S1, S2; no murmurs, gallops or rubs; no S3, S4
detailed exam
Regular rate & rhythm, normal S1, S2; no murmurs, gallops or rubs; no S3, S4
detailed exam
detailed exam
Regular rate & rhythm, normal S1, S2; no murmurs, gallops or rubs; no S3, S4
detailed exam

## 2020-08-12 NOTE — PROGRESS NOTE ADULT - SUBJECTIVE AND OBJECTIVE BOX
Nephrology progress note    THIS IS AN INCOMPLETE NOTE . FULL NOTE TO FOLLOW SHORTLY    Patient is seen and examined, events over the last 24 h noted .    Allergies:  No Known Allergies    Hospital Medications:   MEDICATIONS  (STANDING):  aMIOdarone    Tablet 100 milliGRAM(s) Oral two times a day  chlorhexidine 0.12% Liquid 15 milliLiter(s) Oral Mucosa every 12 hours  chlorhexidine 4% Liquid 1 Application(s) Topical <User Schedule>  dextrose 50% Injectable 12.5 Gram(s) IV Push once  dextrose 50% Injectable 25 Gram(s) IV Push once  dextrose 50% Injectable 25 Gram(s) IV Push once  heparin   Injectable 5000 Unit(s) SubCutaneous every 8 hours  insulin glargine Injectable (LANTUS) 12 Unit(s) SubCutaneous at bedtime  insulin lispro Injectable (HumaLOG) 3 Unit(s) SubCutaneous every 8 hours  midodrine 10 milliGRAM(s) Oral every 8 hours  norepinephrine Infusion 0.01 MICROgram(s)/kG/Min (1.58 mL/Hr) IV Continuous <Continuous>  pantoprazole   Suspension 40 milliGRAM(s) Enteral Tube daily  petrolatum Ophthalmic Ointment 1 Application(s) Both EYES daily  sevelamer carbonate Powder 1600 milliGRAM(s) Oral three times a day with meals  sodium bicarbonate 1300 milliGRAM(s) Oral three times a day  vancomycin    Solution 125 milliGRAM(s) Oral every 24 hours        VITALS:  T(F): 96 (08-12-20 @ 07:03), Max: 96 (08-11-20 @ 23:34)  HR: 64 (08-12-20 @ 08:14)  BP: 115/56 (08-12-20 @ 07:03)  RR: 26 (08-12-20 @ 07:03)  SpO2: 93% (08-12-20 @ 08:14)  Wt(kg): --    08-10 @ 07:01  -  08-11 @ 07:00  --------------------------------------------------------  IN: 1796 mL / OUT: 2500 mL / NET: -704 mL    08-11 @ 07:01  -  08-12 @ 07:00  --------------------------------------------------------  IN: 96 mL / OUT: 1450 mL / NET: -1354 mL          PHYSICAL EXAM:  Constitutional: NAD  HEENT: anicteric sclera, oropharynx clear, MMM  Neck: No JVD  Respiratory: CTAB, no wheezes, rales or rhonchi  Cardiovascular: S1, S2, RRR  Gastrointestinal: BS+, soft, NT/ND  Extremities: No cyanosis or clubbing. No peripheral edema  :  No vergara.   Skin: No rashes    LABS:  08-12    141  |  104  |  108<HH>  ----------------------------<  115<H>  3.9   |  22  |  8.0<HH>    Ca    7.6<L>      12 Aug 2020 05:59  Phos  10.8     08-12  Mg     2.3     08-12    TPro  4.8<L>  /  Alb  1.0<L>  /  TBili  0.2  /  DBili      /  AST  36  /  ALT  7   /  AlkPhos  149<H>  08-12                          7.7    13.58 )-----------( 286      ( 12 Aug 2020 05:59 )             24.1       Urine Studies:      RADIOLOGY & ADDITIONAL STUDIES: Nephrology progress note  Patient is seen and examined, events over the last 24 h noted .  still intubated on MV     Allergies:  No Known Allergies    Hospital Medications:   MEDICATIONS  (STANDING):    aMIOdarone    Tablet 100 milliGRAM(s) Oral two times a day  heparin   Injectable 5000 Unit(s) SubCutaneous every 8 hours  insulin glargine Injectable (LANTUS) 12 Unit(s) SubCutaneous at bedtime  insulin lispro Injectable (HumaLOG) 3 Unit(s) SubCutaneous every 8 hours  midodrine 10 milliGRAM(s) Oral every 8 hours  norepinephrine Infusion 0.01 MICROgram(s)/kG/Min (1.58 mL/Hr) IV Continuous <Continuous>  pantoprazole   Suspension 40 milliGRAM(s) Enteral Tube daily  petrolatum Ophthalmic Ointment 1 Application(s) Both EYES daily  sevelamer carbonate Powder 1600 milliGRAM(s) Oral three times a day with meals  sodium bicarbonate 1300 milliGRAM(s) Oral three times a day  vancomycin    Solution 125 milliGRAM(s) Oral every 24 hours        VITALS:  T(F): 96 (08-12-20 @ 07:03), Max: 96 (08-11-20 @ 23:34)  HR: 64 (08-12-20 @ 08:14)  BP: 115/56 (08-12-20 @ 07:03)  RR: 26 (08-12-20 @ 07:03)  SpO2: 93% (08-12-20 @ 08:14)      08-10 @ 07:01  -  08-11 @ 07:00  --------------------------------------------------------  IN: 1796 mL / OUT: 2500 mL / NET: -704 mL    08-11 @ 07:01  -  08-12 @ 07:00  --------------------------------------------------------  IN: 96 mL / OUT: 1450 mL / NET: -1354 mL          PHYSICAL EXAM:  Constitutional: NAD  Neck: No JVD  Respiratory: CTAB, no wheezes, rales or rhonchi  Cardiovascular: S1, S2, RRR  Gastrointestinal: BS+, soft, NT/ND  Extremities: No cyanosis or clubbing. plus one edema   :  No vergara.   Skin: No rashes    LABS:  08-12    141  |  104  |  108<HH>  ----------------------------<  115<H>  3.9   |  22  |  8.0<HH>    Ca    7.6<L>      12 Aug 2020 05:59  Phos  10.8     08-12  Mg     2.3     08-12    TPro  4.8<L>  /  Alb  1.0<L>  /  TBili  0.2  /  DBili      /  AST  36  /  ALT  7   /  AlkPhos  149<H>  08-12                          7.7    13.58 )-----------( 286      ( 12 Aug 2020 05:59 )             24.1       Urine Studies:      RADIOLOGY & ADDITIONAL STUDIES:

## 2020-08-12 NOTE — PROGRESS NOTE ADULT - ASSESSMENT
· Assessment		  64 year old male pmh of DM, CAD with PCI x3 in 2017, HTN, DLD, trigeminal neuralgia who presents to ED from [facility] s/p TPA for stroke.    IMPRESSION;   #Pseudomonal VAP (panS), R-sided opacities>L    8/1 tracheal cx Pseudomonas  #Cardiopulmonary arrest (3 mins) nonshockable(8/5/2020)  #Trach, PEG  # CVA s/p TPA s/p thrombectomy (Basiliar Artery) - unresponsive off sedation, on pressors  - repeat CT head:  Interval increase in posterior fossa edema and mass effect consistent with evolving infarct  with resultant new effacement of the fourth ventricle and new mild obstructive hydrocephalus. Increased hypergenicity within the left ambient and quadrigeminal plate cisterns, and new hypodensity within the right ambient and quadrigeminal plate cisterns, the suprasellar cistern, the right sylvian fissure and scattered sulci suspicious worsening/new subarachnoid hemorrhage.  - remained ventilated, off sedation  - no neurologic activity.   #7/26 CD colitis: on po vancomycin. Diarrhea has decreased  #E coli bacteremia : secondary to  tract with UCx E coli but no overt pyuria. Could well also be secondary to translocation from the GI tract.  BCx 7/27 NGTD  CT Abdomen 7/28:  Liquid stool within the colon without evidence of obstruction or colonic wall thickening. No intraperitoneal free air. Bilateral small pleural effusions with associated opacities    RECOMMENDATIONS;  please repeat brain death protocol as pt has no function clinically  - maintain off ABx  -d/c po vancomycin after 4 more days

## 2020-08-12 NOTE — PROGRESS NOTE ADULT - RS GEN PE MLT RESP DETAILS PC
diminished breath sounds, R/diminished breath sounds, L
diminished breath sounds, R/diminished breath sounds, L
no rales/no rhonchi
diminished breath sounds, L/diminished breath sounds, R
diminished breath sounds, R

## 2020-08-12 NOTE — ADVANCED PRACTICE NURSE CONSULT - ASSESSMENT
64 year old male pmh of DM, CAD with PCI x3 in 2017, HTN, DLD, trigeminal neuralgia who presents to ED from [facility] s/p TPA for stroke.  History obtained from charts and daughter Vianney [phone] as Pt. is currently aphasic. Per daughter Pt. who is AOx3 at baseline was at home playing with grandchildren and became nausea, had 3 episodes of NBNB vomiting. After laying down for 30 min family found Pt. confused with aphasia and brought Pt. to [facility] ED.  At [facility] stroke code called initial NIHSS 9, CTA showed right vertebral artery occlusion and distal basilar artery thrombus. Other extensive atheromatous changes including moderate/severe subclavian and vertebral stenosis. Pt. was given TPA at 16:46 and sent to [facility] for neurointerventional eval and post TPA care. Pt. seen in ED with expressive aphasia although able to move all extremities with weakness of bilateral LE 1/5. Pt. was on cardine drip to maintain BP below 185. Interval Hx -While in ED RRT called as Pt. posturing and shaking of b/l upper extremities, not following any commands, Pt. was intubated for airway protection. Stat CT done no new hemmorage, CT perfusion Stat, Neuro sx and neurointerventional contacted. NI actual initiated.  Now s/p trach, PEG-8/1. Transferred from ICU to Vent unit 8/10; being managed for Acute hypoxemic respiratory failure SP tracheostomy / pseudomonas in DTA; Stroke SP TPA And thrombectomy; NAEL ATN non oliguric; E coli bacteremia treated; Sepsis; Septic shock; C Diff ; SP Cardiopulmonary arrest (3 mins); Functional quadriplegi    Received patient in Vent unit,  laying supine in bed, turned to left side (pillow under right side), HOB elevated 30 degrees, offloading boots to BLEs in place. Pt w/ anasarca, skin taut, pt nonresponsive, vented via trach, drips infusing, primary RN Silke made aware of purpose of WOCN visit, agreeable to consult.     Type of wound: Mucosal membrane pressure injury -device r/t to ET tube hodge Location: mid upper lip  Wound measurements: 1.5cm x 2cm x 0cm, true depth indeterminable at this time Tunneling/Undermining: none Wound bed: presenting as all scabbed over purple-maroon colored tissue Wound edges: attached, flush, irregular Periwound: intact Wound exudate: none Wound odor: none Induration, erythema, warmth: none Wound pain: no signs present     Type of wound: Deep tissue pressure injury  (DTPI)  Location: left ear Wound measurements: 2.5cm x 1.5cm x 0.1cm; true depth indeterminable at this time Tunneling/Undermining: none Wound bed: dry, purple scabbing over tissue Wound edges: attached, flush, irregular Periwound: intact Wound exudate: none Wound odor: scant amount of sanguinous drainage Induration, erythema, warmth: none Wound pain: no signs present     Type of wound: Stage 3 pressure injury Location: underneath trach plate Wound measurements: 1cm x 3cm x 0.3cm Tunneling/Undermining: none Wound bed: marbleized w/ yellow subcutaneous and yellow devitalized tissue     Wound edges: attached, flush, irregular Periwound: intact Wound exudate: serous drainage present on removed dressing Wound odor: none Induration, erythema, warmth: none Wound pain: no signs present     Type of wound: Deep tissue pressure injury (DTPI); evolving Location: coccyx Wound measurements: 5cm x 6cm x 0.2cm; true depth indeterminable at this time Tunneling/Undermining: none Wound bed: opening purple-maroon colored tissue Wound edges: attached, flush, irregular Periwound: intact Wound exudate: none Wound odor: none Induration, erythema, warmth: none Wound pain: no signs present     Patient bedbound, immobile, + vergara, + fecal management system/Dignishield w/ occasional leakage around tube, receiving TF via G Tube.

## 2020-08-12 NOTE — PROGRESS NOTE ADULT - ASSESSMENT
#NAEL on CKD, p/w creatinine 1.9, unknown baseline, ATN in the context of CVA/shock/cardiac arrest  - creatinine stable   - non oliguric   - DARSHAN noted, non-echogenic kidneys, no hydro   - phos noted, on binders continue current and add phoslo one tab q8h / Nepro for feeding   - cont sodium bicarbonate PO 1300mg TID.   - no indication for RRT, would not improve prognosis  - sp trach/PEG   - off antibx for now   -  BP noted, continue midodrine ,on levophed   - prognosis poor   - will follow

## 2020-08-12 NOTE — PROGRESS NOTE ADULT - SUBJECTIVE AND OBJECTIVE BOX
Patient is a 64y old  Male who presents with a chief complaint of Stroke s/p tpa (12 Aug 2020 14:28)  pt remain vented on peg tube feed  medical follow noted  renal f/u noted  ICU Vital Signs Last 24 Hrs  T(C): 34.9 (12 Aug 2020 16:10), Max: 35.6 (11 Aug 2020 23:34)  T(F): 94.9 (12 Aug 2020 16:10), Max: 96 (11 Aug 2020 23:34)  HR: 60 (12 Aug 2020 16:10) (60 - 65)  BP: 69/36 (12 Aug 2020 16:10) (69/36 - 116/57)  BP(mean): 48 (12 Aug 2020 16:10) (48 - 82)  RR: 26 (12 Aug 2020 16:10) (26 - 26)  SpO2: 93% (12 Aug 2020 08:14) (93% - 94%)      Drug Dosing Weight  Height (cm): 177.8 (17 Jul 2020 00:00)  Weight (kg): 84.5 (16 Jul 2020 20:42)  BMI (kg/m2): 26.7 (17 Jul 2020 00:00)  BSA (m2): 2.03 (17 Jul 2020 00:00)    I&O's Detail    11 Aug 2020 07:01  -  12 Aug 2020 07:00  --------------------------------------------------------  IN:    norepinephrine Infusion: 96 mL  Total IN: 96 mL    OUT:    Indwelling Catheter - Urethral: 700 mL    Intermittent Catheterization - Urethral: 400 mL    Rectal Tube: 350 mL  Total OUT: 1450 mL    Total NET: -1354 mL      12 Aug 2020 07:01  -  12 Aug 2020 16:16  --------------------------------------------------------  IN:  Total IN: 0 mL    OUT:    Indwelling Catheter - Urethral: 200 mL    Rectal Tube: 400 mL  Total OUT: 600 mL    Total NET: -600 mL     PHYSICAL EXAM:  Constitutional:  remain vented via trach  Gastrointestinal:  +peg tube in place  + rectal tube   MEDICATIONS  (STANDING):  aMIOdarone    Tablet 100 milliGRAM(s) Oral two times a day  chlorhexidine 0.12% Liquid 15 milliLiter(s) Oral Mucosa every 12 hours  chlorhexidine 4% Liquid 1 Application(s) Topical <User Schedule>  dextrose 50% Injectable 12.5 Gram(s) IV Push once  dextrose 50% Injectable 25 Gram(s) IV Push once  dextrose 50% Injectable 25 Gram(s) IV Push once  heparin   Injectable 5000 Unit(s) SubCutaneous every 8 hours  insulin glargine Injectable (LANTUS) 12 Unit(s) SubCutaneous at bedtime  insulin lispro Injectable (HumaLOG) 3 Unit(s) SubCutaneous every 8 hours  midodrine 10 milliGRAM(s) Oral every 8 hours  norepinephrine Infusion 0.01 MICROgram(s)/kG/Min (1.58 mL/Hr) IV Continuous <Continuous>  pantoprazole   Suspension 40 milliGRAM(s) Enteral Tube daily  petrolatum Ophthalmic Ointment 1 Application(s) Both EYES daily  sevelamer carbonate Powder 1600 milliGRAM(s) Oral three times a day with meals  sodium bicarbonate 1300 milliGRAM(s) Oral three times a day  vancomycin    Solution 125 milliGRAM(s) Oral every 24 hours      Diet, NPO with Tube Feed:   Tube Feeding Modality: Orogastric  Nepro with Carb Steady  Total Volume for 24 Hours (mL): 1200  Continuous  Starting Tube Feed Rate mL per Hour: 10  Increase Tube Feed Rate by (mL): 10     Every 4 hours  Until Goal Tube Feed Rate (mL per Hour): 50  Tube Feed Duration (in Hours): 24  Tube Feed Start Time: 13:30 (08-06-20 @ 13:17)      LABS  08-12    141  |  104  |  108<HH>  ----------------------------<  115<H>  3.9   |  22  |  8.0<HH>    Ca    7.6<L>      12 Aug 2020 05:59  Phos  10.8     08-12  Mg     2.3     08-12    TPro  4.8<L>  /  Alb  1.0<L>  /  TBili  0.2  /  DBili  x   /  AST  36  /  ALT  7   /  AlkPhos  149<H>  08-12                          7.7    13.58 )-----------( 286      ( 12 Aug 2020 05:59 )             24.1     CAPILLARY BLOOD GLUCOSE  POCT Blood Glucose.: 192 mg/dL (12 Aug 2020 13:23)  POCT Blood Glucose.: 139 mg/dL (12 Aug 2020 05:43)  POCT Blood Glucose.: 163 mg/dL (11 Aug 2020 20:56)  RADIOLOGY STUDIES  < from: Xray Chest 1 View- PORTABLE-Routine (08.11.20 @ 06:16) >  Impression:  Increased bilateral opacities/effusions.

## 2020-08-12 NOTE — PROGRESS NOTE ADULT - ASSESSMENT
ASSESSMENT/PLAN  Acute hypoxemic respiratory failure SP tracheostomy / pseudomonas in DTA  Stroke SP TPA And thrombectomy  NAEL ATN non oliguric   E coli bacteremia treated   Sepsis  Septic shock  C Diff   SP Cardiopulmonary arrest (3 mins)   Functional quadriplegia  hyperphosphatemia    renal f/u noted   tube feed change to Nepro   previous feed was provided 1300mg of phos  current feed 900mg of phos  suggest changing tube feed to [id number] and 120ml at 10pm daily

## 2020-08-12 NOTE — PROGRESS NOTE ADULT - ASSESSMENT
IMPRESSION:    Acute hypoxemic respiratory failure SP tracheostomy / pseudomonas in DTA  Stroke SP TPA And thrombectomy  NAEL ATN non oliguric   E coli bacteremia treated   Sepsis  Septic shock  C Diff   SP Cardiopulmonary arrest (3 mins)   Functional quadriplegia    PLAN:    CNS: FU MS    HEENT:  Oral care / Trach care    PULMONARY:  HOB @ 45 degrees. Vent settings: 450/26/60/5     CARDIOVASCULAR: I=O. Continue Rate control.        GI: GI prophylaxis. Continue Feeding.      RENAL: Cont sodium bicarbonate PO 1300mg TID. Nephrology following. Abbasi for retention      INFECTIOUS DISEASE: FU repeat cultures.  Finish ABX course per ID       HEMATOLOGICAL:  DVT prophylaxis.     ENDOCRINE:  Follow up FS    MUSCULOSKELETAL: Continue providing assistance with ADLS. Turn and Position q 2 hours. Passive ROM     CODE STATUS: FULL CODE    Poor Prognosis

## 2020-08-12 NOTE — PROGRESS NOTE ADULT - NSHPATTENDINGPLANDISCUSS_GEN_ALL_CORE
ICU team
ICU team
MICU
MICU team
TEAM
MICU
MICU
team
ACS Team
team
ACS Team
the patients family(sons, daughters and wife), MICU and NCC Teams
team
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

## 2020-08-12 NOTE — DISCHARGE NOTE FOR THE EXPIRED PATIENT - HOSPITAL COURSE
64 year old male pmh of DM, CAD with PCI x3 in 2017, HTN, DLD, trigeminal neuralgia who presents to ED from [facility] s/p TPA for stroke.  History obtained from charts and daughter Vianney [phone].   [place] [facility] code called initial NIHSS 9, CTA showed right vertebral artery occlusion and distal basilar artery thrombus. Other extensive atheromatous changes including moderate/severe subclavian and vertebral stenosis. Pt. was given TPA at 16:46 and sent to [facility] ER for neurointerventional eval and post TPA care. Interval Hx -While in ED RRT called as Pt. posturing and shaking of b/l upper extremities, not following any commands, Pt. was intubated for airway protection.   Now s/p trach, PEG-8/1. Transferred from ICU to Vent unit 8/10; being managed for Acute hypoxemic respiratory failure SP tracheostomy / pseudomonas in DTA; Stroke SP TPA And thrombectomy; NAEL ATN non oliguric; E coli bacteremia treated; Sepsis; Septic shock; C Diff ; SP Cardiopulmonary arrest (3 mins);   Patient was maxed out on levophed. He completed abx treatment. He had CODE BLUE in vent unit, no ROSC was obtained after CPR. Patient was pronounced dead at 1643.

## 2020-08-12 NOTE — PROGRESS NOTE ADULT - SUBJECTIVE AND OBJECTIVE BOX
ANA CRISTINA LOVING  64y, Male    All available historical data reviewed    OVERNIGHT EVENTS:  non responsive to all stimuli    ROS:  unable to obtain history secondary to patient's mental status and/or sedation   VITALS:  T(F): 96, Max: 96 (08-11-20 @ 23:34)  HR: 64  BP: 115/56  RR: 26Vital Signs Last 24 Hrs  T(C): 35.6 (12 Aug 2020 07:03), Max: 35.6 (11 Aug 2020 23:34)  T(F): 96 (12 Aug 2020 07:03), Max: 96 (11 Aug 2020 23:34)  HR: 64 (12 Aug 2020 08:14) (61 - 68)  BP: 115/56 (12 Aug 2020 07:03) (108/52 - 116/57)  BP(mean): 81 (12 Aug 2020 07:03) (75 - 82)  RR: 26 (12 Aug 2020 07:03) (26 - 26)  SpO2: 93% (12 Aug 2020 08:14) (93% - 94%)    TESTS & MEASUREMENTS:                        7.7    13.58 )-----------( 286      ( 12 Aug 2020 05:59 )             24.1     08-12    141  |  104  |  108<HH>  ----------------------------<  115<H>  3.9   |  22  |  8.0<HH>    Ca    7.6<L>      12 Aug 2020 05:59  Phos  10.8     08-12  Mg     2.3     08-12    TPro  4.8<L>  /  Alb  1.0<L>  /  TBili  0.2  /  DBili  x   /  AST  36  /  ALT  7   /  AlkPhos  149<H>  08-12    LIVER FUNCTIONS - ( 12 Aug 2020 05:59 )  Alb: 1.0 g/dL / Pro: 4.8 g/dL / ALK PHOS: 149 U/L / ALT: 7 U/L / AST: 36 U/L / GGT: x                   RADIOLOGY & ADDITIONAL TESTS:  Personal review of radiological diagnostics performed  Echo and EKG results noted when applicable.     MEDICATIONS:  acetaminophen   Tablet .. 650 milliGRAM(s) Oral every 6 hours PRN  aMIOdarone    Tablet 100 milliGRAM(s) Oral two times a day  chlorhexidine 0.12% Liquid 15 milliLiter(s) Oral Mucosa every 12 hours  chlorhexidine 4% Liquid 1 Application(s) Topical <User Schedule>  dextrose 40% Gel 15 Gram(s) Oral once PRN  dextrose 50% Injectable 12.5 Gram(s) IV Push once  dextrose 50% Injectable 25 Gram(s) IV Push once  dextrose 50% Injectable 25 Gram(s) IV Push once  glucagon  Injectable 1 milliGRAM(s) IntraMuscular once PRN  heparin   Injectable 5000 Unit(s) SubCutaneous every 8 hours  insulin glargine Injectable (LANTUS) 12 Unit(s) SubCutaneous at bedtime  insulin lispro Injectable (HumaLOG) 3 Unit(s) SubCutaneous every 8 hours  midodrine 10 milliGRAM(s) Oral every 8 hours  norepinephrine Infusion 0.01 MICROgram(s)/kG/Min IV Continuous <Continuous>  pantoprazole   Suspension 40 milliGRAM(s) Enteral Tube daily  petrolatum Ophthalmic Ointment 1 Application(s) Both EYES daily  sevelamer carbonate Powder 1600 milliGRAM(s) Oral three times a day with meals  sodium bicarbonate 1300 milliGRAM(s) Oral three times a day  vancomycin    Solution 125 milliGRAM(s) Oral every 24 hours      ANTIBIOTICS:  vancomycin    Solution 125 milliGRAM(s) Oral every 24 hours

## 2020-08-12 NOTE — PROGRESS NOTE ADULT - REASON FOR ADMISSION
Stroke s/p tpa

## 2020-08-12 NOTE — PROGRESS NOTE ADULT - SUBJECTIVE AND OBJECTIVE BOX
ANA CRISTINA LOVING  64y  Male    Patient is a 64y old  Male who presents with a chief complaint of Stroke s/p tpa (12 Aug 2020 09:08)      SUBJECTIVE: No overnight events    Vent: yes   Sedation: no   Pressors: yes   Bowel Movements: yes dignishield in place       PAST MEDICAL & SURGICAL HISTORY:  HTN (hypertension)  Diabetes  No significant past surgical history      OBJECTIVE:    Vital Signs Last 24 Hrs  T(C): 35.6 (12 Aug 2020 07:03), Max: 35.6 (11 Aug 2020 23:34)  T(F): 96 (12 Aug 2020 07:03), Max: 96 (11 Aug 2020 23:34)  HR: 64 (12 Aug 2020 08:14) (61 - 68)  BP: 115/56 (12 Aug 2020 07:03) (108/52 - 116/57)  BP(mean): 81 (12 Aug 2020 07:03) (75 - 82)  RR: 26 (12 Aug 2020 07:03) (26 - 26)  SpO2: 93% (12 Aug 2020 08:14) (93% - 94%)    General: No apparent distress  HEENT: + trach               Neck: No JVD, No Cervical LN    Lungs: Bilateral BS, CTA  Cardiovascular: +S1 S2  Abdomen: Soft, nontender  Extremities: Pulses intact  Skin: dti, l ear, trach, lip   Neurological: non Focal     I&O's Summary    11 Aug 2020 07:01  -  12 Aug 2020 07:00  --------------------------------------------------------  IN: 96 mL / OUT: 1450 mL / NET: -1354 mL          LABS:                          7.7    13.58 )-----------( 286      ( 12 Aug 2020 05:59 )             24.1                                               08-12    141  |  104  |  108<HH>  ----------------------------<  115<H>  3.9   |  22  |  8.0<HH>    Ca    7.6<L>      12 Aug 2020 05:59  Phos  10.8     08-12  Mg     2.3     08-12    TPro  4.8<L>  /  Alb  1.0<L>  /  TBili  0.2  /  DBili  x   /  AST  36  /  ALT  7   /  AlkPhos  149<H>  08-12                                                                                           LIVER FUNCTIONS - ( 12 Aug 2020 05:59 )  Alb: 1.0 g/dL / Pro: 4.8 g/dL / ALK PHOS: 149 U/L / ALT: 7 U/L / AST: 36 U/L / GGT: x                                                                                               Mode: AC/ CMV (Assist Control/ Continuous Mandatory Ventilation)  RR (machine): 26  TV (machine): 450  FiO2: 70  PEEP: 5  ITime: 1  MAP: 11  PIP: 27                                            MEDICATIONS  (STANDING):  aMIOdarone    Tablet 100 milliGRAM(s) Oral two times a day  chlorhexidine 0.12% Liquid 15 milliLiter(s) Oral Mucosa every 12 hours  chlorhexidine 4% Liquid 1 Application(s) Topical <User Schedule>  dextrose 50% Injectable 12.5 Gram(s) IV Push once  dextrose 50% Injectable 25 Gram(s) IV Push once  dextrose 50% Injectable 25 Gram(s) IV Push once  heparin   Injectable 5000 Unit(s) SubCutaneous every 8 hours  insulin glargine Injectable (LANTUS) 12 Unit(s) SubCutaneous at bedtime  insulin lispro Injectable (HumaLOG) 3 Unit(s) SubCutaneous every 8 hours  midodrine 10 milliGRAM(s) Oral every 8 hours  norepinephrine Infusion 0.01 MICROgram(s)/kG/Min (1.58 mL/Hr) IV Continuous <Continuous>  pantoprazole   Suspension 40 milliGRAM(s) Enteral Tube daily  petrolatum Ophthalmic Ointment 1 Application(s) Both EYES daily  sevelamer carbonate Powder 1600 milliGRAM(s) Oral three times a day with meals  sodium bicarbonate 1300 milliGRAM(s) Oral three times a day  vancomycin    Solution 125 milliGRAM(s) Oral every 24 hours    MEDICATIONS  (PRN):  acetaminophen   Tablet .. 650 milliGRAM(s) Oral every 6 hours PRN Temp greater or equal to 38C (100.4F)  dextrose 40% Gel 15 Gram(s) Oral once PRN Blood Glucose LESS THAN 70 milliGRAM(s)/deciliter  glucagon  Injectable 1 milliGRAM(s) IntraMuscular once PRN Glucose LESS THAN 70 milligrams/deciliter

## 2021-05-12 NOTE — PROGRESS NOTE ADULT - GUM GEN PE MLT EXAM PC
Normal genitalia; no lesions; no discharge
Home
Normal genitalia; no lesions; no discharge

## 2022-09-28 NOTE — PROGRESS NOTE ADULT - ASSESSMENT
Pt would like call has many questions about mri of back IMPRESSION:    Acute hypoxemic respiratory failure SP tracheostomy / pseudomonas in DTA  Stroke SP TPA And thrombectomy  NAEL ATN non oliguric   E coli bacteremia treated   Sepsis  Septic shock  CDiff   hypernatremia improving  Cardiopulmonary arrest(3 mins) nonshockable(8/5/2020)  PLAN:    CNS: FU with Neurology. Keep off sedation.     HEENT:  Oral care / Trach care     PULMONARY:  HOB @ 45 degrees.  Vent changes:  Wean O2.  DEC fio2 to 40%. no change in vent settings.    CARDIOVASCULAR: I=O. Continue Rate control.        GI: GI prophylaxis. Increase trickle  feeding as tolerated , free water q 12h    RENAL:  F/u repeat lytes 4 PM.  free water,Continue bicarb, f/u renal     INFECTIOUS DISEASE: FU repeat cultures.  Pan cultures Rakan(7 of 10) and Vanc po.      HEMATOLOGICAL:  DVT prophylaxis.     ENDOCRINE:  Follow up FS.  Insulin protocol if needed.    CODE STATUS: FULL CODE    MICU    Overall very Poor prognosis  Palliative care IMPRESSION:    Acute hypoxemic respiratory failure SP tracheostomy / pseudomonas in DTA  Stroke SP TPA And thrombectomy  NAEL ATN non oliguric   E coli bacteremia treated   Sepsis  Septic shock  CDiff   hypernatremia improving  Cardiopulmonary arrest (3 mins) nonshockable(8/5/2020)    PLAN:    CNS: FU with Neurology. Keep off sedation.     HEENT:  Oral care / Trach care     PULMONARY:  HOB @ 45 degrees.  Vent changes:  Wean O2.  DEC fio2 to 40%. no change in vent settings.    CARDIOVASCULAR: I=O. Continue Rate control.        GI: GI prophylaxis. Increase trickle  feeding as tolerated , free water q 12h    RENAL:  F/u repeat lytes 4 PM.  free water, Continue bicarb, f/u renal     INFECTIOUS DISEASE: FU repeat cultures.  Pan cultures Rakan(7 of 10) and Vanc po.      HEMATOLOGICAL:  DVT prophylaxis.     ENDOCRINE:  Follow up FS.  Insulin protocol if needed.    CODE STATUS: FULL CODE    MICU    Overall very Poor prognosis  Palliative care

## 2022-11-06 NOTE — ED ADULT NURSE NOTE - NSFALLRSKHMRSKTYOTFT_ED_ALL_ED
Right foot very dirty with tenderness to just below the right fifth toe; left foot very dirty with tenderness to the left heel; no erythema, no discharge. code stroke Right foot very dirty with tenderness to just below the right fifth toe; left foot very dirty with tenderness to the left heel; no erythema, no discharge.    Vital signs reviewed and are as documented.  GENERAL: no acute distress, no conversational dyspnea, no lethargy  HEAD: Atraumatic  ENT: no drooling, no muffled voice, no trismus  NECK: trachea midline, no visible masses, no visible JVD  CARDIO:  HR as documented in vital signs  RESPIRATORY: no conversational dyspnea, No respiratory distress.  No visible increased work of breathing  EXTREMITIES: moves all extremities spontaneously  NEURO: Awake and alert.  No gross motor deficits.  Ambulates independently.

## 2023-01-20 NOTE — DIETITIAN INITIAL EVALUATION ADULT. - SIGNS/SYMPTOMS
You can access the FollowMyHealth Patient Portal offered by [facility] by registering at the following website: [email or web address]. By joining [facility]’s FollowMyHealth portal, you will also be able to view your health information using other applications (apps) compatible with our system.
NPO day 3

## 2023-03-23 NOTE — ED ADULT NURSE NOTE - NS ED NURSE REPORT GIVEN TO FT
report given by Dominic Gonzalez RN A shift Propranolol Pregnancy And Lactation Text: This medication is Pregnancy Category C and it isn't known if it is safe during pregnancy. It is excreted in breast milk.

## 2023-07-28 NOTE — CHART NOTE - NSCHARTNOTEFT_GEN_A_CORE
NEUROENDOVASCULAR BRIEF-OPERATIVE NOTE    1- Provider Specialty: Neuroendovascular Surgery    2- Type of Procedure:  Mechanical Thrombectomy     3- Pre-Procedure Diagnosis: Acute Thrombus within the distal basilar artery measuring about 6 mm in length and causing near occlusion       4- Post-Procedure Diagnosis: S/p Mechanical thrombectomy of aneurysmal basilar occlusion (TICI 3 achieved)    5- First  (Attending): Dr. Wen Abebe    6- Anesthesiologist (Attending): Simran Lira    7- Type of Anesthesia:   [x] General Anesthesia  [ ] Sedation  [ ] Local/Regional    8- Amount and Type of Contrast: Visipaque 320 -     9- Estimated Blood Loss: Approx 250cc    10- Patient's Condition:   [X ] Critical  [ ] Serious  [ ] Fair   [ ] Good    11- Specimens Removed:     12- Implants Placed:   [  ] XACT Carotid Stent   [  ] Closure device with Mynxgrip or Angioseal  [  ] Specify other implants      13- Complications: No complications during procedure             Impression   64 year old male with pmh of DM, CAD with PCI x3 in 2017, HTN, DLD, presented to  [facility] initially for expressive aphasia and confusion with NIHSS of 9 was s/p tpa at 16:46 hrs . CTA H/N revealed a right vertebral artery occlusion and distal basilar artery thrombus. Patient was sent to [facility] for neurointerventional eval and post TPA care. On initial neuro eval at [place] , patients expressive aphasia persisted . Pt. was on cardine drip to maintain BP below 185. While in ED Rapid response team was activated for sudden unresponsiveness, witnessed posturing. Pt. was intubated for airway protection and his NIHSS worsened to 39 at this time. Stat CTH was negative for bleed, CT perfusion Stat, with basilar artery near occlusion. Code NI actual was activated and patient was transferred to IR. Patient is s/p mechanical thrombectomy with full recanalization of aneurysmal basilar artery; procedure was completed under general anesthesia.  Post procedurally patient  remains mechanically vented and has an NIHSS of 40.           Suggestions:  - Continue Integrilin drip , obtain cbc stat and then q 4, monitor platelets closely  - Maintain SBP goal: 120-160  - Keep lower extremity straight, maintain reverse Trendelenburg position for 4 hours  - Neuro checks including NIHSS, neurovascular checks including groin and distal pedal pulses, vitals Q15 min x 2 hrs, then Q30 min x 4 hrs, and then Q1h x 18 hrs  - Maintain stirct I/O  - Start Lipitor 80 mg q 24  - Follow institutional protocol for IV-tPA  - STAT CT head for any acute neurological changes and call Stroke Code  - Above Plan was communicated with ICU resident and RN       Disposition: ICU No very little and has signs of dehydration (e.g., no urine > 12 hours, very dry mouth, very lightheaded)   Negative: Patient sounds very sick or weak to the triager   Negative: SEVERE diarrhea (e.g., 7 or more times / day more than normal) and present > 24 hours (1 day)    Protocols used: Diarrhea-A-OH     NEUROENDOVASCULAR BRIEF-OPERATIVE NOTE    1- Provider Specialty: Neuroendovascular Surgery    2- Type of Procedure:  Mechanical Thrombectomy     3- Pre-Procedure Diagnosis: Acute Thrombus within the distal basilar artery measuring about 6 mm in length and causing near occlusion       4- Post-Procedure Diagnosis: S/p Mechanical thrombectomy of aneurysmal basilar occlusion (TICI 3 achieved)    5- First  (Attending): Dr. Wen Abebe    6- Anesthesiologist (Attending): Simran Lira    7- Type of Anesthesia:   [x] General Anesthesia  [ ] Sedation  [ ] Local/Regional    8- Amount and Type of Contrast: Visipaque 320 -     9- Estimated Blood Loss: Approx 250cc    10- Patient's Condition:   [X ] Critical  [ ] Serious  [ ] Fair   [ ] Good    11- Specimens Removed:     12- Implants Placed:   [  ] XACT Carotid Stent   [  ] Closure device with Mynxgrip or Angioseal  [  ] Specify other implants      13- Complications: No complications during procedure             Impression   64 year old male with pmh of DM, CAD with PCI x3 in 2017, HTN, DLD, presented to  [facility] initially for expressive aphasia and confusion with NIHSS of 9 was s/p tpa at 16:46 hrs . CTA H/N revealed a right vertebral artery occlusion and distal basilar artery thrombus. Patient was sent to [facility] for neurointerventional eval and post TPA care. On initial neuro eval at [place] , patients expressive aphasia persisted . Pt. was on cardine drip to maintain BP below 185. While in ED Rapid response team was activated for sudden unresponsiveness, witnessed posturing. Pt. was intubated for airway protection and his NIHSS worsened to 39 at this time. Stat CTH was negative for bleed, CT perfusion Stat, with basilar artery near occlusion. Code NI actual was activated and patient was transferred to IR. Patient is s/p mechanical thrombectomy with full recanalization of aneurysmal basilar artery; procedure was completed under general anesthesia.  Post procedurally Repeat HCT showed contrast staining and no bleed. Patient Currently in ICU and  remains mechanically vented and has an NIHSS of 40.           Suggestions:  - Continue Integrilin drip , obtain cbc stat and then q 4, monitor platelets closely  - Maintain SBP goal: 120-160  - Keep lower extremity straight, maintain reverse Trendelenburg position for 4 hours  - Neuro checks including NIHSS, neurovascular checks including groin and distal pedal pulses, vitals Q15 min x 2 hrs, then Q30 min x 4 hrs, and then Q1h x 18 hrs  - Maintain stirct I/O  - Start Lipitor 80 mg q 24  - Follow institutional protocol for IV-tPA  - STAT CT head for any acute neurological changes and call Stroke Code  - Above Plan was communicated with ICU resident and RN       Disposition: ICU
